# Patient Record
Sex: MALE | Race: BLACK OR AFRICAN AMERICAN | NOT HISPANIC OR LATINO | Employment: OTHER | URBAN - METROPOLITAN AREA
[De-identification: names, ages, dates, MRNs, and addresses within clinical notes are randomized per-mention and may not be internally consistent; named-entity substitution may affect disease eponyms.]

---

## 2017-09-02 ENCOUNTER — HOSPITAL ENCOUNTER (EMERGENCY)
Facility: HOSPITAL | Age: 67
Discharge: HOME | End: 2017-09-02
Attending: EMERGENCY MEDICINE

## 2017-09-02 VITALS
TEMPERATURE: 99 F | HEART RATE: 50 BPM | OXYGEN SATURATION: 99 % | DIASTOLIC BLOOD PRESSURE: 59 MMHG | SYSTOLIC BLOOD PRESSURE: 111 MMHG | RESPIRATION RATE: 18 BRPM

## 2017-09-02 DIAGNOSIS — R07.9 CHEST PAIN: ICD-10-CM

## 2017-09-02 LAB
ALBUMIN SERPL BCP-MCNC: 4.1 G/DL
ALP SERPL-CCNC: 78 U/L
ALT SERPL W/O P-5'-P-CCNC: 28 U/L
ANION GAP SERPL CALC-SCNC: 9 MMOL/L
APTT BLDCRRT: 26.3 SEC
AST SERPL-CCNC: 28 U/L
BASOPHILS # BLD AUTO: 0.03 K/UL
BASOPHILS NFR BLD: 0.7 %
BILIRUB SERPL-MCNC: 0.6 MG/DL
BUN SERPL-MCNC: 19 MG/DL
CALCIUM SERPL-MCNC: 9.9 MG/DL
CHLORIDE SERPL-SCNC: 101 MMOL/L
CO2 SERPL-SCNC: 28 MMOL/L
CREAT SERPL-MCNC: 1.2 MG/DL
D DIMER PPP FEU-MCNC: <200 NG/ML
DIFFERENTIAL METHOD: ABNORMAL
EOSINOPHIL # BLD AUTO: 0.1 K/UL
EOSINOPHIL NFR BLD: 1.6 %
ERYTHROCYTE [DISTWIDTH] IN BLOOD BY AUTOMATED COUNT: 14.9 %
EST. GFR  (AFRICAN AMERICAN): >60 ML/MIN/1.73 M^2
EST. GFR  (NON AFRICAN AMERICAN): >60 ML/MIN/1.73 M^2
GLUCOSE SERPL-MCNC: 118 MG/DL
HCT VFR BLD AUTO: 45.9 %
HGB BLD-MCNC: 14.8 G/DL
INR PPP: 1.3
LYMPHOCYTES # BLD AUTO: 1.5 K/UL
LYMPHOCYTES NFR BLD: 34.7 %
MAGNESIUM SERPL-MCNC: 2.2 MG/DL
MCH RBC QN AUTO: 28 PG
MCHC RBC AUTO-ENTMCNC: 32.2 G/DL
MCV RBC AUTO: 87 FL
MONOCYTES # BLD AUTO: 0.6 K/UL
MONOCYTES NFR BLD: 14.2 %
NEUTROPHILS # BLD AUTO: 2.1 K/UL
NEUTROPHILS NFR BLD: 48.1 %
NT-PROBNP: 40 PG/ML
PLATELET # BLD AUTO: 207 K/UL
PMV BLD AUTO: 10.4 FL
POTASSIUM SERPL-SCNC: 4.5 MMOL/L
PROT SERPL-MCNC: 7.5 G/DL
PROTHROMBIN TIME: 13.9 SEC
RBC # BLD AUTO: 5.28 M/UL
SODIUM SERPL-SCNC: 138 MMOL/L
TROPONIN I SERPL DL<=0.01 NG/ML-MCNC: <0.012 NG/ML
WBC # BLD AUTO: 4.3 K/UL

## 2017-09-02 PROCEDURE — 99284 EMERGENCY DEPT VISIT MOD MDM: CPT | Mod: 25

## 2017-09-02 PROCEDURE — 93010 ELECTROCARDIOGRAM REPORT: CPT | Mod: 76,,, | Performed by: INTERNAL MEDICINE

## 2017-09-02 PROCEDURE — 83880 ASSAY OF NATRIURETIC PEPTIDE: CPT

## 2017-09-02 PROCEDURE — 93005 ELECTROCARDIOGRAM TRACING: CPT

## 2017-09-02 PROCEDURE — 80053 COMPREHEN METABOLIC PANEL: CPT

## 2017-09-02 PROCEDURE — 85025 COMPLETE CBC W/AUTO DIFF WBC: CPT

## 2017-09-02 PROCEDURE — 83735 ASSAY OF MAGNESIUM: CPT

## 2017-09-02 PROCEDURE — 84484 ASSAY OF TROPONIN QUANT: CPT

## 2017-09-02 PROCEDURE — 25000003 PHARM REV CODE 250: Performed by: EMERGENCY MEDICINE

## 2017-09-02 PROCEDURE — 85730 THROMBOPLASTIN TIME PARTIAL: CPT

## 2017-09-02 PROCEDURE — 27000221 HC OXYGEN, UP TO 24 HOURS

## 2017-09-02 PROCEDURE — 85610 PROTHROMBIN TIME: CPT

## 2017-09-02 PROCEDURE — 85379 FIBRIN DEGRADATION QUANT: CPT

## 2017-09-02 RX ORDER — LISINOPRIL 20 MG/1
20 TABLET ORAL DAILY
COMMUNITY
End: 2019-06-23

## 2017-09-02 RX ORDER — NAPROXEN SODIUM 220 MG/1
81 TABLET, FILM COATED ORAL DAILY
COMMUNITY
End: 2019-06-23

## 2017-09-02 RX ORDER — TAMSULOSIN HYDROCHLORIDE 0.4 MG/1
0.4 CAPSULE ORAL DAILY
COMMUNITY
End: 2022-12-21 | Stop reason: SDUPTHER

## 2017-09-02 RX ORDER — BISOPROLOL FUMARATE AND HYDROCHLOROTHIAZIDE 2.5; 6.25 MG/1; MG/1
1 TABLET ORAL DAILY
COMMUNITY
End: 2019-06-23

## 2017-09-02 RX ORDER — PHENAZOPYRIDINE HYDROCHLORIDE 200 MG/1
200 TABLET, FILM COATED ORAL 3 TIMES DAILY PRN
COMMUNITY
End: 2019-06-23

## 2017-09-02 RX ORDER — ASPIRIN 325 MG
325 TABLET ORAL ONCE
Status: COMPLETED | OUTPATIENT
Start: 2017-09-02 | End: 2017-09-02

## 2017-09-02 RX ORDER — NIFEDIPINE 30 MG/1
30 TABLET, FILM COATED, EXTENDED RELEASE ORAL DAILY
COMMUNITY
End: 2019-06-23

## 2017-09-02 RX ORDER — CEFUROXIME AXETIL 500 MG/1
500 TABLET ORAL 2 TIMES DAILY
COMMUNITY
End: 2019-05-18

## 2017-09-02 RX ADMIN — ASPIRIN 325 MG ORAL TABLET 325 MG: 325 PILL ORAL at 06:09

## 2017-09-02 RX ADMIN — NITROGLYCERIN 1 INCH: 20 OINTMENT TOPICAL at 06:09

## 2017-09-02 NOTE — ED PROVIDER NOTES
"Encounter Date: 9/2/2017       History     Chief Complaint   Patient presents with    Chest Pain     substernal pain for 3 days     This patient is a 67-year-old male, presents to the emergency department complaining of chest discomfort.  Started 2 days ago while he was sitting down at home.  Retrosternal pressure sensation, associated with one episode of diaphoresis, intermittent shortness of breath.  Has been nearly constant, but does wax and wane.  Gets better if he sits down and relaxes, gets worse if he gets up and exerts himself.  No associated palpitations nausea or vomiting.    In the early 1990s the patient was diagnosed with some form of valvular disease.  He does not know which valve is urged to and, but says that he was told it is "leaky", has not required surgical repair, and replacement, and does not have heart failure.  He also reports that he has had 3 angiograms over the years, the most recently was about 2-3 months ago.  After one of the angiograms she was told that one of the vessels had a 20% obstruction, but after the most recent angiogram he was told everything was normal.    He has never had chest pain like he has been experiencing in the last few days until this event.    He has a history of hypertension, he does not have diabetes, or hypercholesterolemia.  He has a remote history of smoking, but it is been several decades.          Review of patient's allergies indicates:  No Known Allergies  Past Medical History:   Diagnosis Date    BPH (benign prostatic hyperplasia)     Hypertension      No past surgical history on file.  No family history on file.  Social History   Substance Use Topics    Smoking status: Never Smoker    Smokeless tobacco: Not on file    Alcohol use No     Review of Systems   Constitutional: Positive for diaphoresis. Negative for chills and fever.   HENT: Negative for congestion and rhinorrhea.    Eyes: Negative for visual disturbance.   Respiratory: Positive for " shortness of breath. Negative for cough and wheezing.    Cardiovascular: Positive for chest pain. Negative for palpitations and leg swelling.   Gastrointestinal: Negative for abdominal pain, diarrhea, nausea and vomiting.   Endocrine: Negative for polyuria.        Has a history of diabetes mellitus   Genitourinary: Negative for dysuria and hematuria.   Musculoskeletal: Negative for arthralgias and myalgias.   Skin: Negative for rash.   Allergic/Immunologic: Negative for immunocompromised state.   Neurological: Negative for syncope and light-headedness.   Hematological:        No history of malignancy DVT or PE   Psychiatric/Behavioral: Negative for confusion.       Physical Exam     Initial Vitals [09/02/17 1736]   BP Pulse Resp Temp SpO2   (!) 145/78 (!) 53 20 98.8 °F (37.1 °C) 100 %      MAP       100.33         Physical Exam    Nursing note and vitals reviewed.  Constitutional: He appears well-developed and well-nourished. He is not diaphoretic. No distress.   HENT:   Head: Normocephalic.   Right Ear: External ear normal.   Left Ear: External ear normal.   Nose: Nose normal.   Mouth/Throat: Oropharynx is clear and moist.   Eyes: Conjunctivae and EOM are normal. Pupils are equal, round, and reactive to light.   Neck: No JVD present.   Cardiovascular: Normal rate, regular rhythm, normal heart sounds and intact distal pulses.   No murmur heard.  Pulmonary/Chest: Breath sounds normal. No stridor. No respiratory distress. He has no wheezes.   Abdominal: Soft. Bowel sounds are normal. He exhibits no distension. There is no tenderness.   Musculoskeletal: He exhibits no edema or tenderness.   Neurological: He is alert and oriented to person, place, and time. He has normal strength. No sensory deficit.   Skin: Skin is warm and dry. No rash noted.   Psychiatric: He has a normal mood and affect.         ED Course   Procedures  Labs Reviewed   CBC W/ AUTO DIFFERENTIAL - Abnormal; Notable for the following:        Result  Value    RDW 14.9 (*)     All other components within normal limits   COMPREHENSIVE METABOLIC PANEL - Abnormal; Notable for the following:     Glucose 118 (*)     All other components within normal limits   PROTIME-INR - Abnormal; Notable for the following:     Prothrombin Time 13.9 (*)     INR 1.3 (*)     All other components within normal limits   APTT   MAGNESIUM   TROPONIN I   D DIMER   NT-PRO NATRIURETIC PEPTIDE       Results for orders placed or performed during the hospital encounter of 09/02/17   APTT   Result Value Ref Range    aPTT 26.3 21.0 - 32.0 sec   CBC auto differential   Result Value Ref Range    WBC 4.30 3.90 - 12.70 K/uL    RBC 5.28 4.60 - 6.20 M/uL    Hemoglobin 14.8 14.0 - 18.0 g/dL    Hematocrit 45.9 40.0 - 54.0 %    MCV 87 82 - 98 fL    MCH 28.0 27.0 - 31.0 pg    MCHC 32.2 32.0 - 36.0 g/dL    RDW 14.9 (H) 11.5 - 14.5 %    Platelets 207 150 - 350 K/uL    MPV 10.4 9.2 - 12.9 fL    Gran # 2.1 1.8 - 7.7 K/uL    Lymph # 1.5 1.0 - 4.8 K/uL    Mono # 0.6 0.3 - 1.0 K/uL    Eos # 0.1 0.0 - 0.5 K/uL    Baso # 0.03 0.00 - 0.20 K/uL    Gran% 48.1 38.0 - 73.0 %    Lymph% 34.7 18.0 - 48.0 %    Mono% 14.2 4.0 - 15.0 %    Eosinophil% 1.6 0.0 - 8.0 %    Basophil% 0.7 0.0 - 1.9 %    Differential Method Automated    Comprehensive metabolic panel   Result Value Ref Range    Sodium 138 136 - 145 mmol/L    Potassium 4.5 3.5 - 5.1 mmol/L    Chloride 101 95 - 110 mmol/L    CO2 28 23 - 29 mmol/L    Glucose 118 (H) 70 - 110 mg/dL    BUN, Bld 19 2 - 20 mg/dL    Creatinine 1.20 0.50 - 1.40 mg/dL    Calcium 9.9 8.7 - 10.5 mg/dL    Total Protein 7.5 6.0 - 8.4 g/dL    Albumin 4.1 3.5 - 5.2 g/dL    Total Bilirubin 0.6 0.1 - 1.0 mg/dL    Alkaline Phosphatase 78 38 - 126 U/L    AST 28 15 - 46 U/L    ALT 28 10 - 44 U/L    Anion Gap 9 8 - 16 mmol/L    eGFR if African American >60.0 >60 mL/min/1.73 m^2    eGFR if non African American >60.0 >60 mL/min/1.73 m^2   Magnesium   Result Value Ref Range    Magnesium 2.2 1.6 - 2.6 mg/dL    Protime-INR   Result Value Ref Range    Prothrombin Time 13.9 (H) 9.0 - 12.5 sec    INR 1.3 (H) 0.8 - 1.2   Troponin I   Result Value Ref Range    Troponin I <0.012 0.012 - 0.034 ng/mL   D Dimer   Result Value Ref Range    D-Dimer <200 <230 ng/mL   NT-Pro Natriuretic Peptide   Result Value Ref Range    NT-proBNP 40 5 - 900 pg/mL       EKG Readings: (Independently Interpreted)   I independently reviewed the EKG tracing.  It shows sinus bradycardia, heart rate is 55.  There is an incomplete right bundle branch block pattern.  No acute ST elevation or depression.       X-Rays:   Independently Interpreted Readings:   Other Readings:  I independently reviewed the chest x-ray.  Heart size is normal.  It is underpenetrated film, but I do not see any distinct infiltrates or effusions, appears normal    Medical Decision Making:   Initial Assessment:   Chest pain.  This patient has a history of cardiac valvular disease, we do not know the specifics.  He had a left heart catheterization in June.  We obtained the report from [facility], and he had angiographically normal coronary arteries with normal LV systolic function and mild diastolic dysfunction.    His d-dimer is not elevated, so the likelihood of pulmonary emboli is low.  I asked x-ray shows no evidence of pneumonia, and his cardiac/mediastinal silhouette looked normal, allowing for the suboptimal film, and he has normal distal pulses, so the likelihood of an aortic catastrophe is also very low.    Given the normal left heart catheterization 2 months ago, I feel it is safe to discharge him home, it is unlikely that the discomfort he is experiencing is cardiogenic.  He should follow-up with his primary care physician, Dr. Barker.  The patient's wife says that they saw Dr. Barker yesterday, and that he was placed on Protonix for this problem.  He should continue that medication.                   ED Course      Clinical Impression:   The primary encounter diagnosis was Chest  pain with noncardiac features. A diagnosis of Chest pain was also pertinent to this visit.    Disposition:   Disposition: Discharged  Condition: Stable                        Raheel Kumar MD  09/02/17 2994

## 2017-09-03 NOTE — ED NOTES
Report received from Mesha at bedside. Pt awake, alert, oriented with family at bedside. Pt denies any chest pain or needs at this time. Pt updated on current plan of care and pt and family states they understand.

## 2017-09-03 NOTE — ED NOTES
Pt updated on current plan of care, awaiting heart cath results from [facility]. Pt and family state they understand.

## 2019-03-25 ENCOUNTER — HOSPITAL ENCOUNTER (EMERGENCY)
Facility: HOSPITAL | Age: 69
Discharge: HOME | End: 2019-03-25
Attending: FAMILY MEDICINE
Payer: MEDICARE

## 2019-03-25 VITALS
WEIGHT: 225 LBS | OXYGEN SATURATION: 97 % | TEMPERATURE: 98 F | DIASTOLIC BLOOD PRESSURE: 58 MMHG | HEART RATE: 53 BPM | RESPIRATION RATE: 19 BRPM | SYSTOLIC BLOOD PRESSURE: 99 MMHG | BODY MASS INDEX: 32.21 KG/M2 | HEIGHT: 70 IN

## 2019-03-25 DIAGNOSIS — R07.9 CHEST PAIN: Primary | ICD-10-CM

## 2019-03-25 LAB
ALBUMIN SERPL BCP-MCNC: 4 G/DL (ref 3.5–5.2)
ALP SERPL-CCNC: 96 U/L (ref 38–126)
ALT SERPL W/O P-5'-P-CCNC: 25 U/L (ref 10–44)
ANION GAP SERPL CALC-SCNC: 5 MMOL/L (ref 8–16)
AST SERPL-CCNC: 31 U/L (ref 15–46)
BASOPHILS # BLD AUTO: 0.03 K/UL (ref 0–0.2)
BASOPHILS NFR BLD: 0.6 % (ref 0–1.9)
BILIRUB SERPL-MCNC: 0.5 MG/DL (ref 0.1–1)
BUN SERPL-MCNC: 18 MG/DL (ref 2–20)
CALCIUM SERPL-MCNC: 9.3 MG/DL (ref 8.7–10.5)
CHLORIDE SERPL-SCNC: 100 MMOL/L (ref 95–110)
CO2 SERPL-SCNC: 31 MMOL/L (ref 23–29)
CREAT SERPL-MCNC: 1.19 MG/DL (ref 0.5–1.4)
DIFFERENTIAL METHOD: NORMAL
EOSINOPHIL # BLD AUTO: 0.2 K/UL (ref 0–0.5)
EOSINOPHIL NFR BLD: 4.7 % (ref 0–8)
ERYTHROCYTE [DISTWIDTH] IN BLOOD BY AUTOMATED COUNT: 13.7 % (ref 11.5–14.5)
EST. GFR  (AFRICAN AMERICAN): >60 ML/MIN/1.73 M^2
EST. GFR  (NON AFRICAN AMERICAN): >60 ML/MIN/1.73 M^2
GLUCOSE SERPL-MCNC: 121 MG/DL (ref 70–110)
HCT VFR BLD AUTO: 45.2 % (ref 40–54)
HGB BLD-MCNC: 14.5 G/DL (ref 14–18)
INR PPP: 1.3 (ref 0.8–1.2)
LYMPHOCYTES # BLD AUTO: 1.8 K/UL (ref 1–4.8)
LYMPHOCYTES NFR BLD: 35.4 % (ref 18–48)
MCH RBC QN AUTO: 28.3 PG (ref 27–31)
MCHC RBC AUTO-ENTMCNC: 32.1 G/DL (ref 32–36)
MCV RBC AUTO: 88 FL (ref 82–98)
MONOCYTES # BLD AUTO: 0.7 K/UL (ref 0.3–1)
MONOCYTES NFR BLD: 14.6 % (ref 4–15)
NEUTROPHILS # BLD AUTO: 2.2 K/UL (ref 1.8–7.7)
NEUTROPHILS NFR BLD: 44.3 % (ref 38–73)
NT-PROBNP: 15 PG/ML (ref 5–900)
PLATELET # BLD AUTO: 213 K/UL (ref 150–350)
PMV BLD AUTO: 10 FL (ref 9.2–12.9)
POTASSIUM SERPL-SCNC: 4 MMOL/L (ref 3.5–5.1)
PROT SERPL-MCNC: 7.2 G/DL (ref 6–8.4)
PROTHROMBIN TIME: 13.6 SEC (ref 9–12.5)
RBC # BLD AUTO: 5.12 M/UL (ref 4.6–6.2)
SODIUM SERPL-SCNC: 136 MMOL/L (ref 136–145)
TROPONIN I SERPL DL<=0.01 NG/ML-MCNC: <0.012 NG/ML (ref 0.01–0.03)
TROPONIN I SERPL DL<=0.01 NG/ML-MCNC: <0.012 NG/ML (ref 0.01–0.03)
WBC # BLD AUTO: 5.06 K/UL (ref 3.9–12.7)

## 2019-03-25 PROCEDURE — 80053 COMPREHEN METABOLIC PANEL: CPT | Mod: ER

## 2019-03-25 PROCEDURE — 84484 ASSAY OF TROPONIN QUANT: CPT | Mod: ER

## 2019-03-25 PROCEDURE — 99285 EMERGENCY DEPT VISIT HI MDM: CPT | Mod: ER

## 2019-03-25 PROCEDURE — 85025 COMPLETE CBC W/AUTO DIFF WBC: CPT | Mod: ER

## 2019-03-25 PROCEDURE — 83880 ASSAY OF NATRIURETIC PEPTIDE: CPT | Mod: ER

## 2019-03-25 PROCEDURE — 93005 ELECTROCARDIOGRAM TRACING: CPT | Mod: ER

## 2019-03-25 PROCEDURE — 25000003 PHARM REV CODE 250: Mod: ER | Performed by: FAMILY MEDICINE

## 2019-03-25 PROCEDURE — 93010 EKG 12-LEAD: ICD-10-PCS | Mod: ,,, | Performed by: STUDENT IN AN ORGANIZED HEALTH CARE EDUCATION/TRAINING PROGRAM

## 2019-03-25 PROCEDURE — 85610 PROTHROMBIN TIME: CPT | Mod: ER

## 2019-03-25 PROCEDURE — 93010 ELECTROCARDIOGRAM REPORT: CPT | Mod: ,,, | Performed by: STUDENT IN AN ORGANIZED HEALTH CARE EDUCATION/TRAINING PROGRAM

## 2019-03-25 RX ORDER — NAPROXEN SODIUM 220 MG/1
324 TABLET, FILM COATED ORAL ONCE
Status: DISCONTINUED | OUTPATIENT
Start: 2019-03-25 | End: 2019-03-25

## 2019-03-25 RX ADMIN — NITROGLYCERIN 1 INCH: 20 OINTMENT TOPICAL at 12:03

## 2019-03-25 NOTE — ED NOTES
Patient reports chest pain has decreased to a 4 out of 10 since nitro paste applied. Dr. Pradhan notified.

## 2019-03-25 NOTE — ED PROVIDER NOTES
Encounter Date: 3/25/2019       History     Chief Complaint   Patient presents with    Chest Pain     Midsternal non radiating crushing chest pain x1 hour prior to arrival; pt states pain is 9-10/10, not relieved or exacerbated by anything; denies any SOB or nausea but admits to some dizziness, took 650 mg ASA prior to arrival     68-year-old male patient had midsternal nonradiating crushing chest pain early in the day subsided on its own otherwise no shortness of breath no dyspnea exertion orthopnea no history of or stent placement.  Patient has no nausea no vomiting is pain free at this time.        Review of patient's allergies indicates:   Allergen Reactions    Pcn [penicillins]      Past Medical History:   Diagnosis Date    BPH (benign prostatic hyperplasia)     Hypertension     Mitral valve prolapse syndrome     Prostate enlargement      No past surgical history on file.  History reviewed. No pertinent family history.  Social History     Tobacco Use    Smoking status: Never Smoker   Substance Use Topics    Alcohol use: No     Frequency: Never    Drug use: No     Review of Systems   Constitutional: Negative for fever.   HENT: Negative for sore throat.    Respiratory: Negative for shortness of breath.    Cardiovascular: Positive for chest pain.   Gastrointestinal: Negative for nausea.   Genitourinary: Negative for dysuria.   Musculoskeletal: Negative for back pain.   Skin: Negative for rash.   Neurological: Negative for weakness.   Hematological: Does not bruise/bleed easily.   All other systems reviewed and are negative.      Physical Exam     Initial Vitals   BP Pulse Resp Temp SpO2   03/25/19 0034 03/25/19 0034 03/25/19 0034 03/25/19 0031 03/25/19 0034   (!) 141/81 61 18 97.8 °F (36.6 °C) 99 %      MAP       --                Physical Exam    Nursing note and vitals reviewed.  Constitutional: He appears well-developed and well-nourished.   HENT:   Head: Normocephalic and atraumatic.   Eyes:  Conjunctivae and EOM are normal. Pupils are equal, round, and reactive to light.   Neck: Normal range of motion. Neck supple.   Cardiovascular: Normal rate, regular rhythm and normal heart sounds.   Pulmonary/Chest: Breath sounds normal. He exhibits tenderness.   Abdominal: Soft. Bowel sounds are normal.   Musculoskeletal: Normal range of motion.   Neurological: He is alert. He has normal reflexes.         ED Course   Procedures  Labs Reviewed   COMPREHENSIVE METABOLIC PANEL - Abnormal; Notable for the following components:       Result Value    CO2 31 (*)     Glucose 121 (*)     Anion Gap 5 (*)     All other components within normal limits   PROTIME-INR - Abnormal; Notable for the following components:    Prothrombin Time 13.6 (*)     INR 1.3 (*)     All other components within normal limits   CBC W/ AUTO DIFFERENTIAL   TROPONIN I   NT-PRO NATRIURETIC PEPTIDE   TROPONIN I     EKG Readings: (Independently Interpreted)   Rhythm: Normal Sinus Rhythm. Heart Rate: 53. Ectopy: No Ectopy. Conduction: Normal. ST Segments: Normal ST Segments. T Waves: Normal. Clinical Impression: Normal Sinus Rhythm       Imaging Results          X-Ray Chest PA And Lateral (In process)               X-Rays:   Independently Interpreted Readings:   Other Readings:  Please see radiology report    Medical Decision Making:   Initial Assessment:   Patient sitting in no distress and pleasant. Patient has no other complaints other than documented.   q  Differential Diagnosis:   Angina, unstable angina, hypertension, hypertension urgency, hypertension emergency, myocardial infarction                        Clinical Impression:       ICD-10-CM ICD-9-CM   1. Chest pain R07.9 786.50                                Raymond Pradhan MD  03/25/19 0416

## 2019-04-12 ENCOUNTER — HOSPITAL ENCOUNTER (EMERGENCY)
Facility: HOSPITAL | Age: 69
Discharge: HOME | End: 2019-04-12
Attending: FAMILY MEDICINE
Payer: MEDICARE

## 2019-04-12 VITALS
TEMPERATURE: 98 F | RESPIRATION RATE: 22 BRPM | BODY MASS INDEX: 32.35 KG/M2 | WEIGHT: 226 LBS | SYSTOLIC BLOOD PRESSURE: 136 MMHG | HEART RATE: 50 BPM | DIASTOLIC BLOOD PRESSURE: 63 MMHG | HEIGHT: 70 IN | OXYGEN SATURATION: 94 %

## 2019-04-12 DIAGNOSIS — R07.9 CHEST PAIN: ICD-10-CM

## 2019-04-12 LAB
ALBUMIN SERPL BCP-MCNC: 4.1 G/DL (ref 3.5–5.2)
ALP SERPL-CCNC: 99 U/L (ref 38–126)
ALT SERPL W/O P-5'-P-CCNC: 26 U/L (ref 10–44)
ANION GAP SERPL CALC-SCNC: 5 MMOL/L (ref 8–16)
AST SERPL-CCNC: 29 U/L (ref 15–46)
BASOPHILS # BLD AUTO: 0.03 K/UL (ref 0–0.2)
BASOPHILS NFR BLD: 0.6 % (ref 0–1.9)
BILIRUB SERPL-MCNC: 0.2 MG/DL (ref 0.1–1)
BUN SERPL-MCNC: 27 MG/DL (ref 2–20)
CALCIUM SERPL-MCNC: 9.7 MG/DL (ref 8.7–10.5)
CHLORIDE SERPL-SCNC: 104 MMOL/L (ref 95–110)
CO2 SERPL-SCNC: 28 MMOL/L (ref 23–29)
CREAT SERPL-MCNC: 1.17 MG/DL (ref 0.5–1.4)
DIFFERENTIAL METHOD: ABNORMAL
EOSINOPHIL # BLD AUTO: 0.2 K/UL (ref 0–0.5)
EOSINOPHIL NFR BLD: 4.3 % (ref 0–8)
ERYTHROCYTE [DISTWIDTH] IN BLOOD BY AUTOMATED COUNT: 13.9 % (ref 11.5–14.5)
EST. GFR  (AFRICAN AMERICAN): >60 ML/MIN/1.73 M^2
EST. GFR  (NON AFRICAN AMERICAN): >60 ML/MIN/1.73 M^2
GLUCOSE SERPL-MCNC: 111 MG/DL (ref 70–110)
HCT VFR BLD AUTO: 45 % (ref 40–54)
HGB BLD-MCNC: 14.3 G/DL (ref 14–18)
LYMPHOCYTES # BLD AUTO: 1.8 K/UL (ref 1–4.8)
LYMPHOCYTES NFR BLD: 32.8 % (ref 18–48)
MCH RBC QN AUTO: 28.1 PG (ref 27–31)
MCHC RBC AUTO-ENTMCNC: 31.8 G/DL (ref 32–36)
MCV RBC AUTO: 89 FL (ref 82–98)
MONOCYTES # BLD AUTO: 0.9 K/UL (ref 0.3–1)
MONOCYTES NFR BLD: 16.9 % (ref 4–15)
NEUTROPHILS # BLD AUTO: 2.5 K/UL (ref 1.8–7.7)
NEUTROPHILS NFR BLD: 45.2 % (ref 38–73)
NT-PROBNP: 32 PG/ML (ref 5–900)
PLATELET # BLD AUTO: 205 K/UL (ref 150–350)
PMV BLD AUTO: 10.2 FL (ref 9.2–12.9)
POTASSIUM SERPL-SCNC: 4.1 MMOL/L (ref 3.5–5.1)
PROT SERPL-MCNC: 7.4 G/DL (ref 6–8.4)
RBC # BLD AUTO: 5.08 M/UL (ref 4.6–6.2)
SODIUM SERPL-SCNC: 137 MMOL/L (ref 136–145)
TROPONIN I SERPL DL<=0.01 NG/ML-MCNC: <0.012 NG/ML (ref 0.01–0.03)
WBC # BLD AUTO: 5.4 K/UL (ref 3.9–12.7)

## 2019-04-12 PROCEDURE — 93010 EKG 12-LEAD: ICD-10-PCS | Mod: ,,, | Performed by: INTERNAL MEDICINE

## 2019-04-12 PROCEDURE — 93005 ELECTROCARDIOGRAM TRACING: CPT | Mod: ER

## 2019-04-12 PROCEDURE — 94760 N-INVAS EAR/PLS OXIMETRY 1: CPT | Mod: ER

## 2019-04-12 PROCEDURE — 83880 ASSAY OF NATRIURETIC PEPTIDE: CPT | Mod: ER

## 2019-04-12 PROCEDURE — 84484 ASSAY OF TROPONIN QUANT: CPT | Mod: ER

## 2019-04-12 PROCEDURE — 80053 COMPREHEN METABOLIC PANEL: CPT | Mod: ER

## 2019-04-12 PROCEDURE — 63600175 PHARM REV CODE 636 W HCPCS: Mod: ER | Performed by: FAMILY MEDICINE

## 2019-04-12 PROCEDURE — 99285 EMERGENCY DEPT VISIT HI MDM: CPT | Mod: 25,ER

## 2019-04-12 PROCEDURE — 93010 ELECTROCARDIOGRAM REPORT: CPT | Mod: ,,, | Performed by: INTERNAL MEDICINE

## 2019-04-12 PROCEDURE — 96374 THER/PROPH/DIAG INJ IV PUSH: CPT | Mod: ER

## 2019-04-12 PROCEDURE — 25000003 PHARM REV CODE 250: Mod: ER | Performed by: FAMILY MEDICINE

## 2019-04-12 PROCEDURE — S0028 INJECTION, FAMOTIDINE, 20 MG: HCPCS | Mod: ER | Performed by: FAMILY MEDICINE

## 2019-04-12 PROCEDURE — 85025 COMPLETE CBC W/AUTO DIFF WBC: CPT | Mod: ER

## 2019-04-12 RX ORDER — NITROGLYCERIN 0.4 MG/1
0.4 TABLET SUBLINGUAL
Status: COMPLETED | OUTPATIENT
Start: 2019-04-12 | End: 2019-04-12

## 2019-04-12 RX ORDER — FAMOTIDINE 10 MG/ML
20 INJECTION INTRAVENOUS
Status: COMPLETED | OUTPATIENT
Start: 2019-04-12 | End: 2019-04-12

## 2019-04-12 RX ORDER — ASPIRIN 325 MG
325 TABLET, DELAYED RELEASE (ENTERIC COATED) ORAL
Status: COMPLETED | OUTPATIENT
Start: 2019-04-12 | End: 2019-04-12

## 2019-04-12 RX ADMIN — LIDOCAINE HYDROCHLORIDE: 20 SOLUTION ORAL; TOPICAL at 09:04

## 2019-04-12 RX ADMIN — FAMOTIDINE 20 MG: 10 INJECTION, SOLUTION INTRAVENOUS at 09:04

## 2019-04-12 RX ADMIN — NITROGLYCERIN 0.4 MG: 0.4 TABLET, ORALLY DISINTEGRATING SUBLINGUAL at 08:04

## 2019-04-12 RX ADMIN — ASPIRIN 325 MG: 325 TABLET, COATED ORAL at 08:04

## 2019-04-13 NOTE — ED NOTES
Dr. Rehman informed that patient blood pressure 132/72 with a heart rate of 51. Instructed to still administer Nitro SL at this time.

## 2019-04-13 NOTE — ED NOTES
Patient currently has no complaints of pain at this time. Vital signs remain WDL at this time. MD aware of blood pressure and heart rate. Will continue to monitor patient.

## 2019-04-13 NOTE — ED PROVIDER NOTES
Encounter Date: 4/12/2019       History     Chief Complaint   Patient presents with    Chest Pain     left sided chest pain x 1 hour; pain radiates to left jaw and is described as sharp.     68-year-old male complains of left chest pain while he was sitting at [employer] about 2 hr ago.  Patient was very anxious and had mild sweating also..  This pain was 6/10 it started and lasted for a minute. Pain resolved and only tightness now.  Pain radiated to his jaw.  He had chronic chest pains for which he followed cardiology.  Patient had angiogram about 2 years ago at [facility] and did not receive any intervention.  Patient saw his Cardiology yesterday and had a stress test by treadmill.  Patient did not have chest pain during the test.    Patient denies fever, cough, shortness of breath. No nausea vomiting or dizziness.  He did not take any medication for the pain.    The history is provided by the patient.     Review of patient's allergies indicates:   Allergen Reactions    Pcn [penicillins]      Past Medical History:   Diagnosis Date    BPH (benign prostatic hyperplasia)     Hypertension     Mitral valve prolapse syndrome     Prostate enlargement      History reviewed. No pertinent surgical history.  History reviewed. No pertinent family history.  Social History     Tobacco Use    Smoking status: Never Smoker   Substance Use Topics    Alcohol use: No     Frequency: Never    Drug use: No     Review of Systems   Constitutional: Negative for activity change, appetite change, chills and fever.   HENT: Negative for congestion, ear discharge, rhinorrhea, sinus pressure, sinus pain, sore throat and trouble swallowing.    Eyes: Negative for photophobia, pain, discharge, redness, itching and visual disturbance.   Respiratory: Negative for cough, chest tightness, shortness of breath and wheezing.    Cardiovascular: Positive for chest pain. Negative for palpitations and leg swelling.   Gastrointestinal: Negative for  abdominal distention, abdominal pain, constipation, diarrhea, nausea and vomiting.   Genitourinary: Negative for dysuria, flank pain, frequency and hematuria.   Musculoskeletal: Negative for back pain, gait problem, neck pain and neck stiffness.   Skin: Negative for rash and wound.   Neurological: Negative for dizziness, tremors, seizures, syncope, speech difficulty, weakness, light-headedness, numbness and headaches.   Psychiatric/Behavioral: Negative for behavioral problems, confusion, hallucinations and sleep disturbance. The patient is not nervous/anxious.    All other systems reviewed and are negative.      Physical Exam     Initial Vitals [04/12/19 2009]   BP Pulse Resp Temp SpO2   (!) 149/80 60 17 98.9 °F (37.2 °C) 97 %      MAP       --         Physical Exam    Nursing note and vitals reviewed.  Constitutional: Vital signs are normal. He appears well-developed and well-nourished. He is active.   HENT:   Head: Normocephalic and atraumatic.   Nose: Nose normal.   Mouth/Throat: Oropharynx is clear and moist.   Eyes: Conjunctivae, EOM and lids are normal. Pupils are equal, round, and reactive to light.   Neck: Trachea normal, normal range of motion and full passive range of motion without pain. Neck supple. Normal range of motion present. No neck rigidity.   Cardiovascular: Normal rate, regular rhythm, S1 normal, S2 normal, normal heart sounds, intact distal pulses and normal pulses.   No murmur heard.  Pulmonary/Chest: Breath sounds normal. No respiratory distress. He has no wheezes. He has no rhonchi. He has no rales. He exhibits no tenderness.   Abdominal: Soft. Normal appearance and bowel sounds are normal. He exhibits no distension. There is no tenderness.   Musculoskeletal: Normal range of motion.   Lymphadenopathy:     He has no cervical adenopathy.   Neurological: He is alert and oriented to person, place, and time. He has normal reflexes. No cranial nerve deficit or sensory deficit. GCS score is 15. GCS  eye subscore is 4. GCS verbal subscore is 5. GCS motor subscore is 6.   Skin: Skin is warm and intact. Capillary refill takes less than 2 seconds. No abrasion, no bruising and no rash noted.   Psychiatric: He has a normal mood and affect. His speech is normal and behavior is normal. Judgment and thought content normal. He is not actively hallucinating. Cognition and memory are normal. He is attentive.         ED Course   Procedures  Labs Reviewed   CBC W/ AUTO DIFFERENTIAL   COMPREHENSIVE METABOLIC PANEL   TROPONIN I   B-TYPE NATRIURETIC PEPTIDE     EKG Readings: (Independently Interpreted)   Initial Reading: No STEMI. Rhythm: Normal Sinus Rhythm. Heart Rate: 59. Ectopy: No Ectopy. Conduction: LAFP. ST Segments: Normal ST Segments. T Waves: Normal. Clinical Impression: Normal Sinus Rhythm       Imaging Results    None          Medical Decision Making:   Initial Assessment:   Sharp chest pain in his left chest radiating to his left jaw for about less than a minute. No chest pain on arrival to ED.  Differential Diagnosis:   Angina, unstable angina, coronary syndrome, myocardial infraction, musculoskeletal pain, rib pain, costochondritis.  Clinical Tests:   Lab Tests: Ordered and Reviewed  Radiological Study: Ordered and Reviewed  Medical Tests: Ordered and Reviewed  ED Management:  Cardiac workup is negative with normal EKG and normal cardiac enzymes.  Patient burning pain did not get better with nitroglycerin.  He was treated with GI cocktail and Pepcid which made his burning sensation improved.  Symptoms can be secondary to GERD versus musculoskeletal pain. Advised to continue Pepcid as needed for pain.  Follow up primary care physician in next 2-3 days or to the ER if symptoms recur or worsen.                      Clinical Impression:       ICD-10-CM ICD-9-CM   1. Chest pain R07.9 786.50         Disposition:   Disposition: Discharged  Condition: Stable                        Carlo Rehman MD  04/12/19 6002

## 2019-05-18 ENCOUNTER — HOSPITAL ENCOUNTER (EMERGENCY)
Facility: HOSPITAL | Age: 69
Discharge: HOME | End: 2019-05-18
Attending: EMERGENCY MEDICINE
Payer: MEDICARE

## 2019-05-18 VITALS
DIASTOLIC BLOOD PRESSURE: 77 MMHG | RESPIRATION RATE: 20 BRPM | HEART RATE: 57 BPM | OXYGEN SATURATION: 99 % | TEMPERATURE: 98 F | SYSTOLIC BLOOD PRESSURE: 127 MMHG

## 2019-05-18 DIAGNOSIS — R00.2 PALPITATIONS: Primary | ICD-10-CM

## 2019-05-18 LAB
ALBUMIN SERPL BCP-MCNC: 3.6 G/DL (ref 3.5–5.2)
ALP SERPL-CCNC: 107 U/L (ref 55–135)
ALT SERPL W/O P-5'-P-CCNC: 20 U/L (ref 10–44)
ANION GAP SERPL CALC-SCNC: 8 MMOL/L (ref 8–16)
AST SERPL-CCNC: 25 U/L (ref 10–40)
BASOPHILS # BLD AUTO: 0.05 K/UL (ref 0–0.2)
BASOPHILS NFR BLD: 0.9 % (ref 0–1.9)
BILIRUB SERPL-MCNC: 0.2 MG/DL (ref 0.1–1)
BNP SERPL-MCNC: <10 PG/ML (ref 0–99)
BUN SERPL-MCNC: 30 MG/DL (ref 8–23)
CALCIUM SERPL-MCNC: 9.7 MG/DL (ref 8.7–10.5)
CHLORIDE SERPL-SCNC: 105 MMOL/L (ref 95–110)
CO2 SERPL-SCNC: 24 MMOL/L (ref 23–29)
CREAT SERPL-MCNC: 1.2 MG/DL (ref 0.5–1.4)
DIFFERENTIAL METHOD: ABNORMAL
EOSINOPHIL # BLD AUTO: 0.3 K/UL (ref 0–0.5)
EOSINOPHIL NFR BLD: 5.2 % (ref 0–8)
ERYTHROCYTE [DISTWIDTH] IN BLOOD BY AUTOMATED COUNT: 13.2 % (ref 11.5–14.5)
EST. GFR  (AFRICAN AMERICAN): >60 ML/MIN/1.73 M^2
EST. GFR  (NON AFRICAN AMERICAN): >60 ML/MIN/1.73 M^2
GLUCOSE SERPL-MCNC: 104 MG/DL (ref 70–110)
HCT VFR BLD AUTO: 44.2 % (ref 40–54)
HGB BLD-MCNC: 14.4 G/DL (ref 14–18)
IMM GRANULOCYTES # BLD AUTO: 0.03 K/UL (ref 0–0.04)
IMM GRANULOCYTES NFR BLD AUTO: 0.6 % (ref 0–0.5)
INR PPP: 1.1 (ref 0.8–1.2)
LYMPHOCYTES # BLD AUTO: 1.5 K/UL (ref 1–4.8)
LYMPHOCYTES NFR BLD: 28.4 % (ref 18–48)
MCH RBC QN AUTO: 28.6 PG (ref 27–31)
MCHC RBC AUTO-ENTMCNC: 32.6 G/DL (ref 32–36)
MCV RBC AUTO: 88 FL (ref 82–98)
MONOCYTES # BLD AUTO: 0.8 K/UL (ref 0.3–1)
MONOCYTES NFR BLD: 15.3 % (ref 4–15)
NEUTROPHILS # BLD AUTO: 2.7 K/UL (ref 1.8–7.7)
NEUTROPHILS NFR BLD: 49.6 % (ref 38–73)
NRBC BLD-RTO: 0 /100 WBC
PLATELET # BLD AUTO: 212 K/UL (ref 150–350)
PMV BLD AUTO: 10.5 FL (ref 9.2–12.9)
POTASSIUM SERPL-SCNC: 4.3 MMOL/L (ref 3.5–5.1)
PROT SERPL-MCNC: 7.3 G/DL (ref 6–8.4)
PROTHROMBIN TIME: 11.3 SEC (ref 9–12.5)
RBC # BLD AUTO: 5.03 M/UL (ref 4.6–6.2)
SODIUM SERPL-SCNC: 137 MMOL/L (ref 136–145)
TROPONIN I SERPL DL<=0.01 NG/ML-MCNC: <0.006 NG/ML (ref 0–0.03)
WBC # BLD AUTO: 5.43 K/UL (ref 3.9–12.7)

## 2019-05-18 PROCEDURE — 93010 EKG 12-LEAD: ICD-10-PCS | Mod: ,,, | Performed by: INTERNAL MEDICINE

## 2019-05-18 PROCEDURE — 93010 ELECTROCARDIOGRAM REPORT: CPT | Mod: ,,, | Performed by: INTERNAL MEDICINE

## 2019-05-18 PROCEDURE — 85025 COMPLETE CBC W/AUTO DIFF WBC: CPT

## 2019-05-18 PROCEDURE — 93005 ELECTROCARDIOGRAM TRACING: CPT

## 2019-05-18 PROCEDURE — 99285 EMERGENCY DEPT VISIT HI MDM: CPT | Mod: 25

## 2019-05-18 PROCEDURE — 84484 ASSAY OF TROPONIN QUANT: CPT

## 2019-05-18 PROCEDURE — 99284 EMERGENCY DEPT VISIT MOD MDM: CPT | Mod: ,,, | Performed by: EMERGENCY MEDICINE

## 2019-05-18 PROCEDURE — 80053 COMPREHEN METABOLIC PANEL: CPT

## 2019-05-18 PROCEDURE — 85610 PROTHROMBIN TIME: CPT

## 2019-05-18 PROCEDURE — 83880 ASSAY OF NATRIURETIC PEPTIDE: CPT

## 2019-05-18 PROCEDURE — 99284 PR EMERGENCY DEPT VISIT,LEVEL IV: ICD-10-PCS | Mod: ,,, | Performed by: EMERGENCY MEDICINE

## 2019-05-18 RX ORDER — ASPIRIN 325 MG
325 TABLET ORAL
Status: DISCONTINUED | OUTPATIENT
Start: 2019-05-18 | End: 2019-05-18

## 2019-05-18 NOTE — ED NOTES
Telemetry Verification   Patient placed on Telemetry Box    Box # [id number]   Monitor Tech    Rate 54   Rhythm Sinus javier

## 2019-05-18 NOTE — EKG INTERPRETATIONS - EMERGENCY DEPT.
ECG 1:  Independently interpreted by MD:  Rate 59, NSR, no stemi, no ectopy, no hypertrophy, normal intervals    ECG 2:  Independently interpreted by MD:  Rate 56, NSR, no stemi, no ectopy, no hypertrophy, normal intervals

## 2019-05-18 NOTE — ED NOTES
Patient identifiers verified and correct for Jerardo Callejas.   C/C: Palpitations  APPEARANCE: awake and alert in NAD.  SKIN: warm, dry and intact. No breakdown or bruising.  MUSCULOSKELETAL: Patient moving all extremities spontaneously, no obvious swelling or deformities noted. Ambulates independently.  RESPIRATORY: Denies shortness of breath. Respirations unlabored.   CARDIAC: Denies CP, 2+ distal pulses; no peripheral edema.  ABDOMEN: S/ND/NT, Denies nausea  : voids spontaneously, denies difficulty  Neurologic: AAO x 4; follows commands equal strength in all extremities; denies numbness/tingling. Denies dizziness or weakness.

## 2019-05-18 NOTE — ED PROVIDER NOTES
"Encounter Date: 5/18/2019       History     Chief Complaint   Patient presents with    Palpitations     C/o feeling like his "heart ain't beating right". States he feels like i'ts "fluttering" in his chest x 1.5 days      Patient is a 69 year old male with PMHX of MVP, HTN, and BPH. He presents to the ED for palpitations. He reports having palpitations for approximately two days. Reports associated sternal chest pain. Describes pain as constant and pressure. Rates pain 4/10. Denies pain radiation. He denies fever,chills, nausea, vomiting, sob, abd pain, dysuria, diarrhea, or constipation. He is a non smoker and denies alcohol use.        Review of patient's allergies indicates:   Allergen Reactions    Pcn [penicillins]      Past Medical History:   Diagnosis Date    BPH (benign prostatic hyperplasia)     Hypertension     Mitral valve prolapse syndrome     Prostate enlargement      No past surgical history on file.  No family history on file.  Social History     Tobacco Use    Smoking status: Never Smoker   Substance Use Topics    Alcohol use: No     Frequency: Never    Drug use: No     Review of Systems   Constitutional: Negative for fever.   HENT: Negative for sore throat.    Respiratory: Negative for shortness of breath.    Cardiovascular: Positive for chest pain and palpitations.   Gastrointestinal: Negative for abdominal pain, nausea and vomiting.   Genitourinary: Negative for dysuria.   Musculoskeletal: Negative for back pain.   Skin: Negative for rash.   Neurological: Negative for weakness.   Hematological: Does not bruise/bleed easily.       Physical Exam     Initial Vitals [05/18/19 0313]   BP Pulse Resp Temp SpO2   (!) 154/75 60 16 98.5 °F (36.9 °C) 97 %      MAP       --         Physical Exam    Vitals reviewed.  Constitutional: He appears well-developed and well-nourished. No distress.   HENT:   Head: Normocephalic.   Eyes: Conjunctivae are normal.   Neck: Normal range of motion.   Cardiovascular: " Normal rate and regular rhythm.   No murmur heard.  Pulmonary/Chest: Breath sounds normal. No respiratory distress. He has no wheezes. He has no rales.   Abdominal: Soft. Bowel sounds are normal. He exhibits no distension. There is no tenderness.   Musculoskeletal: Normal range of motion.   Neurological: He is alert and oriented to person, place, and time.   Skin: Skin is warm and dry. No erythema.         ED Course   Procedures  Labs Reviewed   CBC W/ AUTO DIFFERENTIAL - Abnormal; Notable for the following components:       Result Value    Immature Granulocytes 0.6 (*)     Mono% 15.3 (*)     All other components within normal limits   COMPREHENSIVE METABOLIC PANEL - Abnormal; Notable for the following components:    BUN, Bld 30 (*)     All other components within normal limits   TROPONIN I   B-TYPE NATRIURETIC PEPTIDE   PROTIME-INR          Imaging Results          X-Ray Chest PA And Lateral (Final result)  Result time 05/18/19 04:48:48    Final result by Chantal Masterson MD (05/18/19 04:48:48)                 Impression:      No radiographic evidence of acute intra-thoracic process.      Electronically signed by: Chantal Masterson MD  Date:    05/18/2019  Time:    04:48             Narrative:    EXAMINATION:  XR CHEST PA AND LATERAL    CLINICAL HISTORY:  Chest Pain;    TECHNIQUE:  PA and lateral views of the chest were performed.    COMPARISON:  Cardiac monitoring leads overlie the chest.    FINDINGS:  The cardiomediastinal silhouette is within normal limits. The visualized airway is unremarkable.  The lungs appear symmetrically aerated without definite focal alveolar consolidation. No large pleural effusion or pneumothorax is appreciated.The visualized osseous structures are intact.                                       APC / Resident Notes:   Patient is a 69 year old male presents to the ED for emergent evaluation of palpitations.     Will order labs and imaging. Will continue to monitor.     Differential diagnoses  include, but are not limited to: cardiac arrhythmia, dehydration, generalized anxiety disorder, or electrolyte imbalance.     No leukocytosis. Hemodynamically stable. Troponin WNL. CXR found to have no acute cardiopulmonary process.     Patient reassessed, reports symptoms improved with ED management. I have discussed emergency department findings, and plan with the patient. Will discharge home with F/U with cardiology and PCP. Patient verbalizes understanding of plan and agrees. Return precautions given.     I have discussed and reviewed with my supervising physician.        Clinical Impression:       ICD-10-CM ICD-9-CM   1. Palpitations R00.2 785.1         Disposition:   Disposition: Discharged  Condition: Stable                        Shantal Lala PA-C  05/18/19 0754

## 2019-05-18 NOTE — ED TRIAGE NOTES
Patient presents to the ED from home with c/o intermittent palpitations since this past Thursday. Patient reports that his vision became blurry this morning at approx. 0100 when he woke up from a nap on the sofa, episode lasting about 1-2 minutes. Vision back to normal at this time. Patient denies any difficulty with speech, ambulation or numbness. Patient denies any SOB, nausea, vomiting or any others symptom.

## 2019-06-23 ENCOUNTER — HOSPITAL ENCOUNTER (EMERGENCY)
Facility: HOSPITAL | Age: 69
Discharge: HOME | End: 2019-06-23
Attending: EMERGENCY MEDICINE
Payer: MEDICARE

## 2019-06-23 VITALS
BODY MASS INDEX: 32.38 KG/M2 | SYSTOLIC BLOOD PRESSURE: 135 MMHG | HEIGHT: 70 IN | RESPIRATION RATE: 16 BRPM | TEMPERATURE: 99 F | DIASTOLIC BLOOD PRESSURE: 76 MMHG | WEIGHT: 226.19 LBS | HEART RATE: 62 BPM | OXYGEN SATURATION: 100 %

## 2019-06-23 DIAGNOSIS — J34.89 NASAL PAIN: ICD-10-CM

## 2019-06-23 DIAGNOSIS — R09.81 NASAL CONGESTION: Primary | ICD-10-CM

## 2019-06-23 PROCEDURE — 99284 PR EMERGENCY DEPT VISIT,LEVEL IV: ICD-10-PCS | Mod: ,,, | Performed by: EMERGENCY MEDICINE

## 2019-06-23 PROCEDURE — 99283 EMERGENCY DEPT VISIT LOW MDM: CPT

## 2019-06-23 PROCEDURE — 25000003 PHARM REV CODE 250: Performed by: PHYSICIAN ASSISTANT

## 2019-06-23 PROCEDURE — 99284 EMERGENCY DEPT VISIT MOD MDM: CPT | Mod: ,,, | Performed by: EMERGENCY MEDICINE

## 2019-06-23 RX ORDER — OXYMETAZOLINE HCL 0.05 %
1 SPRAY, NON-AEROSOL (ML) NASAL
Status: COMPLETED | OUTPATIENT
Start: 2019-06-23 | End: 2019-06-23

## 2019-06-23 RX ORDER — GUAIFENESIN 600 MG/1
1200 TABLET, EXTENDED RELEASE ORAL 2 TIMES DAILY
Qty: 14 TABLET | Refills: 0 | Status: SHIPPED | OUTPATIENT
Start: 2019-06-23 | End: 2021-02-06

## 2019-06-23 RX ORDER — GUAIFENESIN 600 MG/1
600 TABLET, EXTENDED RELEASE ORAL 2 TIMES DAILY
Status: DISCONTINUED | OUTPATIENT
Start: 2019-06-23 | End: 2019-06-23

## 2019-06-23 RX ORDER — GUAIFENESIN 600 MG/1
600 TABLET, EXTENDED RELEASE ORAL
Status: COMPLETED | OUTPATIENT
Start: 2019-06-23 | End: 2019-06-23

## 2019-06-23 RX ADMIN — Medication 1 SPRAY: at 06:06

## 2019-06-23 RX ADMIN — GUAIFENESIN 600 MG: 600 TABLET, EXTENDED RELEASE ORAL at 06:06

## 2019-06-23 NOTE — DISCHARGE INSTRUCTIONS
Take Mucinex 600 mg twice daily for congestion relief.  Follow-up with your PCP.  Return to the ED for any concerning symptoms.    Our goal in the emergency department is to always give you outstanding care and exceptional service. You may receive a survey by mail or e-mail in the next week regarding your experience in our ED. We would greatly appreciate your completing and returning the survey. Your feedback provides us with a way to recognize our staff who give very good care and it helps us learn how to improve when your experience was below our aspiration of excellence.

## 2019-06-24 NOTE — ED PROVIDER NOTES
Encounter Date: 6/23/2019       History     Chief Complaint   Patient presents with    Facial Swelling     Patient is a 69-year-old  male with a PMH of hypertension who presents to the ED for urgent evaluation of nasal pain. Patient reports a 3-4 day history of nasal congestion, rhinorrhea, and pain over the bridge of his nose. He denies nasal trauma. He does report new eyeglasses that he bought on Wednesday (5 days ago). He has tried over-the-counter intranasal spray without relief. He denies fever/chills, HA, ear pain, sore throat, cough, chest pain, SOB, nausea/vomiting, or abdominal pain.    The history is provided by the patient.     Review of patient's allergies indicates:   Allergen Reactions    Pcn [penicillins]      Past Medical History:   Diagnosis Date    BPH (benign prostatic hyperplasia)     Hypertension     Mitral valve prolapse syndrome     Prostate enlargement      History reviewed. No pertinent surgical history.  No family history on file.  Social History     Tobacco Use    Smoking status: Never Smoker   Substance Use Topics    Alcohol use: No     Frequency: Never    Drug use: No     Review of Systems   Constitutional: Negative for chills and fever.   HENT: Positive for congestion, rhinorrhea and sinus pressure. Negative for ear pain and sore throat.    Eyes: Negative for visual disturbance.   Respiratory: Negative for shortness of breath.    Cardiovascular: Negative for chest pain.   Gastrointestinal: Negative for abdominal pain, nausea and vomiting.   Genitourinary: Negative for dysuria.   Musculoskeletal: Negative for arthralgias.   Skin: Negative for rash.   Neurological: Negative for headaches.   Psychiatric/Behavioral: Negative for dysphoric mood.       Physical Exam     Initial Vitals [06/23/19 1703]   BP Pulse Resp Temp SpO2   135/76 62 16 99.1 °F (37.3 °C) 100 %      MAP       --         Physical Exam    Nursing note and vitals reviewed.  Constitutional: He appears  well-developed and well-nourished. He is not diaphoretic. No distress.   HENT:   Head: Normocephalic and atraumatic.   Nose: Right sinus exhibits no maxillary sinus tenderness and no frontal sinus tenderness. Left sinus exhibits no maxillary sinus tenderness and no frontal sinus tenderness.   Mouth/Throat: Oropharynx is clear and moist. No oropharyngeal exudate.   +Nasal mucosal edema. No external signs of trauma, nasal deformity, or septal deviation.    Eyes: Conjunctivae and EOM are normal. Pupils are equal, round, and reactive to light.   Neck: Normal range of motion. Neck supple.   Cardiovascular: Normal rate, regular rhythm, normal heart sounds and intact distal pulses.   Pulmonary/Chest: Breath sounds normal. No respiratory distress. He has no wheezes. He has no rales.   Abdominal: Soft. Bowel sounds are normal. There is no tenderness. There is no rebound and no guarding.   Musculoskeletal: Normal range of motion. He exhibits no edema or tenderness.   Neurological: He is alert and oriented to person, place, and time. He has normal strength. GCS score is 15. GCS eye subscore is 4. GCS verbal subscore is 5. GCS motor subscore is 6.   Skin: Skin is warm and dry.   Psychiatric: He has a normal mood and affect. Thought content normal.         ED Course   Procedures  Labs Reviewed - No data to display       Imaging Results    None          Medical Decision Making:   History:   Old Medical Records: I decided to obtain old medical records.  Initial Assessment:   Patient is a 69-year-old  male with a PMH of hypertension who presents to the ED for urgent evaluation of nasal pain. PE reveals a nontoxic, afebrile, well-appearing male in no acute distress. Vital signs are stable.  Differential Diagnosis:   Differential diagnosis includes but is not limited to:  Allergic rhinitis, sinusitis, nasal pain from eyewear.  ED Management:  Patient overall very well-appearing, afebrile, hemodynamically stable.  Suspect symptoms likely secondary to rhinitis and new eyewear. Patient given Afrin and Mucinex in ED. No evidence of nasal deformity or septal hematoma. Patient is stable for discharge home with Rx Mucinex. ED return precautions given. Patient verbalized understanding and is agreeable. I have discussed patient case with my supervisory physician, who is in agreement with my assessment and plan.                        Clinical Impression:       ICD-10-CM ICD-9-CM   1. Nasal congestion R09.81 478.19   2. Nasal pain J34.89 478.19         Disposition:   Disposition: Discharged  Condition: Stable                        Didi Thomas PA-C  06/23/19 2040

## 2019-10-26 ENCOUNTER — HOSPITAL ENCOUNTER (EMERGENCY)
Facility: HOSPITAL | Age: 69
Discharge: HOME | End: 2019-10-26
Attending: EMERGENCY MEDICINE
Payer: MEDICARE

## 2019-10-26 VITALS
TEMPERATURE: 98 F | SYSTOLIC BLOOD PRESSURE: 161 MMHG | OXYGEN SATURATION: 97 % | BODY MASS INDEX: 32.21 KG/M2 | HEART RATE: 76 BPM | DIASTOLIC BLOOD PRESSURE: 85 MMHG | HEIGHT: 70 IN | WEIGHT: 225 LBS | RESPIRATION RATE: 18 BRPM

## 2019-10-26 DIAGNOSIS — N39.0 URINARY TRACT INFECTION WITHOUT HEMATURIA, SITE UNSPECIFIED: Primary | ICD-10-CM

## 2019-10-26 LAB
BILIRUB UR QL STRIP: NEGATIVE
CLARITY UR REFRACT.AUTO: CLEAR
COLOR UR AUTO: YELLOW
GLUCOSE UR QL STRIP: NEGATIVE
HGB UR QL STRIP: NEGATIVE
KETONES UR QL STRIP: NEGATIVE
LEUKOCYTE ESTERASE UR QL STRIP: ABNORMAL
MICROSCOPIC COMMENT: NORMAL
NITRITE UR QL STRIP: NEGATIVE
PH UR STRIP: 6 [PH] (ref 5–8)
PROT UR QL STRIP: NEGATIVE
RBC #/AREA URNS AUTO: 1 /HPF (ref 0–4)
SP GR UR STRIP: 1.01 (ref 1–1.03)
URN SPEC COLLECT METH UR: ABNORMAL
UROBILINOGEN UR STRIP-ACNC: NEGATIVE EU/DL
WBC #/AREA URNS AUTO: 3 /HPF (ref 0–5)

## 2019-10-26 PROCEDURE — 99284 EMERGENCY DEPT VISIT MOD MDM: CPT | Mod: 25,ER

## 2019-10-26 PROCEDURE — 81000 URINALYSIS NONAUTO W/SCOPE: CPT | Mod: ER

## 2019-10-26 PROCEDURE — 25000003 PHARM REV CODE 250: Mod: ER | Performed by: EMERGENCY MEDICINE

## 2019-10-26 PROCEDURE — 63600175 PHARM REV CODE 636 W HCPCS: Mod: ER | Performed by: EMERGENCY MEDICINE

## 2019-10-26 PROCEDURE — 96372 THER/PROPH/DIAG INJ SC/IM: CPT | Mod: ER

## 2019-10-26 RX ORDER — NITROFURANTOIN 25; 75 MG/1; MG/1
CAPSULE ORAL
Status: DISCONTINUED
Start: 2019-10-26 | End: 2019-10-26 | Stop reason: HOSPADM

## 2019-10-26 RX ORDER — PHENAZOPYRIDINE HYDROCHLORIDE 100 MG/1
200 TABLET, FILM COATED ORAL
Status: COMPLETED | OUTPATIENT
Start: 2019-10-26 | End: 2019-10-26

## 2019-10-26 RX ORDER — KETOROLAC TROMETHAMINE 30 MG/ML
60 INJECTION, SOLUTION INTRAMUSCULAR; INTRAVENOUS
Status: COMPLETED | OUTPATIENT
Start: 2019-10-26 | End: 2019-10-26

## 2019-10-26 RX ORDER — NITROFURANTOIN 25; 75 MG/1; MG/1
100 CAPSULE ORAL
Status: COMPLETED | OUTPATIENT
Start: 2019-10-26 | End: 2019-10-26

## 2019-10-26 RX ORDER — PHENAZOPYRIDINE HYDROCHLORIDE 200 MG/1
200 TABLET, FILM COATED ORAL 3 TIMES DAILY
Qty: 6 TABLET | Refills: 0 | Status: SHIPPED | OUTPATIENT
Start: 2019-10-26 | End: 2019-11-05

## 2019-10-26 RX ORDER — NITROFURANTOIN 25; 75 MG/1; MG/1
100 CAPSULE ORAL 2 TIMES DAILY
Qty: 14 CAPSULE | Refills: 0 | Status: SHIPPED | OUTPATIENT
Start: 2019-10-26 | End: 2019-11-02

## 2019-10-26 RX ORDER — TRAMADOL HYDROCHLORIDE 50 MG/1
50 TABLET ORAL EVERY 6 HOURS PRN
Qty: 15 TABLET | Refills: 0 | Status: SHIPPED | OUTPATIENT
Start: 2019-10-26 | End: 2019-12-15 | Stop reason: CLARIF

## 2019-10-26 RX ADMIN — PHENAZOPYRIDINE HYDROCHLORIDE 200 MG: 100 TABLET ORAL at 09:10

## 2019-10-26 RX ADMIN — KETOROLAC TROMETHAMINE 60 MG: 30 INJECTION, SOLUTION INTRAMUSCULAR at 09:10

## 2019-10-26 RX ADMIN — NITROFURANTOIN MONOHYDRATE/MACROCRYSTALLINE 100 MG: 25; 75 CAPSULE ORAL at 09:10

## 2019-10-27 NOTE — ED NOTES
Reviewed all discharge instructions and Rx.  Explained to pt about medicating tonight with first dose, 2 Rx on paper provided and 1 Rx sent to pharmacy.  Explained about follow-up with PCP in 3 days for recheck.  Explained about monitoring for worsening s/s of problems or infections.  Pt verbalized understanding.

## 2019-10-31 NOTE — ED PROVIDER NOTES
"Encounter Date: 10/26/2019       History     Chief Complaint   Patient presents with    Back Pain     Patient reports lower back pain with frequent urination x 2 weeks, "I think I have an infection in my prostate"     69-year-old male presents complaining of lower back pain and frequent urination for 14 days.  Patient thinks he might have an infection in his prostate.  Pain is rated dull and 6/10.  Patient denies fever/chills/nausea/vomiting/diarrhea        Review of patient's allergies indicates:   Allergen Reactions    Pcn [penicillins]      Past Medical History:   Diagnosis Date    BPH (benign prostatic hyperplasia)     Hypertension     Mitral valve prolapse syndrome     Prostate enlargement      History reviewed. No pertinent surgical history.  History reviewed. No pertinent family history.  Social History     Tobacco Use    Smoking status: Never Smoker    Smokeless tobacco: Never Used   Substance Use Topics    Alcohol use: No     Frequency: Never    Drug use: No     Review of Systems   Constitutional: Negative for fever.   HENT: Negative for sore throat.    Respiratory: Negative for shortness of breath.    Cardiovascular: Negative for chest pain.   Gastrointestinal: Negative for nausea.   Genitourinary: Positive for frequency.   Musculoskeletal: Positive for back pain.   Skin: Negative for rash.   Neurological: Negative for weakness.   Hematological: Does not bruise/bleed easily.       Physical Exam     Initial Vitals [10/26/19 2036]   BP Pulse Resp Temp SpO2   (!) 161/85 76 18 98.2 °F (36.8 °C) 97 %      MAP       --         Physical Exam    Nursing note and vitals reviewed.  Constitutional: He appears well-developed and well-nourished.   HENT:   Head: Normocephalic and atraumatic.   Mouth/Throat: Oropharynx is clear and moist.   Eyes: Conjunctivae and EOM are normal. Pupils are equal, round, and reactive to light.   Neck: Normal range of motion. Neck supple.   Cardiovascular: Normal rate, regular " rhythm, normal heart sounds and intact distal pulses.   Pulmonary/Chest: Breath sounds normal.   Abdominal: Soft. Bowel sounds are normal.   Musculoskeletal: Normal range of motion. He exhibits tenderness.   Positive lower back tenderness   Neurological: He is alert and oriented to person, place, and time. He has normal strength. GCS score is 15. GCS eye subscore is 4. GCS verbal subscore is 5. GCS motor subscore is 6.   Skin: Skin is warm. Capillary refill takes less than 2 seconds.         ED Course   Procedures  Labs Reviewed   URINALYSIS, REFLEX TO URINE CULTURE - Abnormal; Notable for the following components:       Result Value    Leukocytes, UA 1+ (*)     All other components within normal limits    Narrative:     Preferred Collection Type->Urine, Clean Catch   URINALYSIS MICROSCOPIC    Narrative:     Preferred Collection Type->Urine, Clean Catch         Results for orders placed or performed during the hospital encounter of 10/26/19   Urinalysis, Reflex to Urine Culture Urine, Clean Catch   Result Value Ref Range    Specimen UA Urine, Catheterized     Color, UA Yellow Yellow, Straw, Rakel    Appearance, UA Clear Clear    pH, UA 6.0 5.0 - 8.0    Specific Gravity, UA 1.015 1.005 - 1.030    Protein, UA Negative Negative    Glucose, UA Negative Negative    Ketones, UA Negative Negative    Bilirubin (UA) Negative Negative    Occult Blood UA Negative Negative    Nitrite, UA Negative Negative    Urobilinogen, UA Negative <2.0 EU/dL    Leukocytes, UA 1+ (A) Negative   Urinalysis Microscopic   Result Value Ref Range    RBC, UA 1 0 - 4 /hpf    WBC, UA 3 0 - 5 /hpf    Microscopic Comment SEE COMMENT          Imaging Results    None                               Clinical Impression:       ICD-10-CM ICD-9-CM   1. Urinary tract infection without hematuria, site unspecified N39.0 599.0                                Roland Patel MD  10/31/19 0606

## 2019-11-05 ENCOUNTER — HOSPITAL ENCOUNTER (EMERGENCY)
Facility: HOSPITAL | Age: 69
Discharge: HOME | End: 2019-11-06
Attending: EMERGENCY MEDICINE
Payer: MEDICARE

## 2019-11-05 DIAGNOSIS — M54.50 ACUTE LOW BACK PAIN WITHOUT SCIATICA, UNSPECIFIED BACK PAIN LATERALITY: Primary | ICD-10-CM

## 2019-11-05 PROCEDURE — 63600175 PHARM REV CODE 636 W HCPCS: Performed by: EMERGENCY MEDICINE

## 2019-11-05 PROCEDURE — 99284 EMERGENCY DEPT VISIT MOD MDM: CPT | Mod: ,,, | Performed by: EMERGENCY MEDICINE

## 2019-11-05 PROCEDURE — 99284 PR EMERGENCY DEPT VISIT,LEVEL IV: ICD-10-PCS | Mod: ,,, | Performed by: EMERGENCY MEDICINE

## 2019-11-05 PROCEDURE — 96372 THER/PROPH/DIAG INJ SC/IM: CPT

## 2019-11-05 PROCEDURE — 51798 US URINE CAPACITY MEASURE: CPT

## 2019-11-05 PROCEDURE — 99284 EMERGENCY DEPT VISIT MOD MDM: CPT | Mod: 25

## 2019-11-05 RX ORDER — KETOROLAC TROMETHAMINE 30 MG/ML
15 INJECTION, SOLUTION INTRAMUSCULAR; INTRAVENOUS
Status: COMPLETED | OUTPATIENT
Start: 2019-11-05 | End: 2019-11-05

## 2019-11-05 RX ADMIN — KETOROLAC TROMETHAMINE 15 MG: 30 INJECTION, SOLUTION INTRAMUSCULAR; INTRAVENOUS at 09:11

## 2019-11-06 VITALS
DIASTOLIC BLOOD PRESSURE: 66 MMHG | TEMPERATURE: 98 F | SYSTOLIC BLOOD PRESSURE: 131 MMHG | WEIGHT: 225 LBS | HEART RATE: 50 BPM | OXYGEN SATURATION: 97 % | BODY MASS INDEX: 32.21 KG/M2 | RESPIRATION RATE: 16 BRPM | HEIGHT: 70 IN

## 2019-11-06 RX ORDER — METHOCARBAMOL 500 MG/1
500 TABLET, FILM COATED ORAL 3 TIMES DAILY PRN
Qty: 30 TABLET | Refills: 0 | Status: SHIPPED | OUTPATIENT
Start: 2019-11-06 | End: 2019-12-15 | Stop reason: CLARIF

## 2019-11-06 NOTE — ED TRIAGE NOTES
Patient states right lower back pain x 1 month, intermittent in nature, went to PCP, was placed on antibiotics for prostate issues. Denies carlos. States antibiotic did not do anything for pain . States went to ED 2 weeks later with pain meds, Tramadol, last dose yesterday. Also states went to  Urology today, negative work up.

## 2019-11-06 NOTE — ED PROVIDER NOTES
Encounter Date: 11/5/2019    SCRIBE #1 NOTE: I, Mac Acevedo, am scribing for, and in the presence of,  Dr. Bernardo. I have scribed the entire note.       History     Chief Complaint   Patient presents with    Back Pain     Pt c/o lower back pain x 1 month. Denies injury or urinary changes. He was seen at [facility] on saturday with same complaint and prescribed with tramadol. He denies relief. Last dose was yesterday.      Time patient was seen by the provider: 8:59 PM      The patient is a 69 y.o. male with co-morbidities including: HTN and mitral valve prolapse syndrome, who presents to the ED with a complaint of worsening lower back pain x1-2 weeks, that is worse with standing or walking, and is positional. He does not use a walker at baseline, but is using one today due to his back pain. He is able to walk with a walker. He was seen at [facility] and [facility], as well as by his PCP. At one of these recent visits, degenerative disk disease was seen on CT. He was also prescribed a course of antibiotics for a possible UTI, which he finished. He also complains of right leg pain and frequent urination. No dysuria. No fevers, No IV drug abuse. He is not currently taking muscle relaxants.     The history is provided by the patient.     Review of patient's allergies indicates:   Allergen Reactions    Pcn [penicillins]      Past Medical History:   Diagnosis Date    BPH (benign prostatic hyperplasia)     Hypertension     Mitral valve prolapse syndrome     Prostate enlargement      History reviewed. No pertinent surgical history.  History reviewed. No pertinent family history.  Social History     Tobacco Use    Smoking status: Never Smoker    Smokeless tobacco: Never Used   Substance Use Topics    Alcohol use: No     Frequency: Never    Drug use: No     Review of Systems  Constitutional:  No Fever, No Chills,   Eyes: No Vision Changes  ENT/Mouth: No sore throat, No rhinorrhea  Cardiovascular:   No Chest Pain, No Palpitations  Respiratory:  No Cough, No SOB  Gastrointestinal:  No Nausea, No Vomiting, No Diarrhea, No abdo pain.  Genitourinary:  Frequent urination. No  pain, No dysuria   Musculoskeletal:  Back pain. Right leg pain. No Arthralgias, No Neck Pain, No recent trauma.  Skin:  No skin Lesions  Neuro:  No Weakness, No Numbness, No Paresthesias, No Dizziness, No Headache      Physical Exam     Initial Vitals [11/05/19 1943]   BP Pulse Resp Temp SpO2   (!) 164/77 (!) 58 16 98.1 °F (36.7 °C) 100 %      MAP       --         Physical Exam    Nursing note and vitals reviewed.    Physical Exam:  CONSTITUTIONAL: Well developed, well nourished, in no acute distress, calm  HENT: Normocephalic, atraumatic    EYES: Sclerae anicteric   NECK: Supple  RESPIRATORY: Breathing comfortably. Speaking in full sentences   NEUROLOGIC: Alert, interacting normally. No facial droop. 5/5 strength in bilateral lower extremities. Normal sensation to light touch. Able to ambulate with a steady gait with a walker. Negative straight leg raise. Right hand tremor. No sig LE swelling or TTP.    MSK: Moving all four extremities. No significant T or L spine tenderness.   Skin: Warm and dry. No visible rash on exposed areas of skin.    Psych: Mood and affect normal.       ED Course   Procedures  Labs Reviewed - No data to display       Imaging Results    None          Medical Decision Making:   History:   Old Medical Records: I decided to obtain old medical records.  Initial Assessment:   Subacute back pain, seen at outside hospital and reports having a CT scan that showed arthritis but no other dangerous pathology, per patient report. Coming in with continued back pain, he's been taking tramadol, but states that it has not significantly improved his pain. He does not like taking ibuprofen or tylenol because he states that both make him feel jittery. Denies pain with urination, although does urinate frequently. No abdominal pain. No  fevers. Not consistent with urinary infection, but will check post void residual to ensure he is not having retention. Will treat symptoms with IM Toradol. Will give muscle relaxers but at home as patient is driving home.   Mild R hand tremor, pt state worst when in hospital, not consistent with dangerous pathology at this time.   Clinical Tests:   Medical Tests: Ordered  ED Management:  Patient feels improved with Toradol. Post void residual is 185, not consistent with urinary retention from spinal compression. Will discharge home with MRI as scheduled in 2 days and primary care follow up. ED return instructions.   BP improved with pain treatment. Pt to continue to take his BP meds and follow up with PMD for continued HTN management.     Pt agrees with plan and verbalizes understanding of return precautions.     MDM Complexity Points:   Problem Points:  1.New problem, with no additional ED work-up planned (maximum of 1) - back pain  2.Self-limited or minor (maximum of 2) - elevated BP    Data Points:  Review or order medicine test (PFTs, EKG, cardiac echo or catheterization) and Decision to obtain old records (in the EHR)                Scribe Attestation:   Scribe #1: I performed the above scribed service and the documentation accurately describes the services I performed. I attest to the accuracy of the note.                          Clinical Impression:       ICD-10-CM ICD-9-CM   1. Acute low back pain without sciatica, unspecified back pain laterality M54.5 724.2         Disposition:   Disposition: Discharged  Condition: Stable                     Pablo Bernardo MD  11/07/19 1201       Pablo Bernardo MD  11/07/19 1202

## 2019-11-06 NOTE — ED NOTES
Patient identifiers verified and correct for Mr Callejas  C/C:Right lower back pain  SEE NN  APPEARANCE: awake and alert in NAD.  SKIN: warm, dry and intact. No breakdown or bruising.  MUSCULOSKELETAL: Patient moving all extremities spontaneously, no obvious swelling or deformities noted. Ambulates with walker, began using walker today,   RESPIRATORY: Denies shortness of breath.Respirations unlabored.   CARDIAC: Denies CP, 2+ distal pulses; no peripheral edema  ABDOMEN: S/ND/NT, Denies nausea  : voids spontaneously, denies difficulty  Neurologic: AAO x 4; follows commands equal strength in all extremities; denies numbness/tingling. Denies dizziness Positive weakness due to pain, states pain radiates down leg.

## 2019-11-06 NOTE — DISCHARGE INSTRUCTIONS
Your physical exam did not show any signs of dangerous back pain.  You should follow up on Thursday as scheduled for MRI, and follow-up within 1 week with your primary care doctor for continued evaluation.    If you have difficulty urinating, if you have numbness in your legs or groin, if you have inability to walk, or uncontrolled pain please return to the emergency department for re-evaluation.    You can take tramadol and Robaxin as needed for symptom control.    Your blood pressure was elevated today please continue take your blood pressure medications as prescribed and follow up with your primary care doctor for continued management of your blood pressure.

## 2019-12-13 ENCOUNTER — HOSPITAL ENCOUNTER (EMERGENCY)
Facility: HOSPITAL | Age: 69
Discharge: HOME | End: 2019-12-13
Attending: EMERGENCY MEDICINE
Payer: MEDICARE

## 2019-12-13 VITALS
WEIGHT: 225 LBS | DIASTOLIC BLOOD PRESSURE: 76 MMHG | HEART RATE: 59 BPM | HEIGHT: 69 IN | TEMPERATURE: 98 F | RESPIRATION RATE: 18 BRPM | BODY MASS INDEX: 33.33 KG/M2 | OXYGEN SATURATION: 99 % | SYSTOLIC BLOOD PRESSURE: 162 MMHG

## 2019-12-13 DIAGNOSIS — G89.29 CHRONIC RIGHT-SIDED LOW BACK PAIN WITH RIGHT-SIDED SCIATICA: Primary | ICD-10-CM

## 2019-12-13 DIAGNOSIS — M54.41 CHRONIC RIGHT-SIDED LOW BACK PAIN WITH RIGHT-SIDED SCIATICA: Primary | ICD-10-CM

## 2019-12-13 PROCEDURE — 25000003 PHARM REV CODE 250: Mod: ER | Performed by: NURSE PRACTITIONER

## 2019-12-13 PROCEDURE — 63600175 PHARM REV CODE 636 W HCPCS: Mod: ER | Performed by: NURSE PRACTITIONER

## 2019-12-13 PROCEDURE — 99284 EMERGENCY DEPT VISIT MOD MDM: CPT | Mod: 25,ER

## 2019-12-13 PROCEDURE — 96372 THER/PROPH/DIAG INJ SC/IM: CPT | Mod: ER

## 2019-12-13 RX ORDER — METHOCARBAMOL 750 MG/1
750 TABLET, FILM COATED ORAL
Status: DISCONTINUED | OUTPATIENT
Start: 2019-12-13 | End: 2019-12-13

## 2019-12-13 RX ORDER — KETOROLAC TROMETHAMINE 30 MG/ML
30 INJECTION, SOLUTION INTRAMUSCULAR; INTRAVENOUS
Status: COMPLETED | OUTPATIENT
Start: 2019-12-13 | End: 2019-12-13

## 2019-12-13 RX ORDER — METHOCARBAMOL 500 MG/1
1000 TABLET, FILM COATED ORAL
Status: COMPLETED | OUTPATIENT
Start: 2019-12-13 | End: 2019-12-13

## 2019-12-13 RX ADMIN — METHOCARBAMOL TABLETS 1000 MG: 500 TABLET, COATED ORAL at 07:12

## 2019-12-13 RX ADMIN — KETOROLAC TROMETHAMINE 30 MG: 30 INJECTION, SOLUTION INTRAMUSCULAR at 06:12

## 2019-12-14 NOTE — ED PROVIDER NOTES
"Encounter Date: 12/13/2019       History     Chief Complaint   Patient presents with    Back Pain     Pt with c/o R lower back pain that radiates down the R leg. Pt has been going to pain management for same symptoms. Pt states he got a shot in his back on Wednesday "but it didn't tell." Pt was seen by PCP yesterday and was given Hydrocondone 7.5mg and Diclofenac 50mg "which is not helping at all." Pt states he took the Hydrocodone at 1700 today.      69-year-old male presents with right low back pain radiating down right leg.  He reports this pain is chronic.  He reports he had an injection for pain management few days ago.  He reports his PCP put him on Norco 7.5 and Voltaren for the pain yesterday.  Patient also reports he drove today.        Review of patient's allergies indicates:   Allergen Reactions    Pcn [penicillins]      Past Medical History:   Diagnosis Date    Back pain, chronic     BPH (benign prostatic hyperplasia)     Hypertension     Mitral valve prolapse syndrome     Prostate enlargement      History reviewed. No pertinent surgical history.  History reviewed. No pertinent family history.  Social History     Tobacco Use    Smoking status: Never Smoker    Smokeless tobacco: Never Used   Substance Use Topics    Alcohol use: No     Frequency: Never    Drug use: No     Review of Systems   Constitutional: Negative for fever.   HENT: Negative for sore throat.    Respiratory: Negative for shortness of breath.    Cardiovascular: Negative for chest pain.   Gastrointestinal: Negative for nausea.   Genitourinary: Negative for dysuria.   Musculoskeletal: Positive for myalgias. Negative for back pain.   Skin: Negative for rash.   Neurological: Negative for weakness.   Hematological: Does not bruise/bleed easily.       Physical Exam     Initial Vitals [12/13/19 1816]   BP Pulse Resp Temp SpO2   (!) 162/76 (!) 59 18 98.1 °F (36.7 °C) 99 %      MAP       --         Physical Exam    Constitutional: Vital " signs are normal. He appears well-developed and well-nourished. He is not diaphoretic. He is active.  Non-toxic appearance. He does not have a sickly appearance. He does not appear ill. No distress.   HENT:   Head: Normocephalic and atraumatic.   Cardiovascular: Regular rhythm, S1 normal and S2 normal.   Abdominal: There is no tenderness.   Musculoskeletal:        Lumbar back: He exhibits tenderness.        Back:    Neurological: He is alert. GCS eye subscore is 4. GCS verbal subscore is 5. GCS motor subscore is 6.   Skin: Skin is warm and dry. No pallor.   Psychiatric: He has a normal mood and affect. His speech is normal and behavior is normal. Thought content normal.         ED Course   Procedures  Labs Reviewed - No data to display       Imaging Results    None          Medical Decision Making:   Patient given pain medicine by his pain management doctor a few days ago through injections.  Patient given pain management from his PCP yesterday.  Will treat patient with Robaxin and Toradol in the ED before discharge.                                 Clinical Impression:       ICD-10-CM ICD-9-CM   1. Chronic right-sided low back pain with right-sided sciatica M54.41 724.2    G89.29 724.3     338.29         Disposition:   Disposition: Discharged                     Tony Perez NP  12/13/19 1908

## 2019-12-15 ENCOUNTER — HOSPITAL ENCOUNTER (EMERGENCY)
Facility: HOSPITAL | Age: 69
Discharge: HOME | End: 2019-12-15
Attending: EMERGENCY MEDICINE
Payer: MEDICARE

## 2019-12-15 VITALS
SYSTOLIC BLOOD PRESSURE: 169 MMHG | TEMPERATURE: 99 F | DIASTOLIC BLOOD PRESSURE: 84 MMHG | RESPIRATION RATE: 16 BRPM | OXYGEN SATURATION: 98 % | HEART RATE: 65 BPM

## 2019-12-15 DIAGNOSIS — G89.29 ACUTE EXACERBATION OF CHRONIC LOW BACK PAIN: Primary | ICD-10-CM

## 2019-12-15 DIAGNOSIS — M51.36 DDD (DEGENERATIVE DISC DISEASE), LUMBAR: ICD-10-CM

## 2019-12-15 DIAGNOSIS — M54.16 LUMBAR RADICULOPATHY: ICD-10-CM

## 2019-12-15 DIAGNOSIS — M54.50 ACUTE EXACERBATION OF CHRONIC LOW BACK PAIN: Primary | ICD-10-CM

## 2019-12-15 PROCEDURE — 25000003 PHARM REV CODE 250: Performed by: PHYSICIAN ASSISTANT

## 2019-12-15 PROCEDURE — 63600175 PHARM REV CODE 636 W HCPCS: Performed by: PHYSICIAN ASSISTANT

## 2019-12-15 PROCEDURE — 99284 PR EMERGENCY DEPT VISIT,LEVEL IV: ICD-10-PCS | Mod: ,,, | Performed by: PHYSICIAN ASSISTANT

## 2019-12-15 PROCEDURE — 96372 THER/PROPH/DIAG INJ SC/IM: CPT

## 2019-12-15 PROCEDURE — 99284 EMERGENCY DEPT VISIT MOD MDM: CPT | Mod: 25

## 2019-12-15 PROCEDURE — 99284 EMERGENCY DEPT VISIT MOD MDM: CPT | Mod: ,,, | Performed by: PHYSICIAN ASSISTANT

## 2019-12-15 RX ORDER — LIDOCAINE 50 MG/G
1 PATCH TOPICAL
Status: DISCONTINUED | OUTPATIENT
Start: 2019-12-15 | End: 2019-12-15 | Stop reason: HOSPADM

## 2019-12-15 RX ORDER — KETOROLAC TROMETHAMINE 30 MG/ML
15 INJECTION, SOLUTION INTRAMUSCULAR; INTRAVENOUS
Status: COMPLETED | OUTPATIENT
Start: 2019-12-15 | End: 2019-12-15

## 2019-12-15 RX ORDER — DICLOFENAC SODIUM 50 MG/1
50 TABLET, DELAYED RELEASE ORAL 2 TIMES DAILY
COMMUNITY
End: 2021-02-06

## 2019-12-15 RX ORDER — LIDOCAINE 50 MG/G
1 PATCH TOPICAL
Qty: 15 PATCH | Refills: 0 | Status: SHIPPED | OUTPATIENT
Start: 2019-12-15 | End: 2021-02-06

## 2019-12-15 RX ADMIN — LIDOCAINE 1 PATCH: 50 PATCH TOPICAL at 04:12

## 2019-12-15 RX ADMIN — KETOROLAC TROMETHAMINE 15 MG: 30 INJECTION, SOLUTION INTRAMUSCULAR; INTRAVENOUS at 04:12

## 2019-12-15 NOTE — ED TRIAGE NOTES
"Jerardo Callejas, an 69 y.o. male presents to the ED with lower back pain and numbness to the top of his right thigh.  Patient is in pain management and says his pain increased after his last visit and he doesn't have another one scheduled until next Friday.       Review of patient's allergies indicates:   Allergen Reactions    Pcn [penicillins]      Chief Complaint   Patient presents with    Back Pain     "been going on for a while" - reports numbness to R leg      Past Medical History:   Diagnosis Date    Back pain, chronic     BPH (benign prostatic hyperplasia)     Hypertension     Mitral valve prolapse syndrome     Prostate enlargement        "

## 2019-12-16 NOTE — ED PROVIDER NOTES
"Encounter Date: 12/15/2019       History     Chief Complaint   Patient presents with    Back Pain     "been going on for a while" - reports numbness to R leg      The patient presents to the ER c/o uncontrolled low back pain. He is in pain management for chronic back pain. He recently had an MRI of his spine and was diagnosed with DDD, and advised that he did not need back surgery. He has been getting steroid injections. He is prescribed NSAIDs. He states that his pain is uncontrolled. He reports having burning pains intermittently down his leg. He states that this is all chronic and denies any new injury, worsening, or acute symptoms. He states that he wants to try some other method of pain control. He denies any weakness, numbness, or loss of function. He denies any saddle paresthesia. He denies any bowel or bladder dysfunction.         Review of patient's allergies indicates:   Allergen Reactions    Pcn [penicillins]      Past Medical History:   Diagnosis Date    Back pain, chronic     BPH (benign prostatic hyperplasia)     Hypertension     Mitral valve prolapse syndrome     Prostate enlargement      History reviewed. No pertinent surgical history.  No family history on file.  Social History     Tobacco Use    Smoking status: Never Smoker    Smokeless tobacco: Never Used   Substance Use Topics    Alcohol use: No     Frequency: Never    Drug use: No     Review of Systems   Constitutional: Negative for chills and fever.   Respiratory: Negative for chest tightness and shortness of breath.    Cardiovascular: Negative for chest pain.   Gastrointestinal: Negative for abdominal pain, diarrhea, nausea and vomiting.   Genitourinary: Negative for decreased urine volume, difficulty urinating, dysuria, flank pain, frequency, hematuria and urgency.   Musculoskeletal: Positive for back pain. Negative for joint swelling and neck pain.   Skin: Negative for color change and rash.   Allergic/Immunologic: Negative for " immunocompromised state.   Neurological: Negative for dizziness, syncope, weakness, light-headedness, numbness and headaches.   Psychiatric/Behavioral: Negative for confusion.       Physical Exam     Initial Vitals [12/15/19 1538]   BP Pulse Resp Temp SpO2   (!) 169/84 65 16 98.5 °F (36.9 °C) 98 %      MAP       --         Physical Exam    Nursing note and vitals reviewed.  Constitutional: He appears well-developed and well-nourished. He is not diaphoretic.   HENT:   Head: Normocephalic.   Eyes: Conjunctivae are normal.   Neck: Neck supple.   Cardiovascular: Normal rate, regular rhythm and intact distal pulses.   Pulmonary/Chest: Breath sounds normal. No respiratory distress.   Abdominal: Soft. He exhibits no distension. There is no tenderness. There is no rebound and no guarding.   Musculoskeletal:   Low back pain.    Neurological: He is alert and oriented to person, place, and time. He has normal strength. No sensory deficit.   5/5 strength extremities x 4. Ambulatory - using a walker - reports baseline.    Skin: Skin is warm and dry. No rash noted.         ED Course   Procedures  Labs Reviewed - No data to display       Imaging Results    None          Medical Decision Making:   History:   Old Medical Records: I decided to obtain old medical records.  Initial Assessment:   Pt here c/o uncontrolled chronic back pain with chronic radicular symptoms. Denies any new injury, or new symptoms. Denies any worsening and reports symptoms are chronic.    Differential Diagnosis:   Chronic pain, DDD, Lumbar radiculopathy, spinal stenosis, disc injury, cauda equina, epidural abscess, sciatica, Diskitis, compression, etc    Clinical Tests:   Lab Tests: Reviewed  Radiological Study: Reviewed  ED Management:  Analgesic provided   Pt reports improvement   Pt advised to see his regular physician and pain management physician in the next 2-3 days for re-evaluation and further management   He states that he has a pain management  appointment already scheduled for next week   Pt advised to return to the ER if unimproved or if worse in any way                                  Clinical Impression:       ICD-10-CM ICD-9-CM   1. Acute exacerbation of chronic low back pain M54.5 724.2    G89.29 338.19     338.29   2. Lumbar radiculopathy M54.16 724.4   3. DDD (degenerative disc disease), lumbar M51.36 722.52         Disposition:   Disposition: Discharged  Condition: Stable                     Yong Villaseñor PA-C  12/16/19 6898

## 2021-02-06 ENCOUNTER — HOSPITAL ENCOUNTER (EMERGENCY)
Facility: HOSPITAL | Age: 71
Discharge: HOME | End: 2021-02-06
Attending: EMERGENCY MEDICINE
Payer: MEDICARE

## 2021-02-06 VITALS
RESPIRATION RATE: 18 BRPM | HEART RATE: 57 BPM | BODY MASS INDEX: 30.78 KG/M2 | OXYGEN SATURATION: 96 % | DIASTOLIC BLOOD PRESSURE: 75 MMHG | TEMPERATURE: 98 F | WEIGHT: 215 LBS | SYSTOLIC BLOOD PRESSURE: 130 MMHG | HEIGHT: 70 IN

## 2021-02-06 DIAGNOSIS — T88.1XXA: Primary | ICD-10-CM

## 2021-02-06 LAB
INFLUENZA A, MOLECULAR: NEGATIVE
INFLUENZA B, MOLECULAR: NEGATIVE
SARS-COV-2 RDRP RESP QL NAA+PROBE: NEGATIVE
SPECIMEN SOURCE: NORMAL

## 2021-02-06 PROCEDURE — U0002 COVID-19 LAB TEST NON-CDC: HCPCS | Mod: ER

## 2021-02-06 PROCEDURE — 99282 EMERGENCY DEPT VISIT SF MDM: CPT | Mod: ER

## 2021-02-06 PROCEDURE — 87502 INFLUENZA DNA AMP PROBE: CPT | Mod: ER

## 2021-02-12 ENCOUNTER — HOSPITAL ENCOUNTER (OUTPATIENT)
Facility: HOSPITAL | Age: 71
Discharge: HOME | End: 2021-02-12
Attending: EMERGENCY MEDICINE | Admitting: HOSPITALIST
Payer: MEDICARE

## 2021-02-12 VITALS
OXYGEN SATURATION: 97 % | HEART RATE: 52 BPM | WEIGHT: 221.56 LBS | DIASTOLIC BLOOD PRESSURE: 76 MMHG | TEMPERATURE: 97 F | SYSTOLIC BLOOD PRESSURE: 138 MMHG | RESPIRATION RATE: 18 BRPM | BODY MASS INDEX: 31.72 KG/M2 | HEIGHT: 70 IN

## 2021-02-12 DIAGNOSIS — R07.9 CHEST PAIN: Primary | ICD-10-CM

## 2021-02-12 DIAGNOSIS — R00.1 BRADYCARDIA: ICD-10-CM

## 2021-02-12 LAB
ALBUMIN SERPL BCP-MCNC: 3.5 G/DL (ref 3.5–5.2)
ALP SERPL-CCNC: 104 U/L (ref 55–135)
ALT SERPL W/O P-5'-P-CCNC: 19 U/L (ref 10–44)
ANION GAP SERPL CALC-SCNC: 11 MMOL/L (ref 8–16)
ASCENDING AORTA: 3.68 CM
AST SERPL-CCNC: 22 U/L (ref 10–40)
AV INDEX (PROSTH): 0.82
AV MEAN GRADIENT: 3 MMHG
AV PEAK GRADIENT: 6 MMHG
AV VALVE AREA: 2.92 CM2
AV VELOCITY RATIO: 0.98
BASOPHILS # BLD AUTO: 0.06 K/UL (ref 0–0.2)
BASOPHILS NFR BLD: 1.1 % (ref 0–1.9)
BILIRUB SERPL-MCNC: 0.2 MG/DL (ref 0.1–1)
BNP SERPL-MCNC: <10 PG/ML (ref 0–99)
BSA FOR ECHO PROCEDURE: 2.23 M2
BUN SERPL-MCNC: 20 MG/DL (ref 8–23)
CALCIUM SERPL-MCNC: 9.6 MG/DL (ref 8.7–10.5)
CHLORIDE SERPL-SCNC: 106 MMOL/L (ref 95–110)
CO2 SERPL-SCNC: 20 MMOL/L (ref 23–29)
CREAT SERPL-MCNC: 1.1 MG/DL (ref 0.5–1.4)
CTP QC/QA: YES
CV ECHO LV RWT: 0.39 CM
DIFFERENTIAL METHOD: ABNORMAL
DOP CALC AO PEAK VEL: 1.2 M/S
DOP CALC AO VTI: 31.63 CM
DOP CALC LVOT AREA: 3.6 CM2
DOP CALC LVOT DIAMETER: 2.13 CM
DOP CALC LVOT PEAK VEL: 1.17 M/S
DOP CALC LVOT STROKE VOLUME: 92.38 CM3
DOP CALCLVOT PEAK VEL VTI: 25.94 CM
E WAVE DECELERATION TIME: 183.55 MSEC
E/A RATIO: 1.44
E/E' RATIO: 12.67 M/S
ECHO LV POSTERIOR WALL: 0.92 CM (ref 0.6–1.1)
EOSINOPHIL # BLD AUTO: 0.2 K/UL (ref 0–0.5)
EOSINOPHIL NFR BLD: 4.3 % (ref 0–8)
ERYTHROCYTE [DISTWIDTH] IN BLOOD BY AUTOMATED COUNT: 13.2 % (ref 11.5–14.5)
EST. GFR  (AFRICAN AMERICAN): >60 ML/MIN/1.73 M^2
EST. GFR  (NON AFRICAN AMERICAN): >60 ML/MIN/1.73 M^2
FRACTIONAL SHORTENING: 36 % (ref 28–44)
GLUCOSE SERPL-MCNC: 114 MG/DL (ref 70–110)
HCT VFR BLD AUTO: 45.9 % (ref 40–54)
HGB BLD-MCNC: 14.6 G/DL (ref 14–18)
IMM GRANULOCYTES # BLD AUTO: 0.05 K/UL (ref 0–0.04)
IMM GRANULOCYTES NFR BLD AUTO: 0.9 % (ref 0–0.5)
INTERVENTRICULAR SEPTUM: 1.04 CM (ref 0.6–1.1)
LA MAJOR: 4.79 CM
LA MINOR: 4.87 CM
LA WIDTH: 2.99 CM
LEFT ATRIUM SIZE: 3.45 CM
LEFT ATRIUM VOLUME INDEX MOD: 14.9 ML/M2
LEFT ATRIUM VOLUME INDEX: 19.4 ML/M2
LEFT ATRIUM VOLUME MOD: 32.54 CM3
LEFT ATRIUM VOLUME: 42.35 CM3
LEFT INTERNAL DIMENSION IN SYSTOLE: 3.06 CM (ref 2.1–4)
LEFT VENTRICLE DIASTOLIC VOLUME INDEX: 48.14 ML/M2
LEFT VENTRICLE DIASTOLIC VOLUME: 104.94 ML
LEFT VENTRICLE MASS INDEX: 75 G/M2
LEFT VENTRICLE SYSTOLIC VOLUME INDEX: 16.8 ML/M2
LEFT VENTRICLE SYSTOLIC VOLUME: 36.6 ML
LEFT VENTRICULAR INTERNAL DIMENSION IN DIASTOLE: 4.75 CM (ref 3.5–6)
LEFT VENTRICULAR MASS: 162.79 G
LV LATERAL E/E' RATIO: 10.56 M/S
LV SEPTAL E/E' RATIO: 15.83 M/S
LYMPHOCYTES # BLD AUTO: 1.8 K/UL (ref 1–4.8)
LYMPHOCYTES NFR BLD: 34 % (ref 18–48)
MCH RBC QN AUTO: 28.6 PG (ref 27–31)
MCHC RBC AUTO-ENTMCNC: 31.8 G/DL (ref 32–36)
MCV RBC AUTO: 90 FL (ref 82–98)
MONOCYTES # BLD AUTO: 0.8 K/UL (ref 0.3–1)
MONOCYTES NFR BLD: 14.5 % (ref 4–15)
MV A" WAVE DURATION": 13.7 MSEC
MV PEAK A VEL: 0.66 M/S
MV PEAK E VEL: 0.95 M/S
NEUTROPHILS # BLD AUTO: 2.4 K/UL (ref 1.8–7.7)
NEUTROPHILS NFR BLD: 45.2 % (ref 38–73)
NRBC BLD-RTO: 0 /100 WBC
PISA TR MAX VEL: 2.58 M/S
PLATELET # BLD AUTO: 217 K/UL (ref 150–350)
PMV BLD AUTO: 10 FL (ref 9.2–12.9)
POTASSIUM SERPL-SCNC: 4.2 MMOL/L (ref 3.5–5.1)
PROT SERPL-MCNC: 7.3 G/DL (ref 6–8.4)
PULM VEIN S/D RATIO: 1.16
PV PEAK D VEL: 0.43 M/S
PV PEAK S VEL: 0.5 M/S
RA MAJOR: 5.83 CM
RA PRESSURE: 3 MMHG
RA WIDTH: 3.83 CM
RBC # BLD AUTO: 5.11 M/UL (ref 4.6–6.2)
RIGHT VENTRICULAR END-DIASTOLIC DIMENSION: 3.8 CM
SARS-COV-2 RDRP RESP QL NAA+PROBE: NEGATIVE
SINUS: 3.7 CM
SODIUM SERPL-SCNC: 137 MMOL/L (ref 136–145)
STJ: 3.05 CM
TDI LATERAL: 0.09 M/S
TDI SEPTAL: 0.06 M/S
TDI: 0.08 M/S
TR MAX PG: 27 MMHG
TRICUSPID ANNULAR PLANE SYSTOLIC EXCURSION: 2.78 CM
TROPONIN I SERPL DL<=0.01 NG/ML-MCNC: 0.01 NG/ML (ref 0–0.03)
TROPONIN I SERPL DL<=0.01 NG/ML-MCNC: <0.006 NG/ML (ref 0–0.03)
TV REST PULMONARY ARTERY PRESSURE: 30 MMHG
WBC # BLD AUTO: 5.38 K/UL (ref 3.9–12.7)

## 2021-02-12 PROCEDURE — 36000 PLACE NEEDLE IN VEIN: CPT

## 2021-02-12 PROCEDURE — 25000003 PHARM REV CODE 250: Performed by: STUDENT IN AN ORGANIZED HEALTH CARE EDUCATION/TRAINING PROGRAM

## 2021-02-12 PROCEDURE — 93010 EKG 12-LEAD: ICD-10-PCS | Mod: ,,, | Performed by: INTERNAL MEDICINE

## 2021-02-12 PROCEDURE — 93010 ELECTROCARDIOGRAM REPORT: CPT | Mod: 77,,, | Performed by: INTERNAL MEDICINE

## 2021-02-12 PROCEDURE — 99220 PR INITIAL OBSERVATION CARE,LEVL III: CPT | Mod: ,,, | Performed by: INTERNAL MEDICINE

## 2021-02-12 PROCEDURE — G0378 HOSPITAL OBSERVATION PER HR: HCPCS

## 2021-02-12 PROCEDURE — 84484 ASSAY OF TROPONIN QUANT: CPT

## 2021-02-12 PROCEDURE — 93005 ELECTROCARDIOGRAM TRACING: CPT

## 2021-02-12 PROCEDURE — 83880 ASSAY OF NATRIURETIC PEPTIDE: CPT

## 2021-02-12 PROCEDURE — 93010 EKG 12-LEAD: ICD-10-PCS | Mod: 77,,, | Performed by: INTERNAL MEDICINE

## 2021-02-12 PROCEDURE — 25000242 PHARM REV CODE 250 ALT 637 W/ HCPCS: Performed by: STUDENT IN AN ORGANIZED HEALTH CARE EDUCATION/TRAINING PROGRAM

## 2021-02-12 PROCEDURE — 93010 ELECTROCARDIOGRAM REPORT: CPT | Mod: ,,, | Performed by: INTERNAL MEDICINE

## 2021-02-12 PROCEDURE — 85025 COMPLETE CBC W/AUTO DIFF WBC: CPT

## 2021-02-12 PROCEDURE — 25000003 PHARM REV CODE 250: Performed by: INTERNAL MEDICINE

## 2021-02-12 PROCEDURE — 99285 EMERGENCY DEPT VISIT HI MDM: CPT | Mod: 25

## 2021-02-12 PROCEDURE — U0002 COVID-19 LAB TEST NON-CDC: HCPCS | Performed by: STUDENT IN AN ORGANIZED HEALTH CARE EDUCATION/TRAINING PROGRAM

## 2021-02-12 PROCEDURE — 36415 COLL VENOUS BLD VENIPUNCTURE: CPT

## 2021-02-12 PROCEDURE — 99284 PR EMERGENCY DEPT VISIT,LEVEL IV: ICD-10-PCS | Mod: GC,CS,, | Performed by: EMERGENCY MEDICINE

## 2021-02-12 PROCEDURE — 80053 COMPREHEN METABOLIC PANEL: CPT

## 2021-02-12 PROCEDURE — 84484 ASSAY OF TROPONIN QUANT: CPT | Mod: 91

## 2021-02-12 PROCEDURE — 99220 PR INITIAL OBSERVATION CARE,LEVL III: ICD-10-PCS | Mod: ,,, | Performed by: INTERNAL MEDICINE

## 2021-02-12 PROCEDURE — 99284 EMERGENCY DEPT VISIT MOD MDM: CPT | Mod: GC,CS,, | Performed by: EMERGENCY MEDICINE

## 2021-02-12 RX ORDER — NITROGLYCERIN 0.4 MG/1
0.4 TABLET SUBLINGUAL
Status: COMPLETED | OUTPATIENT
Start: 2021-02-12 | End: 2021-02-12

## 2021-02-12 RX ORDER — ASPIRIN 325 MG
325 TABLET ORAL
Status: COMPLETED | OUTPATIENT
Start: 2021-02-12 | End: 2021-02-12

## 2021-02-12 RX ORDER — AMLODIPINE BESYLATE 5 MG/1
5 TABLET ORAL DAILY
Qty: 30 TABLET | Refills: 3 | Status: SHIPPED | OUTPATIENT
Start: 2021-02-12 | End: 2022-02-10

## 2021-02-12 RX ORDER — AMLODIPINE BESYLATE 2.5 MG/1
5 TABLET ORAL DAILY
Qty: 30 TABLET | Refills: 3 | Status: SHIPPED | OUTPATIENT
Start: 2021-02-12 | End: 2021-02-12 | Stop reason: SDUPTHER

## 2021-02-12 RX ORDER — ASPIRIN 81 MG/1
81 TABLET ORAL DAILY
Status: DISCONTINUED | OUTPATIENT
Start: 2021-02-12 | End: 2021-02-12 | Stop reason: HOSPADM

## 2021-02-12 RX ORDER — LISINOPRIL 20 MG/1
20 TABLET ORAL DAILY
Status: DISCONTINUED | OUTPATIENT
Start: 2021-02-12 | End: 2021-02-12 | Stop reason: HOSPADM

## 2021-02-12 RX ORDER — ONDANSETRON 8 MG/1
8 TABLET, ORALLY DISINTEGRATING ORAL EVERY 8 HOURS PRN
Status: DISCONTINUED | OUTPATIENT
Start: 2021-02-12 | End: 2021-02-12 | Stop reason: HOSPADM

## 2021-02-12 RX ORDER — TAMSULOSIN HYDROCHLORIDE 0.4 MG/1
0.4 CAPSULE ORAL DAILY
Status: DISCONTINUED | OUTPATIENT
Start: 2021-02-12 | End: 2021-02-12 | Stop reason: HOSPADM

## 2021-02-12 RX ORDER — AMLODIPINE BESYLATE 2.5 MG/1
2.5 TABLET ORAL DAILY
Status: DISCONTINUED | OUTPATIENT
Start: 2021-02-12 | End: 2021-02-12 | Stop reason: HOSPADM

## 2021-02-12 RX ORDER — IBUPROFEN 200 MG
24 TABLET ORAL
Status: DISCONTINUED | OUTPATIENT
Start: 2021-02-12 | End: 2021-02-12 | Stop reason: HOSPADM

## 2021-02-12 RX ORDER — TALC
6 POWDER (GRAM) TOPICAL NIGHTLY PRN
Status: DISCONTINUED | OUTPATIENT
Start: 2021-02-12 | End: 2021-02-12 | Stop reason: HOSPADM

## 2021-02-12 RX ORDER — POLYETHYLENE GLYCOL 3350 17 G/17G
17 POWDER, FOR SOLUTION ORAL 2 TIMES DAILY PRN
Status: DISCONTINUED | OUTPATIENT
Start: 2021-02-12 | End: 2021-02-12 | Stop reason: HOSPADM

## 2021-02-12 RX ORDER — IPRATROPIUM BROMIDE AND ALBUTEROL SULFATE 2.5; .5 MG/3ML; MG/3ML
3 SOLUTION RESPIRATORY (INHALATION) EVERY 6 HOURS PRN
Status: DISCONTINUED | OUTPATIENT
Start: 2021-02-12 | End: 2021-02-12 | Stop reason: HOSPADM

## 2021-02-12 RX ORDER — ENOXAPARIN SODIUM 100 MG/ML
40 INJECTION SUBCUTANEOUS EVERY 24 HOURS
Status: DISCONTINUED | OUTPATIENT
Start: 2021-02-12 | End: 2021-02-12 | Stop reason: HOSPADM

## 2021-02-12 RX ORDER — OMEPRAZOLE 20 MG/1
20 CAPSULE, DELAYED RELEASE ORAL DAILY
Qty: 30 CAPSULE | Refills: 1 | Status: SHIPPED | OUTPATIENT
Start: 2021-02-12 | End: 2021-03-24

## 2021-02-12 RX ORDER — HYDROCHLOROTHIAZIDE 12.5 MG/1
12.5 TABLET ORAL DAILY
Status: DISCONTINUED | OUTPATIENT
Start: 2021-02-12 | End: 2021-02-12 | Stop reason: HOSPADM

## 2021-02-12 RX ORDER — ACETAMINOPHEN 325 MG/1
650 TABLET ORAL EVERY 8 HOURS PRN
Status: DISCONTINUED | OUTPATIENT
Start: 2021-02-12 | End: 2021-02-12 | Stop reason: HOSPADM

## 2021-02-12 RX ORDER — PROMETHAZINE HYDROCHLORIDE 25 MG/1
25 TABLET ORAL EVERY 6 HOURS PRN
Status: DISCONTINUED | OUTPATIENT
Start: 2021-02-12 | End: 2021-02-12 | Stop reason: HOSPADM

## 2021-02-12 RX ORDER — CARVEDILOL 3.12 MG/1
3.12 TABLET ORAL 2 TIMES DAILY WITH MEALS
Status: DISCONTINUED | OUTPATIENT
Start: 2021-02-12 | End: 2021-02-12 | Stop reason: HOSPADM

## 2021-02-12 RX ORDER — IBUPROFEN 200 MG
16 TABLET ORAL
Status: DISCONTINUED | OUTPATIENT
Start: 2021-02-12 | End: 2021-02-12 | Stop reason: HOSPADM

## 2021-02-12 RX ORDER — ALUMINUM HYDROXIDE, MAGNESIUM HYDROXIDE, AND SIMETHICONE 2400; 240; 2400 MG/30ML; MG/30ML; MG/30ML
5 SUSPENSION ORAL EVERY 4 HOURS PRN
Status: DISCONTINUED | OUTPATIENT
Start: 2021-02-12 | End: 2021-02-12 | Stop reason: HOSPADM

## 2021-02-12 RX ORDER — GLUCAGON 1 MG
1 KIT INJECTION
Status: DISCONTINUED | OUTPATIENT
Start: 2021-02-12 | End: 2021-02-12 | Stop reason: HOSPADM

## 2021-02-12 RX ORDER — SODIUM CHLORIDE 0.9 % (FLUSH) 0.9 %
10 SYRINGE (ML) INJECTION
Status: DISCONTINUED | OUTPATIENT
Start: 2021-02-12 | End: 2021-02-12 | Stop reason: HOSPADM

## 2021-02-12 RX ADMIN — CARVEDILOL 3.12 MG: 3.12 TABLET, FILM COATED ORAL at 10:02

## 2021-02-12 RX ADMIN — TAMSULOSIN HYDROCHLORIDE 0.4 MG: 0.4 CAPSULE ORAL at 10:02

## 2021-02-12 RX ADMIN — LISINOPRIL 20 MG: 20 TABLET ORAL at 10:02

## 2021-02-12 RX ADMIN — ASPIRIN 325 MG ORAL TABLET 325 MG: 325 PILL ORAL at 02:02

## 2021-02-12 RX ADMIN — HYDROCHLOROTHIAZIDE 12.5 MG: 12.5 TABLET ORAL at 10:02

## 2021-02-12 RX ADMIN — NITROGLYCERIN 0.4 MG: 0.4 TABLET, ORALLY DISINTEGRATING SUBLINGUAL at 02:02

## 2021-02-12 RX ADMIN — ASPIRIN 81 MG: 81 TABLET, COATED ORAL at 10:02

## 2021-02-12 RX ADMIN — AMLODIPINE BESYLATE 2.5 MG: 2.5 TABLET ORAL at 10:02

## 2021-03-17 ENCOUNTER — HOSPITAL ENCOUNTER (EMERGENCY)
Facility: HOSPITAL | Age: 71
Discharge: HOME | End: 2021-03-17
Attending: EMERGENCY MEDICINE
Payer: MEDICARE

## 2021-03-17 VITALS
HEART RATE: 51 BPM | HEIGHT: 70 IN | TEMPERATURE: 99 F | DIASTOLIC BLOOD PRESSURE: 65 MMHG | OXYGEN SATURATION: 97 % | BODY MASS INDEX: 31.5 KG/M2 | SYSTOLIC BLOOD PRESSURE: 113 MMHG | WEIGHT: 220 LBS | RESPIRATION RATE: 18 BRPM

## 2021-03-17 DIAGNOSIS — M25.531 RIGHT WRIST PAIN: ICD-10-CM

## 2021-03-17 DIAGNOSIS — R07.89 CHEST TIGHTNESS: Primary | ICD-10-CM

## 2021-03-17 LAB
ALBUMIN SERPL BCP-MCNC: 2 G/DL (ref 3.5–5.2)
ALBUMIN SERPL BCP-MCNC: 3.5 G/DL (ref 3.5–5.2)
ALP SERPL-CCNC: 110 U/L (ref 55–135)
ALP SERPL-CCNC: 64 U/L (ref 55–135)
ALT SERPL W/O P-5'-P-CCNC: 10 U/L (ref 10–44)
ALT SERPL W/O P-5'-P-CCNC: 17 U/L (ref 10–44)
ANION GAP SERPL CALC-SCNC: 5 MMOL/L (ref 8–16)
ANION GAP SERPL CALC-SCNC: 9 MMOL/L (ref 8–16)
AST SERPL-CCNC: 12 U/L (ref 10–40)
AST SERPL-CCNC: 20 U/L (ref 10–40)
BASOPHILS # BLD AUTO: 0.05 K/UL (ref 0–0.2)
BASOPHILS NFR BLD: 1 % (ref 0–1.9)
BILIRUB SERPL-MCNC: 0.2 MG/DL (ref 0.1–1)
BILIRUB SERPL-MCNC: 0.3 MG/DL (ref 0.1–1)
BNP SERPL-MCNC: <10 PG/ML (ref 0–99)
BUN SERPL-MCNC: 15 MG/DL (ref 8–23)
BUN SERPL-MCNC: 22 MG/DL (ref 8–23)
BUN SERPL-MCNC: 23 MG/DL (ref 6–30)
CALCIUM SERPL-MCNC: 5.1 MG/DL (ref 8.7–10.5)
CALCIUM SERPL-MCNC: 9.1 MG/DL (ref 8.7–10.5)
CHLORIDE SERPL-SCNC: 104 MMOL/L (ref 95–110)
CHLORIDE SERPL-SCNC: 106 MMOL/L (ref 95–110)
CHLORIDE SERPL-SCNC: 125 MMOL/L (ref 95–110)
CO2 SERPL-SCNC: 14 MMOL/L (ref 23–29)
CO2 SERPL-SCNC: 24 MMOL/L (ref 23–29)
CREAT SERPL-MCNC: 0.6 MG/DL (ref 0.5–1.4)
CREAT SERPL-MCNC: 1 MG/DL (ref 0.5–1.4)
CREAT SERPL-MCNC: 1 MG/DL (ref 0.5–1.4)
DIFFERENTIAL METHOD: ABNORMAL
EOSINOPHIL # BLD AUTO: 0.2 K/UL (ref 0–0.5)
EOSINOPHIL NFR BLD: 3.3 % (ref 0–8)
ERYTHROCYTE [DISTWIDTH] IN BLOOD BY AUTOMATED COUNT: 13.9 % (ref 11.5–14.5)
EST. GFR  (AFRICAN AMERICAN): >60 ML/MIN/1.73 M^2
EST. GFR  (AFRICAN AMERICAN): >60 ML/MIN/1.73 M^2
EST. GFR  (NON AFRICAN AMERICAN): >60 ML/MIN/1.73 M^2
EST. GFR  (NON AFRICAN AMERICAN): >60 ML/MIN/1.73 M^2
GLUCOSE SERPL-MCNC: 57 MG/DL (ref 70–110)
GLUCOSE SERPL-MCNC: 88 MG/DL (ref 70–110)
GLUCOSE SERPL-MCNC: 90 MG/DL (ref 70–110)
HCT VFR BLD AUTO: 45 % (ref 40–54)
HCT VFR BLD CALC: 44 %PCV (ref 36–54)
HGB BLD-MCNC: 14.5 G/DL (ref 14–18)
IMM GRANULOCYTES # BLD AUTO: 0.03 K/UL (ref 0–0.04)
IMM GRANULOCYTES NFR BLD AUTO: 0.6 % (ref 0–0.5)
LYMPHOCYTES # BLD AUTO: 1.6 K/UL (ref 1–4.8)
LYMPHOCYTES NFR BLD: 30.4 % (ref 18–48)
MAGNESIUM SERPL-MCNC: 1.1 MG/DL (ref 1.6–2.6)
MAGNESIUM SERPL-MCNC: 1.9 MG/DL (ref 1.6–2.6)
MCH RBC QN AUTO: 28.3 PG (ref 27–31)
MCHC RBC AUTO-ENTMCNC: 32.2 G/DL (ref 32–36)
MCV RBC AUTO: 88 FL (ref 82–98)
MONOCYTES # BLD AUTO: 0.7 K/UL (ref 0.3–1)
MONOCYTES NFR BLD: 13.9 % (ref 4–15)
NEUTROPHILS # BLD AUTO: 2.6 K/UL (ref 1.8–7.7)
NEUTROPHILS NFR BLD: 50.8 % (ref 38–73)
NRBC BLD-RTO: 0 /100 WBC
PLATELET # BLD AUTO: 200 K/UL (ref 150–350)
PMV BLD AUTO: 10.3 FL (ref 9.2–12.9)
POC IONIZED CALCIUM: 1.18 MMOL/L (ref 1.06–1.42)
POC TCO2 (MEASURED): 25 MMOL/L (ref 23–29)
POTASSIUM BLD-SCNC: 3.7 MMOL/L (ref 3.5–5.1)
POTASSIUM SERPL-SCNC: 2.4 MMOL/L (ref 3.5–5.1)
POTASSIUM SERPL-SCNC: 3.8 MMOL/L (ref 3.5–5.1)
PROT SERPL-MCNC: 3.9 G/DL (ref 6–8.4)
PROT SERPL-MCNC: 6.9 G/DL (ref 6–8.4)
RBC # BLD AUTO: 5.12 M/UL (ref 4.6–6.2)
SAMPLE: NORMAL
SODIUM BLD-SCNC: 139 MMOL/L (ref 136–145)
SODIUM SERPL-SCNC: 139 MMOL/L (ref 136–145)
SODIUM SERPL-SCNC: 144 MMOL/L (ref 136–145)
TROPONIN I SERPL DL<=0.01 NG/ML-MCNC: <0.006 NG/ML (ref 0–0.03)
TROPONIN I SERPL DL<=0.01 NG/ML-MCNC: <0.006 NG/ML (ref 0–0.03)
WBC # BLD AUTO: 5.1 K/UL (ref 3.9–12.7)

## 2021-03-17 PROCEDURE — 83880 ASSAY OF NATRIURETIC PEPTIDE: CPT | Performed by: PHYSICIAN ASSISTANT

## 2021-03-17 PROCEDURE — 80047 BASIC METABLC PNL IONIZED CA: CPT

## 2021-03-17 PROCEDURE — 99285 EMERGENCY DEPT VISIT HI MDM: CPT | Mod: 25

## 2021-03-17 PROCEDURE — 93010 ELECTROCARDIOGRAM REPORT: CPT | Mod: ,,, | Performed by: INTERNAL MEDICINE

## 2021-03-17 PROCEDURE — 83735 ASSAY OF MAGNESIUM: CPT | Performed by: PHYSICIAN ASSISTANT

## 2021-03-17 PROCEDURE — 80053 COMPREHEN METABOLIC PANEL: CPT | Performed by: PHYSICIAN ASSISTANT

## 2021-03-17 PROCEDURE — 99285 EMERGENCY DEPT VISIT HI MDM: CPT | Mod: ,,, | Performed by: PHYSICIAN ASSISTANT

## 2021-03-17 PROCEDURE — 99285 PR EMERGENCY DEPT VISIT,LEVEL V: ICD-10-PCS | Mod: ,,, | Performed by: PHYSICIAN ASSISTANT

## 2021-03-17 PROCEDURE — 82330 ASSAY OF CALCIUM: CPT

## 2021-03-17 PROCEDURE — 93005 ELECTROCARDIOGRAM TRACING: CPT

## 2021-03-17 PROCEDURE — 86803 HEPATITIS C AB TEST: CPT | Performed by: EMERGENCY MEDICINE

## 2021-03-17 PROCEDURE — 93010 EKG 12-LEAD: ICD-10-PCS | Mod: ,,, | Performed by: INTERNAL MEDICINE

## 2021-03-17 PROCEDURE — 85025 COMPLETE CBC W/AUTO DIFF WBC: CPT | Performed by: PHYSICIAN ASSISTANT

## 2021-03-17 PROCEDURE — 84484 ASSAY OF TROPONIN QUANT: CPT | Performed by: PHYSICIAN ASSISTANT

## 2021-03-18 LAB — HCV AB SERPL QL IA: NEGATIVE

## 2021-03-24 ENCOUNTER — OFFICE VISIT (OUTPATIENT)
Dept: CARDIOLOGY | Facility: CLINIC | Age: 71
End: 2021-03-24
Payer: MEDICARE

## 2021-03-24 VITALS
HEART RATE: 80 BPM | BODY MASS INDEX: 32.1 KG/M2 | WEIGHT: 224.19 LBS | DIASTOLIC BLOOD PRESSURE: 68 MMHG | HEIGHT: 70 IN | SYSTOLIC BLOOD PRESSURE: 125 MMHG

## 2021-03-24 DIAGNOSIS — I10 ESSENTIAL HYPERTENSION: ICD-10-CM

## 2021-03-24 DIAGNOSIS — R07.2 PRECORDIAL PAIN: Primary | ICD-10-CM

## 2021-03-24 PROCEDURE — 99204 PR OFFICE/OUTPT VISIT, NEW, LEVL IV, 45-59 MIN: ICD-10-PCS | Mod: S$GLB,,, | Performed by: INTERNAL MEDICINE

## 2021-03-24 PROCEDURE — 1126F AMNT PAIN NOTED NONE PRSNT: CPT | Mod: S$GLB,,, | Performed by: INTERNAL MEDICINE

## 2021-03-24 PROCEDURE — 99999 PR PBB SHADOW E&M-EST. PATIENT-LVL III: CPT | Mod: PBBFAC,,, | Performed by: INTERNAL MEDICINE

## 2021-03-24 PROCEDURE — 3074F PR MOST RECENT SYSTOLIC BLOOD PRESSURE < 130 MM HG: ICD-10-PCS | Mod: CPTII,S$GLB,, | Performed by: INTERNAL MEDICINE

## 2021-03-24 PROCEDURE — 3078F DIAST BP <80 MM HG: CPT | Mod: CPTII,S$GLB,, | Performed by: INTERNAL MEDICINE

## 2021-03-24 PROCEDURE — 1159F PR MEDICATION LIST DOCUMENTED IN MEDICAL RECORD: ICD-10-PCS | Mod: S$GLB,,, | Performed by: INTERNAL MEDICINE

## 2021-03-24 PROCEDURE — 3008F BODY MASS INDEX DOCD: CPT | Mod: CPTII,S$GLB,, | Performed by: INTERNAL MEDICINE

## 2021-03-24 PROCEDURE — 1126F PR PAIN SEVERITY QUANTIFIED, NO PAIN PRESENT: ICD-10-PCS | Mod: S$GLB,,, | Performed by: INTERNAL MEDICINE

## 2021-03-24 PROCEDURE — 3074F SYST BP LT 130 MM HG: CPT | Mod: CPTII,S$GLB,, | Performed by: INTERNAL MEDICINE

## 2021-03-24 PROCEDURE — 99204 OFFICE O/P NEW MOD 45 MIN: CPT | Mod: S$GLB,,, | Performed by: INTERNAL MEDICINE

## 2021-03-24 PROCEDURE — 99999 PR PBB SHADOW E&M-EST. PATIENT-LVL III: ICD-10-PCS | Mod: PBBFAC,,, | Performed by: INTERNAL MEDICINE

## 2021-03-24 PROCEDURE — 3008F PR BODY MASS INDEX (BMI) DOCUMENTED: ICD-10-PCS | Mod: CPTII,S$GLB,, | Performed by: INTERNAL MEDICINE

## 2021-03-24 PROCEDURE — 1159F MED LIST DOCD IN RCRD: CPT | Mod: S$GLB,,, | Performed by: INTERNAL MEDICINE

## 2021-03-24 PROCEDURE — 3078F PR MOST RECENT DIASTOLIC BLOOD PRESSURE < 80 MM HG: ICD-10-PCS | Mod: CPTII,S$GLB,, | Performed by: INTERNAL MEDICINE

## 2021-03-26 ENCOUNTER — LAB VISIT (OUTPATIENT)
Dept: LAB | Facility: HOSPITAL | Age: 71
End: 2021-03-26
Attending: INTERNAL MEDICINE
Payer: MEDICARE

## 2021-03-26 DIAGNOSIS — I10 ESSENTIAL HYPERTENSION: ICD-10-CM

## 2021-03-26 DIAGNOSIS — R07.2 PRECORDIAL PAIN: ICD-10-CM

## 2021-03-26 LAB
CHOLEST SERPL-MCNC: 133 MG/DL (ref 120–199)
CHOLEST/HDLC SERPL: 3.8 {RATIO} (ref 2–5)
HDLC SERPL-MCNC: 35 MG/DL (ref 40–75)
HDLC SERPL: 26.3 % (ref 20–50)
LDLC SERPL CALC-MCNC: 80.4 MG/DL (ref 63–159)
NONHDLC SERPL-MCNC: 98 MG/DL
TRIGL SERPL-MCNC: 88 MG/DL (ref 30–150)

## 2021-03-26 PROCEDURE — 80061 LIPID PANEL: CPT | Performed by: INTERNAL MEDICINE

## 2021-03-26 PROCEDURE — 36415 COLL VENOUS BLD VENIPUNCTURE: CPT | Mod: PO | Performed by: INTERNAL MEDICINE

## 2021-03-30 ENCOUNTER — OFFICE VISIT (OUTPATIENT)
Dept: ORTHOPEDICS | Facility: CLINIC | Age: 71
End: 2021-03-30
Payer: MEDICARE

## 2021-03-30 ENCOUNTER — HOSPITAL ENCOUNTER (OUTPATIENT)
Dept: RADIOLOGY | Facility: HOSPITAL | Age: 71
Discharge: HOME | End: 2021-03-30
Attending: ORTHOPAEDIC SURGERY
Payer: MEDICARE

## 2021-03-30 VITALS — WEIGHT: 224 LBS | HEIGHT: 70 IN | BODY MASS INDEX: 32.07 KG/M2

## 2021-03-30 DIAGNOSIS — M79.641 HAND PAIN, RIGHT: ICD-10-CM

## 2021-03-30 DIAGNOSIS — M25.531 WRIST PAIN, RIGHT: Primary | ICD-10-CM

## 2021-03-30 DIAGNOSIS — M25.531 WRIST PAIN, RIGHT: ICD-10-CM

## 2021-03-30 PROCEDURE — 3008F PR BODY MASS INDEX (BMI) DOCUMENTED: ICD-10-PCS | Mod: CPTII,S$GLB,, | Performed by: ORTHOPAEDIC SURGERY

## 2021-03-30 PROCEDURE — 1159F MED LIST DOCD IN RCRD: CPT | Mod: S$GLB,,, | Performed by: ORTHOPAEDIC SURGERY

## 2021-03-30 PROCEDURE — 1101F PT FALLS ASSESS-DOCD LE1/YR: CPT | Mod: CPTII,S$GLB,, | Performed by: ORTHOPAEDIC SURGERY

## 2021-03-30 PROCEDURE — 99999 PR PBB SHADOW E&M-EST. PATIENT-LVL III: ICD-10-PCS | Mod: PBBFAC,,, | Performed by: ORTHOPAEDIC SURGERY

## 2021-03-30 PROCEDURE — 3008F BODY MASS INDEX DOCD: CPT | Mod: CPTII,S$GLB,, | Performed by: ORTHOPAEDIC SURGERY

## 2021-03-30 PROCEDURE — 1125F PR PAIN SEVERITY QUANTIFIED, PAIN PRESENT: ICD-10-PCS | Mod: S$GLB,,, | Performed by: ORTHOPAEDIC SURGERY

## 2021-03-30 PROCEDURE — 1101F PR PT FALLS ASSESS DOC 0-1 FALLS W/OUT INJ PAST YR: ICD-10-PCS | Mod: CPTII,S$GLB,, | Performed by: ORTHOPAEDIC SURGERY

## 2021-03-30 PROCEDURE — 99204 OFFICE O/P NEW MOD 45 MIN: CPT | Mod: S$GLB,,, | Performed by: ORTHOPAEDIC SURGERY

## 2021-03-30 PROCEDURE — 73110 X-RAY EXAM OF WRIST: CPT | Mod: 26,RT,, | Performed by: RADIOLOGY

## 2021-03-30 PROCEDURE — 99999 PR PBB SHADOW E&M-EST. PATIENT-LVL III: CPT | Mod: PBBFAC,,, | Performed by: ORTHOPAEDIC SURGERY

## 2021-03-30 PROCEDURE — 3288F FALL RISK ASSESSMENT DOCD: CPT | Mod: CPTII,S$GLB,, | Performed by: ORTHOPAEDIC SURGERY

## 2021-03-30 PROCEDURE — 1125F AMNT PAIN NOTED PAIN PRSNT: CPT | Mod: S$GLB,,, | Performed by: ORTHOPAEDIC SURGERY

## 2021-03-30 PROCEDURE — 3288F PR FALLS RISK ASSESSMENT DOCUMENTED: ICD-10-PCS | Mod: CPTII,S$GLB,, | Performed by: ORTHOPAEDIC SURGERY

## 2021-03-30 PROCEDURE — 73110 XR WRIST COMPLETE 3 VIEWS RIGHT: ICD-10-PCS | Mod: 26,RT,, | Performed by: RADIOLOGY

## 2021-03-30 PROCEDURE — 73110 X-RAY EXAM OF WRIST: CPT | Mod: TC,RT

## 2021-03-30 PROCEDURE — 99204 PR OFFICE/OUTPT VISIT, NEW, LEVL IV, 45-59 MIN: ICD-10-PCS | Mod: S$GLB,,, | Performed by: ORTHOPAEDIC SURGERY

## 2021-03-30 PROCEDURE — 1159F PR MEDICATION LIST DOCUMENTED IN MEDICAL RECORD: ICD-10-PCS | Mod: S$GLB,,, | Performed by: ORTHOPAEDIC SURGERY

## 2021-03-30 RX ORDER — MELOXICAM 7.5 MG/1
7.5 TABLET ORAL DAILY
Qty: 30 TABLET | Refills: 0 | Status: SHIPPED | OUTPATIENT
Start: 2021-03-30 | End: 2021-04-29

## 2021-03-30 RX ORDER — METHYLPREDNISOLONE 4 MG/1
TABLET ORAL
Qty: 1 PACKAGE | Refills: 0 | Status: SHIPPED | OUTPATIENT
Start: 2021-03-30 | End: 2021-05-12 | Stop reason: CLARIF

## 2021-03-31 ENCOUNTER — HOSPITAL ENCOUNTER (OUTPATIENT)
Dept: CARDIOLOGY | Facility: HOSPITAL | Age: 71
Discharge: HOME | End: 2021-03-31
Attending: INTERNAL MEDICINE
Payer: MEDICARE

## 2021-03-31 VITALS — HEIGHT: 70 IN | BODY MASS INDEX: 31.5 KG/M2 | WEIGHT: 220 LBS

## 2021-03-31 DIAGNOSIS — R07.2 PRECORDIAL PAIN: ICD-10-CM

## 2021-03-31 LAB
ASCENDING AORTA: 3.21 CM
BSA FOR ECHO PROCEDURE: 2.22 M2
CV ECHO LV RWT: 0.34 CM
CV STRESS BASE HR: 59 BPM
DIASTOLIC BLOOD PRESSURE: 69 MMHG
DOP CALC LVOT AREA: 3.9 CM2
DOP CALC LVOT DIAMETER: 2.23 CM
DOP CALC LVOT PEAK VEL: 1.25 M/S
DOP CALC LVOT STROKE VOLUME: 97.71 CM3
DOP CALCLVOT PEAK VEL VTI: 25.03 CM
E WAVE DECELERATION TIME: 255.1 MSEC
E/A RATIO: 1.05
E/E' RATIO: 6.63 M/S
ECHO LV POSTERIOR WALL: 0.79 CM (ref 0.6–1.1)
FRACTIONAL SHORTENING: 32 % (ref 28–44)
INTERVENTRICULAR SEPTUM: 0.75 CM (ref 0.6–1.1)
IVRT: 82.78 MSEC
LA MAJOR: 5.94 CM
LA MINOR: 5.85 CM
LA WIDTH: 4.09 CM
LEFT ATRIUM SIZE: 2.9 CM
LEFT ATRIUM VOLUME INDEX MOD: 24.2 ML/M2
LEFT ATRIUM VOLUME INDEX: 27.4 ML/M2
LEFT ATRIUM VOLUME MOD: 52.62 CM3
LEFT ATRIUM VOLUME: 59.43 CM3
LEFT INTERNAL DIMENSION IN SYSTOLE: 3.19 CM (ref 2.1–4)
LEFT VENTRICLE DIASTOLIC VOLUME INDEX: 47.2 ML/M2
LEFT VENTRICLE DIASTOLIC VOLUME: 102.42 ML
LEFT VENTRICLE MASS INDEX: 54 G/M2
LEFT VENTRICLE SYSTOLIC VOLUME INDEX: 18.7 ML/M2
LEFT VENTRICLE SYSTOLIC VOLUME: 40.6 ML
LEFT VENTRICULAR INTERNAL DIMENSION IN DIASTOLE: 4.7 CM (ref 3.5–6)
LEFT VENTRICULAR MASS: 116.37 G
LV LATERAL E/E' RATIO: 5.73 M/S
LV SEPTAL E/E' RATIO: 7.88 M/S
MV A" WAVE DURATION": 11.8 MSEC
MV PEAK A VEL: 0.6 M/S
MV PEAK E VEL: 0.63 M/S
MV STENOSIS PRESSURE HALF TIME: 73.98 MS
MV VALVE AREA P 1/2 METHOD: 2.97 CM2
OHS CV CPX 1 MINUTE RECOVERY HEART RATE: 113 BPM
OHS CV CPX 85 PERCENT MAX PREDICTED HEART RATE MALE: 126
OHS CV CPX ESTIMATED METS: 13
OHS CV CPX MAX PREDICTED HEART RATE: 148
OHS CV CPX PATIENT IS FEMALE: 0
OHS CV CPX PATIENT IS MALE: 1
OHS CV CPX PEAK DIASTOLIC BLOOD PRESSURE: 70 MMHG
OHS CV CPX PEAK HEAR RATE: 144 BPM
OHS CV CPX PEAK RATE PRESSURE PRODUCT: NORMAL
OHS CV CPX PEAK SYSTOLIC BLOOD PRESSURE: 168 MMHG
OHS CV CPX PERCENT MAX PREDICTED HEART RATE ACHIEVED: 97
OHS CV CPX RATE PRESSURE PRODUCT PRESENTING: 7257
PISA TR MAX VEL: 2.5 M/S
PULM VEIN S/D RATIO: 1.56
PV PEAK D VEL: 0.45 M/S
PV PEAK S VEL: 0.7 M/S
RA MAJOR: 5.64 CM
RA PRESSURE: 3 MMHG
RA WIDTH: 3.71 CM
RIGHT VENTRICULAR END-DIASTOLIC DIMENSION: 4.03 CM
RV TISSUE DOPPLER FREE WALL SYSTOLIC VELOCITY 1 (APICAL 4 CHAMBER VIEW): 14.62 CM/S
SINUS: 3.61 CM
STJ: 3.14 CM
STRESS ECHO POST EXERCISE DUR MIN: 7 MINUTES
STRESS ECHO POST EXERCISE DUR SEC: 51 SECONDS
SYSTOLIC BLOOD PRESSURE: 123 MMHG
TDI LATERAL: 0.11 M/S
TDI SEPTAL: 0.08 M/S
TDI: 0.1 M/S
TR MAX PG: 25 MMHG
TRICUSPID ANNULAR PLANE SYSTOLIC EXCURSION: 2.22 CM
TV REST PULMONARY ARTERY PRESSURE: 28 MMHG

## 2021-03-31 PROCEDURE — 93351 STRESS TTE COMPLETE: CPT

## 2021-03-31 PROCEDURE — 93351 STRESS ECHO (CUPID ONLY): ICD-10-PCS | Mod: 26,,, | Performed by: INTERNAL MEDICINE

## 2021-03-31 PROCEDURE — 93351 STRESS TTE COMPLETE: CPT | Mod: 26,,, | Performed by: INTERNAL MEDICINE

## 2021-04-01 ENCOUNTER — HOSPITAL ENCOUNTER (EMERGENCY)
Facility: HOSPITAL | Age: 71
Discharge: HOME | End: 2021-04-01
Attending: EMERGENCY MEDICINE
Payer: MEDICARE

## 2021-04-01 VITALS
OXYGEN SATURATION: 97 % | HEART RATE: 58 BPM | DIASTOLIC BLOOD PRESSURE: 71 MMHG | TEMPERATURE: 98 F | WEIGHT: 225.88 LBS | RESPIRATION RATE: 21 BRPM | SYSTOLIC BLOOD PRESSURE: 130 MMHG | BODY MASS INDEX: 32.41 KG/M2

## 2021-04-01 DIAGNOSIS — K21.9 GASTROESOPHAGEAL REFLUX DISEASE, UNSPECIFIED WHETHER ESOPHAGITIS PRESENT: Primary | ICD-10-CM

## 2021-04-01 DIAGNOSIS — R07.9 CHEST PAIN: ICD-10-CM

## 2021-04-01 LAB
ALBUMIN SERPL BCP-MCNC: 4.1 G/DL (ref 3.5–5.2)
ALP SERPL-CCNC: 125 U/L (ref 38–126)
ALT SERPL W/O P-5'-P-CCNC: 18 U/L (ref 10–44)
ANION GAP SERPL CALC-SCNC: 7 MMOL/L (ref 8–16)
AST SERPL-CCNC: 25 U/L (ref 15–46)
BASOPHILS # BLD AUTO: 0.04 K/UL (ref 0–0.2)
BASOPHILS NFR BLD: 0.9 % (ref 0–1.9)
BILIRUB SERPL-MCNC: 0.2 MG/DL (ref 0.1–1)
CALCIUM SERPL-MCNC: 10 MG/DL (ref 8.7–10.5)
CHLORIDE SERPL-SCNC: 102 MMOL/L (ref 95–110)
CO2 SERPL-SCNC: 32 MMOL/L (ref 23–29)
CREAT SERPL-MCNC: 1.24 MG/DL (ref 0.5–1.4)
DIFFERENTIAL METHOD: ABNORMAL
EOSINOPHIL # BLD AUTO: 0.3 K/UL (ref 0–0.5)
EOSINOPHIL NFR BLD: 6.3 % (ref 0–8)
ERYTHROCYTE [DISTWIDTH] IN BLOOD BY AUTOMATED COUNT: 13.4 % (ref 11.5–14.5)
EST. GFR  (AFRICAN AMERICAN): >60 ML/MIN/1.73 M^2
EST. GFR  (NON AFRICAN AMERICAN): 57.7 ML/MIN/1.73 M^2
EXTRA BLUE TOP RAINBOW: NORMAL
EXTRA GREEN TOP RAINBOW: NORMAL
EXTRA LAVENDER TOP RAINBOW: NORMAL
GLUCOSE SERPL-MCNC: 135 MG/DL (ref 70–110)
HCT VFR BLD AUTO: 45 % (ref 40–54)
HGB BLD-MCNC: 14.1 G/DL (ref 14–18)
IMM GRANULOCYTES # BLD AUTO: 0.03 K/UL (ref 0–0.04)
IMM GRANULOCYTES NFR BLD AUTO: 0.7 % (ref 0–0.5)
LYMPHOCYTES # BLD AUTO: 1.7 K/UL (ref 1–4.8)
LYMPHOCYTES NFR BLD: 37.1 % (ref 18–48)
MCH RBC QN AUTO: 28.3 PG (ref 27–31)
MCHC RBC AUTO-ENTMCNC: 31.3 G/DL (ref 32–36)
MCV RBC AUTO: 90 FL (ref 82–98)
MONOCYTES # BLD AUTO: 0.6 K/UL (ref 0.3–1)
MONOCYTES NFR BLD: 12.8 % (ref 4–15)
NEUTROPHILS # BLD AUTO: 2 K/UL (ref 1.8–7.7)
NEUTROPHILS NFR BLD: 42.2 % (ref 38–73)
NRBC BLD-RTO: 0 /100 WBC
PLATELET # BLD AUTO: 198 K/UL (ref 150–450)
PMV BLD AUTO: 10 FL (ref 9.2–12.9)
POTASSIUM SERPL-SCNC: 3.8 MMOL/L (ref 3.5–5.1)
PROT SERPL-MCNC: 7.1 G/DL (ref 6–8.4)
RBC # BLD AUTO: 4.99 M/UL (ref 4.6–6.2)
SODIUM SERPL-SCNC: 141 MMOL/L (ref 136–145)
TROPONIN I SERPL-MCNC: <0.012 NG/ML (ref 0.01–0.03)
TROPONIN I SERPL-MCNC: <0.012 NG/ML (ref 0.01–0.03)
UUN UR-MCNC: 21 MG/DL (ref 2–20)
WBC # BLD AUTO: 4.61 K/UL (ref 3.9–12.7)

## 2021-04-01 PROCEDURE — 93010 EKG 12-LEAD: ICD-10-PCS | Mod: ,,, | Performed by: INTERNAL MEDICINE

## 2021-04-01 PROCEDURE — 25000003 PHARM REV CODE 250: Mod: ER | Performed by: EMERGENCY MEDICINE

## 2021-04-01 PROCEDURE — 85025 COMPLETE CBC W/AUTO DIFF WBC: CPT | Mod: ER | Performed by: EMERGENCY MEDICINE

## 2021-04-01 PROCEDURE — 84484 ASSAY OF TROPONIN QUANT: CPT | Mod: ER | Performed by: EMERGENCY MEDICINE

## 2021-04-01 PROCEDURE — 93010 ELECTROCARDIOGRAM REPORT: CPT | Mod: ,,, | Performed by: INTERNAL MEDICINE

## 2021-04-01 PROCEDURE — 99285 EMERGENCY DEPT VISIT HI MDM: CPT | Mod: 25,ER

## 2021-04-01 PROCEDURE — 93005 ELECTROCARDIOGRAM TRACING: CPT | Mod: ER

## 2021-04-01 PROCEDURE — 80053 COMPREHEN METABOLIC PANEL: CPT | Mod: ER | Performed by: EMERGENCY MEDICINE

## 2021-04-01 RX ORDER — FAMOTIDINE 20 MG/1
20 TABLET, FILM COATED ORAL 2 TIMES DAILY
Qty: 20 TABLET | Refills: 0 | Status: SHIPPED | OUTPATIENT
Start: 2021-04-01 | End: 2021-06-11

## 2021-04-01 RX ORDER — NAPROXEN SODIUM 220 MG/1
324 TABLET, FILM COATED ORAL
Status: COMPLETED | OUTPATIENT
Start: 2021-04-01 | End: 2021-04-01

## 2021-04-01 RX ADMIN — LIDOCAINE HYDROCHLORIDE: 20 SOLUTION ORAL; TOPICAL at 05:04

## 2021-04-01 RX ADMIN — ASPIRIN 324 MG: 81 TABLET, CHEWABLE ORAL at 02:04

## 2021-04-05 ENCOUNTER — CLINICAL SUPPORT (OUTPATIENT)
Dept: REHABILITATION | Facility: HOSPITAL | Age: 71
End: 2021-04-05
Payer: MEDICARE

## 2021-04-05 DIAGNOSIS — Z78.9 ALTERATION IN INSTRUMENTAL ACTIVITIES OF DAILY LIVING (IADL): ICD-10-CM

## 2021-04-05 DIAGNOSIS — M25.531 WRIST PAIN, RIGHT: ICD-10-CM

## 2021-04-05 DIAGNOSIS — M79.641 HAND PAIN, RIGHT: ICD-10-CM

## 2021-04-05 PROCEDURE — 97110 THERAPEUTIC EXERCISES: CPT | Mod: PO

## 2021-04-05 PROCEDURE — 97166 OT EVAL MOD COMPLEX 45 MIN: CPT | Mod: PO

## 2021-04-12 ENCOUNTER — CLINICAL SUPPORT (OUTPATIENT)
Dept: REHABILITATION | Facility: HOSPITAL | Age: 71
End: 2021-04-12
Payer: MEDICARE

## 2021-04-12 DIAGNOSIS — M79.641 HAND PAIN, RIGHT: ICD-10-CM

## 2021-04-12 DIAGNOSIS — Z78.9 ALTERATION IN INSTRUMENTAL ACTIVITIES OF DAILY LIVING (IADL): ICD-10-CM

## 2021-04-12 PROCEDURE — 97140 MANUAL THERAPY 1/> REGIONS: CPT | Mod: PO

## 2021-04-12 PROCEDURE — 97110 THERAPEUTIC EXERCISES: CPT | Mod: PO

## 2021-04-21 ENCOUNTER — CLINICAL SUPPORT (OUTPATIENT)
Dept: REHABILITATION | Facility: HOSPITAL | Age: 71
End: 2021-04-21
Payer: MEDICARE

## 2021-04-21 DIAGNOSIS — Z78.9 ALTERATION IN INSTRUMENTAL ACTIVITIES OF DAILY LIVING (IADL): ICD-10-CM

## 2021-04-21 DIAGNOSIS — M79.641 HAND PAIN, RIGHT: ICD-10-CM

## 2021-04-21 PROCEDURE — 97110 THERAPEUTIC EXERCISES: CPT | Mod: PO

## 2021-04-21 PROCEDURE — 97018 PARAFFIN BATH THERAPY: CPT | Mod: PO

## 2021-04-27 ENCOUNTER — OFFICE VISIT (OUTPATIENT)
Dept: ORTHOPEDICS | Facility: CLINIC | Age: 71
End: 2021-04-27
Payer: MEDICARE

## 2021-04-27 VITALS — BODY MASS INDEX: 32.21 KG/M2 | WEIGHT: 225 LBS | HEIGHT: 70 IN

## 2021-04-27 DIAGNOSIS — M65.831 EXTENSOR TENOSYNOVITIS OF RIGHT WRIST: Primary | ICD-10-CM

## 2021-04-27 PROCEDURE — 3288F PR FALLS RISK ASSESSMENT DOCUMENTED: ICD-10-PCS | Mod: CPTII,S$GLB,, | Performed by: ORTHOPAEDIC SURGERY

## 2021-04-27 PROCEDURE — 99213 OFFICE O/P EST LOW 20 MIN: CPT | Mod: 25,S$GLB,, | Performed by: ORTHOPAEDIC SURGERY

## 2021-04-27 PROCEDURE — 99999 PR PBB SHADOW E&M-EST. PATIENT-LVL II: ICD-10-PCS | Mod: PBBFAC,,, | Performed by: ORTHOPAEDIC SURGERY

## 2021-04-27 PROCEDURE — 99213 PR OFFICE/OUTPT VISIT, EST, LEVL III, 20-29 MIN: ICD-10-PCS | Mod: 25,S$GLB,, | Performed by: ORTHOPAEDIC SURGERY

## 2021-04-27 PROCEDURE — 3288F FALL RISK ASSESSMENT DOCD: CPT | Mod: CPTII,S$GLB,, | Performed by: ORTHOPAEDIC SURGERY

## 2021-04-27 PROCEDURE — 1159F PR MEDICATION LIST DOCUMENTED IN MEDICAL RECORD: ICD-10-PCS | Mod: S$GLB,,, | Performed by: ORTHOPAEDIC SURGERY

## 2021-04-27 PROCEDURE — 1159F MED LIST DOCD IN RCRD: CPT | Mod: S$GLB,,, | Performed by: ORTHOPAEDIC SURGERY

## 2021-04-27 PROCEDURE — 1125F PR PAIN SEVERITY QUANTIFIED, PAIN PRESENT: ICD-10-PCS | Mod: S$GLB,,, | Performed by: ORTHOPAEDIC SURGERY

## 2021-04-27 PROCEDURE — 3008F BODY MASS INDEX DOCD: CPT | Mod: CPTII,S$GLB,, | Performed by: ORTHOPAEDIC SURGERY

## 2021-04-27 PROCEDURE — 99999 PR PBB SHADOW E&M-EST. PATIENT-LVL II: CPT | Mod: PBBFAC,,, | Performed by: ORTHOPAEDIC SURGERY

## 2021-04-27 PROCEDURE — 20550 PR INJECT TENDON SHEATH/LIGAMENT: ICD-10-PCS | Mod: RT,S$GLB,, | Performed by: ORTHOPAEDIC SURGERY

## 2021-04-27 PROCEDURE — 1125F AMNT PAIN NOTED PAIN PRSNT: CPT | Mod: S$GLB,,, | Performed by: ORTHOPAEDIC SURGERY

## 2021-04-27 PROCEDURE — 1101F PR PT FALLS ASSESS DOC 0-1 FALLS W/OUT INJ PAST YR: ICD-10-PCS | Mod: CPTII,S$GLB,, | Performed by: ORTHOPAEDIC SURGERY

## 2021-04-27 PROCEDURE — 3008F PR BODY MASS INDEX (BMI) DOCUMENTED: ICD-10-PCS | Mod: CPTII,S$GLB,, | Performed by: ORTHOPAEDIC SURGERY

## 2021-04-27 PROCEDURE — 1101F PT FALLS ASSESS-DOCD LE1/YR: CPT | Mod: CPTII,S$GLB,, | Performed by: ORTHOPAEDIC SURGERY

## 2021-04-27 PROCEDURE — 20550 NJX 1 TENDON SHEATH/LIGAMENT: CPT | Mod: RT,S$GLB,, | Performed by: ORTHOPAEDIC SURGERY

## 2021-04-27 RX ORDER — TRIAMCINOLONE ACETONIDE 40 MG/ML
40 INJECTION, SUSPENSION INTRA-ARTICULAR; INTRAMUSCULAR
Status: DISCONTINUED | OUTPATIENT
Start: 2021-04-27 | End: 2021-04-27 | Stop reason: HOSPADM

## 2021-04-27 RX ADMIN — TRIAMCINOLONE ACETONIDE 40 MG: 40 INJECTION, SUSPENSION INTRA-ARTICULAR; INTRAMUSCULAR at 11:04

## 2021-05-12 ENCOUNTER — HOSPITAL ENCOUNTER (EMERGENCY)
Facility: HOSPITAL | Age: 71
Discharge: HOME | End: 2021-05-12
Attending: EMERGENCY MEDICINE
Payer: MEDICARE

## 2021-05-12 VITALS
WEIGHT: 220 LBS | RESPIRATION RATE: 15 BRPM | BODY MASS INDEX: 31.57 KG/M2 | SYSTOLIC BLOOD PRESSURE: 132 MMHG | DIASTOLIC BLOOD PRESSURE: 73 MMHG | HEART RATE: 68 BPM | OXYGEN SATURATION: 100 % | TEMPERATURE: 99 F

## 2021-05-12 DIAGNOSIS — S81.001A OPEN WOUND OF RIGHT KNEE, INITIAL ENCOUNTER: ICD-10-CM

## 2021-05-12 DIAGNOSIS — L03.115 CELLULITIS OF RIGHT KNEE: Primary | ICD-10-CM

## 2021-05-12 LAB — POCT GLUCOSE: 104 MG/DL (ref 70–110)

## 2021-05-12 PROCEDURE — 25000003 PHARM REV CODE 250: Mod: ER | Performed by: PHYSICIAN ASSISTANT

## 2021-05-12 PROCEDURE — 82962 GLUCOSE BLOOD TEST: CPT | Mod: ER

## 2021-05-12 PROCEDURE — 99283 EMERGENCY DEPT VISIT LOW MDM: CPT | Mod: 25,ER

## 2021-05-12 RX ORDER — CLINDAMYCIN HYDROCHLORIDE 150 MG/1
450 CAPSULE ORAL
Status: COMPLETED | OUTPATIENT
Start: 2021-05-12 | End: 2021-05-12

## 2021-05-12 RX ORDER — CIPROFLOXACIN 500 MG/1
500 TABLET ORAL 2 TIMES DAILY
COMMUNITY
End: 2021-06-11

## 2021-05-12 RX ORDER — CLINDAMYCIN HYDROCHLORIDE 150 MG/1
450 CAPSULE ORAL 3 TIMES DAILY
Qty: 63 CAPSULE | Refills: 0 | Status: SHIPPED | OUTPATIENT
Start: 2021-05-12 | End: 2021-05-19

## 2021-05-12 RX ADMIN — CLINDAMYCIN HYDROCHLORIDE 450 MG: 150 CAPSULE ORAL at 12:05

## 2021-05-13 ENCOUNTER — HOSPITAL ENCOUNTER (EMERGENCY)
Facility: HOSPITAL | Age: 71
Discharge: HOME | End: 2021-05-13
Attending: EMERGENCY MEDICINE
Payer: MEDICARE

## 2021-05-13 VITALS
DIASTOLIC BLOOD PRESSURE: 77 MMHG | WEIGHT: 220 LBS | OXYGEN SATURATION: 99 % | HEIGHT: 70 IN | SYSTOLIC BLOOD PRESSURE: 139 MMHG | HEART RATE: 65 BPM | TEMPERATURE: 99 F | RESPIRATION RATE: 16 BRPM | BODY MASS INDEX: 31.5 KG/M2

## 2021-05-13 DIAGNOSIS — T14.8XXA SKIN ABRASION: Primary | ICD-10-CM

## 2021-05-13 DIAGNOSIS — R60.9 DEPENDENT EDEMA: ICD-10-CM

## 2021-05-13 LAB
BASOPHILS # BLD AUTO: 0.04 K/UL (ref 0–0.2)
BASOPHILS NFR BLD: 0.7 % (ref 0–1.9)
BUN SERPL-MCNC: 24 MG/DL (ref 6–30)
CHLORIDE SERPL-SCNC: 99 MMOL/L (ref 95–110)
CREAT SERPL-MCNC: 1.1 MG/DL (ref 0.5–1.4)
DIFFERENTIAL METHOD: ABNORMAL
EOSINOPHIL # BLD AUTO: 0.1 K/UL (ref 0–0.5)
EOSINOPHIL NFR BLD: 2.5 % (ref 0–8)
ERYTHROCYTE [DISTWIDTH] IN BLOOD BY AUTOMATED COUNT: 14 % (ref 11.5–14.5)
GLUCOSE SERPL-MCNC: 95 MG/DL (ref 70–110)
HCT VFR BLD AUTO: 45.1 % (ref 40–54)
HCT VFR BLD CALC: 47 %PCV (ref 36–54)
HGB BLD-MCNC: 14.4 G/DL (ref 14–18)
IMM GRANULOCYTES # BLD AUTO: 0.05 K/UL (ref 0–0.04)
IMM GRANULOCYTES NFR BLD AUTO: 0.9 % (ref 0–0.5)
LYMPHOCYTES # BLD AUTO: 1.4 K/UL (ref 1–4.8)
LYMPHOCYTES NFR BLD: 25 % (ref 18–48)
MCH RBC QN AUTO: 28.2 PG (ref 27–31)
MCHC RBC AUTO-ENTMCNC: 31.9 G/DL (ref 32–36)
MCV RBC AUTO: 88 FL (ref 82–98)
MONOCYTES # BLD AUTO: 0.8 K/UL (ref 0.3–1)
MONOCYTES NFR BLD: 13.4 % (ref 4–15)
NEUTROPHILS # BLD AUTO: 3.2 K/UL (ref 1.8–7.7)
NEUTROPHILS NFR BLD: 57.5 % (ref 38–73)
NRBC BLD-RTO: 0 /100 WBC
PLATELET # BLD AUTO: 220 K/UL (ref 150–450)
PMV BLD AUTO: 10.3 FL (ref 9.2–12.9)
POC IONIZED CALCIUM: 1.27 MMOL/L (ref 1.06–1.42)
POC TCO2 (MEASURED): 27 MMOL/L (ref 23–29)
POTASSIUM BLD-SCNC: 4.3 MMOL/L (ref 3.5–5.1)
RBC # BLD AUTO: 5.1 M/UL (ref 4.6–6.2)
SAMPLE: NORMAL
SODIUM BLD-SCNC: 137 MMOL/L (ref 136–145)
WBC # BLD AUTO: 5.6 K/UL (ref 3.9–12.7)

## 2021-05-13 PROCEDURE — 99284 EMERGENCY DEPT VISIT MOD MDM: CPT | Mod: ,,, | Performed by: PHYSICIAN ASSISTANT

## 2021-05-13 PROCEDURE — 85025 COMPLETE CBC W/AUTO DIFF WBC: CPT | Performed by: PHYSICIAN ASSISTANT

## 2021-05-13 PROCEDURE — 99284 PR EMERGENCY DEPT VISIT,LEVEL IV: ICD-10-PCS | Mod: ,,, | Performed by: PHYSICIAN ASSISTANT

## 2021-05-13 PROCEDURE — 99283 EMERGENCY DEPT VISIT LOW MDM: CPT

## 2021-05-13 PROCEDURE — 80047 BASIC METABLC PNL IONIZED CA: CPT

## 2021-06-11 ENCOUNTER — HOSPITAL ENCOUNTER (EMERGENCY)
Facility: HOSPITAL | Age: 71
Discharge: HOME | End: 2021-06-11
Attending: EMERGENCY MEDICINE
Payer: MEDICARE

## 2021-06-11 VITALS
SYSTOLIC BLOOD PRESSURE: 125 MMHG | OXYGEN SATURATION: 99 % | HEART RATE: 55 BPM | WEIGHT: 226.38 LBS | TEMPERATURE: 98 F | BODY MASS INDEX: 32.49 KG/M2 | DIASTOLIC BLOOD PRESSURE: 73 MMHG | RESPIRATION RATE: 16 BRPM

## 2021-06-11 DIAGNOSIS — R07.9 CHEST PAIN: ICD-10-CM

## 2021-06-11 LAB
ALBUMIN SERPL BCP-MCNC: 3.9 G/DL (ref 3.5–5.2)
ALP SERPL-CCNC: 105 U/L (ref 38–126)
ALT SERPL W/O P-5'-P-CCNC: 20 U/L (ref 10–44)
ANION GAP SERPL CALC-SCNC: 4 MMOL/L (ref 8–16)
AST SERPL-CCNC: 27 U/L (ref 15–46)
BASOPHILS # BLD AUTO: 0.04 K/UL (ref 0–0.2)
BASOPHILS NFR BLD: 0.6 % (ref 0–1.9)
BILIRUB SERPL-MCNC: 0.4 MG/DL (ref 0.1–1)
CALCIUM SERPL-MCNC: 9.1 MG/DL (ref 8.7–10.5)
CHLORIDE SERPL-SCNC: 104 MMOL/L (ref 95–110)
CO2 SERPL-SCNC: 30 MMOL/L (ref 23–29)
CREAT SERPL-MCNC: 1.16 MG/DL (ref 0.5–1.4)
DIFFERENTIAL METHOD: ABNORMAL
EOSINOPHIL # BLD AUTO: 0.2 K/UL (ref 0–0.5)
EOSINOPHIL NFR BLD: 3.4 % (ref 0–8)
ERYTHROCYTE [DISTWIDTH] IN BLOOD BY AUTOMATED COUNT: 13.4 % (ref 11.5–14.5)
EST. GFR  (AFRICAN AMERICAN): >60 ML/MIN/1.73 M^2
EST. GFR  (NON AFRICAN AMERICAN): >60 ML/MIN/1.73 M^2
GLUCOSE SERPL-MCNC: 120 MG/DL (ref 70–110)
HCT VFR BLD AUTO: 44.2 % (ref 40–54)
HGB BLD-MCNC: 13.9 G/DL (ref 14–18)
IMM GRANULOCYTES # BLD AUTO: 0.04 K/UL (ref 0–0.04)
IMM GRANULOCYTES NFR BLD AUTO: 0.6 % (ref 0–0.5)
LYMPHOCYTES # BLD AUTO: 1.9 K/UL (ref 1–4.8)
LYMPHOCYTES NFR BLD: 27.9 % (ref 18–48)
MCH RBC QN AUTO: 27.9 PG (ref 27–31)
MCHC RBC AUTO-ENTMCNC: 31.4 G/DL (ref 32–36)
MCV RBC AUTO: 89 FL (ref 82–98)
MONOCYTES # BLD AUTO: 0.9 K/UL (ref 0.3–1)
MONOCYTES NFR BLD: 13 % (ref 4–15)
NEUTROPHILS # BLD AUTO: 3.7 K/UL (ref 1.8–7.7)
NEUTROPHILS NFR BLD: 54.5 % (ref 38–73)
NRBC BLD-RTO: 0 /100 WBC
PLATELET # BLD AUTO: 210 K/UL (ref 150–450)
PMV BLD AUTO: 10.3 FL (ref 9.2–12.9)
POTASSIUM SERPL-SCNC: 3.9 MMOL/L (ref 3.5–5.1)
PROT SERPL-MCNC: 7.4 G/DL (ref 6–8.4)
RBC # BLD AUTO: 4.98 M/UL (ref 4.6–6.2)
SODIUM SERPL-SCNC: 138 MMOL/L (ref 136–145)
TROPONIN I SERPL-MCNC: <0.012 NG/ML (ref 0.01–0.03)
TROPONIN I SERPL-MCNC: <0.012 NG/ML (ref 0.01–0.03)
UUN UR-MCNC: 25 MG/DL (ref 2–20)
WBC # BLD AUTO: 6.7 K/UL (ref 3.9–12.7)

## 2021-06-11 PROCEDURE — 80053 COMPREHEN METABOLIC PANEL: CPT | Mod: ER | Performed by: EMERGENCY MEDICINE

## 2021-06-11 PROCEDURE — 93010 EKG 12-LEAD: ICD-10-PCS | Mod: ,,, | Performed by: INTERNAL MEDICINE

## 2021-06-11 PROCEDURE — 99285 EMERGENCY DEPT VISIT HI MDM: CPT | Mod: 25,ER

## 2021-06-11 PROCEDURE — 25000003 PHARM REV CODE 250: Mod: ER | Performed by: EMERGENCY MEDICINE

## 2021-06-11 PROCEDURE — 85025 COMPLETE CBC W/AUTO DIFF WBC: CPT | Mod: ER | Performed by: EMERGENCY MEDICINE

## 2021-06-11 PROCEDURE — 93005 ELECTROCARDIOGRAM TRACING: CPT | Mod: ER

## 2021-06-11 PROCEDURE — 84484 ASSAY OF TROPONIN QUANT: CPT | Mod: 91,ER | Performed by: EMERGENCY MEDICINE

## 2021-06-11 PROCEDURE — 93010 ELECTROCARDIOGRAM REPORT: CPT | Mod: ,,, | Performed by: INTERNAL MEDICINE

## 2021-06-11 RX ORDER — GABAPENTIN 300 MG/1
300 CAPSULE ORAL DAILY
COMMUNITY
End: 2024-03-20

## 2021-06-11 RX ORDER — MAG HYDROX/ALUMINUM HYD/SIMETH 200-200-20
5 SUSPENSION, ORAL (FINAL DOSE FORM) ORAL
Status: COMPLETED | OUTPATIENT
Start: 2021-06-11 | End: 2021-06-11

## 2021-06-11 RX ORDER — ASPIRIN 325 MG
325 TABLET ORAL DAILY
Status: ON HOLD | COMMUNITY
End: 2021-07-21 | Stop reason: HOSPADM

## 2021-06-11 RX ORDER — NAPROXEN SODIUM 220 MG/1
324 TABLET, FILM COATED ORAL
Status: COMPLETED | OUTPATIENT
Start: 2021-06-11 | End: 2021-06-11

## 2021-06-11 RX ORDER — PANTOPRAZOLE SODIUM 40 MG/1
40 TABLET, DELAYED RELEASE ORAL DAILY
Qty: 14 TABLET | Refills: 0 | Status: ON HOLD | OUTPATIENT
Start: 2021-06-11 | End: 2021-07-21 | Stop reason: HOSPADM

## 2021-06-11 RX ADMIN — ALUMINUM HYDROXIDE, MAGNESIUM HYDROXIDE, AND SIMETHICONE 5 ML: 200; 200; 20 SUSPENSION ORAL at 04:06

## 2021-06-11 RX ADMIN — ASPIRIN 324 MG: 81 TABLET, CHEWABLE ORAL at 04:06

## 2021-07-20 ENCOUNTER — HOSPITAL ENCOUNTER (OUTPATIENT)
Facility: HOSPITAL | Age: 71
Discharge: HOME | End: 2021-07-21
Attending: EMERGENCY MEDICINE | Admitting: INTERNAL MEDICINE
Payer: MEDICARE

## 2021-07-20 DIAGNOSIS — I10 ESSENTIAL HYPERTENSION: ICD-10-CM

## 2021-07-20 DIAGNOSIS — K21.9 GASTROESOPHAGEAL REFLUX DISEASE, UNSPECIFIED WHETHER ESOPHAGITIS PRESENT: ICD-10-CM

## 2021-07-20 DIAGNOSIS — I82.621 ACUTE DEEP VEIN THROMBOSIS (DVT) OF BRACHIAL VEIN OF RIGHT UPPER EXTREMITY: ICD-10-CM

## 2021-07-20 DIAGNOSIS — I82.A11 ACUTE DEEP VEIN THROMBOSIS (DVT) OF AXILLARY VEIN OF RIGHT UPPER EXTREMITY: Primary | ICD-10-CM

## 2021-07-20 DIAGNOSIS — N40.0 BENIGN PROSTATIC HYPERPLASIA, UNSPECIFIED WHETHER LOWER URINARY TRACT SYMPTOMS PRESENT: ICD-10-CM

## 2021-07-20 DIAGNOSIS — L03.113 CELLULITIS OF RIGHT UPPER EXTREMITY: ICD-10-CM

## 2021-07-20 LAB
ANION GAP SERPL CALC-SCNC: 4 MMOL/L (ref 8–16)
APTT BLDCRRT: 27.1 SEC (ref 21–32)
BASOPHILS # BLD AUTO: 0.05 K/UL (ref 0–0.2)
BASOPHILS NFR BLD: 0.8 % (ref 0–1.9)
CALCIUM SERPL-MCNC: 9.6 MG/DL (ref 8.7–10.5)
CHLORIDE SERPL-SCNC: 103 MMOL/L (ref 95–110)
CO2 SERPL-SCNC: 30 MMOL/L (ref 23–29)
CREAT SERPL-MCNC: 0.86 MG/DL (ref 0.5–1.4)
DIFFERENTIAL METHOD: ABNORMAL
EOSINOPHIL # BLD AUTO: 0.2 K/UL (ref 0–0.5)
EOSINOPHIL NFR BLD: 3.1 % (ref 0–8)
ERYTHROCYTE [DISTWIDTH] IN BLOOD BY AUTOMATED COUNT: 14.4 % (ref 11.5–14.5)
EST. GFR  (AFRICAN AMERICAN): >60 ML/MIN/1.73 M^2
EST. GFR  (NON AFRICAN AMERICAN): >60 ML/MIN/1.73 M^2
GLUCOSE SERPL-MCNC: 105 MG/DL (ref 70–110)
HCT VFR BLD AUTO: 46 % (ref 40–54)
HGB BLD-MCNC: 14.8 G/DL (ref 14–18)
IMM GRANULOCYTES # BLD AUTO: 0.1 K/UL (ref 0–0.04)
IMM GRANULOCYTES NFR BLD AUTO: 1.5 % (ref 0–0.5)
INR PPP: 1 (ref 0.8–1.2)
LYMPHOCYTES # BLD AUTO: 1.7 K/UL (ref 1–4.8)
LYMPHOCYTES NFR BLD: 26.2 % (ref 18–48)
MCH RBC QN AUTO: 28.6 PG (ref 27–31)
MCHC RBC AUTO-ENTMCNC: 32.2 G/DL (ref 32–36)
MCV RBC AUTO: 89 FL (ref 82–98)
MONOCYTES # BLD AUTO: 0.9 K/UL (ref 0.3–1)
MONOCYTES NFR BLD: 13 % (ref 4–15)
NEUTROPHILS # BLD AUTO: 3.6 K/UL (ref 1.8–7.7)
NEUTROPHILS NFR BLD: 55.4 % (ref 38–73)
NRBC BLD-RTO: 0 /100 WBC
PLATELET # BLD AUTO: 170 K/UL (ref 150–450)
PMV BLD AUTO: 10.1 FL (ref 9.2–12.9)
POTASSIUM SERPL-SCNC: 4.4 MMOL/L (ref 3.5–5.1)
PROTHROMBIN TIME: 10.8 SEC (ref 9–12.5)
RBC # BLD AUTO: 5.17 M/UL (ref 4.6–6.2)
SARS-COV-2 RDRP RESP QL NAA+PROBE: NEGATIVE
SODIUM SERPL-SCNC: 137 MMOL/L (ref 136–145)
UUN UR-MCNC: 23 MG/DL (ref 2–20)
WBC # BLD AUTO: 6.53 K/UL (ref 3.9–12.7)

## 2021-07-20 PROCEDURE — 85610 PROTHROMBIN TIME: CPT | Mod: ER | Performed by: NURSE PRACTITIONER

## 2021-07-20 PROCEDURE — 63600175 PHARM REV CODE 636 W HCPCS: Mod: ER | Performed by: EMERGENCY MEDICINE

## 2021-07-20 PROCEDURE — 85025 COMPLETE CBC W/AUTO DIFF WBC: CPT | Mod: ER | Performed by: NURSE PRACTITIONER

## 2021-07-20 PROCEDURE — 85730 THROMBOPLASTIN TIME PARTIAL: CPT | Mod: ER | Performed by: NURSE PRACTITIONER

## 2021-07-20 PROCEDURE — 96374 THER/PROPH/DIAG INJ IV PUSH: CPT | Mod: ER

## 2021-07-20 PROCEDURE — U0002 COVID-19 LAB TEST NON-CDC: HCPCS | Mod: ER | Performed by: EMERGENCY MEDICINE

## 2021-07-20 PROCEDURE — 99285 EMERGENCY DEPT VISIT HI MDM: CPT | Mod: 25,ER

## 2021-07-20 PROCEDURE — 80048 BASIC METABOLIC PNL TOTAL CA: CPT | Mod: ER | Performed by: NURSE PRACTITIONER

## 2021-07-20 RX ORDER — CEFEPIME HYDROCHLORIDE 1 G/50ML
2 INJECTION, SOLUTION INTRAVENOUS
Status: COMPLETED | OUTPATIENT
Start: 2021-07-20 | End: 2021-07-21

## 2021-07-20 RX ORDER — ENOXAPARIN SODIUM 100 MG/ML
1 INJECTION SUBCUTANEOUS
Status: COMPLETED | OUTPATIENT
Start: 2021-07-20 | End: 2021-07-21

## 2021-07-20 RX ADMIN — CEFEPIME HYDROCHLORIDE 2 G: 2 INJECTION, SOLUTION INTRAVENOUS at 10:07

## 2021-07-21 VITALS
HEIGHT: 70 IN | SYSTOLIC BLOOD PRESSURE: 127 MMHG | BODY MASS INDEX: 31.88 KG/M2 | HEART RATE: 75 BPM | OXYGEN SATURATION: 100 % | DIASTOLIC BLOOD PRESSURE: 65 MMHG | RESPIRATION RATE: 18 BRPM | TEMPERATURE: 98 F | WEIGHT: 222.69 LBS

## 2021-07-21 PROBLEM — N40.0 BPH (BENIGN PROSTATIC HYPERPLASIA): Status: ACTIVE | Noted: 2021-07-21

## 2021-07-21 PROBLEM — M54.12 CERVICAL RADICULOPATHY: Status: ACTIVE | Noted: 2021-07-21

## 2021-07-21 LAB
ALBUMIN SERPL BCP-MCNC: 3.2 G/DL (ref 3.5–5.2)
ALP SERPL-CCNC: 83 U/L (ref 55–135)
ALT SERPL W/O P-5'-P-CCNC: 20 U/L (ref 10–44)
ANION GAP SERPL CALC-SCNC: 9 MMOL/L (ref 8–16)
AST SERPL-CCNC: 18 U/L (ref 10–40)
BASOPHILS # BLD AUTO: 0.03 K/UL (ref 0–0.2)
BASOPHILS NFR BLD: 0.6 % (ref 0–1.9)
BILIRUB SERPL-MCNC: 0.5 MG/DL (ref 0.1–1)
BILIRUB UR QL STRIP: NEGATIVE
BUN SERPL-MCNC: 15 MG/DL (ref 8–23)
CALCIUM SERPL-MCNC: 9.1 MG/DL (ref 8.7–10.5)
CHLORIDE SERPL-SCNC: 106 MMOL/L (ref 95–110)
CLARITY UR: CLEAR
CO2 SERPL-SCNC: 23 MMOL/L (ref 23–29)
COLOR UR: YELLOW
CREAT SERPL-MCNC: 0.9 MG/DL (ref 0.5–1.4)
DIFFERENTIAL METHOD: ABNORMAL
EOSINOPHIL # BLD AUTO: 0.2 K/UL (ref 0–0.5)
EOSINOPHIL NFR BLD: 4.5 % (ref 0–8)
ERYTHROCYTE [DISTWIDTH] IN BLOOD BY AUTOMATED COUNT: 14.4 % (ref 11.5–14.5)
EST. GFR  (AFRICAN AMERICAN): >60 ML/MIN/1.73 M^2
EST. GFR  (NON AFRICAN AMERICAN): >60 ML/MIN/1.73 M^2
GLUCOSE SERPL-MCNC: 141 MG/DL (ref 70–110)
GLUCOSE UR QL STRIP: NEGATIVE
HCT VFR BLD AUTO: 43.1 % (ref 40–54)
HGB BLD-MCNC: 13.6 G/DL (ref 14–18)
HGB UR QL STRIP: NEGATIVE
IMM GRANULOCYTES # BLD AUTO: 0.08 K/UL (ref 0–0.04)
IMM GRANULOCYTES NFR BLD AUTO: 1.5 % (ref 0–0.5)
KETONES UR QL STRIP: NEGATIVE
LEUKOCYTE ESTERASE UR QL STRIP: NEGATIVE
LYMPHOCYTES # BLD AUTO: 1.2 K/UL (ref 1–4.8)
LYMPHOCYTES NFR BLD: 22.1 % (ref 18–48)
MAGNESIUM SERPL-MCNC: 2 MG/DL (ref 1.6–2.6)
MCH RBC QN AUTO: 27.7 PG (ref 27–31)
MCHC RBC AUTO-ENTMCNC: 31.6 G/DL (ref 32–36)
MCV RBC AUTO: 88 FL (ref 82–98)
MONOCYTES # BLD AUTO: 0.7 K/UL (ref 0.3–1)
MONOCYTES NFR BLD: 12.7 % (ref 4–15)
NEUTROPHILS # BLD AUTO: 3.1 K/UL (ref 1.8–7.7)
NEUTROPHILS NFR BLD: 58.6 % (ref 38–73)
NITRITE UR QL STRIP: NEGATIVE
NRBC BLD-RTO: 0 /100 WBC
PH UR STRIP: 6 [PH] (ref 5–8)
PLATELET # BLD AUTO: 170 K/UL (ref 150–450)
PMV BLD AUTO: 10.4 FL (ref 9.2–12.9)
POTASSIUM SERPL-SCNC: 4.3 MMOL/L (ref 3.5–5.1)
PROCALCITONIN SERPL IA-MCNC: 0.02 NG/ML
PROT SERPL-MCNC: 6.2 G/DL (ref 6–8.4)
PROT UR QL STRIP: NEGATIVE
RBC # BLD AUTO: 4.91 M/UL (ref 4.6–6.2)
SODIUM SERPL-SCNC: 138 MMOL/L (ref 136–145)
SP GR UR STRIP: 1.01 (ref 1–1.03)
TROPONIN I SERPL DL<=0.01 NG/ML-MCNC: <0.006 NG/ML (ref 0–0.03)
URN SPEC COLLECT METH UR: NORMAL
UROBILINOGEN UR STRIP-ACNC: NEGATIVE EU/DL
WBC # BLD AUTO: 5.29 K/UL (ref 3.9–12.7)

## 2021-07-21 PROCEDURE — 85025 COMPLETE CBC W/AUTO DIFF WBC: CPT | Performed by: STUDENT IN AN ORGANIZED HEALTH CARE EDUCATION/TRAINING PROGRAM

## 2021-07-21 PROCEDURE — 96372 THER/PROPH/DIAG INJ SC/IM: CPT | Mod: 59,ER

## 2021-07-21 PROCEDURE — 81003 URINALYSIS AUTO W/O SCOPE: CPT | Performed by: STUDENT IN AN ORGANIZED HEALTH CARE EDUCATION/TRAINING PROGRAM

## 2021-07-21 PROCEDURE — 25000003 PHARM REV CODE 250: Performed by: STUDENT IN AN ORGANIZED HEALTH CARE EDUCATION/TRAINING PROGRAM

## 2021-07-21 PROCEDURE — 63600175 PHARM REV CODE 636 W HCPCS: Mod: ER | Performed by: EMERGENCY MEDICINE

## 2021-07-21 PROCEDURE — G0378 HOSPITAL OBSERVATION PER HR: HCPCS

## 2021-07-21 PROCEDURE — 84484 ASSAY OF TROPONIN QUANT: CPT | Performed by: STUDENT IN AN ORGANIZED HEALTH CARE EDUCATION/TRAINING PROGRAM

## 2021-07-21 PROCEDURE — 96375 TX/PRO/DX INJ NEW DRUG ADDON: CPT | Mod: ER

## 2021-07-21 PROCEDURE — 36415 COLL VENOUS BLD VENIPUNCTURE: CPT | Performed by: STUDENT IN AN ORGANIZED HEALTH CARE EDUCATION/TRAINING PROGRAM

## 2021-07-21 PROCEDURE — 96372 THER/PROPH/DIAG INJ SC/IM: CPT | Mod: 59

## 2021-07-21 PROCEDURE — 84145 PROCALCITONIN (PCT): CPT | Performed by: STUDENT IN AN ORGANIZED HEALTH CARE EDUCATION/TRAINING PROGRAM

## 2021-07-21 PROCEDURE — 83735 ASSAY OF MAGNESIUM: CPT | Performed by: STUDENT IN AN ORGANIZED HEALTH CARE EDUCATION/TRAINING PROGRAM

## 2021-07-21 PROCEDURE — 63600175 PHARM REV CODE 636 W HCPCS: Performed by: STUDENT IN AN ORGANIZED HEALTH CARE EDUCATION/TRAINING PROGRAM

## 2021-07-21 PROCEDURE — 80053 COMPREHEN METABOLIC PANEL: CPT | Performed by: STUDENT IN AN ORGANIZED HEALTH CARE EDUCATION/TRAINING PROGRAM

## 2021-07-21 PROCEDURE — 25000003 PHARM REV CODE 250: Mod: ER | Performed by: EMERGENCY MEDICINE

## 2021-07-21 RX ORDER — IBUPROFEN 200 MG
16 TABLET ORAL
Status: DISCONTINUED | OUTPATIENT
Start: 2021-07-21 | End: 2021-07-21 | Stop reason: HOSPADM

## 2021-07-21 RX ORDER — HYDROCHLOROTHIAZIDE 12.5 MG/1
12.5 TABLET ORAL DAILY
Status: DISCONTINUED | OUTPATIENT
Start: 2021-07-21 | End: 2021-07-21 | Stop reason: HOSPADM

## 2021-07-21 RX ORDER — TAMSULOSIN HYDROCHLORIDE 0.4 MG/1
0.4 CAPSULE ORAL DAILY
Status: DISCONTINUED | OUTPATIENT
Start: 2021-07-21 | End: 2021-07-21 | Stop reason: HOSPADM

## 2021-07-21 RX ORDER — IBUPROFEN 200 MG
24 TABLET ORAL
Status: DISCONTINUED | OUTPATIENT
Start: 2021-07-21 | End: 2021-07-21 | Stop reason: HOSPADM

## 2021-07-21 RX ORDER — SODIUM CHLORIDE 0.9 % (FLUSH) 0.9 %
10 SYRINGE (ML) INJECTION
Status: DISCONTINUED | OUTPATIENT
Start: 2021-07-21 | End: 2021-07-21 | Stop reason: HOSPADM

## 2021-07-21 RX ORDER — GABAPENTIN 300 MG/1
300 CAPSULE ORAL NIGHTLY
Status: DISCONTINUED | OUTPATIENT
Start: 2021-07-21 | End: 2021-07-21 | Stop reason: HOSPADM

## 2021-07-21 RX ORDER — AMLODIPINE BESYLATE 5 MG/1
5 TABLET ORAL DAILY
Status: DISCONTINUED | OUTPATIENT
Start: 2021-07-21 | End: 2021-07-21 | Stop reason: HOSPADM

## 2021-07-21 RX ORDER — ENOXAPARIN SODIUM 100 MG/ML
100 INJECTION SUBCUTANEOUS EVERY 12 HOURS
Status: DISCONTINUED | OUTPATIENT
Start: 2021-07-21 | End: 2021-07-21 | Stop reason: HOSPADM

## 2021-07-21 RX ORDER — LISINOPRIL 20 MG/1
20 TABLET ORAL DAILY
Status: DISCONTINUED | OUTPATIENT
Start: 2021-07-21 | End: 2021-07-21 | Stop reason: HOSPADM

## 2021-07-21 RX ORDER — ACETAMINOPHEN 325 MG/1
650 TABLET ORAL EVERY 4 HOURS PRN
Status: DISCONTINUED | OUTPATIENT
Start: 2021-07-21 | End: 2021-07-21 | Stop reason: HOSPADM

## 2021-07-21 RX ORDER — GLUCAGON 1 MG
1 KIT INJECTION
Status: DISCONTINUED | OUTPATIENT
Start: 2021-07-21 | End: 2021-07-21 | Stop reason: HOSPADM

## 2021-07-21 RX ADMIN — ENOXAPARIN SODIUM 100 MG: 100 INJECTION SUBCUTANEOUS at 12:07

## 2021-07-21 RX ADMIN — VANCOMYCIN HYDROCHLORIDE 2250 MG: 1 INJECTION, POWDER, LYOPHILIZED, FOR SOLUTION INTRAVENOUS at 12:07

## 2021-07-21 RX ADMIN — LISINOPRIL 20 MG: 20 TABLET ORAL at 09:07

## 2021-07-21 RX ADMIN — HYDROCHLOROTHIAZIDE 12.5 MG: 12.5 TABLET ORAL at 09:07

## 2021-07-21 RX ADMIN — ENOXAPARIN SODIUM 100 MG: 40 INJECTION SUBCUTANEOUS at 09:07

## 2021-07-21 RX ADMIN — AMLODIPINE BESYLATE 5 MG: 5 TABLET ORAL at 09:07

## 2021-07-21 RX ADMIN — TAMSULOSIN HYDROCHLORIDE 0.4 MG: 0.4 CAPSULE ORAL at 09:07

## 2021-07-31 ENCOUNTER — HOSPITAL ENCOUNTER (EMERGENCY)
Facility: HOSPITAL | Age: 71
Discharge: HOME | End: 2021-07-31
Attending: FAMILY MEDICINE
Payer: MEDICARE

## 2021-07-31 VITALS
RESPIRATION RATE: 18 BRPM | BODY MASS INDEX: 31.5 KG/M2 | DIASTOLIC BLOOD PRESSURE: 76 MMHG | HEIGHT: 70 IN | WEIGHT: 220 LBS | OXYGEN SATURATION: 99 % | TEMPERATURE: 98 F | HEART RATE: 69 BPM | SYSTOLIC BLOOD PRESSURE: 108 MMHG

## 2021-07-31 DIAGNOSIS — R10.9 ACUTE RIGHT FLANK PAIN: ICD-10-CM

## 2021-07-31 LAB
BILIRUB UR QL STRIP: NEGATIVE
CLARITY UR REFRACT.AUTO: CLEAR
COLOR UR AUTO: YELLOW
GLUCOSE UR QL STRIP: NEGATIVE
HGB UR QL STRIP: ABNORMAL
KETONES UR QL STRIP: NEGATIVE
LEUKOCYTE ESTERASE UR QL STRIP: NEGATIVE
MICROSCOPIC COMMENT: NORMAL
NITRITE UR QL STRIP: NEGATIVE
PH UR STRIP: 5 [PH] (ref 5–8)
PROT UR QL STRIP: NEGATIVE
RBC #/AREA URNS AUTO: 1 /HPF (ref 0–4)
SP GR UR STRIP: 1.01 (ref 1–1.03)
URN SPEC COLLECT METH UR: ABNORMAL
UROBILINOGEN UR STRIP-ACNC: NEGATIVE EU/DL

## 2021-07-31 PROCEDURE — 99284 EMERGENCY DEPT VISIT MOD MDM: CPT | Mod: 25,ER

## 2021-07-31 PROCEDURE — 63600175 PHARM REV CODE 636 W HCPCS: Mod: ER | Performed by: FAMILY MEDICINE

## 2021-07-31 PROCEDURE — 81000 URINALYSIS NONAUTO W/SCOPE: CPT | Mod: ER | Performed by: FAMILY MEDICINE

## 2021-07-31 PROCEDURE — 96372 THER/PROPH/DIAG INJ SC/IM: CPT | Mod: ER

## 2021-07-31 RX ORDER — KETOROLAC TROMETHAMINE 30 MG/ML
30 INJECTION, SOLUTION INTRAMUSCULAR; INTRAVENOUS
Status: COMPLETED | OUTPATIENT
Start: 2021-07-31 | End: 2021-07-31

## 2021-07-31 RX ORDER — ORPHENADRINE CITRATE 30 MG/ML
60 INJECTION INTRAMUSCULAR; INTRAVENOUS
Status: COMPLETED | OUTPATIENT
Start: 2021-07-31 | End: 2021-07-31

## 2021-07-31 RX ORDER — METHOCARBAMOL 500 MG/1
1000 TABLET, FILM COATED ORAL 3 TIMES DAILY
Qty: 30 TABLET | Refills: 0 | Status: SHIPPED | OUTPATIENT
Start: 2021-07-31 | End: 2021-08-05

## 2021-07-31 RX ORDER — TRAMADOL HYDROCHLORIDE 50 MG/1
50 TABLET ORAL EVERY 6 HOURS PRN
Qty: 12 TABLET | Refills: 0 | OUTPATIENT
Start: 2021-07-31 | End: 2022-02-10

## 2021-07-31 RX ADMIN — KETOROLAC TROMETHAMINE 30 MG: 30 INJECTION, SOLUTION INTRAMUSCULAR; INTRAVENOUS at 12:07

## 2021-07-31 RX ADMIN — ORPHENADRINE CITRATE 60 MG: 30 INJECTION INTRAMUSCULAR; INTRAVENOUS at 12:07

## 2021-08-08 ENCOUNTER — HOSPITAL ENCOUNTER (EMERGENCY)
Facility: HOSPITAL | Age: 71
Discharge: HOME | End: 2021-08-08
Attending: EMERGENCY MEDICINE
Payer: MEDICARE

## 2021-08-08 VITALS
WEIGHT: 220 LBS | TEMPERATURE: 98 F | SYSTOLIC BLOOD PRESSURE: 124 MMHG | BODY MASS INDEX: 31.57 KG/M2 | OXYGEN SATURATION: 98 % | RESPIRATION RATE: 19 BRPM | DIASTOLIC BLOOD PRESSURE: 68 MMHG | HEART RATE: 56 BPM

## 2021-08-08 DIAGNOSIS — R53.1 WEAKNESS: ICD-10-CM

## 2021-08-08 DIAGNOSIS — F41.9 ANXIETY: ICD-10-CM

## 2021-08-08 DIAGNOSIS — R00.2 PALPITATIONS: Primary | ICD-10-CM

## 2021-08-08 LAB
ALBUMIN SERPL BCP-MCNC: 4.2 G/DL (ref 3.5–5.2)
ALP SERPL-CCNC: 97 U/L (ref 38–126)
ALT SERPL W/O P-5'-P-CCNC: 17 U/L (ref 10–44)
AMPHET+METHAMPHET UR QL: NEGATIVE
ANION GAP SERPL CALC-SCNC: 7 MMOL/L (ref 8–16)
AST SERPL-CCNC: 23 U/L (ref 15–46)
BARBITURATES UR QL SCN>200 NG/ML: NEGATIVE
BASOPHILS # BLD AUTO: 0.03 K/UL (ref 0–0.2)
BASOPHILS NFR BLD: 0.9 % (ref 0–1.9)
BENZODIAZ UR QL SCN>200 NG/ML: NEGATIVE
BILIRUB SERPL-MCNC: 0.4 MG/DL (ref 0.1–1)
BILIRUB UR QL STRIP: NEGATIVE
BZE UR QL SCN: NEGATIVE
CALCIUM SERPL-MCNC: 9.8 MG/DL (ref 8.7–10.5)
CANNABINOIDS UR QL SCN: NEGATIVE
CHLORIDE SERPL-SCNC: 103 MMOL/L (ref 95–110)
CLARITY UR REFRACT.AUTO: CLEAR
CO2 SERPL-SCNC: 30 MMOL/L (ref 23–29)
COLOR UR AUTO: YELLOW
CREAT SERPL-MCNC: 0.96 MG/DL (ref 0.5–1.4)
CREAT UR-MCNC: 15 MG/DL (ref 23–375)
DIFFERENTIAL METHOD: ABNORMAL
EOSINOPHIL # BLD AUTO: 0.2 K/UL (ref 0–0.5)
EOSINOPHIL NFR BLD: 5.9 % (ref 0–8)
ERYTHROCYTE [DISTWIDTH] IN BLOOD BY AUTOMATED COUNT: 13.5 % (ref 11.5–14.5)
EST. GFR  (AFRICAN AMERICAN): >60 ML/MIN/1.73 M^2
EST. GFR  (NON AFRICAN AMERICAN): >60 ML/MIN/1.73 M^2
GLUCOSE SERPL-MCNC: 117 MG/DL (ref 70–110)
GLUCOSE UR QL STRIP: NEGATIVE
HCT VFR BLD AUTO: 44.7 % (ref 40–54)
HGB BLD-MCNC: 14.1 G/DL (ref 14–18)
HGB UR QL STRIP: NEGATIVE
IMM GRANULOCYTES # BLD AUTO: 0.04 K/UL (ref 0–0.04)
IMM GRANULOCYTES NFR BLD AUTO: 1.2 % (ref 0–0.5)
KETONES UR QL STRIP: NEGATIVE
LEUKOCYTE ESTERASE UR QL STRIP: NEGATIVE
LYMPHOCYTES # BLD AUTO: 0.9 K/UL (ref 1–4.8)
LYMPHOCYTES NFR BLD: 29 % (ref 18–48)
MCH RBC QN AUTO: 27.8 PG (ref 27–31)
MCHC RBC AUTO-ENTMCNC: 31.5 G/DL (ref 32–36)
MCV RBC AUTO: 88 FL (ref 82–98)
METHADONE UR QL SCN>300 NG/ML: NEGATIVE
MONOCYTES # BLD AUTO: 0.5 K/UL (ref 0.3–1)
MONOCYTES NFR BLD: 15.7 % (ref 4–15)
NEUTROPHILS # BLD AUTO: 1.5 K/UL (ref 1.8–7.7)
NEUTROPHILS NFR BLD: 47.3 % (ref 38–73)
NITRITE UR QL STRIP: NEGATIVE
NRBC BLD-RTO: 0 /100 WBC
NT-PROBNP SERPL-MCNC: 36 PG/ML (ref 5–900)
OPIATES UR QL SCN: NEGATIVE
PCP UR QL SCN>25 NG/ML: NEGATIVE
PH UR STRIP: 5 [PH] (ref 5–8)
PLATELET # BLD AUTO: 233 K/UL (ref 150–450)
PMV BLD AUTO: 9.7 FL (ref 9.2–12.9)
POTASSIUM SERPL-SCNC: 4.2 MMOL/L (ref 3.5–5.1)
PROT SERPL-MCNC: 7.2 G/DL (ref 6–8.4)
PROT UR QL STRIP: NEGATIVE
RBC # BLD AUTO: 5.07 M/UL (ref 4.6–6.2)
SARS-COV-2 RDRP RESP QL NAA+PROBE: NEGATIVE
SODIUM SERPL-SCNC: 140 MMOL/L (ref 136–145)
SP GR UR STRIP: 1 (ref 1–1.03)
TOXICOLOGY INFORMATION: ABNORMAL
TROPONIN I SERPL-MCNC: <0.012 NG/ML (ref 0.01–0.03)
TROPONIN I SERPL-MCNC: <0.012 NG/ML (ref 0.01–0.03)
URN SPEC COLLECT METH UR: NORMAL
UROBILINOGEN UR STRIP-ACNC: NEGATIVE EU/DL
UUN UR-MCNC: 15 MG/DL (ref 2–20)
WBC # BLD AUTO: 3.24 K/UL (ref 3.9–12.7)

## 2021-08-08 PROCEDURE — 93005 ELECTROCARDIOGRAM TRACING: CPT | Mod: ER

## 2021-08-08 PROCEDURE — 99285 EMERGENCY DEPT VISIT HI MDM: CPT | Mod: 25,ER

## 2021-08-08 PROCEDURE — 96374 THER/PROPH/DIAG INJ IV PUSH: CPT | Mod: ER

## 2021-08-08 PROCEDURE — 80053 COMPREHEN METABOLIC PANEL: CPT | Mod: ER | Performed by: EMERGENCY MEDICINE

## 2021-08-08 PROCEDURE — 25000003 PHARM REV CODE 250: Mod: ER | Performed by: EMERGENCY MEDICINE

## 2021-08-08 PROCEDURE — 84484 ASSAY OF TROPONIN QUANT: CPT | Mod: 91,ER | Performed by: EMERGENCY MEDICINE

## 2021-08-08 PROCEDURE — 83880 ASSAY OF NATRIURETIC PEPTIDE: CPT | Mod: ER | Performed by: EMERGENCY MEDICINE

## 2021-08-08 PROCEDURE — 85025 COMPLETE CBC W/AUTO DIFF WBC: CPT | Mod: ER | Performed by: EMERGENCY MEDICINE

## 2021-08-08 PROCEDURE — 93010 ELECTROCARDIOGRAM REPORT: CPT | Mod: ,,, | Performed by: INTERNAL MEDICINE

## 2021-08-08 PROCEDURE — 63600175 PHARM REV CODE 636 W HCPCS: Mod: ER | Performed by: EMERGENCY MEDICINE

## 2021-08-08 PROCEDURE — U0002 COVID-19 LAB TEST NON-CDC: HCPCS | Mod: ER | Performed by: EMERGENCY MEDICINE

## 2021-08-08 PROCEDURE — 93010 EKG 12-LEAD: ICD-10-PCS | Mod: ,,, | Performed by: INTERNAL MEDICINE

## 2021-08-08 PROCEDURE — 81003 URINALYSIS AUTO W/O SCOPE: CPT | Mod: 59,ER | Performed by: EMERGENCY MEDICINE

## 2021-08-08 PROCEDURE — 96361 HYDRATE IV INFUSION ADD-ON: CPT | Mod: ER

## 2021-08-08 PROCEDURE — 80307 DRUG TEST PRSMV CHEM ANLYZR: CPT | Mod: ER | Performed by: EMERGENCY MEDICINE

## 2021-08-08 RX ORDER — PANTOPRAZOLE SODIUM 40 MG/1
40 TABLET, DELAYED RELEASE ORAL
Status: COMPLETED | OUTPATIENT
Start: 2021-08-08 | End: 2021-08-08

## 2021-08-08 RX ORDER — FAMOTIDINE 20 MG/1
20 TABLET, FILM COATED ORAL
Status: COMPLETED | OUTPATIENT
Start: 2021-08-08 | End: 2021-08-08

## 2021-08-08 RX ADMIN — SODIUM CHLORIDE 1000 ML: 0.9 INJECTION, SOLUTION INTRAVENOUS at 11:08

## 2021-08-08 RX ADMIN — LIDOCAINE HYDROCHLORIDE: 20 SOLUTION ORAL; TOPICAL at 01:08

## 2021-08-08 RX ADMIN — LORAZEPAM 0.5 MG: 2 INJECTION INTRAMUSCULAR; INTRAVENOUS at 01:08

## 2021-08-08 RX ADMIN — PANTOPRAZOLE SODIUM 40 MG: 40 TABLET, DELAYED RELEASE ORAL at 01:08

## 2021-08-08 RX ADMIN — FAMOTIDINE 20 MG: 20 TABLET, FILM COATED ORAL at 01:08

## 2021-08-12 ENCOUNTER — HOSPITAL ENCOUNTER (EMERGENCY)
Facility: HOSPITAL | Age: 71
Discharge: HOME | End: 2021-08-12
Attending: EMERGENCY MEDICINE
Payer: MEDICARE

## 2021-08-12 VITALS
DIASTOLIC BLOOD PRESSURE: 70 MMHG | RESPIRATION RATE: 20 BRPM | HEIGHT: 70 IN | TEMPERATURE: 99 F | BODY MASS INDEX: 32.21 KG/M2 | HEART RATE: 57 BPM | WEIGHT: 225 LBS | SYSTOLIC BLOOD PRESSURE: 148 MMHG | OXYGEN SATURATION: 95 %

## 2021-08-12 DIAGNOSIS — K59.00 CONSTIPATION, UNSPECIFIED CONSTIPATION TYPE: Primary | ICD-10-CM

## 2021-08-12 DIAGNOSIS — R00.2 PALPITATIONS: ICD-10-CM

## 2021-08-12 LAB
ALBUMIN SERPL BCP-MCNC: 4.2 G/DL (ref 3.5–5.2)
ALP SERPL-CCNC: 94 U/L (ref 38–126)
ALT SERPL W/O P-5'-P-CCNC: 17 U/L (ref 10–44)
ANION GAP SERPL CALC-SCNC: 6 MMOL/L (ref 8–16)
AST SERPL-CCNC: 24 U/L (ref 15–46)
BASOPHILS # BLD AUTO: 0.04 K/UL (ref 0–0.2)
BASOPHILS NFR BLD: 0.8 % (ref 0–1.9)
BILIRUB SERPL-MCNC: 0.4 MG/DL (ref 0.1–1)
BILIRUB UR QL STRIP: NEGATIVE
CALCIUM SERPL-MCNC: 9.9 MG/DL (ref 8.7–10.5)
CHLORIDE SERPL-SCNC: 103 MMOL/L (ref 95–110)
CLARITY UR REFRACT.AUTO: CLEAR
CO2 SERPL-SCNC: 30 MMOL/L (ref 23–29)
COLOR UR AUTO: YELLOW
CREAT SERPL-MCNC: 0.89 MG/DL (ref 0.5–1.4)
DIFFERENTIAL METHOD: ABNORMAL
EOSINOPHIL # BLD AUTO: 0.1 K/UL (ref 0–0.5)
EOSINOPHIL NFR BLD: 2.4 % (ref 0–8)
ERYTHROCYTE [DISTWIDTH] IN BLOOD BY AUTOMATED COUNT: 13.3 % (ref 11.5–14.5)
EST. GFR  (AFRICAN AMERICAN): >60 ML/MIN/1.73 M^2
EST. GFR  (NON AFRICAN AMERICAN): >60 ML/MIN/1.73 M^2
GLUCOSE SERPL-MCNC: 118 MG/DL (ref 70–110)
GLUCOSE UR QL STRIP: NEGATIVE
HCT VFR BLD AUTO: 45.6 % (ref 40–54)
HGB BLD-MCNC: 14.7 G/DL (ref 14–18)
HGB UR QL STRIP: NEGATIVE
IMM GRANULOCYTES # BLD AUTO: 0.05 K/UL (ref 0–0.04)
IMM GRANULOCYTES NFR BLD AUTO: 1 % (ref 0–0.5)
KETONES UR QL STRIP: NEGATIVE
LEUKOCYTE ESTERASE UR QL STRIP: NEGATIVE
LYMPHOCYTES # BLD AUTO: 1.4 K/UL (ref 1–4.8)
LYMPHOCYTES NFR BLD: 27.9 % (ref 18–48)
MCH RBC QN AUTO: 28.3 PG (ref 27–31)
MCHC RBC AUTO-ENTMCNC: 32.2 G/DL (ref 32–36)
MCV RBC AUTO: 88 FL (ref 82–98)
MONOCYTES # BLD AUTO: 0.6 K/UL (ref 0.3–1)
MONOCYTES NFR BLD: 12.5 % (ref 4–15)
NEUTROPHILS # BLD AUTO: 2.7 K/UL (ref 1.8–7.7)
NEUTROPHILS NFR BLD: 55.4 % (ref 38–73)
NITRITE UR QL STRIP: NEGATIVE
NRBC BLD-RTO: 0 /100 WBC
NT-PROBNP SERPL-MCNC: 26 PG/ML (ref 5–900)
PH UR STRIP: 5 [PH] (ref 5–8)
PLATELET # BLD AUTO: 236 K/UL (ref 150–450)
PMV BLD AUTO: 9.6 FL (ref 9.2–12.9)
POTASSIUM SERPL-SCNC: 4 MMOL/L (ref 3.5–5.1)
PROT SERPL-MCNC: 7.3 G/DL (ref 6–8.4)
PROT UR QL STRIP: NEGATIVE
RBC # BLD AUTO: 5.2 M/UL (ref 4.6–6.2)
SODIUM SERPL-SCNC: 139 MMOL/L (ref 136–145)
SP GR UR STRIP: 1.01 (ref 1–1.03)
TROPONIN I SERPL-MCNC: <0.012 NG/ML (ref 0.01–0.03)
URN SPEC COLLECT METH UR: NORMAL
UROBILINOGEN UR STRIP-ACNC: NEGATIVE EU/DL
UUN UR-MCNC: 12 MG/DL (ref 2–20)
WBC # BLD AUTO: 4.95 K/UL (ref 3.9–12.7)

## 2021-08-12 PROCEDURE — 85025 COMPLETE CBC W/AUTO DIFF WBC: CPT | Mod: ER | Performed by: EMERGENCY MEDICINE

## 2021-08-12 PROCEDURE — 93010 ELECTROCARDIOGRAM REPORT: CPT | Mod: ,,, | Performed by: INTERNAL MEDICINE

## 2021-08-12 PROCEDURE — 99285 EMERGENCY DEPT VISIT HI MDM: CPT | Mod: ER,25

## 2021-08-12 PROCEDURE — 84484 ASSAY OF TROPONIN QUANT: CPT | Mod: ER | Performed by: EMERGENCY MEDICINE

## 2021-08-12 PROCEDURE — 93005 ELECTROCARDIOGRAM TRACING: CPT | Mod: ER

## 2021-08-12 PROCEDURE — 80053 COMPREHEN METABOLIC PANEL: CPT | Mod: ER | Performed by: EMERGENCY MEDICINE

## 2021-08-12 PROCEDURE — 83880 ASSAY OF NATRIURETIC PEPTIDE: CPT | Mod: ER | Performed by: EMERGENCY MEDICINE

## 2021-08-12 PROCEDURE — 93010 EKG 12-LEAD: ICD-10-PCS | Mod: ,,, | Performed by: INTERNAL MEDICINE

## 2021-08-12 PROCEDURE — 25000003 PHARM REV CODE 250: Mod: ER | Performed by: EMERGENCY MEDICINE

## 2021-08-12 PROCEDURE — 81003 URINALYSIS AUTO W/O SCOPE: CPT | Mod: ER | Performed by: EMERGENCY MEDICINE

## 2021-08-12 RX ADMIN — LIDOCAINE HYDROCHLORIDE: 20 SOLUTION ORAL; TOPICAL at 11:08

## 2021-08-14 ENCOUNTER — HOSPITAL ENCOUNTER (EMERGENCY)
Facility: HOSPITAL | Age: 71
Discharge: HOME | End: 2021-08-14
Attending: EMERGENCY MEDICINE
Payer: MEDICARE

## 2021-08-14 VITALS
HEART RATE: 66 BPM | BODY MASS INDEX: 32.21 KG/M2 | DIASTOLIC BLOOD PRESSURE: 83 MMHG | OXYGEN SATURATION: 98 % | TEMPERATURE: 99 F | SYSTOLIC BLOOD PRESSURE: 125 MMHG | RESPIRATION RATE: 18 BRPM | HEIGHT: 70 IN | WEIGHT: 225 LBS

## 2021-08-14 DIAGNOSIS — J18.9 ATYPICAL PNEUMONIA: Primary | ICD-10-CM

## 2021-08-14 DIAGNOSIS — R07.9 CHEST PAIN: ICD-10-CM

## 2021-08-14 DIAGNOSIS — R93.89 ABNORMAL CXR: ICD-10-CM

## 2021-08-14 LAB
ALBUMIN SERPL BCP-MCNC: 3.5 G/DL (ref 3.5–5.2)
ALP SERPL-CCNC: 93 U/L (ref 55–135)
ALT SERPL W/O P-5'-P-CCNC: 12 U/L (ref 10–44)
ANION GAP SERPL CALC-SCNC: 10 MMOL/L (ref 8–16)
AST SERPL-CCNC: 15 U/L (ref 10–40)
BASOPHILS # BLD AUTO: 0.03 K/UL (ref 0–0.2)
BASOPHILS NFR BLD: 0.6 % (ref 0–1.9)
BILIRUB SERPL-MCNC: 0.3 MG/DL (ref 0.1–1)
BILIRUB UR QL STRIP: NEGATIVE
BNP SERPL-MCNC: <10 PG/ML (ref 0–99)
BUN SERPL-MCNC: 14 MG/DL (ref 8–23)
CALCIUM SERPL-MCNC: 9.5 MG/DL (ref 8.7–10.5)
CHLORIDE SERPL-SCNC: 102 MMOL/L (ref 95–110)
CLARITY UR: CLEAR
CO2 SERPL-SCNC: 26 MMOL/L (ref 23–29)
COLOR UR: COLORLESS
CREAT SERPL-MCNC: 1 MG/DL (ref 0.5–1.4)
DIFFERENTIAL METHOD: ABNORMAL
EOSINOPHIL # BLD AUTO: 0.1 K/UL (ref 0–0.5)
EOSINOPHIL NFR BLD: 2.2 % (ref 0–8)
ERYTHROCYTE [DISTWIDTH] IN BLOOD BY AUTOMATED COUNT: 13.3 % (ref 11.5–14.5)
EST. GFR  (AFRICAN AMERICAN): >60 ML/MIN/1.73 M^2
EST. GFR  (NON AFRICAN AMERICAN): >60 ML/MIN/1.73 M^2
GLUCOSE SERPL-MCNC: 121 MG/DL (ref 70–110)
GLUCOSE UR QL STRIP: NEGATIVE
HCT VFR BLD AUTO: 42.5 % (ref 40–54)
HGB BLD-MCNC: 13.8 G/DL (ref 14–18)
HGB UR QL STRIP: NEGATIVE
IMM GRANULOCYTES # BLD AUTO: 0.05 K/UL (ref 0–0.04)
IMM GRANULOCYTES NFR BLD AUTO: 1 % (ref 0–0.5)
KETONES UR QL STRIP: NEGATIVE
LACTATE SERPL-SCNC: 1.2 MMOL/L (ref 0.5–2.2)
LEUKOCYTE ESTERASE UR QL STRIP: NEGATIVE
LIPASE SERPL-CCNC: 21 U/L (ref 4–60)
LYMPHOCYTES # BLD AUTO: 1.2 K/UL (ref 1–4.8)
LYMPHOCYTES NFR BLD: 23.2 % (ref 18–48)
MCH RBC QN AUTO: 28.3 PG (ref 27–31)
MCHC RBC AUTO-ENTMCNC: 32.5 G/DL (ref 32–36)
MCV RBC AUTO: 87 FL (ref 82–98)
MONOCYTES # BLD AUTO: 0.6 K/UL (ref 0.3–1)
MONOCYTES NFR BLD: 11.2 % (ref 4–15)
NEUTROPHILS # BLD AUTO: 3.1 K/UL (ref 1.8–7.7)
NEUTROPHILS NFR BLD: 61.8 % (ref 38–73)
NITRITE UR QL STRIP: NEGATIVE
NRBC BLD-RTO: 0 /100 WBC
PH UR STRIP: 7 [PH] (ref 5–8)
PLATELET # BLD AUTO: 229 K/UL (ref 150–450)
PMV BLD AUTO: 10.1 FL (ref 9.2–12.9)
POTASSIUM SERPL-SCNC: 3.7 MMOL/L (ref 3.5–5.1)
PROT SERPL-MCNC: 6.8 G/DL (ref 6–8.4)
PROT UR QL STRIP: NEGATIVE
RBC # BLD AUTO: 4.87 M/UL (ref 4.6–6.2)
SARS-COV-2 RDRP RESP QL NAA+PROBE: NEGATIVE
SODIUM SERPL-SCNC: 138 MMOL/L (ref 136–145)
SP GR UR STRIP: 1.01 (ref 1–1.03)
TROPONIN I SERPL DL<=0.01 NG/ML-MCNC: <0.006 NG/ML (ref 0–0.03)
URN SPEC COLLECT METH UR: ABNORMAL
UROBILINOGEN UR STRIP-ACNC: NEGATIVE EU/DL
WBC # BLD AUTO: 5.08 K/UL (ref 3.9–12.7)

## 2021-08-14 PROCEDURE — 96374 THER/PROPH/DIAG INJ IV PUSH: CPT

## 2021-08-14 PROCEDURE — 25000003 PHARM REV CODE 250: Performed by: EMERGENCY MEDICINE

## 2021-08-14 PROCEDURE — 84484 ASSAY OF TROPONIN QUANT: CPT | Performed by: EMERGENCY MEDICINE

## 2021-08-14 PROCEDURE — 81003 URINALYSIS AUTO W/O SCOPE: CPT | Performed by: EMERGENCY MEDICINE

## 2021-08-14 PROCEDURE — 93005 ELECTROCARDIOGRAM TRACING: CPT

## 2021-08-14 PROCEDURE — 83880 ASSAY OF NATRIURETIC PEPTIDE: CPT | Performed by: EMERGENCY MEDICINE

## 2021-08-14 PROCEDURE — 83605 ASSAY OF LACTIC ACID: CPT | Performed by: EMERGENCY MEDICINE

## 2021-08-14 PROCEDURE — 63600175 PHARM REV CODE 636 W HCPCS: Performed by: EMERGENCY MEDICINE

## 2021-08-14 PROCEDURE — 83690 ASSAY OF LIPASE: CPT | Performed by: EMERGENCY MEDICINE

## 2021-08-14 PROCEDURE — 80053 COMPREHEN METABOLIC PANEL: CPT | Performed by: EMERGENCY MEDICINE

## 2021-08-14 PROCEDURE — 85025 COMPLETE CBC W/AUTO DIFF WBC: CPT | Performed by: EMERGENCY MEDICINE

## 2021-08-14 PROCEDURE — 93010 ELECTROCARDIOGRAM REPORT: CPT | Mod: ,,, | Performed by: INTERNAL MEDICINE

## 2021-08-14 PROCEDURE — 99284 EMERGENCY DEPT VISIT MOD MDM: CPT | Mod: 25

## 2021-08-14 PROCEDURE — U0002 COVID-19 LAB TEST NON-CDC: HCPCS | Performed by: EMERGENCY MEDICINE

## 2021-08-14 PROCEDURE — 93010 EKG 12-LEAD: ICD-10-PCS | Mod: ,,, | Performed by: INTERNAL MEDICINE

## 2021-08-14 RX ORDER — DICYCLOMINE HYDROCHLORIDE 10 MG/1
20 CAPSULE ORAL
Status: COMPLETED | OUTPATIENT
Start: 2021-08-14 | End: 2021-08-14

## 2021-08-14 RX ORDER — DOXYCYCLINE 100 MG/1
100 CAPSULE ORAL 2 TIMES DAILY
Qty: 14 CAPSULE | Refills: 0 | Status: SHIPPED | OUTPATIENT
Start: 2021-08-14 | End: 2021-08-21

## 2021-08-14 RX ORDER — ONDANSETRON 2 MG/ML
4 INJECTION INTRAMUSCULAR; INTRAVENOUS
Status: COMPLETED | OUTPATIENT
Start: 2021-08-14 | End: 2021-08-14

## 2021-08-14 RX ORDER — ALBUTEROL SULFATE 90 UG/1
2 AEROSOL, METERED RESPIRATORY (INHALATION) EVERY 4 HOURS PRN
Qty: 6.7 G | Refills: 0 | Status: SHIPPED | OUTPATIENT
Start: 2021-08-14 | End: 2021-08-21

## 2021-08-14 RX ADMIN — DICYCLOMINE HYDROCHLORIDE 20 MG: 10 CAPSULE ORAL at 05:08

## 2021-08-14 RX ADMIN — ONDANSETRON 4 MG: 2 INJECTION INTRAMUSCULAR; INTRAVENOUS at 05:08

## 2021-10-14 ENCOUNTER — HOSPITAL ENCOUNTER (EMERGENCY)
Facility: HOSPITAL | Age: 71
Discharge: HOME | End: 2021-10-14
Attending: EMERGENCY MEDICINE
Payer: MEDICARE

## 2021-10-14 VITALS
WEIGHT: 220 LBS | TEMPERATURE: 99 F | HEIGHT: 70 IN | SYSTOLIC BLOOD PRESSURE: 109 MMHG | HEART RATE: 61 BPM | OXYGEN SATURATION: 98 % | RESPIRATION RATE: 20 BRPM | DIASTOLIC BLOOD PRESSURE: 63 MMHG | BODY MASS INDEX: 31.5 KG/M2

## 2021-10-14 DIAGNOSIS — M79.601 PAIN OF RIGHT UPPER EXTREMITY: Primary | ICD-10-CM

## 2021-10-14 DIAGNOSIS — R30.0 DYSURIA: ICD-10-CM

## 2021-10-14 LAB
BILIRUB UR QL STRIP: NEGATIVE
CLARITY UR REFRACT.AUTO: CLEAR
COLOR UR AUTO: YELLOW
GLUCOSE UR QL STRIP: NEGATIVE
HGB UR QL STRIP: NEGATIVE
KETONES UR QL STRIP: NEGATIVE
LEUKOCYTE ESTERASE UR QL STRIP: NEGATIVE
NITRITE UR QL STRIP: NEGATIVE
PH UR STRIP: 5 [PH] (ref 5–8)
PROT UR QL STRIP: ABNORMAL
SP GR UR STRIP: 1.01 (ref 1–1.03)
URN SPEC COLLECT METH UR: ABNORMAL
UROBILINOGEN UR STRIP-ACNC: NEGATIVE EU/DL

## 2021-10-14 PROCEDURE — 81003 URINALYSIS AUTO W/O SCOPE: CPT | Mod: ER | Performed by: PHYSICIAN ASSISTANT

## 2021-10-14 PROCEDURE — 99284 EMERGENCY DEPT VISIT MOD MDM: CPT | Mod: 25,ER

## 2021-10-14 PROCEDURE — 25000003 PHARM REV CODE 250: Mod: ER | Performed by: PHYSICIAN ASSISTANT

## 2021-10-14 RX ORDER — LIDOCAINE 50 MG/G
1 PATCH TOPICAL DAILY
Qty: 15 PATCH | Refills: 0 | OUTPATIENT
Start: 2021-10-14 | End: 2022-02-10

## 2021-10-14 RX ORDER — ACETAMINOPHEN 500 MG
1000 TABLET ORAL
Status: COMPLETED | OUTPATIENT
Start: 2021-10-14 | End: 2021-10-14

## 2021-10-14 RX ORDER — LIDOCAINE 50 MG/G
1 PATCH TOPICAL
Status: DISCONTINUED | OUTPATIENT
Start: 2021-10-14 | End: 2021-10-14 | Stop reason: HOSPADM

## 2021-10-14 RX ADMIN — LIDOCAINE 1 PATCH: 50 PATCH CUTANEOUS at 11:10

## 2021-10-14 RX ADMIN — ACETAMINOPHEN 1000 MG: 500 TABLET ORAL at 10:10

## 2021-11-14 ENCOUNTER — HOSPITAL ENCOUNTER (EMERGENCY)
Facility: HOSPITAL | Age: 71
Discharge: HOME | End: 2021-11-14
Attending: FAMILY MEDICINE
Payer: MEDICARE

## 2021-11-14 VITALS
HEIGHT: 70 IN | DIASTOLIC BLOOD PRESSURE: 78 MMHG | WEIGHT: 215 LBS | RESPIRATION RATE: 18 BRPM | BODY MASS INDEX: 30.78 KG/M2 | TEMPERATURE: 98 F | OXYGEN SATURATION: 98 % | SYSTOLIC BLOOD PRESSURE: 132 MMHG | HEART RATE: 70 BPM

## 2021-11-14 DIAGNOSIS — N30.00 ACUTE CYSTITIS WITHOUT HEMATURIA: Primary | ICD-10-CM

## 2021-11-14 LAB
BACTERIA #/AREA URNS AUTO: ABNORMAL /HPF
BILIRUB UR QL STRIP: NEGATIVE
CLARITY UR REFRACT.AUTO: ABNORMAL
COLOR UR AUTO: YELLOW
GLUCOSE UR QL STRIP: NEGATIVE
HGB UR QL STRIP: ABNORMAL
KETONES UR QL STRIP: NEGATIVE
LEUKOCYTE ESTERASE UR QL STRIP: ABNORMAL
MICROSCOPIC COMMENT: ABNORMAL
NITRITE UR QL STRIP: NEGATIVE
PH UR STRIP: 7 [PH] (ref 5–8)
PROT UR QL STRIP: ABNORMAL
RBC #/AREA URNS AUTO: 8 /HPF (ref 0–4)
SP GR UR STRIP: 1.01 (ref 1–1.03)
URN SPEC COLLECT METH UR: ABNORMAL
UROBILINOGEN UR STRIP-ACNC: NEGATIVE EU/DL
WBC #/AREA URNS AUTO: 40 /HPF (ref 0–5)
WBC CLUMPS UR QL AUTO: ABNORMAL

## 2021-11-14 PROCEDURE — 87077 CULTURE AEROBIC IDENTIFY: CPT | Mod: ER | Performed by: FAMILY MEDICINE

## 2021-11-14 PROCEDURE — 87088 URINE BACTERIA CULTURE: CPT | Mod: ER | Performed by: FAMILY MEDICINE

## 2021-11-14 PROCEDURE — 87186 SC STD MICRODIL/AGAR DIL: CPT | Mod: ER | Performed by: FAMILY MEDICINE

## 2021-11-14 PROCEDURE — 96372 THER/PROPH/DIAG INJ SC/IM: CPT | Mod: ER

## 2021-11-14 PROCEDURE — 81000 URINALYSIS NONAUTO W/SCOPE: CPT | Mod: ER | Performed by: FAMILY MEDICINE

## 2021-11-14 PROCEDURE — 87086 URINE CULTURE/COLONY COUNT: CPT | Mod: ER | Performed by: FAMILY MEDICINE

## 2021-11-14 PROCEDURE — 63600175 PHARM REV CODE 636 W HCPCS: Mod: ER | Performed by: PHYSICIAN ASSISTANT

## 2021-11-14 PROCEDURE — 99284 EMERGENCY DEPT VISIT MOD MDM: CPT | Mod: 25,ER

## 2021-11-14 RX ORDER — PHENAZOPYRIDINE HYDROCHLORIDE 100 MG/1
100 TABLET, FILM COATED ORAL 3 TIMES DAILY PRN
Qty: 9 TABLET | Refills: 0 | Status: SHIPPED | OUTPATIENT
Start: 2021-11-14 | End: 2021-11-14 | Stop reason: SDUPTHER

## 2021-11-14 RX ORDER — CEFTRIAXONE 1 G/1
1 INJECTION, POWDER, FOR SOLUTION INTRAMUSCULAR; INTRAVENOUS
Status: COMPLETED | OUTPATIENT
Start: 2021-11-14 | End: 2021-11-14

## 2021-11-14 RX ORDER — CIPROFLOXACIN 500 MG/1
500 TABLET ORAL 2 TIMES DAILY
Qty: 20 TABLET | Refills: 0 | Status: SHIPPED | OUTPATIENT
Start: 2021-11-14 | End: 2021-11-24

## 2021-11-14 RX ORDER — PHENAZOPYRIDINE HYDROCHLORIDE 100 MG/1
100 TABLET, FILM COATED ORAL 3 TIMES DAILY PRN
Qty: 9 TABLET | Refills: 0 | Status: SHIPPED | OUTPATIENT
Start: 2021-11-14 | End: 2021-11-17

## 2021-11-14 RX ORDER — CIPROFLOXACIN 500 MG/1
500 TABLET ORAL 2 TIMES DAILY
Qty: 20 TABLET | Refills: 0 | Status: SHIPPED | OUTPATIENT
Start: 2021-11-14 | End: 2021-11-14 | Stop reason: SDUPTHER

## 2021-11-14 RX ADMIN — CEFTRIAXONE SODIUM 1 G: 1 INJECTION, POWDER, FOR SOLUTION INTRAMUSCULAR; INTRAVENOUS at 12:11

## 2021-11-16 LAB — BACTERIA UR CULT: ABNORMAL

## 2021-11-30 ENCOUNTER — HOSPITAL ENCOUNTER (EMERGENCY)
Facility: HOSPITAL | Age: 71
Discharge: HOME | End: 2021-11-30
Attending: EMERGENCY MEDICINE
Payer: MEDICARE

## 2021-11-30 VITALS
BODY MASS INDEX: 31.5 KG/M2 | WEIGHT: 220 LBS | TEMPERATURE: 98 F | RESPIRATION RATE: 20 BRPM | HEART RATE: 59 BPM | OXYGEN SATURATION: 99 % | HEIGHT: 70 IN | DIASTOLIC BLOOD PRESSURE: 72 MMHG | SYSTOLIC BLOOD PRESSURE: 123 MMHG

## 2021-11-30 DIAGNOSIS — R91.1 PULMONARY NODULE: ICD-10-CM

## 2021-11-30 DIAGNOSIS — R07.9 CHEST PAIN: ICD-10-CM

## 2021-11-30 DIAGNOSIS — F41.9 ANXIETY: Primary | ICD-10-CM

## 2021-11-30 DIAGNOSIS — R16.0 LIVER MASS: ICD-10-CM

## 2021-11-30 LAB
ALBUMIN SERPL BCP-MCNC: 4 G/DL (ref 3.5–5.2)
ALP SERPL-CCNC: 118 U/L (ref 38–126)
ALT SERPL W/O P-5'-P-CCNC: 17 U/L (ref 10–44)
ANION GAP SERPL CALC-SCNC: 9 MMOL/L (ref 8–16)
AST SERPL-CCNC: 29 U/L (ref 15–46)
BASOPHILS # BLD AUTO: 0.03 K/UL (ref 0–0.2)
BASOPHILS NFR BLD: 0.9 % (ref 0–1.9)
BILIRUB SERPL-MCNC: 0.6 MG/DL (ref 0.1–1)
CALCIUM SERPL-MCNC: 9.4 MG/DL (ref 8.7–10.5)
CHLORIDE SERPL-SCNC: 100 MMOL/L (ref 95–110)
CO2 SERPL-SCNC: 29 MMOL/L (ref 23–29)
CREAT SERPL-MCNC: 0.98 MG/DL (ref 0.5–1.4)
DIFFERENTIAL METHOD: ABNORMAL
EOSINOPHIL # BLD AUTO: 0.2 K/UL (ref 0–0.5)
EOSINOPHIL NFR BLD: 4.3 % (ref 0–8)
ERYTHROCYTE [DISTWIDTH] IN BLOOD BY AUTOMATED COUNT: 13.2 % (ref 11.5–14.5)
EST. GFR  (AFRICAN AMERICAN): >60 ML/MIN/1.73 M^2
EST. GFR  (NON AFRICAN AMERICAN): >60 ML/MIN/1.73 M^2
GLUCOSE SERPL-MCNC: 117 MG/DL (ref 70–110)
HCT VFR BLD AUTO: 44.3 % (ref 40–54)
HGB BLD-MCNC: 14 G/DL (ref 14–18)
IMM GRANULOCYTES # BLD AUTO: 0.02 K/UL (ref 0–0.04)
IMM GRANULOCYTES NFR BLD AUTO: 0.6 % (ref 0–0.5)
LYMPHOCYTES # BLD AUTO: 1.2 K/UL (ref 1–4.8)
LYMPHOCYTES NFR BLD: 33.3 % (ref 18–48)
MCH RBC QN AUTO: 27.7 PG (ref 27–31)
MCHC RBC AUTO-ENTMCNC: 31.6 G/DL (ref 32–36)
MCV RBC AUTO: 88 FL (ref 82–98)
MONOCYTES # BLD AUTO: 0.5 K/UL (ref 0.3–1)
MONOCYTES NFR BLD: 14.9 % (ref 4–15)
NEUTROPHILS # BLD AUTO: 1.6 K/UL (ref 1.8–7.7)
NEUTROPHILS NFR BLD: 46 % (ref 38–73)
NRBC BLD-RTO: 0 /100 WBC
NT-PROBNP SERPL-MCNC: 28 PG/ML (ref 5–900)
PLATELET # BLD AUTO: 203 K/UL (ref 150–450)
PMV BLD AUTO: 9.8 FL (ref 9.2–12.9)
POTASSIUM SERPL-SCNC: 3.8 MMOL/L (ref 3.5–5.1)
PROT SERPL-MCNC: 7.5 G/DL (ref 6–8.4)
RBC # BLD AUTO: 5.05 M/UL (ref 4.6–6.2)
SODIUM SERPL-SCNC: 138 MMOL/L (ref 136–145)
TROPONIN I SERPL-MCNC: 0.01 NG/ML (ref 0.01–0.03)
TROPONIN I SERPL-MCNC: <0.012 NG/ML (ref 0.01–0.03)
UUN UR-MCNC: 16 MG/DL (ref 2–20)
WBC # BLD AUTO: 3.48 K/UL (ref 3.9–12.7)

## 2021-11-30 PROCEDURE — 96374 THER/PROPH/DIAG INJ IV PUSH: CPT | Mod: 59,ER

## 2021-11-30 PROCEDURE — 99285 EMERGENCY DEPT VISIT HI MDM: CPT | Mod: 25,ER

## 2021-11-30 PROCEDURE — 93010 ELECTROCARDIOGRAM REPORT: CPT | Mod: 76,,, | Performed by: INTERNAL MEDICINE

## 2021-11-30 PROCEDURE — 83880 ASSAY OF NATRIURETIC PEPTIDE: CPT | Mod: ER | Performed by: EMERGENCY MEDICINE

## 2021-11-30 PROCEDURE — 93010 EKG 12-LEAD: ICD-10-PCS | Mod: 76,,, | Performed by: INTERNAL MEDICINE

## 2021-11-30 PROCEDURE — 84484 ASSAY OF TROPONIN QUANT: CPT | Mod: ER | Performed by: EMERGENCY MEDICINE

## 2021-11-30 PROCEDURE — 93005 ELECTROCARDIOGRAM TRACING: CPT | Mod: ER

## 2021-11-30 PROCEDURE — 25500020 PHARM REV CODE 255: Mod: ER | Performed by: EMERGENCY MEDICINE

## 2021-11-30 PROCEDURE — 85025 COMPLETE CBC W/AUTO DIFF WBC: CPT | Mod: ER | Performed by: EMERGENCY MEDICINE

## 2021-11-30 PROCEDURE — 63600175 PHARM REV CODE 636 W HCPCS: Mod: ER | Performed by: EMERGENCY MEDICINE

## 2021-11-30 PROCEDURE — 25000003 PHARM REV CODE 250: Mod: ER | Performed by: EMERGENCY MEDICINE

## 2021-11-30 PROCEDURE — 80053 COMPREHEN METABOLIC PANEL: CPT | Mod: ER | Performed by: EMERGENCY MEDICINE

## 2021-11-30 RX ORDER — ASPIRIN 325 MG
325 TABLET ORAL
Status: COMPLETED | OUTPATIENT
Start: 2021-11-30 | End: 2021-11-30

## 2021-11-30 RX ADMIN — IOHEXOL 100 ML: 350 INJECTION, SOLUTION INTRAVENOUS at 01:11

## 2021-11-30 RX ADMIN — LORAZEPAM 1 MG: 2 INJECTION INTRAMUSCULAR; INTRAVENOUS at 02:11

## 2021-11-30 RX ADMIN — ASPIRIN 325 MG ORAL TABLET 325 MG: 325 PILL ORAL at 11:11

## 2021-12-18 ENCOUNTER — HOSPITAL ENCOUNTER (EMERGENCY)
Facility: HOSPITAL | Age: 71
Discharge: HOME | End: 2021-12-18
Attending: FAMILY MEDICINE
Payer: MEDICARE

## 2021-12-18 VITALS
BODY MASS INDEX: 31.92 KG/M2 | HEIGHT: 70 IN | RESPIRATION RATE: 19 BRPM | WEIGHT: 223 LBS | HEART RATE: 58 BPM | TEMPERATURE: 98 F | DIASTOLIC BLOOD PRESSURE: 67 MMHG | SYSTOLIC BLOOD PRESSURE: 123 MMHG | OXYGEN SATURATION: 97 %

## 2021-12-18 DIAGNOSIS — R20.2 PARESTHESIA: ICD-10-CM

## 2021-12-18 DIAGNOSIS — F41.9 ANXIETY: ICD-10-CM

## 2021-12-18 DIAGNOSIS — I10 PRIMARY HYPERTENSION: Primary | ICD-10-CM

## 2021-12-18 LAB
BILIRUB UR QL STRIP: NEGATIVE
CLARITY UR REFRACT.AUTO: CLEAR
COLOR UR AUTO: YELLOW
GLUCOSE UR QL STRIP: NEGATIVE
HGB UR QL STRIP: ABNORMAL
KETONES UR QL STRIP: NEGATIVE
LEUKOCYTE ESTERASE UR QL STRIP: NEGATIVE
NITRITE UR QL STRIP: NEGATIVE
PH UR STRIP: 6 [PH] (ref 5–8)
PROT UR QL STRIP: NEGATIVE
SP GR UR STRIP: 1.01 (ref 1–1.03)
URN SPEC COLLECT METH UR: ABNORMAL
UROBILINOGEN UR STRIP-ACNC: NEGATIVE EU/DL

## 2021-12-18 PROCEDURE — 99282 EMERGENCY DEPT VISIT SF MDM: CPT | Mod: ER

## 2021-12-18 PROCEDURE — 81003 URINALYSIS AUTO W/O SCOPE: CPT | Mod: ER | Performed by: FAMILY MEDICINE

## 2021-12-18 RX ORDER — METOPROLOL TARTRATE 25 MG/1
12.5 TABLET, FILM COATED ORAL 2 TIMES DAILY
COMMUNITY
End: 2022-02-10

## 2021-12-18 RX ORDER — LOSARTAN POTASSIUM 25 MG/1
12.5 TABLET ORAL DAILY
Status: ON HOLD | COMMUNITY
End: 2022-09-02 | Stop reason: HOSPADM

## 2022-02-09 ENCOUNTER — HOSPITAL ENCOUNTER (EMERGENCY)
Facility: HOSPITAL | Age: 72
Discharge: HOME | End: 2022-02-10
Attending: EMERGENCY MEDICINE
Payer: MEDICARE

## 2022-02-09 DIAGNOSIS — R53.1 GENERALIZED WEAKNESS: ICD-10-CM

## 2022-02-09 PROCEDURE — 99285 EMERGENCY DEPT VISIT HI MDM: CPT | Mod: 25,ER

## 2022-02-09 PROCEDURE — 82962 GLUCOSE BLOOD TEST: CPT | Mod: ER

## 2022-02-10 VITALS
SYSTOLIC BLOOD PRESSURE: 133 MMHG | DIASTOLIC BLOOD PRESSURE: 73 MMHG | RESPIRATION RATE: 18 BRPM | TEMPERATURE: 98 F | HEART RATE: 56 BPM | OXYGEN SATURATION: 97 % | BODY MASS INDEX: 31.57 KG/M2 | WEIGHT: 220 LBS

## 2022-02-10 LAB
ALBUMIN SERPL BCP-MCNC: 4.3 G/DL (ref 3.5–5.2)
ALP SERPL-CCNC: 112 U/L (ref 38–126)
ALT SERPL W/O P-5'-P-CCNC: 21 U/L (ref 10–44)
AMPHET+METHAMPHET UR QL: NEGATIVE
ANION GAP SERPL CALC-SCNC: 9 MMOL/L (ref 8–16)
AST SERPL-CCNC: 29 U/L (ref 15–46)
BARBITURATES UR QL SCN>200 NG/ML: NEGATIVE
BASOPHILS # BLD AUTO: 0.04 K/UL (ref 0–0.2)
BASOPHILS NFR BLD: 0.8 % (ref 0–1.9)
BENZODIAZ UR QL SCN>200 NG/ML: NEGATIVE
BILIRUB SERPL-MCNC: 0.5 MG/DL (ref 0.1–1)
BILIRUB UR QL STRIP: NEGATIVE
BZE UR QL SCN: NEGATIVE
CALCIUM SERPL-MCNC: 9.6 MG/DL (ref 8.7–10.5)
CANNABINOIDS UR QL SCN: NEGATIVE
CHLORIDE SERPL-SCNC: 100 MMOL/L (ref 95–110)
CLARITY UR REFRACT.AUTO: CLEAR
CO2 SERPL-SCNC: 29 MMOL/L (ref 23–29)
COLOR UR AUTO: YELLOW
CREAT SERPL-MCNC: 1.25 MG/DL (ref 0.5–1.4)
CREAT UR-MCNC: 223.6 MG/DL (ref 23–375)
DIFFERENTIAL METHOD: ABNORMAL
EOSINOPHIL # BLD AUTO: 0.2 K/UL (ref 0–0.5)
EOSINOPHIL NFR BLD: 4.2 % (ref 0–8)
ERYTHROCYTE [DISTWIDTH] IN BLOOD BY AUTOMATED COUNT: 13.6 % (ref 11.5–14.5)
EST. GFR  (AFRICAN AMERICAN): >60 ML/MIN/1.73 M^2
EST. GFR  (NON AFRICAN AMERICAN): 56.8 ML/MIN/1.73 M^2
ETHANOL SERPL-MCNC: <10 MG/DL
GLUCOSE SERPL-MCNC: 121 MG/DL (ref 70–110)
GLUCOSE UR QL STRIP: NEGATIVE
HCT VFR BLD AUTO: 45 % (ref 40–54)
HGB BLD-MCNC: 14.5 G/DL (ref 14–18)
HGB UR QL STRIP: ABNORMAL
IMM GRANULOCYTES # BLD AUTO: 0.04 K/UL (ref 0–0.04)
IMM GRANULOCYTES NFR BLD AUTO: 0.8 % (ref 0–0.5)
KETONES UR QL STRIP: NEGATIVE
LEUKOCYTE ESTERASE UR QL STRIP: NEGATIVE
LYMPHOCYTES # BLD AUTO: 1.6 K/UL (ref 1–4.8)
LYMPHOCYTES NFR BLD: 32.7 % (ref 18–48)
MCH RBC QN AUTO: 28.2 PG (ref 27–31)
MCHC RBC AUTO-ENTMCNC: 32.2 G/DL (ref 32–36)
MCV RBC AUTO: 88 FL (ref 82–98)
METHADONE UR QL SCN>300 NG/ML: NEGATIVE
MONOCYTES # BLD AUTO: 0.7 K/UL (ref 0.3–1)
MONOCYTES NFR BLD: 13.9 % (ref 4–15)
NEUTROPHILS # BLD AUTO: 2.4 K/UL (ref 1.8–7.7)
NEUTROPHILS NFR BLD: 47.6 % (ref 38–73)
NITRITE UR QL STRIP: NEGATIVE
NRBC BLD-RTO: 0 /100 WBC
OPIATES UR QL SCN: NEGATIVE
PCP UR QL SCN>25 NG/ML: NEGATIVE
PH UR STRIP: 6 [PH] (ref 5–8)
PLATELET # BLD AUTO: 204 K/UL (ref 150–450)
PMV BLD AUTO: 10.3 FL (ref 9.2–12.9)
POCT GLUCOSE: 133 MG/DL (ref 70–110)
POTASSIUM SERPL-SCNC: 4 MMOL/L (ref 3.5–5.1)
PROT SERPL-MCNC: 7.6 G/DL (ref 6–8.4)
PROT UR QL STRIP: ABNORMAL
RBC # BLD AUTO: 5.14 M/UL (ref 4.6–6.2)
SARS-COV-2 RDRP RESP QL NAA+PROBE: NEGATIVE
SODIUM SERPL-SCNC: 138 MMOL/L (ref 136–145)
SP GR UR STRIP: 1.02 (ref 1–1.03)
TOXICOLOGY INFORMATION: NORMAL
TROPONIN I SERPL-MCNC: <0.012 NG/ML (ref 0.01–0.03)
URN SPEC COLLECT METH UR: ABNORMAL
UROBILINOGEN UR STRIP-ACNC: NEGATIVE EU/DL
UUN UR-MCNC: 25 MG/DL (ref 2–20)
WBC # BLD AUTO: 4.96 K/UL (ref 3.9–12.7)

## 2022-02-10 PROCEDURE — U0002 COVID-19 LAB TEST NON-CDC: HCPCS | Mod: ER | Performed by: EMERGENCY MEDICINE

## 2022-02-10 PROCEDURE — 80053 COMPREHEN METABOLIC PANEL: CPT | Mod: ER | Performed by: EMERGENCY MEDICINE

## 2022-02-10 PROCEDURE — 85025 COMPLETE CBC W/AUTO DIFF WBC: CPT | Mod: ER | Performed by: EMERGENCY MEDICINE

## 2022-02-10 PROCEDURE — 82077 ASSAY SPEC XCP UR&BREATH IA: CPT | Mod: ER | Performed by: EMERGENCY MEDICINE

## 2022-02-10 PROCEDURE — 93010 EKG 12-LEAD: ICD-10-PCS | Mod: ,,, | Performed by: INTERNAL MEDICINE

## 2022-02-10 PROCEDURE — 93005 ELECTROCARDIOGRAM TRACING: CPT | Mod: ER

## 2022-02-10 PROCEDURE — 93010 ELECTROCARDIOGRAM REPORT: CPT | Mod: ,,, | Performed by: INTERNAL MEDICINE

## 2022-02-10 PROCEDURE — 84484 ASSAY OF TROPONIN QUANT: CPT | Mod: ER | Performed by: EMERGENCY MEDICINE

## 2022-02-10 PROCEDURE — 80307 DRUG TEST PRSMV CHEM ANLYZR: CPT | Mod: ER | Performed by: EMERGENCY MEDICINE

## 2022-02-10 PROCEDURE — 81003 URINALYSIS AUTO W/O SCOPE: CPT | Mod: ER,59 | Performed by: EMERGENCY MEDICINE

## 2022-02-10 RX ORDER — METOPROLOL SUCCINATE 25 MG/1
12.5 TABLET, EXTENDED RELEASE ORAL DAILY
Status: ON HOLD | COMMUNITY
Start: 2021-11-19 | End: 2022-09-02 | Stop reason: SDUPTHER

## 2022-02-10 RX ORDER — APIXABAN 5 MG/1
5 TABLET, FILM COATED ORAL 2 TIMES DAILY
COMMUNITY
Start: 2022-01-31

## 2022-02-10 RX ORDER — FUROSEMIDE 20 MG/1
20 TABLET ORAL EVERY MORNING
COMMUNITY
Start: 2021-12-14

## 2022-02-10 RX ORDER — POTASSIUM CHLORIDE 750 MG/1
10 CAPSULE, EXTENDED RELEASE ORAL DAILY
COMMUNITY
Start: 2021-12-14 | End: 2023-07-19

## 2022-02-10 RX ORDER — LOSARTAN POTASSIUM AND HYDROCHLOROTHIAZIDE 12.5; 5 MG/1; MG/1
1 TABLET ORAL EVERY MORNING
Status: ON HOLD | COMMUNITY
Start: 2022-01-25 | End: 2022-09-02 | Stop reason: SDUPTHER

## 2022-02-10 NOTE — ED PROVIDER NOTES
Encounter Date: 2/9/2022       History     Chief Complaint   Patient presents with    Fatigue     Pt states that approx 30 min ago, he got out of bed and had blurred vision in both eyes and generalized weakness. Pt says the blurred vision lasted approx 2-3 min and has since resolved. Pt denies slurred speech, facial droop, unilateral weakness/numbness, headache, chest pain. Pt is aaox4     Patient currently presents with concern regarding generalized weakness.  Patient notes that he other bed about 30 minutes ago and suddenly became lightheaded.  He describes a brief period where his vision was blurry but notes that this improved after 1-2 minutes.  He denies any difficulty speaking or swallowing.  He did not have any apparent facial weakness nor did he experience any extremity numbness or weakness.  Headache similarly is denied.  He denies associated chest pain or shortness of breath.        Review of patient's allergies indicates:   Allergen Reactions    Penicillins Palpitations and Anxiety     Past Medical History:   Diagnosis Date    Back pain, chronic     BPH (benign prostatic hyperplasia)     Hypertension     Mitral valve prolapse syndrome     Prostate enlargement      Past Surgical History:   Procedure Laterality Date    NECK SURGERY       Family History   Problem Relation Age of Onset    Hypertension Mother     Stroke Father      Social History     Tobacco Use    Smoking status: Former Smoker    Smokeless tobacco: Never Used    Tobacco comment: >40 years ago   Substance Use Topics    Alcohol use: No    Drug use: No     Review of Systems   Constitutional: Negative for chills and fever.   HENT: Negative for congestion, postnasal drip and sore throat.    Respiratory: Negative for chest tightness and shortness of breath.    Cardiovascular: Negative for chest pain and palpitations.   Gastrointestinal: Negative for abdominal pain, diarrhea, nausea and vomiting.   Genitourinary: Negative for  difficulty urinating and dysuria.   Skin: Negative for color change and rash.   Allergic/Immunologic: Negative for immunocompromised state.   Neurological: Positive for light-headedness. Negative for facial asymmetry, speech difficulty, weakness, numbness and headaches.   Hematological: Negative for adenopathy.   All other systems reviewed and are negative.      Physical Exam     Initial Vitals [02/10/22 0004]   BP Pulse Resp Temp SpO2   (!) 172/91 63 18 98 °F (36.7 °C) 100 %      MAP       --         Vitals:    02/10/22 0004 02/10/22 0030 02/10/22 0033 02/10/22 0034   BP: (!) 172/91 (!) 145/72 (!) 149/85 (!) 151/82   Pulse: 63 60 (!) 59 61   Resp: 18      Temp: 98 °F (36.7 °C)      TempSrc: Oral      SpO2: 100%      Weight: 99.8 kg (220 lb)       02/10/22 0045 02/10/22 0046 02/10/22 0103 02/10/22 0132   BP:   123/68    Pulse:  (!) 57     Resp: 18      Temp:       TempSrc:       SpO2: 95%   97%   Weight:        02/10/22 0133 02/10/22 0202   BP:  133/73   Pulse: (!) 55 (!) 56   Resp:  18   Temp:     TempSrc:     SpO2:  97%   Weight:           Physical Exam    Constitutional: He appears well-developed and well-nourished. He is not diaphoretic. No distress.   HENT:   Head: Normocephalic and atraumatic.   Right Ear: External ear normal.   Left Ear: External ear normal.   Nose: Nose normal.   Mouth/Throat: Oropharynx is clear and moist.   Eyes: Conjunctivae and EOM are normal. Pupils are equal, round, and reactive to light. No scleral icterus.   Neck: Neck supple. No JVD present.   Cardiovascular: Normal rate, regular rhythm, normal heart sounds and intact distal pulses.   Pulmonary/Chest: Breath sounds normal. No respiratory distress.   Abdominal: Abdomen is soft. There is no abdominal tenderness.   Musculoskeletal:      Cervical back: Neck supple.     Neurological: He is alert and oriented to person, place, and time. He has normal strength. No cranial nerve deficit or sensory deficit. Coordination and gait normal. GCS  score is 15. GCS eye subscore is 4. GCS verbal subscore is 5. GCS motor subscore is 6.   Skin: Skin is warm and dry.         ED Course   Procedures  Labs Reviewed   CBC W/ AUTO DIFFERENTIAL - Abnormal; Notable for the following components:       Result Value    Immature Granulocytes 0.8 (*)     All other components within normal limits   COMPREHENSIVE METABOLIC PANEL - Abnormal; Notable for the following components:    Glucose 121 (*)     BUN 25 (*)     eGFR if non  56.8 (*)     All other components within normal limits   URINALYSIS, REFLEX TO URINE CULTURE - Abnormal; Notable for the following components:    Protein, UA Trace (*)     Occult Blood UA Trace (*)     All other components within normal limits    Narrative:     Preferred Collection Type->Urine, Clean Catch  Specimen Source->Urine  Collection Type->Urine, Clean Catch   POCT GLUCOSE - Abnormal; Notable for the following components:    POCT Glucose 133 (*)     All other components within normal limits   ALCOHOL,MEDICAL (ETHANOL)   TROPONIN I   DRUG SCREEN PANEL, URINE EMERGENCY    Narrative:     Preferred Collection Type->Urine, Clean Catch  Specimen Source->Urine  Collection Type->Urine, Clean Catch   SARS-COV-2 RNA AMPLIFICATION, QUAL    Narrative:     Is the patient symptomatic?->No     EKG Readings: (Independently Interpreted)   Initial Reading: No STEMI. Rhythm: Normal Sinus Rhythm. Heart Rate: 56. Ectopy: No Ectopy. Conduction: Normal. Axis: Normal.       Imaging Results          X-Ray Chest AP Portable (In process)               X-Rays:   Independently Interpreted Readings:   Chest X-Ray: Normal heart size.  No infiltrates.  No acute abnormalities.     Medications - No data to display  Medical Decision Making:   ED Management:  All findings were reviewed with the patient/family in detail.  I see no indication of an emergent process beyond that addressed during our encounter but have duly counseled the patient/family regarding the need  for prompt follow-up as well as the indications that should prompt immediate return to the emergency room should new or worrisome developments occur.  The patient has additionally been provided with printed information regarding diagnosis as well as instructions regarding follow up and any medications intended to manage the patient's aforementioned conditions.  The patient/family communicates understanding of all this information and all remaining questions and concerns were addressed at this time.                            Clinical Impression:   Final diagnoses:  [R53.1] Generalized weakness          ED Disposition Condition    Discharge Stable        ED Prescriptions     None        Follow-up Information     Follow up With Specialties Details Why Contact Kelvin Barker MD Family Medicine Schedule an appointment as soon as possible for a visit  for reassessment [address]  [facility] [address]  [phone]      [facility] - Emergency Dept Emergency Medicine Go to  As needed, If symptoms worsen [address] [facility] [address] [phone]           Urban Alvarez MD  02/10/22 0343

## 2022-02-10 NOTE — ED TRIAGE NOTES
Pt states that approx 30 min ago, he got out of bed and had blurred vision in both eyes and generalized weakness. Pt says the blurred vision lasted approx 2-3 min and has since resolved. Pt denies slurred speech, facial droop, unilateral weakness/numbness, headache, chest pain. Pt is aaox4

## 2022-02-17 ENCOUNTER — HOSPITAL ENCOUNTER (EMERGENCY)
Facility: HOSPITAL | Age: 72
Discharge: HOME | End: 2022-02-17
Attending: EMERGENCY MEDICINE
Payer: MEDICARE

## 2022-02-17 VITALS
BODY MASS INDEX: 31.5 KG/M2 | TEMPERATURE: 98 F | DIASTOLIC BLOOD PRESSURE: 79 MMHG | RESPIRATION RATE: 26 BRPM | SYSTOLIC BLOOD PRESSURE: 142 MMHG | HEART RATE: 53 BPM | HEIGHT: 70 IN | WEIGHT: 220 LBS | OXYGEN SATURATION: 97 %

## 2022-02-17 DIAGNOSIS — R00.2 PALPITATIONS: Primary | ICD-10-CM

## 2022-02-17 DIAGNOSIS — R07.9 CHEST PAIN: ICD-10-CM

## 2022-02-17 LAB
ALBUMIN SERPL BCP-MCNC: 4 G/DL (ref 3.5–5.2)
ALP SERPL-CCNC: 113 U/L (ref 55–135)
ALT SERPL W/O P-5'-P-CCNC: 14 U/L (ref 10–44)
ANION GAP SERPL CALC-SCNC: 10 MMOL/L (ref 8–16)
AST SERPL-CCNC: 20 U/L (ref 10–40)
BASOPHILS # BLD AUTO: 0.04 K/UL (ref 0–0.2)
BASOPHILS NFR BLD: 0.8 % (ref 0–1.9)
BILIRUB SERPL-MCNC: 0.6 MG/DL (ref 0.1–1)
BNP SERPL-MCNC: <10 PG/ML (ref 0–99)
BUN SERPL-MCNC: 20 MG/DL (ref 8–23)
CALCIUM SERPL-MCNC: 9.4 MG/DL (ref 8.7–10.5)
CHLORIDE SERPL-SCNC: 100 MMOL/L (ref 95–110)
CO2 SERPL-SCNC: 29 MMOL/L (ref 23–29)
CREAT SERPL-MCNC: 1 MG/DL (ref 0.5–1.4)
DIFFERENTIAL METHOD: ABNORMAL
EOSINOPHIL # BLD AUTO: 0.2 K/UL (ref 0–0.5)
EOSINOPHIL NFR BLD: 4.4 % (ref 0–8)
ERYTHROCYTE [DISTWIDTH] IN BLOOD BY AUTOMATED COUNT: 13.8 % (ref 11.5–14.5)
EST. GFR  (AFRICAN AMERICAN): >60 ML/MIN/1.73 M^2
EST. GFR  (NON AFRICAN AMERICAN): >60 ML/MIN/1.73 M^2
GLUCOSE SERPL-MCNC: 97 MG/DL (ref 70–110)
HCT VFR BLD AUTO: 46.9 % (ref 40–54)
HGB BLD-MCNC: 14.9 G/DL (ref 14–18)
IMM GRANULOCYTES # BLD AUTO: 0.02 K/UL (ref 0–0.04)
IMM GRANULOCYTES NFR BLD AUTO: 0.4 % (ref 0–0.5)
LYMPHOCYTES # BLD AUTO: 1.9 K/UL (ref 1–4.8)
LYMPHOCYTES NFR BLD: 37.5 % (ref 18–48)
MCH RBC QN AUTO: 27.6 PG (ref 27–31)
MCHC RBC AUTO-ENTMCNC: 31.8 G/DL (ref 32–36)
MCV RBC AUTO: 87 FL (ref 82–98)
MONOCYTES # BLD AUTO: 0.6 K/UL (ref 0.3–1)
MONOCYTES NFR BLD: 11.2 % (ref 4–15)
NEUTROPHILS # BLD AUTO: 2.4 K/UL (ref 1.8–7.7)
NEUTROPHILS NFR BLD: 45.7 % (ref 38–73)
NRBC BLD-RTO: 0 /100 WBC
PLATELET # BLD AUTO: 191 K/UL (ref 150–450)
PMV BLD AUTO: 11.1 FL (ref 9.2–12.9)
POTASSIUM SERPL-SCNC: 3.8 MMOL/L (ref 3.5–5.1)
PROT SERPL-MCNC: 7.9 G/DL (ref 6–8.4)
RBC # BLD AUTO: 5.39 M/UL (ref 4.6–6.2)
SODIUM SERPL-SCNC: 139 MMOL/L (ref 136–145)
TROPONIN I SERPL DL<=0.01 NG/ML-MCNC: 0.01 NG/ML (ref 0–0.03)
TSH SERPL DL<=0.005 MIU/L-ACNC: 1.77 UIU/ML (ref 0.4–4)
WBC # BLD AUTO: 5.18 K/UL (ref 3.9–12.7)

## 2022-02-17 PROCEDURE — 99285 EMERGENCY DEPT VISIT HI MDM: CPT | Mod: 25

## 2022-02-17 PROCEDURE — 93010 EKG 12-LEAD: ICD-10-PCS | Mod: ,,, | Performed by: INTERNAL MEDICINE

## 2022-02-17 PROCEDURE — 80053 COMPREHEN METABOLIC PANEL: CPT | Performed by: NURSE PRACTITIONER

## 2022-02-17 PROCEDURE — 85025 COMPLETE CBC W/AUTO DIFF WBC: CPT | Performed by: NURSE PRACTITIONER

## 2022-02-17 PROCEDURE — 93010 ELECTROCARDIOGRAM REPORT: CPT | Mod: ,,, | Performed by: INTERNAL MEDICINE

## 2022-02-17 PROCEDURE — 93005 ELECTROCARDIOGRAM TRACING: CPT

## 2022-02-17 PROCEDURE — 84484 ASSAY OF TROPONIN QUANT: CPT | Performed by: NURSE PRACTITIONER

## 2022-02-17 PROCEDURE — 84443 ASSAY THYROID STIM HORMONE: CPT | Performed by: NURSE PRACTITIONER

## 2022-02-17 PROCEDURE — 83880 ASSAY OF NATRIURETIC PEPTIDE: CPT | Performed by: NURSE PRACTITIONER

## 2022-02-17 RX ORDER — ASPIRIN 325 MG
325 TABLET ORAL
Status: DISCONTINUED | OUTPATIENT
Start: 2022-02-17 | End: 2022-02-17

## 2022-02-18 NOTE — ED NOTES
Pt presents with c/o chest pressure, palpitations, SOB, and tingling to face.  Patient identifiers verified by spelling and stated name on armband along with .     Review of patient's allergies indicates:   Allergen Reactions    Penicillins Palpitations and Anxiety        APPEARANCE: Alert, oriented and in no acute distress.  CARDIAC: Normal rate and rhythm, no murmur heard.   PERIPHERAL VASCULAR: peripheral pulses present. Normal cap refill. No edema. Warm to touch.    RESPIRATORY:Normal rate and effort, breath sounds clear bilaterally throughout chest. Respirations are equal and unlabored no obvious signs of distress.  GASTRO: soft, bowel sounds normal, no tenderness, no abdominal distention.  MUSC: Full ROM. No bony tenderness or soft tissue tenderness. No obvious deformity.  SKIN: Skin is warm and dry, normal skin turgor, mucous membranes moist.  NEURO: 5/5 strength major flexors/extensors bilaterally. Sensory intact to light touch bilaterally. Buford coma scale: eyes open spontaneously-4, oriented & converses-5, obeys commands-6. No neurological abnormalities.   MENTAL STATUS: awake, alert and aware of environment.  EYE: PERRL, both eyes: pupils brisk and reactive to light. Normal size.  ENT: EARS: no obvious drainage. NOSE: no active bleeding.     Patient verbalized understanding of status and plan of care. Patient changed into hospital gown.   Patient side rails are up x 2, bed is low and locked, call light is in reach   Cardiac monitor (alarms on, set, and audible), pulse oximeter, and automatic blood pressure cuff applied.   Will continue to monitor.

## 2022-02-18 NOTE — ED PROVIDER NOTES
Encounter Date: 2/17/2022       History     Chief Complaint   Patient presents with    Chest Pain     Chest pressure and palpitations since this am. Associated with tingling to face and sob.     71-year-old male history of hypertension presents for palpitations and chest pressure that began early this morning.  No shortness of breath.  No nausea vomiting.  No diaphoresis.  Patient has a history of DVT currently anticoagulated with Eliquis    The history is provided by the patient.     Review of patient's allergies indicates:   Allergen Reactions    Penicillins Palpitations and Anxiety     Past Medical History:   Diagnosis Date    Back pain, chronic     BPH (benign prostatic hyperplasia)     Hypertension     Mitral valve prolapse syndrome     Prostate enlargement      Past Surgical History:   Procedure Laterality Date    NECK SURGERY       Family History   Problem Relation Age of Onset    Hypertension Mother     Stroke Father      Social History     Tobacco Use    Smoking status: Former Smoker    Smokeless tobacco: Never Used    Tobacco comment: >40 years ago   Substance Use Topics    Alcohol use: No    Drug use: No     Review of Systems   Constitutional: Negative for chills and fever.   HENT: Negative for congestion, rhinorrhea and sore throat.    Eyes: Negative for visual disturbance.   Respiratory: Negative for cough and shortness of breath.    Cardiovascular: Positive for palpitations. Negative for chest pain.   Gastrointestinal: Negative for abdominal pain, diarrhea, nausea and vomiting.   Genitourinary: Negative for dysuria and hematuria.   Musculoskeletal: Negative for back pain and myalgias.   Skin: Negative for pallor and rash.   Neurological: Negative for dizziness and weakness.   All other systems reviewed and are negative.      Physical Exam     Initial Vitals [02/17/22 1723]   BP Pulse Resp Temp SpO2   (!) 151/76 65 18 99.2 °F (37.3 °C) 96 %      MAP       --         Physical  Exam    Nursing note and vitals reviewed.  Constitutional: He appears well-developed and well-nourished. No distress.   HENT:   Head: Normocephalic and atraumatic.   Mouth/Throat: Oropharynx is clear and moist.   Eyes: Conjunctivae and EOM are normal. Pupils are equal, round, and reactive to light.   Neck: Neck supple.   Normal range of motion.  Cardiovascular: Normal rate, regular rhythm and intact distal pulses.   Distal pulses equal bilaterally   Pulmonary/Chest: No stridor. No respiratory distress.   Abdominal: Abdomen is soft. He exhibits no distension. There is no abdominal tenderness.   Musculoskeletal:         General: Normal range of motion.      Cervical back: Normal range of motion and neck supple.      Comments: Moving all extremities with grossly normal strength     Neurological: He is alert and oriented to person, place, and time.   CN 2-12 appear grossly intact   Skin: Skin is warm and dry.   Psychiatric: He has a normal mood and affect.         ED Course   Procedures  Labs Reviewed   CBC W/ AUTO DIFFERENTIAL - Abnormal; Notable for the following components:       Result Value    MCHC 31.8 (*)     All other components within normal limits   COMPREHENSIVE METABOLIC PANEL   TROPONIN I   B-TYPE NATRIURETIC PEPTIDE   TSH     EKG Readings: (Independently Interpreted)   Initial Reading: No STEMI. Rhythm: Normal Sinus Rhythm. Heart Rate: 65. Ectopy: No Ectopy. Conduction: Normal. ST Segments: Normal ST Segments. T Waves: Normal. Axis: Left Axis Deviation. Clinical Impression: Normal Sinus Rhythm       Imaging Results          X-Ray Chest AP Portable (Final result)  Result time 02/17/22 19:40:55    Final result by Jorge Bradford MD (02/17/22 19:40:55)                 Impression:      No convincing evidence of acute chest disease since February 10th.      Electronically signed by: Jorge Bradford  Date:    02/17/2022  Time:    19:40             Narrative:    EXAMINATION:  XR CHEST AP PORTABLE    CLINICAL  HISTORY:  Chest Pain;    TECHNIQUE:  Single frontal view of the chest was performed.    COMPARISON:  02/10/2022    FINDINGS:  Unlike the heart mediastinal contours appear normal in size and configuration the trachea midline.  ACDF plate is noted in the lower cervical level.  The lungs are clear.  Bony structures are intact.                                 Medications - No data to display  Medical Decision Making:   History:   Old Medical Records: I decided to obtain old medical records.  Initial Assessment:   Well-appearing nontoxic normal vitals  Differential Diagnosis:   Palpitations, rule out arrhythmia, ACS  Independently Interpreted Test(s):   I have ordered and independently interpreted EKG Reading(s) - see prior notes  Clinical Tests:   Lab Tests: Ordered and Reviewed  The following lab test(s) were unremarkable: CBC, CMP and Troponin  Radiological Study: Ordered and Reviewed  Medical Tests: Ordered and Reviewed    Additional MDM:   Heart Score:    History:          Slightly suspicious.  ECG:             Normal  Age:               >65 years  Risk factors: 1-2 risk factors  Troponin:       Less than or equal to normal limit  Final Score: 3                       Clinical Impression:   Final diagnoses:  [R07.9] Chest pain  [R00.2] Palpitations (Primary)          ED Disposition Condition    Discharge Stable        ED Prescriptions     None        Follow-up Information     Follow up With Specialties Details Why Contact Info    SONIA Barker MD Family Medicine Schedule an appointment as soon as possible for a visit in 3 days  [address] [address] [address]  [phone]      Sukhjinder Diamond MD Cardiology Schedule an appointment as soon as possible for a visit in 3 days  [address]  [place] [address]  [phone]             Chris Bedoya,   02/17/22 5837

## 2022-02-18 NOTE — FIRST PROVIDER EVALUATION
" Emergency Department TeleTriage Encounter Note      CHIEF COMPLAINT    Chief Complaint   Patient presents with    Chest Pain     Chest pressure and palpitations since this am. Associated with tingling to face and sob.       VITAL SIGNS   Initial Vitals [02/17/22 1723]   BP Pulse Resp Temp SpO2   (!) 151/76 65 18 99.2 °F (37.3 °C) 96 %      MAP       --            ALLERGIES    Review of patient's allergies indicates:   Allergen Reactions    Penicillins Palpitations and Anxiety       PROVIDER TRIAGE NOTE  This is a teletriage evaluation of a 71 y.o. male presenting to the ED complaining of CP with associated SOB and palpitations since around 0900 today.   Reports "tingling" in his face.  Has taken his eliquis today.     Initial orders will be placed and care will be transferred to an alternate provider when patient is roomed for a full evaluation. Any additional orders and the final disposition will be determined by that provider.           ORDERS  Labs Reviewed   CBC W/ AUTO DIFFERENTIAL   COMPREHENSIVE METABOLIC PANEL   TROPONIN I   TROPONIN I   B-TYPE NATRIURETIC PEPTIDE   TSH       ED Orders (720h ago, onward)    Start Ordered     Status Ordering Provider    02/17/22 2153 02/17/22 1853  Troponin I #2  Once Timed         Ordered GERARDO FLOOD N.    02/17/22 1900 02/17/22 1853  aspirin tablet 325 mg  ED 1 Time         Ordered GERARDO FLOOD N.    02/17/22 1854 02/17/22 1853  X-Ray Chest AP Portable  1 time imaging         Ordered GERARDO FLOOD N.    02/17/22 1854 02/17/22 1853  TSH  STAT         Ordered GERARDO FLOOD N.    02/17/22 1853 02/17/22 1853  Vital signs  Every 15 min         Ordered GERARDO FLOOD N.    02/17/22 1853 02/17/22 1853  Cardiac Monitoring - Adult  Continuous        Comments: Notify Physician If:    Ordered GERARDO FLOOD N.    02/17/22 1853 02/17/22 1853  Pulse Oximetry Continuous  Continuous         Ordered GERARDO FLOOD N.    " 02/17/22 1853 02/17/22 1853  Diet NPO  Diet effective now         Ordered GERARDO FLOOD N.    02/17/22 1853 02/17/22 1853  Saline lock IV  Once         Ordered GERARDO FLOOD N.    02/17/22 1853 02/17/22 1853  EKG 12-lead  Once        Comments: Do not perform if previously done during this visit/ triage    Ordered GERARDO FLOOD N.    02/17/22 1853 02/17/22 1853  CBC auto differential  STAT         Ordered GERARDO FLOOD N.    02/17/22 1853 02/17/22 1853  Comprehensive metabolic panel  STAT         Ordered GERARDO FLOOD N.    02/17/22 1853 02/17/22 1853  Troponin I #1  STAT         Ordered GERARDO FLOOD N.    02/17/22 1853 02/17/22 1853  BNP  STAT         Ordered GERARDO FLOOD            Virtual Visit Note: The provider triage portion of this emergency department evaluation and documentation was performed via [employer], a HIPAA-compliant telemedicine application, in concert with a tele-presenter in the room. A face to face patient evaluation with one of my colleagues will occur once the patient is placed in an emergency department room.      DISCLAIMER: This note was prepared with [employer] voice recognition transcription software. Garbled syntax, mangled pronouns, and other bizarre constructions may be attributed to that software system.

## 2022-04-28 ENCOUNTER — HOSPITAL ENCOUNTER (EMERGENCY)
Facility: HOSPITAL | Age: 72
Discharge: HOME | End: 2022-04-29
Attending: EMERGENCY MEDICINE
Payer: MEDICARE

## 2022-04-28 DIAGNOSIS — K21.9 GASTROESOPHAGEAL REFLUX DISEASE, UNSPECIFIED WHETHER ESOPHAGITIS PRESENT: ICD-10-CM

## 2022-04-28 DIAGNOSIS — R00.2 PALPITATIONS: Primary | ICD-10-CM

## 2022-04-28 DIAGNOSIS — F41.9 ANXIETY: ICD-10-CM

## 2022-04-28 PROCEDURE — 93005 ELECTROCARDIOGRAM TRACING: CPT | Mod: ER

## 2022-04-28 PROCEDURE — 93010 EKG 12-LEAD: ICD-10-PCS | Mod: ,,, | Performed by: INTERNAL MEDICINE

## 2022-04-28 PROCEDURE — 93010 ELECTROCARDIOGRAM REPORT: CPT | Mod: ,,, | Performed by: INTERNAL MEDICINE

## 2022-04-28 PROCEDURE — 99284 EMERGENCY DEPT VISIT MOD MDM: CPT | Mod: 25,ER

## 2022-04-29 VITALS
DIASTOLIC BLOOD PRESSURE: 64 MMHG | SYSTOLIC BLOOD PRESSURE: 111 MMHG | RESPIRATION RATE: 19 BRPM | OXYGEN SATURATION: 99 % | WEIGHT: 220 LBS | HEART RATE: 51 BPM | HEIGHT: 70 IN | TEMPERATURE: 99 F | BODY MASS INDEX: 31.5 KG/M2

## 2022-04-29 PROCEDURE — 25000003 PHARM REV CODE 250: Mod: ER | Performed by: EMERGENCY MEDICINE

## 2022-04-29 RX ORDER — MAG HYDROX/ALUMINUM HYD/SIMETH 200-200-20
30 SUSPENSION, ORAL (FINAL DOSE FORM) ORAL ONCE
Status: COMPLETED | OUTPATIENT
Start: 2022-04-29 | End: 2022-04-29

## 2022-04-29 RX ORDER — FAMOTIDINE 20 MG/1
20 TABLET, FILM COATED ORAL 2 TIMES DAILY
Qty: 20 TABLET | Refills: 0 | Status: ON HOLD | OUTPATIENT
Start: 2022-04-29 | End: 2022-09-02 | Stop reason: SDUPTHER

## 2022-04-29 RX ORDER — PANTOPRAZOLE SODIUM 40 MG/1
40 TABLET, DELAYED RELEASE ORAL
Status: COMPLETED | OUTPATIENT
Start: 2022-04-29 | End: 2022-04-29

## 2022-04-29 RX ORDER — FAMOTIDINE 20 MG/1
20 TABLET, FILM COATED ORAL
Status: COMPLETED | OUTPATIENT
Start: 2022-04-29 | End: 2022-04-29

## 2022-04-29 RX ORDER — PANTOPRAZOLE SODIUM 20 MG/1
40 TABLET, DELAYED RELEASE ORAL DAILY
Qty: 60 TABLET | Refills: 0 | Status: ON HOLD | OUTPATIENT
Start: 2022-04-29 | End: 2022-09-02 | Stop reason: HOSPADM

## 2022-04-29 RX ORDER — LIDOCAINE HYDROCHLORIDE 20 MG/ML
10 SOLUTION OROPHARYNGEAL ONCE
Status: COMPLETED | OUTPATIENT
Start: 2022-04-29 | End: 2022-04-29

## 2022-04-29 RX ADMIN — ALUMINUM HYDROXIDE, MAGNESIUM HYDROXIDE, AND SIMETHICONE 30 ML: 200; 200; 20 SUSPENSION ORAL at 12:04

## 2022-04-29 RX ADMIN — FAMOTIDINE 20 MG: 20 TABLET ORAL at 12:04

## 2022-04-29 RX ADMIN — LIDOCAINE HYDROCHLORIDE 10 ML: 20 SOLUTION ORAL; TOPICAL at 12:04

## 2022-04-29 RX ADMIN — PANTOPRAZOLE SODIUM 40 MG: 40 TABLET, DELAYED RELEASE ORAL at 12:04

## 2022-04-29 NOTE — ED PROVIDER NOTES
Chief Complaint  Chief Complaint   Patient presents with    Anxiety     Pt states tonight he was watching tv and became jittery and shakey. Checked his blood pressure and it was elevated. Denies any dizziness chest pain or blurry vision.   Took a ativan and has had some relief.       HPI  Jerardo Callejas is a 72 y.o. male who presents with brief episode of shakiness.  The patient describes as both arms felt jittery briefly.  It came and went spontaneously.  He checked his blood pressure while it happened or briefly after and it was elevated and this made him nervous.  He reports some brief palpitations.  He reports feeling this many times.  He was seen approximately 17 times in the ER in 2021 for similar complaints.  he had a stress test in 2021.  He reports the symptoms are similar to what he had previously.  He took an Ativan and he has felt some relief before arrival to the ER.  He is concerned because he does not know what causes his jitteriness    Past medical history  Past Medical History:   Diagnosis Date    Back pain, chronic     BPH (benign prostatic hyperplasia)     Hypertension     Mitral valve prolapse syndrome     Prostate enlargement        Current Medications  No current facility-administered medications for this encounter.    Current Outpatient Medications:     albuterol (PROVENTIL/VENTOLIN HFA) 90 mcg/actuation inhaler, Inhale 2 puffs into the lungs every 4 (four) hours as needed for Wheezing or Shortness of Breath. Rescue, Disp: 6.7 g, Rfl: 0    ELIQUIS 5 mg Tab, Take 5 mg by mouth 2 (two) times daily., Disp: , Rfl:     famotidine (PEPCID) 20 MG tablet, Take 1 tablet (20 mg total) by mouth 2 (two) times daily., Disp: 20 tablet, Rfl: 0    furosemide (LASIX) 20 MG tablet, Take 20 mg by mouth every morning., Disp: , Rfl:     gabapentin (NEURONTIN) 300 MG capsule, Take 300 mg by mouth once daily. Only takes at night., Disp: , Rfl:     losartan (COZAAR) 25 MG tablet, Take 12.5 mg by  "mouth once daily., Disp: , Rfl:     losartan-hydrochlorothiazide 50-12.5 mg (HYZAAR) 50-12.5 mg per tablet, Take 1 tablet by mouth every morning., Disp: , Rfl:     metoprolol succinate (TOPROL-XL) 25 MG 24 hr tablet, Take 12.5 mg by mouth once daily., Disp: , Rfl:     pantoprazole (PROTONIX) 20 MG tablet, Take 2 tablets (40 mg total) by mouth once daily., Disp: 60 tablet, Rfl: 0    potassium chloride (MICRO-K) 10 MEQ CpSR, Take 10 mEq by mouth once daily., Disp: , Rfl:     tamsulosin (FLOMAX) 0.4 mg Cp24, Take 0.4 mg by mouth once daily., Disp: , Rfl:     Allergies  Review of patient's allergies indicates:   Allergen Reactions    Penicillins Palpitations and Anxiety       Surgical history  Past Surgical History:   Procedure Laterality Date    NECK SURGERY         Social history  Social History     Socioeconomic History    Marital status: Legally    Tobacco Use    Smoking status: Former Smoker    Smokeless tobacco: Never Used    Tobacco comment: >40 years ago   Substance and Sexual Activity    Alcohol use: No    Drug use: No    Sexual activity: Not Currently     Partners: Female   Social History Narrative    ** Merged History Encounter **            Family History  Family History   Problem Relation Age of Onset    Hypertension Mother     Stroke Father        Review of systems  Skin: No rash, abscess, or laceration.  Neurologic: No new focal weakness or sensory changes.  All systems otherwise negative except as noted in ROS and HPI    Physical Exam  Vital signs: /64   Pulse (!) 51   Temp 98.6 °F (37 °C)   Resp 19   Ht 5' 10" (1.778 m)   Wt 99.8 kg (220 lb)   SpO2 99%   BMI 31.57 kg/m²   Constitutional: No acute distress.  Well developed, alert, oriented and appropriate.  HENT: Normocephalic, atraumatic. Normal ear, nose, and throat.  Eyes: PERRL, EOMI, normal conjunctiva.  Neck: Normal range of motion, no tenderness; supple.  Respiratory: Nonlabored breathing with normal breath " sounds.  Cardiovascular: RRR with no pulse deficit.  GI: Soft, nontender, no rebound or guarding.  Musculoskeletal: Normal ROM, no tenderness, injury, or edema.  Skin: Warm, dry skin without infection or injury.  Neurologic: Normal motor, sensation with no new focal deficit.  Psychiatric: Affect normal, judgement normal, mood is anxious.  No SI, HI, and not gravely disabled.    Labs  Pertinent labs reviewed (see chart for details)  Labs Reviewed - No data to display    ECG  Results for orders placed or performed during the hospital encounter of 02/17/22   EKG 12-lead    Collection Time: 02/17/22  5:21 PM    Narrative    Test Reason : R07.9,    Vent. Rate : 065 BPM     Atrial Rate : 065 BPM     P-R Int : 166 ms          QRS Dur : 088 ms      QT Int : 416 ms       P-R-T Axes : 053 -41 038 degrees     QTc Int : 432 ms    Normal sinus rhythm  Left axis deviation  Abnormal ECG  When compared with ECG of 10-FEB-2022 00:26,  No significant change was found  Confirmed by Danielle Vang MD ([id number]) on 2/18/2022 2:00:43 PM    Referred By: [email or web address]   SELF           Confirmed By:Danielle Vang MD     ECG interpreted by ED MD    Radiology  No orders to display       Procedures    Procedures    Medications   famotidine tablet 20 mg (20 mg Oral Given 4/29/22 0017)   aluminum-magnesium hydroxide-simethicone 200-200-20 mg/5 mL suspension 30 mL (30 mLs Oral Given 4/29/22 0017)     And   LIDOcaine HCl 2% oral solution 10 mL (10 mLs Oral Given 4/29/22 0017)   pantoprazole EC tablet 40 mg (40 mg Oral Given 4/29/22 0017)       ED course    ED Course as of 04/29/22 0142   u Apr 28, 2022   2330 EKG shows sinus bradycardia at rate of 52 beats per minute with no ST elevation MI.  Normal QTC [MB]   Fri Apr 29, 2022   0055 Pt feels improved and wishes to go home at this time [MB]      ED Course User Index  [MB] Shay Méndez MD         ED management:  Long discussion with the patient.  He feels improved and wishes to go home.  We discussed  other options including further observation or further testing.  He feels comfortable and happy with the plan and will follow up outpatient with his primary doctor.      Disposition    Patient discharged in stable condition      Final impression  1. Palpitations    2. Gastroesophageal reflux disease, unspecified whether esophagitis present    3. Anxiety        Critical care time spent with this patient was 0 minutes excluding the procedure time.              Shay Méndez MD  04/29/22 0144

## 2022-04-29 NOTE — ED NOTES
Physical Assessment    LOC: The patient is awake, alert and aware of environment with an appropriate affect, the patient is oriented x 3 and speaking appropriately.     Psych: Patient is calm and cooperative with good eye contact.    APPEARANCE: Patient is clean and non toxic appearing    NEUROLOGIC:  DONALD, Follows commands without difficulty. Speech is clear. No neuro deficits observed.    HEENT: Denies HEENT complaint or injury, moist mucus membranes     RESPIRATORY: Airway is open and patent, respirations are spontaneous; patient has a normal effort and rate. Bilateral breath sounds are clear. Pink nailbeds.     CARDIAC: Patient has a normal rate and rhythm, no peripheral edema noted, capillary refill < 2 seconds. PULSES are symmetrical in all extremities - C/O chest pressure    GI/ : Soft and non tender to palpation, no distention noted.     MUSCULOSKELETAL:  Normal range of motion noted. Moves all extremities well, no c/o pain, no deformity noted.    SKIN: The skin is warm, dry and intact. Patient has normal skin turgor. No rashes or lesions. No Breakdown noted.

## 2022-04-29 NOTE — DISCHARGE INSTRUCTIONS
Please avoid caffeine, sodas, chocolate, alcohol, and cigarettes. These things can irritate the stomach and also cause reflux.  Juices and sport drinks and tomato sauces are common acids to avoid as well if your stomach is hurting.  Drink plenty of plain water.

## 2022-04-29 NOTE — ED NOTES
Provided pt a pitcher of water and placed on bedside table,  Pt sitting on ER stretcher, bed low and locked, SR up  Call bell in reach  Concerns and needs met at this time.  Will continue to monitor

## 2022-07-12 ENCOUNTER — HOSPITAL ENCOUNTER (EMERGENCY)
Facility: HOSPITAL | Age: 72
Discharge: HOME | End: 2022-07-12
Attending: EMERGENCY MEDICINE
Payer: MEDICARE

## 2022-07-12 VITALS
HEART RATE: 74 BPM | HEIGHT: 70 IN | SYSTOLIC BLOOD PRESSURE: 122 MMHG | DIASTOLIC BLOOD PRESSURE: 69 MMHG | TEMPERATURE: 98 F | RESPIRATION RATE: 18 BRPM | BODY MASS INDEX: 32.21 KG/M2 | WEIGHT: 225 LBS | OXYGEN SATURATION: 97 %

## 2022-07-12 DIAGNOSIS — N63.0 BREAST MASS: Primary | ICD-10-CM

## 2022-07-12 PROCEDURE — 99282 PR EMERGENCY DEPT VISIT,LEVEL II: ICD-10-PCS | Mod: ,,, | Performed by: EMERGENCY MEDICINE

## 2022-07-12 PROCEDURE — 99281 EMR DPT VST MAYX REQ PHY/QHP: CPT

## 2022-07-12 PROCEDURE — 99282 EMERGENCY DEPT VISIT SF MDM: CPT | Mod: ,,, | Performed by: EMERGENCY MEDICINE

## 2022-07-12 NOTE — ED TRIAGE NOTES
Patient presents to the ER with complaints of having a bump on his left breast that is progressively gotten worse over a month. Patient states it is painful to touch and itches a little.

## 2022-07-12 NOTE — ED PROVIDER NOTES
Encounter Date: 7/12/2022    SCRIBE #1 NOTE: I, Alba Santo, am scribing for, and in the presence of,  Oli Holguin MD. I have scribed the following portions of the note - Other sections scribed: HPI ROS PE.       History     Chief Complaint   Patient presents with    Breast Problem     Lump to L breast and painful for over month     Time patient was seen by the provider: 11:45 AM      The patient is a 72 y.o. male with past medical history of BPH, prostate enlargement, HTN, MVP syndrome, who presents to the ED with a complaint of left breast mass and pain onset over one month ago. The patient reports on his left breast there has been an area of a mass forming behind the nipple that has progressively worsened in the last month. He states there is pain to that area that worsens with touch. States that the area is itchy. No nipple discharge, recent weight loss or weight gain. No family breast cancer history. Denies nausea, vomiting, diarrhea, fever, cough, SOB, chest pain, abdominal pain, dysuria. A ten point review of systems was completed and is negative except as documented above. Patient denies any other acute medical complaint. The patients available PMH, PSH, Social History, medications, allergies, and triage vital signs were reviewed just prior to their medical evaluation.  Patient had US at OSH.    The history is provided by the patient and medical records. No  was used.     Review of patient's allergies indicates:   Allergen Reactions    Penicillins Palpitations and Anxiety     Past Medical History:   Diagnosis Date    Back pain, chronic     BPH (benign prostatic hyperplasia)     Hypertension     Mitral valve prolapse syndrome     Prostate enlargement      Past Surgical History:   Procedure Laterality Date    NECK SURGERY       Family History   Problem Relation Age of Onset    Hypertension Mother     Stroke Father      Social History     Tobacco Use    Smoking  status: Former Smoker    Smokeless tobacco: Never Used    Tobacco comment: >40 years ago   Substance Use Topics    Alcohol use: No    Drug use: No     Review of Systems   Constitutional: Negative for fever and unexpected weight change.   HENT: Negative for sore throat.    Eyes: Negative for visual disturbance.   Respiratory: Negative for cough and shortness of breath.    Cardiovascular: Negative for chest pain.   Gastrointestinal: Negative for abdominal pain, diarrhea, nausea and vomiting.   Genitourinary: Negative for dysuria.   Musculoskeletal: Negative for neck pain.        +Left breast mass  +Left breast pain   Skin: Negative for rash and wound.   Allergic/Immunologic: Negative for immunocompromised state.   Neurological: Negative for syncope.   Psychiatric/Behavioral: Negative for confusion.       Physical Exam     Initial Vitals [07/12/22 1046]   BP Pulse Resp Temp SpO2   122/69 74 18 98.4 °F (36.9 °C) 97 %      MAP       --         Physical Exam    Nursing note and vitals reviewed.  Constitutional: He appears well-developed and well-nourished. He is not diaphoretic. No distress.   HENT:   Head: Normocephalic and atraumatic.   Nose: Nose normal.   Eyes: Conjunctivae are normal. Right eye exhibits no discharge. Left eye exhibits no discharge.   Neck: Neck supple.   Normal range of motion.  Cardiovascular: Normal rate, regular rhythm and normal heart sounds. Exam reveals no gallop and no friction rub.    No murmur heard.  Pulmonary/Chest: Breath sounds normal. No respiratory distress. He has no wheezes. He has no rhonchi. He has no rales. Left breast exhibits mass. Left breast exhibits no nipple discharge.   Left breast has palpable mass, associated gynecomastia.   Abdominal: Abdomen is soft. He exhibits no distension. There is no abdominal tenderness. There is no rebound and no guarding.   Musculoskeletal:         General: No tenderness or edema. Normal range of motion.      Cervical back: Normal range of  motion and neck supple.     Neurological: He is alert and oriented to person, place, and time. GCS score is 15. GCS eye subscore is 4. GCS verbal subscore is 5. GCS motor subscore is 6.   Skin: Skin is warm and dry. No rash noted. No erythema.   Psychiatric: He has a normal mood and affect. His behavior is normal. Judgment and thought content normal.         ED Course   Procedures  Labs Reviewed - No data to display       Imaging Results    None          Medications - No data to display  Medical Decision Making:   History:   Old Medical Records: I decided to obtain old medical records.  Old Records Summarized: records from clinic visits.       <> Summary of Records: US unavailable in our system  ED Management:  72-year-old male presents with left breast mass.  Vitals normal.  Physical exam otherwise benign.  No indication for emergent labs or imaging.  Referred for breast surgery evaluation.  He will go outside hospital and get copy of his ultrasound report.  Patient will return to ED for worsening symptoms, inability to eat/drink, fever greater than 100.4, or any other concerns.  Did bedside teaching with return precautions.  All questions answered.  The patient acknowledges understanding.  Gave verbal discharge instructions.          Scribe Attestation:   Scribe #1: I performed the above scribed service and the documentation accurately describes the services I performed. I attest to the accuracy of the note.                 Clinical Impression:   Final diagnoses:  [N63.0] Breast mass (Primary)          ED Disposition Condition    Discharge Stable        ED Prescriptions     None        Follow-up Information     Follow up With Specialties Details Why Contact Info Additional Information    [facility]   [address] [facility] [address] [phone] All patients head to [address] [facility].  Please park in [place] [address] surface [facility] or [facility] and enter  through [facility] entrance, East side facing the parking garage    [facility] - Emergency Dept Emergency Medicine  Return to ED for worsening symptoms, inability to eat/drink, fever greater than 100.4, or any other concerns. [address]  [facility] [address]  [phone]            Oli Holguin MD  07/14/22 0811

## 2022-07-13 ENCOUNTER — TELEPHONE (OUTPATIENT)
Dept: SURGERY | Facility: CLINIC | Age: 72
End: 2022-07-13
Payer: MEDICARE

## 2022-07-29 ENCOUNTER — OFFICE VISIT (OUTPATIENT)
Dept: SURGERY | Facility: CLINIC | Age: 72
End: 2022-07-29
Payer: MEDICARE

## 2022-07-29 VITALS
WEIGHT: 225 LBS | DIASTOLIC BLOOD PRESSURE: 73 MMHG | BODY MASS INDEX: 32.21 KG/M2 | HEART RATE: 56 BPM | HEIGHT: 70 IN | SYSTOLIC BLOOD PRESSURE: 162 MMHG

## 2022-07-29 DIAGNOSIS — N62 GYNECOMASTIA, MALE: Primary | ICD-10-CM

## 2022-07-29 DIAGNOSIS — Z12.39 ENCOUNTER FOR SCREENING BREAST EXAMINATION: ICD-10-CM

## 2022-07-29 DIAGNOSIS — N63.0 BREAST MASS: ICD-10-CM

## 2022-07-29 PROCEDURE — 1125F AMNT PAIN NOTED PAIN PRSNT: CPT | Mod: CPTII,S$GLB,, | Performed by: NURSE PRACTITIONER

## 2022-07-29 PROCEDURE — 99999 PR PBB SHADOW E&M-EST. PATIENT-LVL IV: CPT | Mod: PBBFAC,,, | Performed by: NURSE PRACTITIONER

## 2022-07-29 PROCEDURE — 3077F SYST BP >= 140 MM HG: CPT | Mod: CPTII,S$GLB,, | Performed by: NURSE PRACTITIONER

## 2022-07-29 PROCEDURE — 3078F DIAST BP <80 MM HG: CPT | Mod: CPTII,S$GLB,, | Performed by: NURSE PRACTITIONER

## 2022-07-29 PROCEDURE — 1100F PTFALLS ASSESS-DOCD GE2>/YR: CPT | Mod: CPTII,S$GLB,, | Performed by: NURSE PRACTITIONER

## 2022-07-29 PROCEDURE — 99203 OFFICE O/P NEW LOW 30 MIN: CPT | Mod: S$GLB,,, | Performed by: NURSE PRACTITIONER

## 2022-07-29 PROCEDURE — 1100F PR PT FALLS ASSESS DOC 2+ FALLS/FALL W/INJURY/YR: ICD-10-PCS | Mod: CPTII,S$GLB,, | Performed by: NURSE PRACTITIONER

## 2022-07-29 PROCEDURE — 1159F PR MEDICATION LIST DOCUMENTED IN MEDICAL RECORD: ICD-10-PCS | Mod: CPTII,S$GLB,, | Performed by: NURSE PRACTITIONER

## 2022-07-29 PROCEDURE — 3008F BODY MASS INDEX DOCD: CPT | Mod: CPTII,S$GLB,, | Performed by: NURSE PRACTITIONER

## 2022-07-29 PROCEDURE — 99999 PR PBB SHADOW E&M-EST. PATIENT-LVL IV: ICD-10-PCS | Mod: PBBFAC,,, | Performed by: NURSE PRACTITIONER

## 2022-07-29 PROCEDURE — 1159F MED LIST DOCD IN RCRD: CPT | Mod: CPTII,S$GLB,, | Performed by: NURSE PRACTITIONER

## 2022-07-29 PROCEDURE — 1125F PR PAIN SEVERITY QUANTIFIED, PAIN PRESENT: ICD-10-PCS | Mod: CPTII,S$GLB,, | Performed by: NURSE PRACTITIONER

## 2022-07-29 PROCEDURE — 3288F PR FALLS RISK ASSESSMENT DOCUMENTED: ICD-10-PCS | Mod: CPTII,S$GLB,, | Performed by: NURSE PRACTITIONER

## 2022-07-29 PROCEDURE — 3008F PR BODY MASS INDEX (BMI) DOCUMENTED: ICD-10-PCS | Mod: CPTII,S$GLB,, | Performed by: NURSE PRACTITIONER

## 2022-07-29 PROCEDURE — 3077F PR MOST RECENT SYSTOLIC BLOOD PRESSURE >= 140 MM HG: ICD-10-PCS | Mod: CPTII,S$GLB,, | Performed by: NURSE PRACTITIONER

## 2022-07-29 PROCEDURE — 99203 PR OFFICE/OUTPT VISIT, NEW, LEVL III, 30-44 MIN: ICD-10-PCS | Mod: S$GLB,,, | Performed by: NURSE PRACTITIONER

## 2022-07-29 PROCEDURE — 3288F FALL RISK ASSESSMENT DOCD: CPT | Mod: CPTII,S$GLB,, | Performed by: NURSE PRACTITIONER

## 2022-07-29 PROCEDURE — 3078F PR MOST RECENT DIASTOLIC BLOOD PRESSURE < 80 MM HG: ICD-10-PCS | Mod: CPTII,S$GLB,, | Performed by: NURSE PRACTITIONER

## 2022-07-29 NOTE — PROGRESS NOTES
[facility]  Department of Surgery      REFERRING PROVIDER: Oli Holguin MD  [address] [place] [address]    Chief Complaint: Lump (Np L breast lump)      Subjective:      Patient ID: Jerardo Callejas is a 72 y.o. male who presents with new left breast mass     History of Present Illness:   Patient reports he first noticed left breast mass about 2-3 months ago. Since then it has become larger and more tender to touch. He reports it is especially tender after shower. Patient does not routinely do self breast exams.  Patient has noted a change on breast exam.  Patient denies nipple discharge overlying skin changes . Patient denies to previous breast biopsy. Patient denies a personal history of breast cancer.      Past Medical History:   Diagnosis Date    Back pain, chronic     BPH (benign prostatic hyperplasia)     Hypertension     Mitral valve prolapse syndrome     Prostate enlargement      Past Surgical History:   Procedure Laterality Date    NECK SURGERY       Current Outpatient Medications on File Prior to Visit   Medication Sig Dispense Refill    ELIQUIS 5 mg Tab Take 5 mg by mouth 2 (two) times daily.      famotidine (PEPCID) 20 MG tablet Take 1 tablet (20 mg total) by mouth 2 (two) times daily. 20 tablet 0    furosemide (LASIX) 20 MG tablet Take 20 mg by mouth every morning.      gabapentin (NEURONTIN) 300 MG capsule Take 300 mg by mouth once daily. Only takes at night.      losartan (COZAAR) 25 MG tablet Take 12.5 mg by mouth once daily.      losartan-hydrochlorothiazide 50-12.5 mg (HYZAAR) 50-12.5 mg per tablet Take 1 tablet by mouth every morning.      metoprolol succinate (TOPROL-XL) 25 MG 24 hr tablet Take 12.5 mg by mouth once daily.      potassium chloride (MICRO-K) 10 MEQ CpSR Take 10 mEq by mouth once daily.      tamsulosin (FLOMAX) 0.4 mg Cp24 Take 0.4 mg by mouth once daily.      albuterol (PROVENTIL/VENTOLIN HFA) 90 mcg/actuation inhaler Inhale 2  "puffs into the lungs every 4 (four) hours as needed for Wheezing or Shortness of Breath. Rescue 6.7 g 0    pantoprazole (PROTONIX) 20 MG tablet Take 2 tablets (40 mg total) by mouth once daily. 60 tablet 0    [DISCONTINUED] amLODIPine (NORVASC) 5 MG tablet Take 1 tablet (5 mg total) by mouth once daily. 30 tablet 3    [DISCONTINUED] hydroCHLOROthiazide (HYDRODIURIL) 12.5 MG Tab Take 12.5 mg by mouth once daily.      [DISCONTINUED] lisinopril (PRINIVIL,ZESTRIL) 20 MG tablet Take 20 mg by mouth once daily.      [DISCONTINUED] metoprolol tartrate (LOPRESSOR) 25 MG tablet Take 12.5 mg by mouth 2 (two) times daily.       No current facility-administered medications on file prior to visit.     Social History     Socioeconomic History    Marital status: Legally    Tobacco Use    Smoking status: Former Smoker    Smokeless tobacco: Never Used    Tobacco comment: >40 years ago   Substance and Sexual Activity    Alcohol use: No    Drug use: No    Sexual activity: Not Currently     Partners: Female   Social History Narrative    ** Merged History Encounter **          Family History   Problem Relation Age of Onset    Hypertension Mother     Stroke Father         Review of Systems   Constitutional: Negative for chills, fatigue and fever.   HENT: Negative for congestion.    Eyes: Negative for pain, discharge, redness and itching.   Respiratory: Negative for cough, shortness of breath and wheezing.    Cardiovascular: Negative for chest pain and palpitations.   Musculoskeletal: Negative for back pain.   Neurological: Negative for headaches.     Objective:   BP (!) 162/73 (BP Location: Left arm, Patient Position: Sitting, BP Method: Large (Automatic))   Pulse (!) 56   Ht 5' 10" (1.778 m)   Wt 102.1 kg (225 lb)   BMI 32.28 kg/m²     Physical Exam   Constitutional: He is oriented to person, place, and time. He appears well-developed and well-nourished.   HENT:   Head: Normocephalic.   Eyes: Right eye exhibits " no discharge. Left eye exhibits no discharge.   Pulmonary/Chest: Effort normal. No respiratory distress. He has no wheezes. Right breast exhibits no inverted nipple, no mass, no nipple discharge, no skin change and no tenderness. Left breast exhibits mass and tenderness. Left breast exhibits no inverted nipple, no nipple discharge and no skin change.    3 cm firm glandular tissue at the NAC, semi TTP of the left breast. No overlying skin changes. No nipple discharge elicited.    Musculoskeletal:      Comments: Walks with rolling walker    Lymphadenopathy:     He has no cervical adenopathy.   Neurological: He is alert and oriented to person, place, and time.   Skin: Skin is warm and dry. No rash noted. No erythema. No pallor.       Radiology review: Images personally reviewed by me in the clinic.     Assessment:       1. Gynecomastia, male    2. Breast mass    3. Encounter for screening breast examination        Plan:     Discussed that exam and imaging is consistent with gynecomastia. Gynecomastia is a benign proliferation of glandular tissue of the male breast. It is characterized as physiologic occurring normally in infancy, puberty, and older age or pathologic due to medications or disorders such as androgen deficiency, testicular tumors, hyperthyroidism or chronic kidney disease. In adult men seeking consultation for gynecomastia, 40% of cases of gynecomastia are due to pubertal gynecomastia or medications. 25% of cases gynecomastia are idiopathic or of unknown cause.     Although gynecomastia is usually bilateral, some patients presents with unilateral enlargement. One side of the breast may enlarge weeks to months before the other.     There is good supporting evidence of a true association with estrogens, spironolactone, ketoconazole, growth hormone, gonadotropins, antiandrogen therapy, and 5-alpha-reductase inhibitors. Drugs with fair evidence include first-generation and atypical antipsychotics in adults,  calcium channel blockers, omeprazole, HIV medications, anabolic steroids, alcohol, opioids, alkylating agents and statins.     The first step in management of new gynecomastia includes observation, discontination, if possible, of offending medications and treating underlying medical problems. Surgical intervention can be considered in men whose gynecomastia does not regress spontaneously, is causing discomfort or psychological distress, or longstanding (greaster than 12 months).     The patient is in agreement with the plan. Questions were encouraged and answered to patient's satisfaction. Jerardo will call our office with any questions or concerns.

## 2022-08-16 ENCOUNTER — HOSPITAL ENCOUNTER (EMERGENCY)
Facility: HOSPITAL | Age: 72
Discharge: HOME | End: 2022-08-16
Attending: EMERGENCY MEDICINE
Payer: MEDICARE

## 2022-08-16 VITALS
OXYGEN SATURATION: 97 % | SYSTOLIC BLOOD PRESSURE: 142 MMHG | HEIGHT: 70 IN | RESPIRATION RATE: 18 BRPM | BODY MASS INDEX: 31.35 KG/M2 | HEART RATE: 60 BPM | DIASTOLIC BLOOD PRESSURE: 77 MMHG | TEMPERATURE: 99 F | WEIGHT: 219 LBS

## 2022-08-16 DIAGNOSIS — R20.2 PARESTHESIA: ICD-10-CM

## 2022-08-16 DIAGNOSIS — N39.0 URINARY TRACT INFECTION WITHOUT HEMATURIA, SITE UNSPECIFIED: Primary | ICD-10-CM

## 2022-08-16 DIAGNOSIS — R10.9 ABDOMINAL PAIN: ICD-10-CM

## 2022-08-16 LAB
ALBUMIN SERPL BCP-MCNC: 3.7 G/DL (ref 3.5–5.2)
ALP SERPL-CCNC: 106 U/L (ref 55–135)
ALT SERPL W/O P-5'-P-CCNC: 16 U/L (ref 10–44)
ANION GAP SERPL CALC-SCNC: 10 MMOL/L (ref 8–16)
AST SERPL-CCNC: 22 U/L (ref 10–40)
BACTERIA #/AREA URNS AUTO: ABNORMAL /HPF
BASOPHILS # BLD AUTO: 0.04 K/UL (ref 0–0.2)
BASOPHILS NFR BLD: 0.6 % (ref 0–1.9)
BILIRUB SERPL-MCNC: 0.5 MG/DL (ref 0.1–1)
BILIRUB UR QL STRIP: NEGATIVE
BNP SERPL-MCNC: 22 PG/ML (ref 0–99)
BUN SERPL-MCNC: 19 MG/DL (ref 8–23)
CALCIUM SERPL-MCNC: 9.8 MG/DL (ref 8.7–10.5)
CHLORIDE SERPL-SCNC: 103 MMOL/L (ref 95–110)
CLARITY UR REFRACT.AUTO: ABNORMAL
CO2 SERPL-SCNC: 29 MMOL/L (ref 23–29)
COLOR UR AUTO: YELLOW
CREAT SERPL-MCNC: 1.1 MG/DL (ref 0.5–1.4)
DIFFERENTIAL METHOD: ABNORMAL
EOSINOPHIL # BLD AUTO: 0.2 K/UL (ref 0–0.5)
EOSINOPHIL NFR BLD: 3.1 % (ref 0–8)
ERYTHROCYTE [DISTWIDTH] IN BLOOD BY AUTOMATED COUNT: 14.2 % (ref 11.5–14.5)
EST. GFR  (NO RACE VARIABLE): >60 ML/MIN/1.73 M^2
GLUCOSE SERPL-MCNC: 91 MG/DL (ref 70–110)
GLUCOSE UR QL STRIP: NEGATIVE
HCT VFR BLD AUTO: 43.4 % (ref 40–54)
HGB BLD-MCNC: 13.7 G/DL (ref 14–18)
HGB UR QL STRIP: NEGATIVE
IMM GRANULOCYTES # BLD AUTO: 0.03 K/UL (ref 0–0.04)
IMM GRANULOCYTES NFR BLD AUTO: 0.5 % (ref 0–0.5)
KETONES UR QL STRIP: NEGATIVE
LEUKOCYTE ESTERASE UR QL STRIP: ABNORMAL
LIPASE SERPL-CCNC: 14 U/L (ref 4–60)
LYMPHOCYTES # BLD AUTO: 1.6 K/UL (ref 1–4.8)
LYMPHOCYTES NFR BLD: 25 % (ref 18–48)
MCH RBC QN AUTO: 27.8 PG (ref 27–31)
MCHC RBC AUTO-ENTMCNC: 31.6 G/DL (ref 32–36)
MCV RBC AUTO: 88 FL (ref 82–98)
MICROSCOPIC COMMENT: ABNORMAL
MONOCYTES # BLD AUTO: 0.9 K/UL (ref 0.3–1)
MONOCYTES NFR BLD: 13.7 % (ref 4–15)
NEUTROPHILS # BLD AUTO: 3.7 K/UL (ref 1.8–7.7)
NEUTROPHILS NFR BLD: 57.1 % (ref 38–73)
NITRITE UR QL STRIP: POSITIVE
NRBC BLD-RTO: 0 /100 WBC
PH UR STRIP: 6 [PH] (ref 5–8)
PLATELET # BLD AUTO: 222 K/UL (ref 150–450)
PMV BLD AUTO: 10.5 FL (ref 9.2–12.9)
POCT GLUCOSE: 95 MG/DL (ref 70–110)
POTASSIUM SERPL-SCNC: 3.9 MMOL/L (ref 3.5–5.1)
PROT SERPL-MCNC: 7.3 G/DL (ref 6–8.4)
PROT UR QL STRIP: NEGATIVE
RBC # BLD AUTO: 4.93 M/UL (ref 4.6–6.2)
RBC #/AREA URNS AUTO: 4 /HPF (ref 0–4)
SODIUM SERPL-SCNC: 142 MMOL/L (ref 136–145)
SP GR UR STRIP: 1.02 (ref 1–1.03)
SQUAMOUS #/AREA URNS AUTO: 3 /HPF
TROPONIN I SERPL DL<=0.01 NG/ML-MCNC: <0.006 NG/ML (ref 0–0.03)
URN SPEC COLLECT METH UR: ABNORMAL
WBC # BLD AUTO: 6.48 K/UL (ref 3.9–12.7)
WBC #/AREA URNS AUTO: 77 /HPF (ref 0–5)

## 2022-08-16 PROCEDURE — 25000003 PHARM REV CODE 250: Performed by: PHYSICIAN ASSISTANT

## 2022-08-16 PROCEDURE — 86803 HEPATITIS C AB TEST: CPT | Performed by: PHYSICIAN ASSISTANT

## 2022-08-16 PROCEDURE — 93010 ELECTROCARDIOGRAM REPORT: CPT | Mod: ,,, | Performed by: INTERNAL MEDICINE

## 2022-08-16 PROCEDURE — 81001 URINALYSIS AUTO W/SCOPE: CPT | Performed by: PHYSICIAN ASSISTANT

## 2022-08-16 PROCEDURE — 87086 URINE CULTURE/COLONY COUNT: CPT | Performed by: PHYSICIAN ASSISTANT

## 2022-08-16 PROCEDURE — 93005 ELECTROCARDIOGRAM TRACING: CPT

## 2022-08-16 PROCEDURE — 85025 COMPLETE CBC W/AUTO DIFF WBC: CPT | Performed by: PHYSICIAN ASSISTANT

## 2022-08-16 PROCEDURE — 84484 ASSAY OF TROPONIN QUANT: CPT | Performed by: PHYSICIAN ASSISTANT

## 2022-08-16 PROCEDURE — 99285 EMERGENCY DEPT VISIT HI MDM: CPT | Mod: 25

## 2022-08-16 PROCEDURE — 83690 ASSAY OF LIPASE: CPT | Performed by: PHYSICIAN ASSISTANT

## 2022-08-16 PROCEDURE — 36000 PLACE NEEDLE IN VEIN: CPT

## 2022-08-16 PROCEDURE — 87186 SC STD MICRODIL/AGAR DIL: CPT | Performed by: PHYSICIAN ASSISTANT

## 2022-08-16 PROCEDURE — 99285 EMERGENCY DEPT VISIT HI MDM: CPT | Mod: ,,, | Performed by: PHYSICIAN ASSISTANT

## 2022-08-16 PROCEDURE — 87077 CULTURE AEROBIC IDENTIFY: CPT | Performed by: PHYSICIAN ASSISTANT

## 2022-08-16 PROCEDURE — 82962 GLUCOSE BLOOD TEST: CPT

## 2022-08-16 PROCEDURE — 93010 EKG 12-LEAD: ICD-10-PCS | Mod: ,,, | Performed by: INTERNAL MEDICINE

## 2022-08-16 PROCEDURE — 99285 PR EMERGENCY DEPT VISIT,LEVEL V: ICD-10-PCS | Mod: ,,, | Performed by: PHYSICIAN ASSISTANT

## 2022-08-16 PROCEDURE — 80053 COMPREHEN METABOLIC PANEL: CPT | Performed by: PHYSICIAN ASSISTANT

## 2022-08-16 PROCEDURE — 83880 ASSAY OF NATRIURETIC PEPTIDE: CPT | Performed by: PHYSICIAN ASSISTANT

## 2022-08-16 PROCEDURE — 87088 URINE BACTERIA CULTURE: CPT | Performed by: PHYSICIAN ASSISTANT

## 2022-08-16 RX ORDER — CEPHALEXIN 500 MG/1
500 CAPSULE ORAL EVERY 8 HOURS
Qty: 20 CAPSULE | Refills: 0 | Status: SHIPPED | OUTPATIENT
Start: 2022-08-16 | End: 2022-08-23

## 2022-08-16 RX ORDER — CEPHALEXIN 500 MG/1
500 CAPSULE ORAL
Status: COMPLETED | OUTPATIENT
Start: 2022-08-16 | End: 2022-08-16

## 2022-08-16 RX ADMIN — CEPHALEXIN 500 MG: 500 CAPSULE ORAL at 10:08

## 2022-08-17 NOTE — ED NOTES
PATIENT LEFT ALERT AND ORIENTED X4. NO OBVIOUS DISTRESS NOTED. DISCHARGE INSTRUCTIONS EXPLAINED TO PATIENT BY PROVIDER. NO FURTHER QUESTIONS BY PATIENT/FAMILY AT THIS TIME.

## 2022-08-17 NOTE — ED PROVIDER NOTES
"Encounter Date: 8/16/2022       History     Chief Complaint   Patient presents with    Abdominal Pain     Abd pain x few hours, feels weak and tingling to his face and hands     The patient is a 72 year old male, who has a documented past medical history of chronic back pain, chronic neck pain, BPH, MVP, gynecomastia, DVT on Eliquis, and HTN. He presents to the ER for an emergent evaluation due to acute abdominal pain. He states that the pain is diffuse lower abdominal region. He rates the pain 3/10 on the pain scale. He states that the pain started last week, but became worse today, with associated lightheadedness. He states that he has felt increased pressure when urinating. He states that he has had "tingling" sensations to his face and both hands for the past week, that comes and goes. He denies any additional symptoms or concerns. He denies any pre-arrival treatment.         Review of patient's allergies indicates:   Allergen Reactions    Penicillins Palpitations and Anxiety     Past Medical History:   Diagnosis Date    Back pain, chronic     BPH (benign prostatic hyperplasia)     Hypertension     Mitral valve prolapse syndrome     Prostate enlargement      Past Surgical History:   Procedure Laterality Date    NECK SURGERY       Family History   Problem Relation Age of Onset    Hypertension Mother     Stroke Father      Social History     Tobacco Use    Smoking status: Former Smoker    Smokeless tobacco: Never Used    Tobacco comment: >40 years ago   Substance Use Topics    Alcohol use: No    Drug use: No     Review of Systems   Constitutional: Negative for activity change, appetite change, chills, diaphoresis, fatigue and fever.   HENT: Negative for congestion, ear pain, facial swelling, rhinorrhea, sinus pressure, sore throat and tinnitus.    Eyes: Negative for pain and visual disturbance.   Respiratory: Negative for cough and shortness of breath.    Cardiovascular: Negative for chest pain, " palpitations and leg swelling.   Gastrointestinal: Positive for abdominal pain. Negative for blood in stool, constipation, diarrhea, nausea, rectal pain and vomiting.   Genitourinary: Positive for dysuria. Negative for decreased urine volume, difficulty urinating, flank pain, frequency, genital sores, hematuria, penile discharge, penile pain, penile swelling, scrotal swelling, testicular pain and urgency.   Musculoskeletal: Negative for back pain, gait problem and myalgias.   Skin: Negative for color change and rash.   Allergic/Immunologic: Negative for immunocompromised state.   Neurological: Positive for light-headedness. Negative for dizziness, tremors, seizures, syncope, facial asymmetry, speech difficulty, numbness and headaches.   Psychiatric/Behavioral: Negative for confusion.       Physical Exam     Initial Vitals [08/16/22 2003]   BP Pulse Resp Temp SpO2   (!) 165/92 65 18 98.7 °F (37.1 °C) 98 %      MAP       --         Physical Exam    Nursing note and vitals reviewed.  Constitutional: He appears well-developed and well-nourished. He is not diaphoretic. No distress.   HENT:   Head: Normocephalic.   Mouth/Throat: Oropharynx is clear and moist.   Eyes: Conjunctivae are normal. Pupils are equal, round, and reactive to light.   Neck: Neck supple.   Cardiovascular: Normal rate and intact distal pulses.   Pulmonary/Chest: No respiratory distress.   Abdominal: Abdomen is soft. He exhibits no distension.   Mild suprapubic tenderness to palpation. No peritoneal signs. Benign abdominal exam.  There is no rebound and no guarding.   Musculoskeletal:         General: No tenderness or edema. Normal range of motion.      Cervical back: Neck supple.     Neurological: He is alert and oriented to person, place, and time. He has normal strength. No sensory deficit.   Normal gait. No facial droop. No dysarthric speech. 5/5 strength. No focal deficit.    Skin: Skin is warm and dry. No rash noted.   Psychiatric: He has a  normal mood and affect. His behavior is normal.         ED Course   Procedures  Labs Reviewed   CBC W/ AUTO DIFFERENTIAL - Abnormal; Notable for the following components:       Result Value    Hemoglobin 13.7 (*)     MCHC 31.6 (*)     All other components within normal limits   URINALYSIS, REFLEX TO URINE CULTURE - Abnormal; Notable for the following components:    Appearance, UA Hazy (*)     Nitrite, UA Positive (*)     Leukocytes, UA 3+ (*)     All other components within normal limits    Narrative:     Specimen Source->Urine   URINALYSIS MICROSCOPIC - Abnormal; Notable for the following components:    WBC, UA 77 (*)     All other components within normal limits    Narrative:     Specimen Source->Urine   CULTURE, URINE   COMPREHENSIVE METABOLIC PANEL   LIPASE   TROPONIN I   B-TYPE NATRIURETIC PEPTIDE   HEPATITIS C ANTIBODY   POCT GLUCOSE   POCT GLUCOSE MONITORING CONTINUOUS     Results for orders placed or performed during the hospital encounter of 08/16/22   CBC auto differential   Result Value Ref Range    WBC 6.48 3.90 - 12.70 K/uL    RBC 4.93 4.60 - 6.20 M/uL    Hemoglobin 13.7 (L) 14.0 - 18.0 g/dL    Hematocrit 43.4 40.0 - 54.0 %    MCV 88 82 - 98 fL    MCH 27.8 27.0 - 31.0 pg    MCHC 31.6 (L) 32.0 - 36.0 g/dL    RDW 14.2 11.5 - 14.5 %    Platelets 222 150 - 450 K/uL    MPV 10.5 9.2 - 12.9 fL    Immature Granulocytes 0.5 0.0 - 0.5 %    Gran # (ANC) 3.7 1.8 - 7.7 K/uL    Immature Grans (Abs) 0.03 0.00 - 0.04 K/uL    Lymph # 1.6 1.0 - 4.8 K/uL    Mono # 0.9 0.3 - 1.0 K/uL    Eos # 0.2 0.0 - 0.5 K/uL    Baso # 0.04 0.00 - 0.20 K/uL    nRBC 0 0 /100 WBC    Gran % 57.1 38.0 - 73.0 %    Lymph % 25.0 18.0 - 48.0 %    Mono % 13.7 4.0 - 15.0 %    Eosinophil % 3.1 0.0 - 8.0 %    Basophil % 0.6 0.0 - 1.9 %    Differential Method Automated    Comprehensive metabolic panel   Result Value Ref Range    Sodium 142 136 - 145 mmol/L    Potassium 3.9 3.5 - 5.1 mmol/L    Chloride 103 95 - 110 mmol/L    CO2 29 23 - 29 mmol/L     Glucose 91 70 - 110 mg/dL    BUN 19 8 - 23 mg/dL    Creatinine 1.1 0.5 - 1.4 mg/dL    Calcium 9.8 8.7 - 10.5 mg/dL    Total Protein 7.3 6.0 - 8.4 g/dL    Albumin 3.7 3.5 - 5.2 g/dL    Total Bilirubin 0.5 0.1 - 1.0 mg/dL    Alkaline Phosphatase 106 55 - 135 U/L    AST 22 10 - 40 U/L    ALT 16 10 - 44 U/L    Anion Gap 10 8 - 16 mmol/L    eGFR >60.0 >60 mL/min/1.73 m^2   Lipase   Result Value Ref Range    Lipase 14 4 - 60 U/L   Troponin I   Result Value Ref Range    Troponin I <0.006 0.000 - 0.026 ng/mL   Brain natriuretic peptide   Result Value Ref Range    BNP 22 0 - 99 pg/mL   Urinalysis, Reflex to Urine Culture Urine, Clean Catch    Specimen: Urine   Result Value Ref Range    Specimen UA Urine, Clean Catch     Color, UA Yellow Yellow, Straw, Rakel    Appearance, UA Hazy (A) Clear    pH, UA 6.0 5.0 - 8.0    Specific Gravity, UA 1.025 1.005 - 1.030    Protein, UA Negative Negative    Glucose, UA Negative Negative    Ketones, UA Negative Negative    Bilirubin (UA) Negative Negative    Occult Blood UA Negative Negative    Nitrite, UA Positive (A) Negative    Leukocytes, UA 3+ (A) Negative   Urinalysis Microscopic   Result Value Ref Range    RBC, UA 4 0 - 4 /hpf    WBC, UA 77 (H) 0 - 5 /hpf    Bacteria Occasional None-Occ /hpf    Squam Epithel, UA 3 /hpf    Microscopic Comment SEE COMMENT    POCT glucose   Result Value Ref Range    POCT Glucose 95 70 - 110 mg/dL       EKG Readings: (Independently Interpreted)   Initial Reading: No STEMI. Rhythm: Normal Sinus Rhythm. Heart Rate: 56. ST Segments: Normal ST Segments. T Waves: Normal.   ER attending physician reviewed and signed        Imaging Results          CT Head Without Contrast (Final result)  Result time 08/16/22 22:01:31    Final result by Raghav Moore MD (08/16/22 22:01:31)                 Impression:      No CT evidence of acute intracranial abnormality.      Electronically signed by: Raghav Moore MD  Date:    08/16/2022  Time:    22:01              "Narrative:    EXAMINATION:  CT HEAD WITHOUT CONTRAST    CLINICAL HISTORY:  Numbness or tingling, paresthesia (Ped 0-18y);    TECHNIQUE:  Low dose axial images were obtained through the head.  Coronal and sagittal reformations were also performed. Contrast was not administered.    COMPARISON:  None.    FINDINGS:  No evidence of acute territorial infarct, hemorrhage, mass effect, or midline shift.    Ventricles are normal in size and configuration.    No displaced calvarial fracture.    Visualized paranasal sinuses and mastoid air cells are essentially clear.                               X-Ray Chest AP Portable (Final result)  Result time 08/16/22 21:25:20    Final result by Raghav Moore MD (08/16/22 21:25:20)                 Impression:      No acute cardiopulmonary finding identified on this single view.  No detrimental change when compared with 02/17/2022.      Electronically signed by: Raghav Moore MD  Date:    08/16/2022  Time:    21:25             Narrative:    EXAMINATION:  XR CHEST AP PORTABLE    CLINICAL HISTORY:  Provided history is "  Paresthesia of skin".    TECHNIQUE:  One view of the chest.    COMPARISON:  02/17/2022.    FINDINGS:  Cardiac silhouette is not enlarged.  Atherosclerotic calcifications overlie the aortic arch.  Bilateral calcified pleural plaques again noted.  No focal consolidation.  No sizable pleural effusion.  No pneumothorax.                                 Medications   cephALEXin capsule 500 mg (500 mg Oral Given 8/16/22 2232)     Medical Decision Making:   History:   Old Medical Records: I decided to obtain old medical records.  Initial Assessment:   73 yo male, here c/o urinary symptoms, suprapubic abdominal pain, and intermittent tingling sensations to face and hands with lightheadedness today   Differential Diagnosis:   UTI, CVA, dysrhythmia, infection, LINDA, electrolyte derangement, hypoglycemia, etc   Clinical Tests:   Lab Tests: Ordered and Reviewed  Radiological " Study: Ordered and Reviewed  Medical Tests: Ordered and Reviewed  ED Management:  Vital signs reviewed   Records reviewed   Work up remarkable for Nitrite positive UTI  Previous urine C & S reviewed, sensitive to cephalosporins, pt has PCN allergy but reports tolerating Cephalosporins  Keflex started in the ER and Rx for same provided   Doubt stroke - paresthesia reportedly bilateral - CT brain negative - ot denies acute/increased neck/back pain - strength and sensation intact on exam  Pt advised to follow up with his PCP in the next 1-2 days for re-evaluation and further management   Pt advised to return to the ER promptly if unimproved or if worse in any way       Additional MDM:   EKG: I have independently interpreted EKG(s) - see notes.   X-Rays: I have independently interpreted X-Ray(s) - see notes.                    Clinical Impression:   Final diagnoses:  [R10.9] Abdominal pain  [R20.2] Paresthesia  [N39.0] Urinary tract infection without hematuria, site unspecified (Primary)          ED Disposition Condition    Discharge Stable        ED Prescriptions     Medication Sig Dispense Start Date End Date Auth. Provider    cephALEXin (KEFLEX) 500 MG capsule Take 1 capsule (500 mg total) by mouth every 8 (eight) hours. for 7 days 20 capsule 8/16/2022 8/23/2022 Yong Villaseñor PA-C        Follow-up Information     Follow up With Specialties Details Why Contact Info    SONIA Barker MD Family Medicine Schedule an appointment as soon as possible for a visit   [address]  [facility] [address] [phone]      [facility] Emergency Medicine  If symptoms worsen [address]  [facility] [id number] [phone]           Yong Villaseñor PA-C  08/16/22 4150

## 2022-08-17 NOTE — ED NOTES
Jerardo Joseph Callejas, an 72 y.o. male presents to the ED c/o generalized abd pain. Reports he feels weak. Ambulating with walkers. States that he has tingling to face and hands. Denies chest pain, fever, cough  Review of patient's allergies indicates:   Allergen Reactions    Penicillins Palpitations and Anxiety     Chief Complaint   Patient presents with    Abdominal Pain     Abd pain x few hours, feels weak and tingling to his face and hands     Past Medical History:   Diagnosis Date    Back pain, chronic     BPH (benign prostatic hyperplasia)     Hypertension     Mitral valve prolapse syndrome     Prostate enlargement

## 2022-08-18 LAB — BACTERIA UR CULT: ABNORMAL

## 2022-08-19 LAB — HCV AB SERPL QL IA: NEGATIVE

## 2022-08-19 RX ORDER — SULFAMETHOXAZOLE AND TRIMETHOPRIM 800; 160 MG/1; MG/1
1 TABLET ORAL 2 TIMES DAILY
Qty: 14 TABLET | Refills: 0 | Status: SHIPPED | OUTPATIENT
Start: 2022-08-19 | End: 2022-08-26

## 2022-08-24 ENCOUNTER — HOSPITAL ENCOUNTER (EMERGENCY)
Facility: HOSPITAL | Age: 72
Discharge: HOME | End: 2022-08-24
Attending: EMERGENCY MEDICINE
Payer: MEDICARE

## 2022-08-24 VITALS
RESPIRATION RATE: 19 BRPM | DIASTOLIC BLOOD PRESSURE: 78 MMHG | SYSTOLIC BLOOD PRESSURE: 159 MMHG | BODY MASS INDEX: 31.5 KG/M2 | HEIGHT: 70 IN | HEART RATE: 54 BPM | OXYGEN SATURATION: 100 % | WEIGHT: 220 LBS | TEMPERATURE: 98 F

## 2022-08-24 DIAGNOSIS — R07.9 CHEST PAIN: Primary | ICD-10-CM

## 2022-08-24 LAB
ALBUMIN SERPL BCP-MCNC: 4.4 G/DL (ref 3.5–5.2)
ALP SERPL-CCNC: 102 U/L (ref 38–126)
ALT SERPL W/O P-5'-P-CCNC: 22 U/L (ref 10–44)
ANION GAP SERPL CALC-SCNC: 10 MMOL/L (ref 8–16)
AST SERPL-CCNC: 31 U/L (ref 15–46)
BASOPHILS # BLD AUTO: 0.03 K/UL (ref 0–0.2)
BASOPHILS NFR BLD: 0.7 % (ref 0–1.9)
BILIRUB SERPL-MCNC: 0.4 MG/DL (ref 0.1–1)
CALCIUM SERPL-MCNC: 9.2 MG/DL (ref 8.7–10.5)
CHLORIDE SERPL-SCNC: 102 MMOL/L (ref 95–110)
CO2 SERPL-SCNC: 28 MMOL/L (ref 23–29)
CREAT SERPL-MCNC: 1.26 MG/DL (ref 0.5–1.4)
DIFFERENTIAL METHOD: ABNORMAL
EOSINOPHIL # BLD AUTO: 0.1 K/UL (ref 0–0.5)
EOSINOPHIL NFR BLD: 2.7 % (ref 0–8)
ERYTHROCYTE [DISTWIDTH] IN BLOOD BY AUTOMATED COUNT: 14 % (ref 11.5–14.5)
EST. GFR  (NO RACE VARIABLE): 59.8 ML/MIN/1.73 M^2
GLUCOSE SERPL-MCNC: 80 MG/DL (ref 70–110)
HCT VFR BLD AUTO: 43.7 % (ref 40–54)
HGB BLD-MCNC: 14 G/DL (ref 14–18)
IMM GRANULOCYTES # BLD AUTO: 0.01 K/UL (ref 0–0.04)
IMM GRANULOCYTES NFR BLD AUTO: 0.2 % (ref 0–0.5)
LYMPHOCYTES # BLD AUTO: 1.5 K/UL (ref 1–4.8)
LYMPHOCYTES NFR BLD: 35.6 % (ref 18–48)
MCH RBC QN AUTO: 27.5 PG (ref 27–31)
MCHC RBC AUTO-ENTMCNC: 32 G/DL (ref 32–36)
MCV RBC AUTO: 86 FL (ref 82–98)
MONOCYTES # BLD AUTO: 0.7 K/UL (ref 0.3–1)
MONOCYTES NFR BLD: 15.9 % (ref 4–15)
NEUTROPHILS # BLD AUTO: 1.8 K/UL (ref 1.8–7.7)
NEUTROPHILS NFR BLD: 44.9 % (ref 38–73)
NRBC BLD-RTO: 0 /100 WBC
NT-PROBNP SERPL-MCNC: 55 PG/ML (ref 5–900)
PLATELET # BLD AUTO: 223 K/UL (ref 150–450)
PMV BLD AUTO: 9.5 FL (ref 9.2–12.9)
POTASSIUM SERPL-SCNC: 3.6 MMOL/L (ref 3.5–5.1)
PROT SERPL-MCNC: 7.5 G/DL (ref 6–8.4)
RBC # BLD AUTO: 5.1 M/UL (ref 4.6–6.2)
SODIUM SERPL-SCNC: 140 MMOL/L (ref 136–145)
TROPONIN I SERPL-MCNC: <0.012 NG/ML (ref 0.01–0.03)
TROPONIN I SERPL-MCNC: <0.012 NG/ML (ref 0.01–0.03)
UUN UR-MCNC: 22 MG/DL (ref 2–20)
WBC # BLD AUTO: 4.1 K/UL (ref 3.9–12.7)

## 2022-08-24 PROCEDURE — 83880 ASSAY OF NATRIURETIC PEPTIDE: CPT | Mod: ER | Performed by: EMERGENCY MEDICINE

## 2022-08-24 PROCEDURE — 93010 ELECTROCARDIOGRAM REPORT: CPT | Mod: ,,, | Performed by: INTERNAL MEDICINE

## 2022-08-24 PROCEDURE — 85025 COMPLETE CBC W/AUTO DIFF WBC: CPT | Mod: ER | Performed by: EMERGENCY MEDICINE

## 2022-08-24 PROCEDURE — 94760 N-INVAS EAR/PLS OXIMETRY 1: CPT | Mod: ER

## 2022-08-24 PROCEDURE — 25000003 PHARM REV CODE 250: Mod: ER | Performed by: EMERGENCY MEDICINE

## 2022-08-24 PROCEDURE — 93010 EKG 12-LEAD: ICD-10-PCS | Mod: ,,, | Performed by: INTERNAL MEDICINE

## 2022-08-24 PROCEDURE — 99900035 HC TECH TIME PER 15 MIN (STAT): Mod: ER

## 2022-08-24 PROCEDURE — 93005 ELECTROCARDIOGRAM TRACING: CPT | Mod: ER

## 2022-08-24 PROCEDURE — 84484 ASSAY OF TROPONIN QUANT: CPT | Mod: ER | Performed by: EMERGENCY MEDICINE

## 2022-08-24 PROCEDURE — 80053 COMPREHEN METABOLIC PANEL: CPT | Mod: ER | Performed by: EMERGENCY MEDICINE

## 2022-08-24 PROCEDURE — 99284 EMERGENCY DEPT VISIT MOD MDM: CPT | Mod: 25,ER

## 2022-08-24 RX ORDER — ASPIRIN 325 MG
325 TABLET ORAL
Status: COMPLETED | OUTPATIENT
Start: 2022-08-24 | End: 2022-08-24

## 2022-08-24 RX ADMIN — ASPIRIN 325 MG ORAL TABLET 325 MG: 325 PILL ORAL at 04:08

## 2022-08-24 NOTE — ED PROVIDER NOTES
Encounter Date: 8/24/2022       History     Chief Complaint   Patient presents with    Chest Pain     Pt c/o intermittent left anterior chest pain that began today. Pt also states a pain in left wrist. Pt describes pain as pressure. Denies SOB or N/V/D.      This is a 72-year-old male with a history of hypertension, BPH, chronic back pain, DVT on Eliquis, mitral valve prolapse presented to the emergency department for left-sided chest discomfort.  Patient reports intermittent episodes several times all today.  The 1st episode started while riding his bike.  Each episode lasted approximately 2-3 seconds per patient and subsides.  No radiation of discomfort.  The patient does states that he feels pain/tingling sensation in his left wrist, but not the entire arm area.  Mild discomfort currently.  No shortness of breath.  No nausea/vomiting or diaphoresis.  Chart review shows the patient had a stress test and echo done in March of 2021 that was normal.  The patient also states that he had another stress test done earlier this year by his cardiologist in [facility]  that was also unremarkable.  He has no other complaints specifically headache, cough, fevers/chills, body aches, abdominal pain or any viral type symptoms.        Review of patient's allergies indicates:   Allergen Reactions    Penicillins Palpitations and Anxiety     Past Medical History:   Diagnosis Date    Back pain, chronic     BPH (benign prostatic hyperplasia)     Hypertension     Mitral valve prolapse syndrome     Prostate enlargement      Past Surgical History:   Procedure Laterality Date    NECK SURGERY       Family History   Problem Relation Age of Onset    Hypertension Mother     Stroke Father      Social History     Tobacco Use    Smoking status: Former Smoker    Smokeless tobacco: Never Used    Tobacco comment: >40 years ago   Substance Use Topics    Alcohol use: No    Drug use: No     Review of Systems   Constitutional: Negative  for fever.   HENT: Negative for sore throat.    Respiratory: Negative for shortness of breath.    Cardiovascular: Positive for chest pain.   Gastrointestinal: Negative for nausea.   Genitourinary: Negative for dysuria.   Musculoskeletal: Negative for back pain.   Skin: Negative for rash.   Neurological: Negative for weakness.   Hematological: Does not bruise/bleed easily.       Physical Exam     Initial Vitals [08/24/22 1609]   BP Pulse Resp Temp SpO2   (!) 141/73 69 20 98.2 °F (36.8 °C) 97 %      MAP       --         Physical Exam    Nursing note reviewed.  Constitutional: He appears well-developed and well-nourished.   HENT:   Head: Normocephalic and atraumatic.   Eyes: EOM are normal. Pupils are equal, round, and reactive to light.   Neck: Neck supple. No thyromegaly present.   Normal range of motion.  Cardiovascular: Normal rate, regular rhythm and normal heart sounds. Exam reveals no gallop and no friction rub.    No murmur heard.  Pulmonary/Chest: No respiratory distress. He has no wheezes. He has no rhonchi. He has no rales. He exhibits no tenderness.   Abdominal: Abdomen is soft. Bowel sounds are normal. He exhibits no distension and no mass. There is no abdominal tenderness. There is no rebound and no guarding.   Musculoskeletal:         General: No tenderness or edema. Normal range of motion.      Cervical back: Normal range of motion and neck supple.     Neurological: He is alert and oriented to person, place, and time. He has normal strength. GCS score is 15. GCS eye subscore is 4. GCS verbal subscore is 5. GCS motor subscore is 6.   Skin: Skin is warm and dry. Capillary refill takes less than 2 seconds. No rash and no abscess noted. No erythema. No pallor.   Psychiatric: He has a normal mood and affect.         ED Course   Procedures  Labs Reviewed   CBC W/ AUTO DIFFERENTIAL - Abnormal; Notable for the following components:       Result Value    Mono % 15.9 (*)     All other components within normal  limits   COMPREHENSIVE METABOLIC PANEL - Abnormal; Notable for the following components:    BUN 22 (*)     eGFR 59.8 (*)     All other components within normal limits   TROPONIN I   NT-PRO NATRIURETIC PEPTIDE   TROPONIN I     EKG Readings: (Independently Interpreted)   Heart Rate: 66.     Normal sinus rhythm, rate 66, left axis deviation, no STEMI     ECG Results          EKG 12-lead (In process)  Result time 08/24/22 16:23:19    In process by Interface, [facility] (08/24/22 16:23:19)                 Narrative:    Test Reason : R07.9,    Vent. Rate : 066 BPM     Atrial Rate : 066 BPM     P-R Int : 170 ms          QRS Dur : 094 ms      QT Int : 446 ms       P-R-T Axes : 066 -39 058 degrees     QTc Int : 467 ms    Normal sinus rhythm  Left axis deviation  Abnormal ECG  When compared with ECG of 16-AUG-2022 20:06,  Premature atrial complexes are no longer Present    Referred By: [email or web address]   SELF           Confirmed By:                             Imaging Results          X-Ray Chest AP Portable (Final result)  Result time 08/24/22 16:27:02    Final result by Joseph Ospina MD (08/24/22 16:27:02)                 Impression:      No acute findings.      Electronically signed by: Joseph Ospina MD  Date:    08/24/2022  Time:    16:27             Narrative:    EXAMINATION:  XR CHEST AP PORTABLE    CLINICAL HISTORY:  Chest Pain;    TECHNIQUE:  Single frontal view of the chest was performed.    COMPARISON:  08/16/2022    FINDINGS:  The cardiomediastinal silhouette is normal.    The lungs are clear.  No pleural effusions.    No acute osseous findings.  Lower cervical fusion.                                 Medications   aspirin tablet 325 mg (325 mg Oral Given 8/24/22 1639)     Medical Decision Making:   Clinical Tests:   Lab Tests: Ordered and Reviewed  The following lab test(s) were unremarkable: CBC, CMP and Troponin  Radiological Study: Ordered and Reviewed  Medical Tests: Ordered and Reviewed  ED Management:  Patient  is lying comfortably.  No active chest pain.  Stable vitals.  Unremarkable workup initially.  Second troponin pending at this time.  Patient care will be handed off to the oncoming ED physician at shift change, , to follow 2nd troponin, further evaluate and  final disposition.                      Clinical Impression:   Final diagnoses:  [R07.9] Chest pain                 Leno Nava MD  08/24/22 4340

## 2022-08-25 NOTE — ED NOTES
Patient reports pain level 4 out of 10 at this time. PT respirations even and unlabored. PT instructed to use call light if assistance is needed. Call light within reach. PT provided with urinal prior to nurse writer leaving room. Will continue to monitor.

## 2022-08-26 ENCOUNTER — HOSPITAL ENCOUNTER (EMERGENCY)
Facility: HOSPITAL | Age: 72
Discharge: HOME | End: 2022-08-27
Attending: EMERGENCY MEDICINE
Payer: MEDICARE

## 2022-08-26 DIAGNOSIS — R07.9 CHEST PAIN: ICD-10-CM

## 2022-08-26 DIAGNOSIS — R07.9 CHEST PAIN, UNSPECIFIED TYPE: Primary | ICD-10-CM

## 2022-08-26 LAB
ALBUMIN SERPL BCP-MCNC: 3.6 G/DL (ref 3.5–5.2)
ALP SERPL-CCNC: 112 U/L (ref 55–135)
ALT SERPL W/O P-5'-P-CCNC: 14 U/L (ref 10–44)
ANION GAP SERPL CALC-SCNC: 4 MMOL/L (ref 8–16)
AST SERPL-CCNC: 21 U/L (ref 10–40)
BASOPHILS # BLD AUTO: 0.03 K/UL (ref 0–0.2)
BASOPHILS NFR BLD: 0.5 % (ref 0–1.9)
BILIRUB SERPL-MCNC: 0.3 MG/DL (ref 0.1–1)
BUN SERPL-MCNC: 20 MG/DL (ref 8–23)
CALCIUM SERPL-MCNC: 9 MG/DL (ref 8.7–10.5)
CHLORIDE SERPL-SCNC: 107 MMOL/L (ref 95–110)
CO2 SERPL-SCNC: 26 MMOL/L (ref 23–29)
CREAT SERPL-MCNC: 1.3 MG/DL (ref 0.5–1.4)
DIFFERENTIAL METHOD: ABNORMAL
EOSINOPHIL # BLD AUTO: 0.1 K/UL (ref 0–0.5)
EOSINOPHIL NFR BLD: 1.9 % (ref 0–8)
ERYTHROCYTE [DISTWIDTH] IN BLOOD BY AUTOMATED COUNT: 14.2 % (ref 11.5–14.5)
EST. GFR  (NO RACE VARIABLE): 57.6 ML/MIN/1.73 M^2
GLUCOSE SERPL-MCNC: 109 MG/DL (ref 70–110)
HCT VFR BLD AUTO: 40.9 % (ref 40–54)
HGB BLD-MCNC: 13.8 G/DL (ref 14–18)
IMM GRANULOCYTES # BLD AUTO: 0.02 K/UL (ref 0–0.04)
IMM GRANULOCYTES NFR BLD AUTO: 0.3 % (ref 0–0.5)
LYMPHOCYTES # BLD AUTO: 1.1 K/UL (ref 1–4.8)
LYMPHOCYTES NFR BLD: 19.7 % (ref 18–48)
MCH RBC QN AUTO: 28.2 PG (ref 27–31)
MCHC RBC AUTO-ENTMCNC: 33.7 G/DL (ref 32–36)
MCV RBC AUTO: 84 FL (ref 82–98)
MONOCYTES # BLD AUTO: 0.7 K/UL (ref 0.3–1)
MONOCYTES NFR BLD: 11.7 % (ref 4–15)
NEUTROPHILS # BLD AUTO: 3.8 K/UL (ref 1.8–7.7)
NEUTROPHILS NFR BLD: 65.9 % (ref 38–73)
NRBC BLD-RTO: 0 /100 WBC
PLATELET # BLD AUTO: 207 K/UL (ref 150–450)
PMV BLD AUTO: 9.5 FL (ref 9.2–12.9)
POTASSIUM SERPL-SCNC: 4.2 MMOL/L (ref 3.5–5.1)
PROT SERPL-MCNC: 7.2 G/DL (ref 6–8.4)
RBC # BLD AUTO: 4.9 M/UL (ref 4.6–6.2)
SODIUM SERPL-SCNC: 137 MMOL/L (ref 136–145)
TROPONIN I SERPL DL<=0.01 NG/ML-MCNC: <0.006 NG/ML (ref 0–0.03)
WBC # BLD AUTO: 5.75 K/UL (ref 3.9–12.7)

## 2022-08-26 PROCEDURE — 99285 EMERGENCY DEPT VISIT HI MDM: CPT | Mod: 25

## 2022-08-26 PROCEDURE — 93010 ELECTROCARDIOGRAM REPORT: CPT | Mod: ,,, | Performed by: INTERNAL MEDICINE

## 2022-08-26 PROCEDURE — 85025 COMPLETE CBC W/AUTO DIFF WBC: CPT | Performed by: EMERGENCY MEDICINE

## 2022-08-26 PROCEDURE — 99285 PR EMERGENCY DEPT VISIT,LEVEL V: ICD-10-PCS | Mod: GC,,, | Performed by: EMERGENCY MEDICINE

## 2022-08-26 PROCEDURE — 93005 ELECTROCARDIOGRAM TRACING: CPT

## 2022-08-26 PROCEDURE — 84484 ASSAY OF TROPONIN QUANT: CPT | Performed by: EMERGENCY MEDICINE

## 2022-08-26 PROCEDURE — 93010 EKG 12-LEAD: ICD-10-PCS | Mod: ,,, | Performed by: INTERNAL MEDICINE

## 2022-08-26 PROCEDURE — 80053 COMPREHEN METABOLIC PANEL: CPT | Performed by: EMERGENCY MEDICINE

## 2022-08-26 PROCEDURE — 99285 EMERGENCY DEPT VISIT HI MDM: CPT | Mod: GC,,, | Performed by: EMERGENCY MEDICINE

## 2022-08-26 PROCEDURE — 87389 HIV-1 AG W/HIV-1&-2 AB AG IA: CPT | Performed by: PHYSICIAN ASSISTANT

## 2022-08-27 VITALS
HEART RATE: 50 BPM | DIASTOLIC BLOOD PRESSURE: 80 MMHG | TEMPERATURE: 98 F | HEIGHT: 70 IN | WEIGHT: 220 LBS | BODY MASS INDEX: 31.5 KG/M2 | RESPIRATION RATE: 21 BRPM | OXYGEN SATURATION: 97 % | SYSTOLIC BLOOD PRESSURE: 158 MMHG

## 2022-08-27 LAB — TROPONIN I SERPL DL<=0.01 NG/ML-MCNC: <0.006 NG/ML (ref 0–0.03)

## 2022-08-27 NOTE — DISCHARGE INSTRUCTIONS
Diagnosis: chest pain    Tests you had showed: EKG and labs did not show a heart attack.    Follow-Up Plan:  - Follow-up with: Primary care doctor within 3 - 5 days  - Follow-up for additional testing and/or evaluation as directed by your primary doctor    Return to the Emergency Department for symptoms including but not limited to: worsening symptoms, shortness of breath or chest pain, vomiting with inability to hold down fluids, fevers greater than 100.4°F, dizziness, passing out/fainting/unconsciousness, or other concerning symptoms.  Home Care Instructions:  - Continue taking your home medications as prescribed    Take ibuprofen (also called Advil, Motrin) for your pain. This medicine is available over-the-counter in 200 mg tablets.  - You may take 600 mg every 6 hours, or 800 mg every 8 hours as needed   - Do not take more than this amount, as it can cause kidney problems, bleeding in your stomach, and other serious problems.   - Do not also take naproxen (Aleve) at the same time or on the same day  - If you have heart problems or uncontrolled high blood pressure, you should not take ibuprofen for more than 3 days without discussing with your doctor    If your pain is not controlled with ibuprofen, you may also take acetaminophen (also called Tylenol).  - You may take up to 1,000 mg of Tylenol every 6 hours as needed  - Do not take more than 4,000 mg in 24 hours (1 day) as this may cause liver damage  - Many other medicines include acetaminophen (Tylenol) such as: Norco, Vicodin, Tylenol #3, many cold medicines, etc.  - Please read all labels carefully and do not combine medicines that include acetaminophen.  - If you have a history of liver disease or drink alcohol heavily, do not take acetaminophen (Tylenol) since it can damage your liver

## 2022-08-27 NOTE — ED PROVIDER NOTES
The Encounter Date: 8/26/2022       History     Chief Complaint   Patient presents with    Chest Pain     C/o L anterior chest pain and pressure, radiating down L arm, beginning around 6pm. Pt also c/o palpitations.     Patient presents with:  Chest Pain: C/o L anterior chest pain and pressure, radiating down L arm, beginning around 6pm. Pt also c/o palpitations.      Jerardo Callejas is a 72 y.o. male with PMH of hypertension, mitral valve prolapse, presenting to [facility] ED for chest pain.  Patient states that his pain is substernal, like a pressure, radiates to the belly, at 1 point radiated to his arm but has that has resolved.  Reports that the pain began around 530-6 p.m. Patient describes associated shortness of breath, states it is not exertional.  Patient states that he thinks that he felt like his heart rate was irregular for period of time but that has resolved.Patient denies nausea vomiting, recent fevers.            Review of patient's allergies indicates:   Allergen Reactions    Penicillins Palpitations and Anxiety     Past Medical History:   Diagnosis Date    Back pain, chronic     BPH (benign prostatic hyperplasia)     Hypertension     Mitral valve prolapse syndrome     Prostate enlargement      Past Surgical History:   Procedure Laterality Date    NECK SURGERY       Family History   Problem Relation Age of Onset    Hypertension Mother     Stroke Father      Social History     Tobacco Use    Smoking status: Former Smoker    Smokeless tobacco: Never Used    Tobacco comment: >40 years ago   Substance Use Topics    Alcohol use: No    Drug use: No     Review of Systems   Constitutional: Negative for chills and fever.   HENT: Negative for congestion, ear pain, rhinorrhea and sore throat.    Eyes: Negative for visual disturbance.   Respiratory: Positive for chest tightness and shortness of breath. Negative for cough.    Cardiovascular: Positive for chest pain. Negative for leg swelling.    Gastrointestinal: Positive for abdominal pain. Negative for abdominal distention, constipation, diarrhea, nausea and vomiting.   Endocrine: Positive for polyuria.   Genitourinary: Negative for dysuria and hematuria.   Musculoskeletal: Negative for myalgias.   Skin: Negative for color change and rash.   Neurological: Negative for light-headedness and headaches.   Psychiatric/Behavioral: Negative for decreased concentration. The patient is not nervous/anxious.        Physical Exam     Initial Vitals [08/26/22 2018]   BP Pulse Resp Temp SpO2   (!) 173/79 (!) 59 16 98.8 °F (37.1 °C) 98 %      MAP       --         Physical Exam    Nursing note and vitals reviewed.  Constitutional: Vital signs are normal. He appears well-developed and well-nourished. He is cooperative.   HENT:   Head: Normocephalic and atraumatic.   Eyes: Conjunctivae and EOM are normal. Pupils are equal, round, and reactive to light.   Neck: Trachea normal and phonation normal. Neck supple. No tracheal deviation present.   Cardiovascular: Normal rate, regular rhythm, normal heart sounds, intact distal pulses and normal pulses. Exam reveals no gallop, no S3, no S4 and no friction rub.    No murmur heard.  Pulmonary/Chest: Breath sounds normal. No respiratory distress. He has no wheezes. He has no rhonchi. He has no rales. He exhibits no tenderness.   Abdominal: Abdomen is soft. He exhibits no distension. There is no abdominal tenderness. There is no rebound.   Musculoskeletal:      Cervical back: Neck supple.      Comments: Extremities atraumatic x4.  Normal gait.  No clubbing, cyanosis, edema.     Neurological: He is alert and oriented to person, place, and time. No cranial nerve deficit or sensory deficit. GCS eye subscore is 4. GCS verbal subscore is 5. GCS motor subscore is 6.   Skin: Skin is warm, dry and intact. Capillary refill takes less than 2 seconds.   Psychiatric: He has a normal mood and affect. His speech is normal and behavior is normal.  Thought content normal.         ED Course   Procedures  Labs Reviewed   CBC W/ AUTO DIFFERENTIAL - Abnormal; Notable for the following components:       Result Value    Hemoglobin 13.8 (*)     All other components within normal limits    Narrative:     Release to patient->Immediate   COMPREHENSIVE METABOLIC PANEL - Abnormal; Notable for the following components:    Anion Gap 4 (*)     eGFR 57.6 (*)     All other components within normal limits    Narrative:     Release to patient->Immediate   TROPONIN I    Narrative:     Release to patient->Immediate   HIV 1 / 2 ANTIBODY   TROPONIN I          Imaging Results          X-Ray Chest AP Portable (Final result)  Result time 08/26/22 22:03:48    Final result by Chantal Masterson MD (08/26/22 22:03:48)                 Impression:      No acute intrathoracic abnormality identified on this single radiographic view of the chest.  No detrimental change compared to prior radiograph 08/24/2022      Electronically signed by: Chantal Masterson MD  Date:    08/26/2022  Time:    22:03             Narrative:    EXAMINATION:  XR CHEST AP PORTABLE    CLINICAL HISTORY:  Chest Pain;    TECHNIQUE:  Single frontal view of the chest was performed.    COMPARISON:  Chest radiograph 08/24/2022, CTA chest 11/30/2021    FINDINGS:  Cardiac monitoring leads overlie the chest.  The cardiomediastinal silhouette is unchanged in size and configuration with atherosclerosis of the thoracic aorta.  Mediastinal  structures are midline.  There are small calcified bilateral pleural plaques similar to prior studies.  The lungs are symmetrically expanded without confluent airspace consolidation.  No large volume of pleural fluid or pneumothorax is identified.  Osseous structures are intact                                 Medications - No data to display  Medical Decision Making:   Initial Assessment:   Jerardo Callejas is a 72 y.o. male with PMH of hypertension, mitral valve prolapse, presenting to [facility] ED for  chest pain.  Initially, patient is hemodynamically stable and clinically well appearing.  Differential Diagnosis:   Cardiac  ACS  Aortic dissection  Cardiac tamponade  Coronary artery dissection  Anemia  Myocarditis  Pericarditis  Stable angina  Valvular heart disease  Cardiomyopathy    Respiratory  Pulmonary embolism  Tension pneumothorax  Pneumonia  Pneumothorax  Pleuritis    MSK  Costochondritis  Arthritis  ED Management:  Concern for ACS based on patient's description of symptoms.  -ACS unlikely with normal EKG, normal troponin x2, and no dyspnea, diaphoresis, nausea, vomiting.   -Tension pneumothorax unlikely as patient has bilateral breath sounds diffusely with no evidence of pneumothorax on CXR.   -Aortic dissection is not suspected since patient has no aortic regurg murmur, back pain, unequal pulses, or neurological findings.  -Tamponade is also unlikely given the lack of muffled heart sounds, pulsus paradoxus, and JVD.   -Esophageal rupture is not likely since patient reports no recent vomiting and is afebrile without crepitus  -PE unlikely with no sustained tachycardia, tachypnea, or dyspnea and patient is already anticoagulated with Eliquis.    Workup as detailed below in ED course.    Discussed patient's case with Dr. Cobb, patient pending 2nd troponin.  The patient's 2nd troponin is normal, patient is appropriate to be discharged to home with strict return precautions.             ED Course as of 08/26/22 2330   Fri Aug 26, 2022   2141 CBC auto differential(!)  Normal CBC, lower suspicion for anemia. [ES]   2159 Comprehensive metabolic panel(!)  Low GFR of 57, consistent with lab taken 2 days ago. [ES]   2159 Troponin I #1  First troponin negative.  Reassuring since patient has had chest pain for greater than 4 hours. [ES]   2210 EKG 12-lead  No evidence of ST elevation on EKG [ES]   2330 X-Ray Chest AP Portable  No evidence of pneumonia, pneumothorax, dissection on chest x-ray. [ES]      ED Course  User Index  [ES] Kristy Carballo MD             Clinical Impression:   Final diagnoses:  [R07.9] Chest pain                 Kristy Carballo MD  Resident  08/26/22 [id number]

## 2022-08-27 NOTE — PROVIDER PROGRESS NOTES - EMERGENCY DEPT.
Encounter Date: 8/26/2022    ED Physician Progress Notes        Physician Note:   I received sign-out on this patient from the off going team.    Pt is a 72 y.o. male with PMH of hypertension, mitral valve prolapse, presenting to [facility] ED for chest pain.  No EKG signs consistent of cardiac injury or ischemia. First troponin negative.  Current status; patient's vital signs are currently stable no ongoing chest pain reported.  Pending; 2nd troponin  Likely dispo; discharge if 2nd troponin negative and symptoms have resolved.    8/27/22  12:40am  2nd Trop: 0.006  No ongoing chest pain.  Patient DC'ed home              I also personally evaluated the patient. He is feeling better on re-evaluation.  Has cardiology follow up soon.  Discussed return precautions for worsening symptoms or other new concerning symptoms.  Ok for discharge with cardiology follow up.   Jh Reeves MD

## 2022-08-29 LAB — HIV 1+2 AB+HIV1 P24 AG SERPL QL IA: NEGATIVE

## 2022-08-30 ENCOUNTER — HOSPITAL ENCOUNTER (OUTPATIENT)
Facility: HOSPITAL | Age: 72
Discharge: HOME | End: 2022-09-02
Attending: EMERGENCY MEDICINE | Admitting: EMERGENCY MEDICINE
Payer: MEDICARE

## 2022-08-30 DIAGNOSIS — I82.621 ACUTE DEEP VEIN THROMBOSIS (DVT) OF BRACHIAL VEIN OF RIGHT UPPER EXTREMITY: ICD-10-CM

## 2022-08-30 DIAGNOSIS — R07.9 CHEST PAIN: ICD-10-CM

## 2022-08-30 DIAGNOSIS — I10 HYPERTENSION: ICD-10-CM

## 2022-08-30 DIAGNOSIS — N30.00 ACUTE CYSTITIS WITHOUT HEMATURIA: ICD-10-CM

## 2022-08-30 DIAGNOSIS — R00.1 BRADYCARDIA: ICD-10-CM

## 2022-08-30 DIAGNOSIS — A41.9 SEPSIS, DUE TO UNSPECIFIED ORGANISM, UNSPECIFIED WHETHER ACUTE ORGAN DYSFUNCTION PRESENT: Primary | ICD-10-CM

## 2022-08-30 LAB — SARS-COV-2 RDRP RESP QL NAA+PROBE: NEGATIVE

## 2022-08-30 PROCEDURE — 93010 ELECTROCARDIOGRAM REPORT: CPT | Mod: ,,, | Performed by: INTERNAL MEDICINE

## 2022-08-30 PROCEDURE — 93005 ELECTROCARDIOGRAM TRACING: CPT

## 2022-08-30 PROCEDURE — 99285 EMERGENCY DEPT VISIT HI MDM: CPT | Mod: 25

## 2022-08-30 PROCEDURE — 87086 URINE CULTURE/COLONY COUNT: CPT | Performed by: EMERGENCY MEDICINE

## 2022-08-30 PROCEDURE — 99285 EMERGENCY DEPT VISIT HI MDM: CPT | Mod: CS,,, | Performed by: EMERGENCY MEDICINE

## 2022-08-30 PROCEDURE — 93010 EKG 12-LEAD: ICD-10-PCS | Mod: 76,,, | Performed by: INTERNAL MEDICINE

## 2022-08-30 PROCEDURE — 83605 ASSAY OF LACTIC ACID: CPT | Performed by: EMERGENCY MEDICINE

## 2022-08-30 PROCEDURE — 87088 URINE BACTERIA CULTURE: CPT | Performed by: EMERGENCY MEDICINE

## 2022-08-30 PROCEDURE — 93010 ELECTROCARDIOGRAM REPORT: CPT | Mod: 76,,, | Performed by: INTERNAL MEDICINE

## 2022-08-30 PROCEDURE — 85025 COMPLETE CBC W/AUTO DIFF WBC: CPT | Performed by: EMERGENCY MEDICINE

## 2022-08-30 PROCEDURE — 99285 PR EMERGENCY DEPT VISIT,LEVEL V: ICD-10-PCS | Mod: CS,,, | Performed by: EMERGENCY MEDICINE

## 2022-08-30 PROCEDURE — 80053 COMPREHEN METABOLIC PANEL: CPT | Performed by: EMERGENCY MEDICINE

## 2022-08-30 PROCEDURE — 87040 BLOOD CULTURE FOR BACTERIA: CPT | Performed by: EMERGENCY MEDICINE

## 2022-08-30 PROCEDURE — 87077 CULTURE AEROBIC IDENTIFY: CPT | Performed by: EMERGENCY MEDICINE

## 2022-08-30 PROCEDURE — 84484 ASSAY OF TROPONIN QUANT: CPT | Performed by: EMERGENCY MEDICINE

## 2022-08-30 PROCEDURE — 63600175 PHARM REV CODE 636 W HCPCS: Performed by: EMERGENCY MEDICINE

## 2022-08-30 PROCEDURE — 96361 HYDRATE IV INFUSION ADD-ON: CPT

## 2022-08-30 PROCEDURE — U0002 COVID-19 LAB TEST NON-CDC: HCPCS | Performed by: PHYSICIAN ASSISTANT

## 2022-08-30 PROCEDURE — 87186 SC STD MICRODIL/AGAR DIL: CPT | Performed by: EMERGENCY MEDICINE

## 2022-08-30 PROCEDURE — 81001 URINALYSIS AUTO W/SCOPE: CPT | Performed by: EMERGENCY MEDICINE

## 2022-08-30 RX ADMIN — SODIUM CHLORIDE, SODIUM LACTATE, POTASSIUM CHLORIDE, AND CALCIUM CHLORIDE 2190 ML: .6; .31; .03; .02 INJECTION, SOLUTION INTRAVENOUS at 11:08

## 2022-08-31 PROBLEM — M54.9 CHRONIC BACK PAIN: Status: ACTIVE | Noted: 2022-08-31

## 2022-08-31 PROBLEM — N18.2 STAGE 2 CHRONIC KIDNEY DISEASE: Status: ACTIVE | Noted: 2022-08-31

## 2022-08-31 PROBLEM — N30.00 ACUTE CYSTITIS: Status: ACTIVE | Noted: 2022-08-31

## 2022-08-31 PROBLEM — A41.9 SEPSIS: Status: ACTIVE | Noted: 2022-08-31

## 2022-08-31 PROBLEM — G89.29 CHRONIC BACK PAIN: Status: ACTIVE | Noted: 2022-08-31

## 2022-08-31 LAB
ALBUMIN SERPL BCP-MCNC: 3.7 G/DL (ref 3.5–5.2)
ALP SERPL-CCNC: 100 U/L (ref 55–135)
ALT SERPL W/O P-5'-P-CCNC: 14 U/L (ref 10–44)
ANION GAP SERPL CALC-SCNC: 11 MMOL/L (ref 8–16)
ANION GAP SERPL CALC-SCNC: 6 MMOL/L (ref 8–16)
ANION GAP SERPL CALC-SCNC: 6 MMOL/L (ref 8–16)
ASCENDING AORTA: 3.68 CM
AST SERPL-CCNC: 25 U/L (ref 10–40)
AV INDEX (PROSTH): 0.82
AV MEAN GRADIENT: 4 MMHG
AV PEAK GRADIENT: 6 MMHG
AV VALVE AREA: 3.14 CM2
AV VELOCITY RATIO: 0.83
BACTERIA #/AREA URNS AUTO: ABNORMAL /HPF
BASOPHILS # BLD AUTO: 0.03 K/UL (ref 0–0.2)
BASOPHILS # BLD AUTO: 0.05 K/UL (ref 0–0.2)
BASOPHILS # BLD AUTO: 0.05 K/UL (ref 0–0.2)
BASOPHILS NFR BLD: 0.2 % (ref 0–1.9)
BASOPHILS NFR BLD: 0.4 % (ref 0–1.9)
BASOPHILS NFR BLD: 0.8 % (ref 0–1.9)
BILIRUB SERPL-MCNC: 0.5 MG/DL (ref 0.1–1)
BILIRUB UR QL STRIP: NEGATIVE
BSA FOR ECHO PROCEDURE: 2.22 M2
BUN SERPL-MCNC: 18 MG/DL (ref 8–23)
BUN SERPL-MCNC: 21 MG/DL (ref 8–23)
BUN SERPL-MCNC: 23 MG/DL (ref 8–23)
CALCIUM SERPL-MCNC: 9.2 MG/DL (ref 8.7–10.5)
CALCIUM SERPL-MCNC: 9.4 MG/DL (ref 8.7–10.5)
CALCIUM SERPL-MCNC: 9.6 MG/DL (ref 8.7–10.5)
CHLORIDE SERPL-SCNC: 103 MMOL/L (ref 95–110)
CHLORIDE SERPL-SCNC: 103 MMOL/L (ref 95–110)
CHLORIDE SERPL-SCNC: 105 MMOL/L (ref 95–110)
CLARITY UR REFRACT.AUTO: CLEAR
CO2 SERPL-SCNC: 22 MMOL/L (ref 23–29)
CO2 SERPL-SCNC: 28 MMOL/L (ref 23–29)
CO2 SERPL-SCNC: 29 MMOL/L (ref 23–29)
COLOR UR AUTO: YELLOW
CREAT SERPL-MCNC: 1 MG/DL (ref 0.5–1.4)
CREAT SERPL-MCNC: 1.1 MG/DL (ref 0.5–1.4)
CREAT SERPL-MCNC: 1.2 MG/DL (ref 0.5–1.4)
CV ECHO LV RWT: 0.32 CM
DIFFERENTIAL METHOD: ABNORMAL
DOP CALC AO PEAK VEL: 1.24 M/S
DOP CALC AO VTI: 29.92 CM
DOP CALC LVOT AREA: 3.8 CM2
DOP CALC LVOT DIAMETER: 2.21 CM
DOP CALC LVOT PEAK VEL: 1.03 M/S
DOP CALC LVOT STROKE VOLUME: 93.97 CM3
DOP CALCLVOT PEAK VEL VTI: 24.51 CM
E WAVE DECELERATION TIME: 163.61 MSEC
E/A RATIO: 1.25
E/E' RATIO: 11.38 M/S
ECHO LV POSTERIOR WALL: 0.79 CM (ref 0.6–1.1)
EJECTION FRACTION: 65 %
EOSINOPHIL # BLD AUTO: 0 K/UL (ref 0–0.5)
EOSINOPHIL # BLD AUTO: 0.1 K/UL (ref 0–0.5)
EOSINOPHIL # BLD AUTO: 0.1 K/UL (ref 0–0.5)
EOSINOPHIL NFR BLD: 0.3 % (ref 0–8)
EOSINOPHIL NFR BLD: 0.9 % (ref 0–8)
EOSINOPHIL NFR BLD: 2.2 % (ref 0–8)
ERYTHROCYTE [DISTWIDTH] IN BLOOD BY AUTOMATED COUNT: 14.2 % (ref 11.5–14.5)
ERYTHROCYTE [DISTWIDTH] IN BLOOD BY AUTOMATED COUNT: 14.4 % (ref 11.5–14.5)
ERYTHROCYTE [DISTWIDTH] IN BLOOD BY AUTOMATED COUNT: 14.4 % (ref 11.5–14.5)
EST. GFR  (NO RACE VARIABLE): >60 ML/MIN/1.73 M^2
FRACTIONAL SHORTENING: 31 % (ref 28–44)
GLUCOSE SERPL-MCNC: 118 MG/DL (ref 70–110)
GLUCOSE SERPL-MCNC: 122 MG/DL (ref 70–110)
GLUCOSE SERPL-MCNC: 146 MG/DL (ref 70–110)
GLUCOSE UR QL STRIP: NEGATIVE
HCT VFR BLD AUTO: 39.6 % (ref 40–54)
HCT VFR BLD AUTO: 39.9 % (ref 40–54)
HCT VFR BLD AUTO: 43.3 % (ref 40–54)
HGB BLD-MCNC: 12.9 G/DL (ref 14–18)
HGB BLD-MCNC: 13 G/DL (ref 14–18)
HGB BLD-MCNC: 14 G/DL (ref 14–18)
HGB UR QL STRIP: ABNORMAL
IMM GRANULOCYTES # BLD AUTO: 0.02 K/UL (ref 0–0.04)
IMM GRANULOCYTES # BLD AUTO: 0.02 K/UL (ref 0–0.04)
IMM GRANULOCYTES # BLD AUTO: 0.07 K/UL (ref 0–0.04)
IMM GRANULOCYTES NFR BLD AUTO: 0.2 % (ref 0–0.5)
IMM GRANULOCYTES NFR BLD AUTO: 0.3 % (ref 0–0.5)
IMM GRANULOCYTES NFR BLD AUTO: 0.5 % (ref 0–0.5)
INTERVENTRICULAR SEPTUM: 0.85 CM (ref 0.6–1.1)
IVRT: 77.07 MSEC
KETONES UR QL STRIP: NEGATIVE
LA MAJOR: 4.63 CM
LA MINOR: 5.12 CM
LA WIDTH: 3.78 CM
LACTATE SERPL-SCNC: 1.4 MMOL/L (ref 0.5–2.2)
LACTATE SERPL-SCNC: 1.7 MMOL/L (ref 0.5–2.2)
LEFT ATRIUM SIZE: 3.5 CM
LEFT ATRIUM VOLUME INDEX MOD: 21.8 ML/M2
LEFT ATRIUM VOLUME INDEX: 25.2 ML/M2
LEFT ATRIUM VOLUME MOD: 47.38 CM3
LEFT ATRIUM VOLUME: 54.68 CM3
LEFT INTERNAL DIMENSION IN SYSTOLE: 3.42 CM (ref 2.1–4)
LEFT VENTRICLE DIASTOLIC VOLUME INDEX: 54.28 ML/M2
LEFT VENTRICLE DIASTOLIC VOLUME: 117.78 ML
LEFT VENTRICLE MASS INDEX: 64 G/M2
LEFT VENTRICLE SYSTOLIC VOLUME INDEX: 22.2 ML/M2
LEFT VENTRICLE SYSTOLIC VOLUME: 48.07 ML
LEFT VENTRICULAR INTERNAL DIMENSION IN DIASTOLE: 4.99 CM (ref 3.5–6)
LEFT VENTRICULAR MASS: 139.7 G
LEUKOCYTE ESTERASE UR QL STRIP: ABNORMAL
LV LATERAL E/E' RATIO: 10.11 M/S
LV SEPTAL E/E' RATIO: 13 M/S
LYMPHOCYTES # BLD AUTO: 0.6 K/UL (ref 1–4.8)
LYMPHOCYTES # BLD AUTO: 1.4 K/UL (ref 1–4.8)
LYMPHOCYTES # BLD AUTO: 1.8 K/UL (ref 1–4.8)
LYMPHOCYTES NFR BLD: 12.4 % (ref 18–48)
LYMPHOCYTES NFR BLD: 23.7 % (ref 18–48)
LYMPHOCYTES NFR BLD: 5.3 % (ref 18–48)
MAGNESIUM SERPL-MCNC: 2.2 MG/DL (ref 1.6–2.6)
MCH RBC QN AUTO: 27.7 PG (ref 27–31)
MCH RBC QN AUTO: 27.8 PG (ref 27–31)
MCH RBC QN AUTO: 28.5 PG (ref 27–31)
MCHC RBC AUTO-ENTMCNC: 32.3 G/DL (ref 32–36)
MCHC RBC AUTO-ENTMCNC: 32.6 G/DL (ref 32–36)
MCHC RBC AUTO-ENTMCNC: 32.6 G/DL (ref 32–36)
MCV RBC AUTO: 85 FL (ref 82–98)
MCV RBC AUTO: 86 FL (ref 82–98)
MCV RBC AUTO: 87 FL (ref 82–98)
MICROSCOPIC COMMENT: ABNORMAL
MONOCYTES # BLD AUTO: 0.7 K/UL (ref 0.3–1)
MONOCYTES # BLD AUTO: 1.2 K/UL (ref 0.3–1)
MONOCYTES # BLD AUTO: 1.5 K/UL (ref 0.3–1)
MONOCYTES NFR BLD: 10.4 % (ref 4–15)
MONOCYTES NFR BLD: 12.2 % (ref 4–15)
MONOCYTES NFR BLD: 9.9 % (ref 4–15)
MV A" WAVE DURATION": 12.27 MSEC
MV PEAK A VEL: 0.73 M/S
MV PEAK E VEL: 0.91 M/S
MV STENOSIS PRESSURE HALF TIME: 47.45 MS
MV VALVE AREA P 1/2 METHOD: 4.64 CM2
NEUTROPHILS # BLD AUTO: 10.2 K/UL (ref 1.8–7.7)
NEUTROPHILS # BLD AUTO: 10.6 K/UL (ref 1.8–7.7)
NEUTROPHILS # BLD AUTO: 3.6 K/UL (ref 1.8–7.7)
NEUTROPHILS NFR BLD: 60.8 % (ref 38–73)
NEUTROPHILS NFR BLD: 75.4 % (ref 38–73)
NEUTROPHILS NFR BLD: 84.1 % (ref 38–73)
NITRITE UR QL STRIP: POSITIVE
NRBC BLD-RTO: 0 /100 WBC
PH UR STRIP: 5 [PH] (ref 5–8)
PISA TR MAX VEL: 2.9 M/S
PLATELET # BLD AUTO: 196 K/UL (ref 150–450)
PLATELET # BLD AUTO: 201 K/UL (ref 150–450)
PLATELET # BLD AUTO: 221 K/UL (ref 150–450)
PMV BLD AUTO: 9.5 FL (ref 9.2–12.9)
PMV BLD AUTO: 9.7 FL (ref 9.2–12.9)
PMV BLD AUTO: 9.8 FL (ref 9.2–12.9)
POCT GLUCOSE: 107 MG/DL (ref 70–110)
POCT GLUCOSE: 151 MG/DL (ref 70–110)
POTASSIUM SERPL-SCNC: 3.6 MMOL/L (ref 3.5–5.1)
POTASSIUM SERPL-SCNC: 3.7 MMOL/L (ref 3.5–5.1)
POTASSIUM SERPL-SCNC: 4 MMOL/L (ref 3.5–5.1)
PROT SERPL-MCNC: 7.4 G/DL (ref 6–8.4)
PROT UR QL STRIP: NEGATIVE
PULM VEIN S/D RATIO: 1.24
PV PEAK D VEL: 0.59 M/S
PV PEAK S VEL: 0.73 M/S
RA MAJOR: 5.57 CM
RA PRESSURE: 8 MMHG
RA WIDTH: 3.52 CM
RBC # BLD AUTO: 4.53 M/UL (ref 4.6–6.2)
RBC # BLD AUTO: 4.69 M/UL (ref 4.6–6.2)
RBC # BLD AUTO: 5.04 M/UL (ref 4.6–6.2)
RBC #/AREA URNS AUTO: 4 /HPF (ref 0–4)
RIGHT VENTRICULAR END-DIASTOLIC DIMENSION: 4.93 CM
RV TISSUE DOPPLER FREE WALL SYSTOLIC VELOCITY 1 (APICAL 4 CHAMBER VIEW): 12.42 CM/S
SINUS: 3.79 CM
SODIUM SERPL-SCNC: 136 MMOL/L (ref 136–145)
SODIUM SERPL-SCNC: 138 MMOL/L (ref 136–145)
SODIUM SERPL-SCNC: 139 MMOL/L (ref 136–145)
SP GR UR STRIP: 1.02 (ref 1–1.03)
SQUAMOUS #/AREA URNS AUTO: 1 /HPF
STJ: 3.01 CM
TDI LATERAL: 0.09 M/S
TDI SEPTAL: 0.07 M/S
TDI: 0.08 M/S
TR MAX PG: 34 MMHG
TRICUSPID ANNULAR PLANE SYSTOLIC EXCURSION: 1.8 CM
TROPONIN I SERPL DL<=0.01 NG/ML-MCNC: <0.006 NG/ML (ref 0–0.03)
TV REST PULMONARY ARTERY PRESSURE: 42 MMHG
URN SPEC COLLECT METH UR: ABNORMAL
WBC # BLD AUTO: 12.17 K/UL (ref 3.9–12.7)
WBC # BLD AUTO: 14.08 K/UL (ref 3.9–12.7)
WBC # BLD AUTO: 5.91 K/UL (ref 3.9–12.7)
WBC #/AREA URNS AUTO: 99 /HPF (ref 0–5)

## 2022-08-31 PROCEDURE — G0378 HOSPITAL OBSERVATION PER HR: HCPCS

## 2022-08-31 PROCEDURE — 96365 THER/PROPH/DIAG IV INF INIT: CPT | Mod: 59

## 2022-08-31 PROCEDURE — 63600175 PHARM REV CODE 636 W HCPCS: Performed by: EMERGENCY MEDICINE

## 2022-08-31 PROCEDURE — 83735 ASSAY OF MAGNESIUM: CPT | Performed by: PHYSICIAN ASSISTANT

## 2022-08-31 PROCEDURE — 85025 COMPLETE CBC W/AUTO DIFF WBC: CPT | Mod: 91 | Performed by: PHYSICIAN ASSISTANT

## 2022-08-31 PROCEDURE — 25000003 PHARM REV CODE 250: Performed by: PHYSICIAN ASSISTANT

## 2022-08-31 PROCEDURE — 80048 BASIC METABOLIC PNL TOTAL CA: CPT

## 2022-08-31 PROCEDURE — 25000003 PHARM REV CODE 250

## 2022-08-31 PROCEDURE — 99220 PR INITIAL OBSERVATION CARE,LEVL III: ICD-10-PCS | Mod: ,,,

## 2022-08-31 PROCEDURE — 99220 PR INITIAL OBSERVATION CARE,LEVL III: CPT | Mod: ,,,

## 2022-08-31 PROCEDURE — 96361 HYDRATE IV INFUSION ADD-ON: CPT

## 2022-08-31 PROCEDURE — 80048 BASIC METABOLIC PNL TOTAL CA: CPT | Mod: 91 | Performed by: PHYSICIAN ASSISTANT

## 2022-08-31 PROCEDURE — 83605 ASSAY OF LACTIC ACID: CPT | Performed by: EMERGENCY MEDICINE

## 2022-08-31 PROCEDURE — 36415 COLL VENOUS BLD VENIPUNCTURE: CPT | Performed by: PHYSICIAN ASSISTANT

## 2022-08-31 PROCEDURE — 36415 COLL VENOUS BLD VENIPUNCTURE: CPT

## 2022-08-31 PROCEDURE — 85025 COMPLETE CBC W/AUTO DIFF WBC: CPT

## 2022-08-31 RX ORDER — ACETAMINOPHEN 325 MG/1
650 TABLET ORAL EVERY 4 HOURS PRN
Status: DISCONTINUED | OUTPATIENT
Start: 2022-08-31 | End: 2022-09-02 | Stop reason: HOSPADM

## 2022-08-31 RX ORDER — GABAPENTIN 300 MG/1
300 CAPSULE ORAL DAILY
Status: DISCONTINUED | OUTPATIENT
Start: 2022-08-31 | End: 2022-09-02 | Stop reason: HOSPADM

## 2022-08-31 RX ORDER — TALC
6 POWDER (GRAM) TOPICAL NIGHTLY PRN
Status: DISCONTINUED | OUTPATIENT
Start: 2022-08-31 | End: 2022-09-02 | Stop reason: HOSPADM

## 2022-08-31 RX ORDER — TAMSULOSIN HYDROCHLORIDE 0.4 MG/1
0.4 CAPSULE ORAL DAILY
Status: DISCONTINUED | OUTPATIENT
Start: 2022-08-31 | End: 2022-09-02 | Stop reason: HOSPADM

## 2022-08-31 RX ORDER — POLYETHYLENE GLYCOL 3350 17 G/17G
17 POWDER, FOR SOLUTION ORAL DAILY PRN
Status: DISCONTINUED | OUTPATIENT
Start: 2022-08-31 | End: 2022-09-02 | Stop reason: HOSPADM

## 2022-08-31 RX ORDER — IPRATROPIUM BROMIDE AND ALBUTEROL SULFATE 2.5; .5 MG/3ML; MG/3ML
3 SOLUTION RESPIRATORY (INHALATION) EVERY 4 HOURS PRN
Status: DISCONTINUED | OUTPATIENT
Start: 2022-08-31 | End: 2022-09-02 | Stop reason: HOSPADM

## 2022-08-31 RX ORDER — GLUCAGON 1 MG
1 KIT INJECTION
Status: DISCONTINUED | OUTPATIENT
Start: 2022-08-31 | End: 2022-09-02 | Stop reason: HOSPADM

## 2022-08-31 RX ORDER — BISACODYL 10 MG
10 SUPPOSITORY, RECTAL RECTAL DAILY PRN
Status: DISCONTINUED | OUTPATIENT
Start: 2022-08-31 | End: 2022-09-02 | Stop reason: HOSPADM

## 2022-08-31 RX ORDER — IBUPROFEN 200 MG
24 TABLET ORAL
Status: DISCONTINUED | OUTPATIENT
Start: 2022-08-31 | End: 2022-09-02 | Stop reason: HOSPADM

## 2022-08-31 RX ORDER — LEVOFLOXACIN 5 MG/ML
750 INJECTION, SOLUTION INTRAVENOUS
Status: DISCONTINUED | OUTPATIENT
Start: 2022-08-31 | End: 2022-08-31

## 2022-08-31 RX ORDER — POTASSIUM CHLORIDE 750 MG/1
10 CAPSULE, EXTENDED RELEASE ORAL DAILY
Status: DISCONTINUED | OUTPATIENT
Start: 2022-08-31 | End: 2022-09-02 | Stop reason: HOSPADM

## 2022-08-31 RX ORDER — ONDANSETRON 8 MG/1
8 TABLET, ORALLY DISINTEGRATING ORAL EVERY 8 HOURS PRN
Status: DISCONTINUED | OUTPATIENT
Start: 2022-08-31 | End: 2022-09-02 | Stop reason: HOSPADM

## 2022-08-31 RX ORDER — PROMETHAZINE HYDROCHLORIDE 25 MG/1
25 TABLET ORAL EVERY 6 HOURS PRN
Status: DISCONTINUED | OUTPATIENT
Start: 2022-08-31 | End: 2022-09-02 | Stop reason: HOSPADM

## 2022-08-31 RX ORDER — SODIUM CHLORIDE 0.9 % (FLUSH) 0.9 %
10 SYRINGE (ML) INJECTION
Status: DISCONTINUED | OUTPATIENT
Start: 2022-08-31 | End: 2022-09-02 | Stop reason: HOSPADM

## 2022-08-31 RX ORDER — LOSARTAN POTASSIUM AND HYDROCHLOROTHIAZIDE 12.5; 5 MG/1; MG/1
1 TABLET ORAL EVERY MORNING
Status: DISCONTINUED | OUTPATIENT
Start: 2022-08-31 | End: 2022-09-02 | Stop reason: HOSPADM

## 2022-08-31 RX ORDER — FAMOTIDINE 20 MG/1
20 TABLET, FILM COATED ORAL 2 TIMES DAILY
Status: DISCONTINUED | OUTPATIENT
Start: 2022-08-31 | End: 2022-09-02 | Stop reason: HOSPADM

## 2022-08-31 RX ORDER — FUROSEMIDE 20 MG/1
20 TABLET ORAL EVERY MORNING
Status: DISCONTINUED | OUTPATIENT
Start: 2022-08-31 | End: 2022-09-02 | Stop reason: HOSPADM

## 2022-08-31 RX ORDER — IBUPROFEN 200 MG
16 TABLET ORAL
Status: DISCONTINUED | OUTPATIENT
Start: 2022-08-31 | End: 2022-09-02 | Stop reason: HOSPADM

## 2022-08-31 RX ADMIN — POTASSIUM CHLORIDE 10 MEQ: 10 CAPSULE, COATED, EXTENDED RELEASE ORAL at 09:08

## 2022-08-31 RX ADMIN — APIXABAN 5 MG: 5 TABLET, FILM COATED ORAL at 02:08

## 2022-08-31 RX ADMIN — CEFTRIAXONE 1 G: 1 INJECTION, SOLUTION INTRAVENOUS at 12:08

## 2022-08-31 RX ADMIN — GABAPENTIN 300 MG: 300 CAPSULE ORAL at 09:08

## 2022-08-31 RX ADMIN — FAMOTIDINE 20 MG: 20 TABLET ORAL at 08:08

## 2022-08-31 RX ADMIN — TAMSULOSIN HYDROCHLORIDE 0.4 MG: 0.4 CAPSULE ORAL at 09:08

## 2022-08-31 RX ADMIN — FUROSEMIDE 20 MG: 20 TABLET ORAL at 09:08

## 2022-08-31 RX ADMIN — APIXABAN 5 MG: 5 TABLET, FILM COATED ORAL at 08:08

## 2022-08-31 RX ADMIN — FAMOTIDINE 20 MG: 20 TABLET ORAL at 09:08

## 2022-08-31 RX ADMIN — SODIUM CHLORIDE 500 ML: 0.9 INJECTION, SOLUTION INTRAVENOUS at 11:08

## 2022-08-31 NOTE — ASSESSMENT & PLAN NOTE
BP stable in ED, given LR bolus per sepsis protocol   - continue furosemide 20 mg daily  - continue metoprolol succinate 12.5 mg daily  - monitor pressures closely given sepsis presentation

## 2022-08-31 NOTE — SUBJECTIVE & OBJECTIVE
Past Medical History:   Diagnosis Date    Back pain, chronic     BPH (benign prostatic hyperplasia)     Hypertension     Mitral valve prolapse syndrome     Prostate enlargement        Past Surgical History:   Procedure Laterality Date    NECK SURGERY         Review of patient's allergies indicates:   Allergen Reactions    Penicillins Palpitations and Anxiety       No current facility-administered medications on file prior to encounter.     Current Outpatient Medications on File Prior to Encounter   Medication Sig    albuterol (PROVENTIL/VENTOLIN HFA) 90 mcg/actuation inhaler Inhale 2 puffs into the lungs every 4 (four) hours as needed for Wheezing or Shortness of Breath. Rescue    ELIQUIS 5 mg Tab Take 5 mg by mouth 2 (two) times daily.    famotidine (PEPCID) 20 MG tablet Take 1 tablet (20 mg total) by mouth 2 (two) times daily.    furosemide (LASIX) 20 MG tablet Take 20 mg by mouth every morning.    gabapentin (NEURONTIN) 300 MG capsule Take 300 mg by mouth once daily. Only takes at night.    losartan (COZAAR) 25 MG tablet Take 12.5 mg by mouth once daily.    losartan-hydrochlorothiazide 50-12.5 mg (HYZAAR) 50-12.5 mg per tablet Take 1 tablet by mouth every morning.    metoprolol succinate (TOPROL-XL) 25 MG 24 hr tablet Take 12.5 mg by mouth once daily.    pantoprazole (PROTONIX) 20 MG tablet Take 2 tablets (40 mg total) by mouth once daily.    potassium chloride (MICRO-K) 10 MEQ CpSR Take 10 mEq by mouth once daily.    tamsulosin (FLOMAX) 0.4 mg Cp24 Take 0.4 mg by mouth once daily.    [DISCONTINUED] amLODIPine (NORVASC) 5 MG tablet Take 1 tablet (5 mg total) by mouth once daily.    [DISCONTINUED] hydroCHLOROthiazide (HYDRODIURIL) 12.5 MG Tab Take 12.5 mg by mouth once daily.    [DISCONTINUED] lisinopril (PRINIVIL,ZESTRIL) 20 MG tablet Take 20 mg by mouth once daily.    [DISCONTINUED] metoprolol tartrate (LOPRESSOR) 25 MG tablet Take 12.5 mg by mouth 2 (two) times daily.     Family History       Problem Relation  (Age of Onset)    Hypertension Mother    Stroke Father          Tobacco Use    Smoking status: Former    Smokeless tobacco: Never    Tobacco comments:     >40 years ago   Substance and Sexual Activity    Alcohol use: No    Drug use: No    Sexual activity: Not Currently     Partners: Female     Review of Systems   Constitutional:  Positive for chills and fever.   HENT:  Negative for congestion.    Respiratory:  Negative for chest tightness and shortness of breath.    Cardiovascular:  Negative for chest pain and leg swelling.   Gastrointestinal:  Negative for abdominal pain and nausea.   Genitourinary:  Negative for difficulty urinating, dysuria, flank pain, frequency, hematuria and urgency.   Musculoskeletal:  Positive for back pain. Negative for myalgias.   Skin:  Negative for rash and wound.   Neurological:  Negative for dizziness and weakness.   Objective:     Vital Signs (Most Recent):  Temp: 99.9 °F (37.7 °C) (08/30/22 2359)  Pulse: 78 (08/30/22 2359)  Resp: 14 (08/30/22 2359)  BP: 139/74 (08/30/22 2359)  SpO2: 99 % (08/30/22 2359) Vital Signs (24h Range):  Temp:  [99.9 °F (37.7 °C)-101.2 °F (38.4 °C)] 99.9 °F (37.7 °C)  Pulse:  [72-78] 78  Resp:  [14-18] 14  SpO2:  [98 %-99 %] 99 %  BP: (137-139)/(74-81) 139/74     Weight: 99.8 kg (220 lb)  Body mass index is 31.57 kg/m².    Physical Exam  Vitals and nursing note reviewed.   Constitutional:       General: He is not in acute distress.     Appearance: He is well-developed.   HENT:      Head: Normocephalic and atraumatic.      Mouth/Throat:      Pharynx: No oropharyngeal exudate.   Eyes:      Conjunctiva/sclera: Conjunctivae normal.      Pupils: Pupils are equal, round, and reactive to light.   Cardiovascular:      Rate and Rhythm: Normal rate and regular rhythm.      Heart sounds: Normal heart sounds.   Pulmonary:      Effort: Pulmonary effort is normal. No respiratory distress.      Breath sounds: Normal breath sounds. No wheezing.   Abdominal:      General:  Bowel sounds are normal. There is no distension.      Palpations: Abdomen is soft.      Tenderness: There is no abdominal tenderness. There is no right CVA tenderness or left CVA tenderness.   Musculoskeletal:         General: No tenderness. Normal range of motion.      Cervical back: Normal range of motion and neck supple.   Lymphadenopathy:      Cervical: No cervical adenopathy.   Skin:     General: Skin is warm and dry.      Capillary Refill: Capillary refill takes less than 2 seconds.      Findings: No rash.   Neurological:      Mental Status: He is alert and oriented to person, place, and time.      Cranial Nerves: No cranial nerve deficit.      Sensory: No sensory deficit.      Coordination: Coordination normal.   Psychiatric:         Behavior: Behavior normal.         Thought Content: Thought content normal.         Judgment: Judgment normal.         CRANIAL NERVES     CN III, IV, VI   Pupils are equal, round, and reactive to light.     Significant Labs: All pertinent labs within the past 24 hours have been reviewed.  BMP:   Recent Labs   Lab 08/30/22  2232   *      K 4.0      CO2 22*   BUN 21   CREATININE 1.2   CALCIUM 9.6     CBC:   Recent Labs   Lab 08/30/22  2232   WBC 12.17   HGB 14.0   HCT 43.3          Significant Imaging: I have reviewed all pertinent imaging results/findings within the past 24 hours.

## 2022-08-31 NOTE — ASSESSMENT & PLAN NOTE
Creatine stable for now. BMP reviewed- noted Estimated Creatinine Clearance: 63.9 mL/min (based on SCr of 1.2 mg/dL). according to latest data. Monitor UOP and serial BMP and adjust therapy as needed. Renally dose meds.

## 2022-08-31 NOTE — PLAN OF CARE
Pt admitted to floor from ED this AM; pt alert, stable; plan is abx therapy; no distress noted during shift; safety maintained.    Problem: Adult Inpatient Plan of Care  Goal: Plan of Care Review  Outcome: Ongoing, Progressing  Goal: Patient-Specific Goal (Individualized)  Outcome: Ongoing, Progressing  Goal: Absence of Hospital-Acquired Illness or Injury  Outcome: Ongoing, Progressing  Goal: Optimal Comfort and Wellbeing  Outcome: Ongoing, Progressing  Goal: Readiness for Transition of Care  Outcome: Ongoing, Progressing     Problem: Infection  Goal: Absence of Infection Signs and Symptoms  Outcome: Ongoing, Progressing     Problem: Adjustment to Illness (Sepsis/Septic Shock)  Goal: Optimal Coping  Outcome: Ongoing, Progressing     Problem: Bleeding (Sepsis/Septic Shock)  Goal: Absence of Bleeding  Outcome: Ongoing, Progressing     Problem: Glycemic Control Impaired (Sepsis/Septic Shock)  Goal: Blood Glucose Level Within Desired Range  Outcome: Ongoing, Progressing     Problem: Infection Progression (Sepsis/Septic Shock)  Goal: Absence of Infection Signs and Symptoms  Outcome: Ongoing, Progressing     Problem: Nutrition Impaired (Sepsis/Septic Shock)  Goal: Optimal Nutrition Intake  Outcome: Ongoing, Progressing

## 2022-08-31 NOTE — H&P
[facility] - Emergency Dept  [facility] Medicine  History & Physical    Patient Name: Jerardo Callejas  MRN: [id number]  Patient Class: OP- Observation  Admission Date: 8/30/2022  Attending Physician: Hillary Maher MD   Primary Care Provider: DOMINIC Barker MD         Patient information was obtained from patient and ER records.     Subjective:     Principal Problem:Sepsis    Chief Complaint:   Chief Complaint   Patient presents with    Fever     Pt called EMS for fever/chills, temp 101.2 x 1 day.     Chest Pain     Pt reporting L side chest pain upon arrival to ED        HPI: Jerardo Callejas is a 72 y.o. male with a history of BPH, DVT (11/2021, on eliquis), HTN and recurrent UTIs presents to the ED with fever and chills. He states that he began to start shaking at home around 6 pm today which prompted him to take his temperature which was elevated. He states he recently completed a 7 day course of bactrim for a UTI that was incidentally found during prior admission (8/16). He reports hx of frequent UTIs and has been evaluated by urology. Denies any chest pain, SOB, N/V, abdominal or flank pain. Denies any burning with urination, urgency, or frequency.    ED: Temp: 101.2, WBC 12K. Vitals otherwise stable. CMP significant for glucose 146. Cr 1.2 (baseline 1.0-1.2). Lactic 1.7. UA significant for + nitrites, 3+ leukocytes, 99 WBC and few bacteria. Urine culture is pending. Blood culture collected. CXR without acute process. Received 2190 cc LR bolus and ceftriaxone 1 g IV in ED.      Past Medical History:   Diagnosis Date    Back pain, chronic     BPH (benign prostatic hyperplasia)     Hypertension     Mitral valve prolapse syndrome     Prostate enlargement        Past Surgical History:   Procedure Laterality Date    NECK SURGERY         Review of patient's allergies indicates:   Allergen Reactions    Penicillins Palpitations and Anxiety       No current facility-administered medications on file prior to  encounter.     Current Outpatient Medications on File Prior to Encounter   Medication Sig    albuterol (PROVENTIL/VENTOLIN HFA) 90 mcg/actuation inhaler Inhale 2 puffs into the lungs every 4 (four) hours as needed for Wheezing or Shortness of Breath. Rescue    ELIQUIS 5 mg Tab Take 5 mg by mouth 2 (two) times daily.    famotidine (PEPCID) 20 MG tablet Take 1 tablet (20 mg total) by mouth 2 (two) times daily.    furosemide (LASIX) 20 MG tablet Take 20 mg by mouth every morning.    gabapentin (NEURONTIN) 300 MG capsule Take 300 mg by mouth once daily. Only takes at night.    losartan (COZAAR) 25 MG tablet Take 12.5 mg by mouth once daily.    losartan-hydrochlorothiazide 50-12.5 mg (HYZAAR) 50-12.5 mg per tablet Take 1 tablet by mouth every morning.    metoprolol succinate (TOPROL-XL) 25 MG 24 hr tablet Take 12.5 mg by mouth once daily.    pantoprazole (PROTONIX) 20 MG tablet Take 2 tablets (40 mg total) by mouth once daily.    potassium chloride (MICRO-K) 10 MEQ CpSR Take 10 mEq by mouth once daily.    tamsulosin (FLOMAX) 0.4 mg Cp24 Take 0.4 mg by mouth once daily.    [DISCONTINUED] amLODIPine (NORVASC) 5 MG tablet Take 1 tablet (5 mg total) by mouth once daily.    [DISCONTINUED] hydroCHLOROthiazide (HYDRODIURIL) 12.5 MG Tab Take 12.5 mg by mouth once daily.    [DISCONTINUED] lisinopril (PRINIVIL,ZESTRIL) 20 MG tablet Take 20 mg by mouth once daily.    [DISCONTINUED] metoprolol tartrate (LOPRESSOR) 25 MG tablet Take 12.5 mg by mouth 2 (two) times daily.     Family History       Problem Relation (Age of Onset)    Hypertension Mother    Stroke Father          Tobacco Use    Smoking status: Former    Smokeless tobacco: Never    Tobacco comments:     >40 years ago   Substance and Sexual Activity    Alcohol use: No    Drug use: No    Sexual activity: Not Currently     Partners: Female     Review of Systems   Constitutional:  Positive for chills and fever.   HENT:  Negative for congestion.     Respiratory:  Negative for chest tightness and shortness of breath.    Cardiovascular:  Negative for chest pain and leg swelling.   Gastrointestinal:  Negative for abdominal pain and nausea.   Genitourinary:  Negative for difficulty urinating, dysuria, flank pain, frequency, hematuria and urgency.   Musculoskeletal:  Positive for back pain. Negative for myalgias.   Skin:  Negative for rash and wound.   Neurological:  Negative for dizziness and weakness.   Objective:     Vital Signs (Most Recent):  Temp: 99.9 °F (37.7 °C) (08/30/22 2359)  Pulse: 78 (08/30/22 2359)  Resp: 14 (08/30/22 2359)  BP: 139/74 (08/30/22 2359)  SpO2: 99 % (08/30/22 2359) Vital Signs (24h Range):  Temp:  [99.9 °F (37.7 °C)-101.2 °F (38.4 °C)] 99.9 °F (37.7 °C)  Pulse:  [72-78] 78  Resp:  [14-18] 14  SpO2:  [98 %-99 %] 99 %  BP: (137-139)/(74-81) 139/74     Weight: 99.8 kg (220 lb)  Body mass index is 31.57 kg/m².    Physical Exam  Vitals and nursing note reviewed.   Constitutional:       General: He is not in acute distress.     Appearance: He is well-developed.   HENT:      Head: Normocephalic and atraumatic.      Mouth/Throat:      Pharynx: No oropharyngeal exudate.   Eyes:      Conjunctiva/sclera: Conjunctivae normal.      Pupils: Pupils are equal, round, and reactive to light.   Cardiovascular:      Rate and Rhythm: Normal rate and regular rhythm.      Heart sounds: Normal heart sounds.   Pulmonary:      Effort: Pulmonary effort is normal. No respiratory distress.      Breath sounds: Normal breath sounds. No wheezing.   Abdominal:      General: Bowel sounds are normal. There is no distension.      Palpations: Abdomen is soft.      Tenderness: There is no abdominal tenderness. There is no right CVA tenderness or left CVA tenderness.   Musculoskeletal:         General: No tenderness. Normal range of motion.      Cervical back: Normal range of motion and neck supple.   Lymphadenopathy:      Cervical: No cervical adenopathy.   Skin:      General: Skin is warm and dry.      Capillary Refill: Capillary refill takes less than 2 seconds.      Findings: No rash.   Neurological:      Mental Status: He is alert and oriented to person, place, and time.      Cranial Nerves: No cranial nerve deficit.      Sensory: No sensory deficit.      Coordination: Coordination normal.   Psychiatric:         Behavior: Behavior normal.         Thought Content: Thought content normal.         Judgment: Judgment normal.         CRANIAL NERVES     CN III, IV, VI   Pupils are equal, round, and reactive to light.     Significant Labs: All pertinent labs within the past 24 hours have been reviewed.  BMP:   Recent Labs   Lab 08/30/22  2232   *      K 4.0      CO2 22*   BUN 21   CREATININE 1.2   CALCIUM 9.6     CBC:   Recent Labs   Lab 08/30/22  2232   WBC 12.17   HGB 14.0   HCT 43.3          Significant Imaging: I have reviewed all pertinent imaging results/findings within the past 24 hours.    Assessment/Plan:     * Sepsis  Acute cystitis     2/4 SRS Criteria: T 101.2, WBC 12K. likely 2/2 to UTI. No flank pain on exam, no urinary symptoms; low concern for pyelonephritis, will monitor.   - Lactic 1.7   - CXR without acute process   - UA + nitrites, 3+ leukocytes, 99 WBC, and few bacteria   - Follow reflex urine culture  - Given ceftriaxone 1 g IV in ED. Prior urine cultures and sensitivities reviewed, will continue for now.   - tylenol prn for fever       Stage 2 chronic kidney disease  Creatine stable for now. BMP reviewed- noted Estimated Creatinine Clearance: 63.9 mL/min (based on SCr of 1.2 mg/dL). according to latest data. Monitor UOP and serial BMP and adjust therapy as needed. Renally dose meds.      Chronic back pain  - continue gabapentin 300 mg daily      Gastroesophageal reflux disease  - continue famotidine 20 mg BID      Acute deep vein thrombosis (DVT) of brachial vein of right upper extremity  DVT found 7/21/22. Primary care doctor  managing Eliquis, reportedly last seen in May  - continue Eliquis 5 mg BID      BPH (benign prostatic hyperplasia)  - continue tamsulosin 0.4 mg daily      Hypertension  BP stable in ED, given LR bolus per sepsis protocol   - continue furosemide 20 mg daily  - continue metoprolol succinate 12.5 mg daily  - monitor pressures closely given sepsis presentation            VTE Risk Mitigation (From admission, onward)         Ordered     apixaban tablet 5 mg  2 times daily         08/31/22 0152                   JOSEPHINE IversonC  Department of Hospital Medicine   [facility] - Emergency Dept

## 2022-08-31 NOTE — ED TRIAGE NOTES
Jerardo Callejas, a 72 y.o. male presents to the ED w/ complaint of chest pain. Pt also reports fever, and uncontrollable shaking. Pt stated that in route to ER via EMS, pt also had left arm pain. Denies SOB.     Triage note:  Chief Complaint   Patient presents with    Fever     Pt called EMS for fever/chills, temp 101.2 x 1 day.     Chest Pain     Pt reporting L side chest pain upon arrival to ED     Review of patient's allergies indicates:   Allergen Reactions    Penicillins Palpitations and Anxiety     Past Medical History:   Diagnosis Date    Back pain, chronic     BPH (benign prostatic hyperplasia)     Hypertension     Mitral valve prolapse syndrome     Prostate enlargement

## 2022-08-31 NOTE — PLAN OF CARE
[facility] - Telemetry Stepdown ([facility])  Discharge Assessment    Primary Care Provider: DOMINIC Barker MD     Discharge Assessment (most recent)       BRIEF DISCHARGE ASSESSMENT - 08/31/22 1412          Discharge Planning    Assessment Type Discharge Planning Brief Assessment     Resource/Environmental Concerns none     Support Systems Spouse/significant other     Equipment Currently Used at Home walker, rolling     Current Living Arrangements home/apartment/condo     Patient/Family Anticipates Transition to home     Patient/Family Anticipated Services at Transition none     DME Needed Upon Discharge  none     Discharge Plan A Home     Discharge Plan B Home Health                   Pt is a 72 y.o. male admitted with sepsis and has a PMH of DVT on eliquis, HTN and recurrent UTI. He lives with his SO in a single story house and states he is independent with his ADLs and IADLs. [facility] Discharge Packet given to patient and/or family with understanding verbalized.   name and number and estimated discharge date written on white board in patient's room with request to call for any questions or concerns.  Will continue to follow for needs.  Joseph Whatley, RN,BSN

## 2022-08-31 NOTE — HPI
Jerardo Callejas is a 72 y.o. male with a history of BPH, DVT (11/2021, on eliquis), HTN and recurrent UTIs presents to the ED with fever and chills. He states that he began to start shaking at home around 6 pm today which prompted him to take his temperature which was elevated. He states he recently completed a 7 day course of bactrim for a UTI that was incidentally found during prior admission (8/16). He reports hx of frequent UTIs and has been evaluated by urology. Denies any chest pain, SOB, N/V, abdominal or flank pain. Denies any burning with urination, urgency, or frequency.    ED: Temp: 101.2, WBC 12K. Vitals otherwise stable. CMP significant for glucose 146. Cr 1.2 (baseline 1.0-1.2). Lactic 1.7. UA significant for + nitrites, 3+ leukocytes, 99 WBC and few bacteria. Urine culture is pending. Blood culture collected. CXR without acute process. Received 2190 cc LR bolus and ceftriaxone 1 g IV in ED.

## 2022-08-31 NOTE — PROGRESS NOTES
Evaluated patient.  Patient reports dysuria, urgency and frequency with urination.  He reports frequent urinary tract infections and his followed by Urology outpatient.  He is afebrile this morning.  Patient noted to have bradycardia therefore metoprolol was held.  He had an episode of V-tach less than 10 beats.  Echocardiogram pending.    Continue plan of care.  Urine cultures pending.  Check PVR

## 2022-08-31 NOTE — NURSING
"JOSE ARMANDO Serna NP secure text paged the following @ 6321;     Metropolol held this morning. HR 49-55. Pt states, "I usually have a low HR."     Beverly Feldman RN    "

## 2022-08-31 NOTE — ED PROVIDER NOTES
Encounter Date: 8/30/2022       History     Chief Complaint   Patient presents with    Fever     Pt called EMS for fever/chills, temp 101.2 x 1 day.     Chest Pain     Pt reporting L side chest pain upon arrival to ED     HPI    72yM with h/o BPH, back pain, and recent klebsiella UTI presents with acute onset fever, shaking chills, that began today. With EMS, he developed left sided CP that radiated to his right arm. He denies dysuria or hematuria but feels his urine is concentrated. Finished a course of bactrim last week. He is not currently on antibiotics. Felt well yesterday.   Review of patient's allergies indicates:   Allergen Reactions    Penicillins Palpitations and Anxiety     Past Medical History:   Diagnosis Date    Back pain, chronic     BPH (benign prostatic hyperplasia)     Hypertension     Mitral valve prolapse syndrome     Prostate enlargement      Past Surgical History:   Procedure Laterality Date    NECK SURGERY       Family History   Problem Relation Age of Onset    Hypertension Mother     Stroke Father      Social History     Tobacco Use    Smoking status: Former    Smokeless tobacco: Never    Tobacco comments:     >40 years ago   Substance Use Topics    Alcohol use: No    Drug use: No     Review of Systems   Constitutional:  Positive for chills, fatigue and fever. Negative for diaphoresis.   HENT:  Negative for congestion, rhinorrhea and sore throat.    Eyes:  Negative for visual disturbance.   Respiratory:  Negative for cough, chest tightness and shortness of breath.    Cardiovascular:  Negative for chest pain.   Gastrointestinal:  Negative for abdominal pain, blood in stool, constipation, diarrhea and vomiting.   Genitourinary:  Positive for difficulty urinating, dysuria and frequency. Negative for hematuria and urgency.   Musculoskeletal:  Negative for back pain.   Skin:  Negative for rash.   Neurological:  Negative for seizures and syncope.   Hematological:  Does not bruise/bleed easily.    Psychiatric/Behavioral:  Negative for agitation and hallucinations.      Physical Exam     Initial Vitals [08/30/22 2119]   BP Pulse Resp Temp SpO2   137/81 72 18 (!) 101.2 °F (38.4 °C) 98 %      MAP       --         Physical Exam  Gen: AxOx4, NAD, appears stated age, well appearing  Eye: EOMI, no scleral icterus, no periorbital edema or ecchymosis  Head: Normocephalic, atraumatic, no lesions, scalp grossly normal  ENT: neck supple, no stridor, no masses, no abnormal phonation or drooling  CVS: warm and well perfused, cap refill <2 seconds, no extremity pallor  PULM: normal work of breathing, no stridor, equal chest rise, no peripheral cyanosis  ABD: scaphoid, nondistended  Ext: no rash, no deformities, moving all joints with normal ROM  Neuro: SURESH, gait intact, face grossly symmetric  Psych: well groomed, mood and affect congruent, makes good eye contact, cooperative    ED Course   Procedures  Labs Reviewed   CBC W/ AUTO DIFFERENTIAL - Abnormal; Notable for the following components:       Result Value    Gran # (ANC) 10.2 (*)     Lymph # 0.6 (*)     Mono # 1.2 (*)     Gran % 84.1 (*)     Lymph % 5.3 (*)     All other components within normal limits   COMPREHENSIVE METABOLIC PANEL - Abnormal; Notable for the following components:    CO2 22 (*)     Glucose 146 (*)     All other components within normal limits   URINALYSIS, REFLEX TO URINE CULTURE - Abnormal; Notable for the following components:    Occult Blood UA Trace (*)     Nitrite, UA Positive (*)     Leukocytes, UA 3+ (*)     All other components within normal limits    Narrative:     Specimen Source->Urine   URINALYSIS MICROSCOPIC - Abnormal; Notable for the following components:    WBC, UA 99 (*)     Bacteria Few (*)     All other components within normal limits    Narrative:     Specimen Source->Urine   CULTURE, BLOOD   CULTURE, BLOOD   CULTURE, URINE   SARS-COV-2 RNA AMPLIFICATION, QUAL   LACTIC ACID, PLASMA   TROPONIN I          Imaging Results          "     X-Ray Chest AP Portable (Final result)  Result time 08/30/22 23:31:54      Final result by Tony Freeman MD (08/30/22 23:31:54)                   Impression:      Stable radiographic appearance without radiographic evidence for superimposed acute intrathoracic process.      Electronically signed by: Tony Freeman  Date:    08/30/2022  Time:    23:31               Narrative:    EXAMINATION:  XR CHEST AP PORTABLE    CLINICAL HISTORY:  Sepsis;    TECHNIQUE:  Single frontal view of the chest was performed.    COMPARISON:  Chest radiograph August 26, 2022    FINDINGS:  Single portable chest view is submitted.  The cardiomediastinal silhouette appears stable.  There appear to be bilateral calcified pleural plaques, appearing similar to prior examinations.    There is no evidence for confluent infiltrate or consolidation, significant pleural effusion or pneumothorax.  The osseous structures demonstrate chronic change, postoperative change of the cervical spine noted.                                       Medications   cefTRIAXone (ROCEPHIN) 1 g/50 mL D5W IVPB (has no administration in time range)   lactated ringers bolus 2,190 mL (0 mLs Intravenous Stopped 8/31/22 0032)     Medical Decision Making:   History:   Old Medical Records: I decided to obtain old medical records.  Old Records Summarized: records from clinic visits.  Initial Assessment:   This is a 72-year-old man with a history of recent UTIs who presents with fever, rigors at home, in the setting of urinary discomfort, some concerned about urosepsis versus bacteremia have a different etiology versus COVID given the ongoing pandemic.  Plan for broad labs, including blood cultures, lactate, and fluids, and close monitoring of his vital signs.  Clinical Tests:   Lab Tests: Ordered and Reviewed  Radiological Study: Ordered and Reviewed  Sepsis Perfusion Assessment: "I attest a sepsis perfusion exam was performed within 6 hours of sepsis, severe sepsis, or " "septic shock presentation, following fluid resuscitation."    Sepsis Perfusion Assessment Complete: 8/31/2022 12:37 AM    ED Management:  Chest x-ray by my independent interpretation is negative for acute infection.  His labs are suggestive of urinary tract infection, and an increase in his white count from baseline.  I am concerned about bacteremia and I have administered ceftriaxone after review of his prior sensitivities.  Plan for observation in the hospital to ensure clinical stability and IV antibiotics.                    Clinical Impression:   Final diagnoses:  [R07.9] Chest pain  [A41.9] Sepsis, due to unspecified organism, unspecified whether acute organ dysfunction present (Primary)        ED Disposition Condition    Observation                 Kristy Kern MD  08/31/22 0041    "

## 2022-08-31 NOTE — NURSING
Pt arrive to floor from ED via wheelchair; pt awake, alert, stable. Pt oriented to room and call light; fall safety precautions reviewed; discussed plan of care; bed in low position, wheels locked, personal items placed on bedside table near patient; will monitor.

## 2022-08-31 NOTE — ASSESSMENT & PLAN NOTE
DVT found 7/21/22. Primary care doctor managing Eliquis, reportedly last seen in May  - continue Eliquis 5 mg BID

## 2022-08-31 NOTE — ASSESSMENT & PLAN NOTE
Acute cystitis     2/4 SRS Criteria: T 101.2, WBC 12K. likely 2/2 to UTI. No flank pain on exam, no urinary symptoms; low concern for pyelonephritis, will monitor.   - Lactic 1.7   - CXR without acute process   - UA + nitrites, 3+ leukocytes, 99 WBC, and few bacteria   - Follow reflex urine culture  - Given ceftriaxone 1 g IV in ED. Prior urine cultures and sensitivities reviewed, will continue for now.   - tylenol prn for fever

## 2022-09-01 LAB
ANION GAP SERPL CALC-SCNC: 7 MMOL/L (ref 8–16)
BASOPHILS # BLD AUTO: 0.04 K/UL (ref 0–0.2)
BASOPHILS NFR BLD: 0.7 % (ref 0–1.9)
BUN SERPL-MCNC: 16 MG/DL (ref 8–23)
CALCIUM SERPL-MCNC: 9.3 MG/DL (ref 8.7–10.5)
CHLORIDE SERPL-SCNC: 107 MMOL/L (ref 95–110)
CO2 SERPL-SCNC: 25 MMOL/L (ref 23–29)
CREAT SERPL-MCNC: 0.8 MG/DL (ref 0.5–1.4)
DIFFERENTIAL METHOD: ABNORMAL
EOSINOPHIL # BLD AUTO: 0.2 K/UL (ref 0–0.5)
EOSINOPHIL NFR BLD: 2.7 % (ref 0–8)
ERYTHROCYTE [DISTWIDTH] IN BLOOD BY AUTOMATED COUNT: 14.2 % (ref 11.5–14.5)
EST. GFR  (NO RACE VARIABLE): >60 ML/MIN/1.73 M^2
GLUCOSE SERPL-MCNC: 102 MG/DL (ref 70–110)
HCT VFR BLD AUTO: 41.5 % (ref 40–54)
HGB BLD-MCNC: 13.5 G/DL (ref 14–18)
IMM GRANULOCYTES # BLD AUTO: 0.03 K/UL (ref 0–0.04)
IMM GRANULOCYTES NFR BLD AUTO: 0.5 % (ref 0–0.5)
LYMPHOCYTES # BLD AUTO: 1.4 K/UL (ref 1–4.8)
LYMPHOCYTES NFR BLD: 25.6 % (ref 18–48)
MAGNESIUM SERPL-MCNC: 2 MG/DL (ref 1.6–2.6)
MCH RBC QN AUTO: 28.4 PG (ref 27–31)
MCHC RBC AUTO-ENTMCNC: 32.5 G/DL (ref 32–36)
MCV RBC AUTO: 87 FL (ref 82–98)
MONOCYTES # BLD AUTO: 0.7 K/UL (ref 0.3–1)
MONOCYTES NFR BLD: 11.9 % (ref 4–15)
NEUTROPHILS # BLD AUTO: 3.2 K/UL (ref 1.8–7.7)
NEUTROPHILS NFR BLD: 58.6 % (ref 38–73)
NRBC BLD-RTO: 0 /100 WBC
PLATELET # BLD AUTO: 193 K/UL (ref 150–450)
PMV BLD AUTO: 10 FL (ref 9.2–12.9)
POCT GLUCOSE: 100 MG/DL (ref 70–110)
POCT GLUCOSE: 102 MG/DL (ref 70–110)
POCT GLUCOSE: 123 MG/DL (ref 70–110)
POTASSIUM SERPL-SCNC: 4.3 MMOL/L (ref 3.5–5.1)
RBC # BLD AUTO: 4.76 M/UL (ref 4.6–6.2)
SODIUM SERPL-SCNC: 139 MMOL/L (ref 136–145)
WBC # BLD AUTO: 5.54 K/UL (ref 3.9–12.7)

## 2022-09-01 PROCEDURE — 93010 EKG 12-LEAD: ICD-10-PCS | Mod: ,,, | Performed by: INTERNAL MEDICINE

## 2022-09-01 PROCEDURE — 83735 ASSAY OF MAGNESIUM: CPT | Performed by: HOSPITALIST

## 2022-09-01 PROCEDURE — 93010 ELECTROCARDIOGRAM REPORT: CPT | Mod: ,,, | Performed by: INTERNAL MEDICINE

## 2022-09-01 PROCEDURE — 85025 COMPLETE CBC W/AUTO DIFF WBC: CPT

## 2022-09-01 PROCEDURE — 96365 THER/PROPH/DIAG IV INF INIT: CPT | Mod: 59

## 2022-09-01 PROCEDURE — 63600175 PHARM REV CODE 636 W HCPCS

## 2022-09-01 PROCEDURE — 93005 ELECTROCARDIOGRAM TRACING: CPT

## 2022-09-01 PROCEDURE — 99226 PR SUBSEQUENT OBSERVATION CARE,LEVEL III: ICD-10-PCS | Mod: ,,,

## 2022-09-01 PROCEDURE — 25000003 PHARM REV CODE 250

## 2022-09-01 PROCEDURE — G0378 HOSPITAL OBSERVATION PER HR: HCPCS

## 2022-09-01 PROCEDURE — 36415 COLL VENOUS BLD VENIPUNCTURE: CPT

## 2022-09-01 PROCEDURE — 80048 BASIC METABOLIC PNL TOTAL CA: CPT

## 2022-09-01 PROCEDURE — 99226 PR SUBSEQUENT OBSERVATION CARE,LEVEL III: CPT | Mod: ,,,

## 2022-09-01 RX ADMIN — GABAPENTIN 300 MG: 300 CAPSULE ORAL at 08:09

## 2022-09-01 RX ADMIN — APIXABAN 5 MG: 5 TABLET, FILM COATED ORAL at 08:09

## 2022-09-01 RX ADMIN — POTASSIUM CHLORIDE 10 MEQ: 10 CAPSULE, COATED, EXTENDED RELEASE ORAL at 08:09

## 2022-09-01 RX ADMIN — TAMSULOSIN HYDROCHLORIDE 0.4 MG: 0.4 CAPSULE ORAL at 08:09

## 2022-09-01 RX ADMIN — FAMOTIDINE 20 MG: 20 TABLET ORAL at 08:09

## 2022-09-01 RX ADMIN — CEFTRIAXONE 1 G: 1 INJECTION, SOLUTION INTRAVENOUS at 12:09

## 2022-09-01 RX ADMIN — LOSARTAN POTASSIUM AND HYDROCHLOROTHIAZIDE 1 TABLET: 50; 12.5 TABLET, FILM COATED ORAL at 07:09

## 2022-09-01 RX ADMIN — FUROSEMIDE 20 MG: 20 TABLET ORAL at 07:09

## 2022-09-01 NOTE — PROGRESS NOTES
[facility] - Telemetry Stepdown ([place] [address])  [facility] Medicine  Progress Note    Patient Name: Jerardo Callejas  MRN: [id number]  Patient Class: OP- Observation   Admission Date: 8/30/2022  Length of Stay: 0 days  Attending Physician: Gus Pierre MD  Primary Care Provider: DOMINIC Barker MD        Subjective:     Principal Problem:Sepsis        HPI:  Jerardo Callejas is a 72 y.o. male with a history of BPH, DVT (11/2021, on eliquis), HTN and recurrent UTIs presents to the ED with fever and chills. He states that he began to start shaking at home around 6 pm today which prompted him to take his temperature which was elevated. He states he recently completed a 7 day course of bactrim for a UTI that was incidentally found during prior admission (8/16). He reports hx of frequent UTIs and has been evaluated by urology. Denies any chest pain, SOB, N/V, abdominal or flank pain. Denies any burning with urination, urgency, or frequency.    ED: Temp: 101.2, WBC 12K. Vitals otherwise stable. CMP significant for glucose 146. Cr 1.2 (baseline 1.0-1.2). Lactic 1.7. UA significant for + nitrites, 3+ leukocytes, 99 WBC and few bacteria. Urine culture is pending. Blood culture collected. CXR without acute process. Received 2190 cc LR bolus and ceftriaxone 1 g IV in ED.      Overview/Hospital Course:  Patient placed in observation for urosepsis. Fever and leukocytosis improving with IVF and ceftriaxone. Urine culture with gram negative rods, following susceptibilities. Echo with CVP of 8 and indeterminate diastolic dysfunction. Patient denies CP, SOB, or peripheral edema.      Interval History: Patient seen and examined today. VSSAF, YING. Urine culture with gram negative rods, susceptibility pending. Patient still complaining of chills overnight, otherwise has no complaints. Echo with CVP of 8 and indeterminate diastolic dysfunction. Patient denies CP, SOB, or peripheral edema.    Review of Systems   Constitutional:  Positive  for chills and fever.   HENT:  Negative for congestion.    Respiratory:  Negative for chest tightness and shortness of breath.    Cardiovascular:  Negative for chest pain and leg swelling.   Gastrointestinal:  Negative for abdominal pain and nausea.   Genitourinary:  Negative for difficulty urinating, dysuria, flank pain, frequency, hematuria and urgency.   Musculoskeletal:  Positive for back pain. Negative for myalgias.   Skin:  Negative for rash and wound.   Neurological:  Negative for dizziness and weakness.   Objective:     Vital Signs (Most Recent):  Temp: 97.5 °F (36.4 °C) (09/01/22 1148)  Pulse: 61 (09/01/22 1151)  Resp: 18 (09/01/22 1148)  BP: 129/81 (09/01/22 1148)  SpO2: 99 % (09/01/22 1148) Vital Signs (24h Range):  Temp:  [97 °F (36.1 °C)-98.2 °F (36.8 °C)] 97.5 °F (36.4 °C)  Pulse:  [45-70] 61  Resp:  [18] 18  SpO2:  [96 %-99 %] 99 %  BP: (124-167)/(63-88) 129/81     Weight: 99.8 kg (220 lb)  Body mass index is 31.57 kg/m².    Intake/Output Summary (Last 24 hours) at 9/1/2022 1342  Last data filed at 9/1/2022 1200  Gross per 24 hour   Intake 636 ml   Output 1000 ml   Net -364 ml      Physical Exam  Vitals and nursing note reviewed.   Constitutional:       General: He is not in acute distress.     Appearance: He is well-developed.   HENT:      Head: Normocephalic and atraumatic.      Mouth/Throat:      Pharynx: No oropharyngeal exudate.   Eyes:      Conjunctiva/sclera: Conjunctivae normal.      Pupils: Pupils are equal, round, and reactive to light.   Cardiovascular:      Rate and Rhythm: Normal rate and regular rhythm.      Heart sounds: Normal heart sounds.   Pulmonary:      Effort: Pulmonary effort is normal. No respiratory distress.      Breath sounds: Normal breath sounds. No wheezing.   Abdominal:      General: Bowel sounds are normal. There is no distension.      Palpations: Abdomen is soft.      Tenderness: There is no abdominal tenderness. There is no right CVA tenderness or left CVA tenderness.    Musculoskeletal:         General: No tenderness. Normal range of motion.      Cervical back: Normal range of motion and neck supple.   Lymphadenopathy:      Cervical: No cervical adenopathy.   Skin:     General: Skin is warm and dry.      Capillary Refill: Capillary refill takes less than 2 seconds.      Findings: No rash.   Neurological:      Mental Status: He is alert and oriented to person, place, and time.      Cranial Nerves: No cranial nerve deficit.      Sensory: No sensory deficit.      Coordination: Coordination normal.   Psychiatric:         Behavior: Behavior normal.         Thought Content: Thought content normal.         Judgment: Judgment normal.       Significant Labs: All pertinent labs within the past 24 hours have been reviewed.  CBC:   Recent Labs   Lab 08/31/22 0439 08/31/22 2204 09/01/22  0241   WBC 14.08* 5.91 5.54   HGB 12.9* 13.0* 13.5*   HCT 39.6* 39.9* 41.5    201 193     CMP:   Recent Labs   Lab 08/30/22 2232 08/31/22 0439 08/31/22 2204 09/01/22  0241    139 138 139   K 4.0 3.7 3.6 4.3    105 103 107   CO2 22* 28 29 25   * 118* 122* 102   BUN 21 23 18 16   CREATININE 1.2 1.1 1.0 0.8   CALCIUM 9.6 9.2 9.4 9.3   PROT 7.4  --   --   --    ALBUMIN 3.7  --   --   --    BILITOT 0.5  --   --   --    ALKPHOS 100  --   --   --    AST 25  --   --   --    ALT 14  --   --   --    ANIONGAP 11 6* 6* 7*       Significant Imaging: I have reviewed all pertinent imaging results/findings within the past 24 hours.      Assessment/Plan:      * Sepsis  Acute cystitis     2/4 SRS Criteria: T 101.2, WBC 12K. likely 2/2 to UTI. No flank pain on exam, no urinary symptoms; low concern for pyelonephritis, will monitor.   - Lactic 1.7   - CXR without acute process   - UA + nitrites, 3+ leukocytes, 99 WBC, and few bacteria   - Follow reflex urine culture  - Given ceftriaxone 1 g IV in ED. Prior urine cultures and sensitivities reviewed, will continue for now.   - urine culture with gram  negative rods, following susceptibility  - tylenol prn for fever     Hypertension  BP stable in ED, given LR bolus per sepsis protocol   - continue furosemide 20 mg daily  - continue metoprolol succinate 12.5 mg daily  - monitor pressures closely given sepsis presentation    Stage 2 chronic kidney disease  Creatine stable for now. BMP reviewed- noted Estimated Creatinine Clearance: 95.9 mL/min (based on SCr of 0.8 mg/dL). according to latest data. Monitor UOP and serial BMP and adjust therapy as needed. Renally dose meds.    Chronic back pain  - continue gabapentin 300 mg daily    Gastroesophageal reflux disease  - continue famotidine 20 mg BID    Acute deep vein thrombosis (DVT) of brachial vein of right upper extremity  DVT found 7/21/22. Primary care doctor managing Eliquis, reportedly last seen in May  - continue Eliquis 5 mg BID    BPH (benign prostatic hyperplasia)  - continue tamsulosin 0.4 mg daily        VTE Risk Mitigation (From admission, onward)         Ordered     apixaban tablet 5 mg  2 times daily         08/31/22 0152                Discharge Planning   ASHLYN: 9/2/2022     Code Status: Full Code   Is the patient medically ready for discharge?:     Reason for patient still in hospital (select all that apply): Patient trending condition, Laboratory test and Treatment  Discharge Plan A: Home                  Odessa Ramirez PA-C  Department of Hospital Medicine   [facility] - Telemetry Stepdown ([place]-)

## 2022-09-01 NOTE — ASSESSMENT & PLAN NOTE
Creatine stable for now. BMP reviewed- noted Estimated Creatinine Clearance: 95.9 mL/min (based on SCr of 0.8 mg/dL). according to latest data. Monitor UOP and serial BMP and adjust therapy as needed. Renally dose meds.

## 2022-09-01 NOTE — ASSESSMENT & PLAN NOTE
Acute cystitis     2/4 SRS Criteria: T 101.2, WBC 12K. likely 2/2 to UTI. No flank pain on exam, no urinary symptoms; low concern for pyelonephritis, will monitor.   - Lactic 1.7   - CXR without acute process   - UA + nitrites, 3+ leukocytes, 99 WBC, and few bacteria   - Follow reflex urine culture  - Given ceftriaxone 1 g IV in ED. Prior urine cultures and sensitivities reviewed, will continue for now.   - urine culture with gram negative rods, following susceptibility  - tylenol prn for fever

## 2022-09-01 NOTE — NURSING
Patient started with uncontrolled shaking of his right arm around 2000. No weakness or changes in mental status. MD was notified and ordered blood work. Patient's heart rate was in the 40's, MD ordered a bolus of fluids and EKG. Patient's heart rate went up to 56-68 when he got up to go to the bathroom. Patient had no other symptoms or issues after that episode.

## 2022-09-01 NOTE — PLAN OF CARE
Patient rested comfortably during the shift. Patient's right arm started shaking with no weakness or mental changes. MD was notified and ordered blood work. Patient's heart rate was in the 40's. MD ordered a bolus of NS and an EKG. Patient's heart rate did go up to 56-68 when ambulating to the bathroom. Patient had no other issues after that episode.

## 2022-09-01 NOTE — SUBJECTIVE & OBJECTIVE
Interval History: Patient seen and examined today. YING MAC. Urine culture with gram negative rods, susceptibility pending. Patient still complaining of chills overnight, otherwise has no complaints. Echo with CVP of 8 and indeterminate diastolic dysfunction. Patient denies CP, SOB, or peripheral edema.    Review of Systems   Constitutional:  Positive for chills and fever.   HENT:  Negative for congestion.    Respiratory:  Negative for chest tightness and shortness of breath.    Cardiovascular:  Negative for chest pain and leg swelling.   Gastrointestinal:  Negative for abdominal pain and nausea.   Genitourinary:  Negative for difficulty urinating, dysuria, flank pain, frequency, hematuria and urgency.   Musculoskeletal:  Positive for back pain. Negative for myalgias.   Skin:  Negative for rash and wound.   Neurological:  Negative for dizziness and weakness.   Objective:     Vital Signs (Most Recent):  Temp: 97.5 °F (36.4 °C) (09/01/22 1148)  Pulse: 61 (09/01/22 1151)  Resp: 18 (09/01/22 1148)  BP: 129/81 (09/01/22 1148)  SpO2: 99 % (09/01/22 1148) Vital Signs (24h Range):  Temp:  [97 °F (36.1 °C)-98.2 °F (36.8 °C)] 97.5 °F (36.4 °C)  Pulse:  [45-70] 61  Resp:  [18] 18  SpO2:  [96 %-99 %] 99 %  BP: (124-167)/(63-88) 129/81     Weight: 99.8 kg (220 lb)  Body mass index is 31.57 kg/m².    Intake/Output Summary (Last 24 hours) at 9/1/2022 1342  Last data filed at 9/1/2022 1200  Gross per 24 hour   Intake 636 ml   Output 1000 ml   Net -364 ml      Physical Exam  Vitals and nursing note reviewed.   Constitutional:       General: He is not in acute distress.     Appearance: He is well-developed.   HENT:      Head: Normocephalic and atraumatic.      Mouth/Throat:      Pharynx: No oropharyngeal exudate.   Eyes:      Conjunctiva/sclera: Conjunctivae normal.      Pupils: Pupils are equal, round, and reactive to light.   Cardiovascular:      Rate and Rhythm: Normal rate and regular rhythm.      Heart sounds: Normal heart  sounds.   Pulmonary:      Effort: Pulmonary effort is normal. No respiratory distress.      Breath sounds: Normal breath sounds. No wheezing.   Abdominal:      General: Bowel sounds are normal. There is no distension.      Palpations: Abdomen is soft.      Tenderness: There is no abdominal tenderness. There is no right CVA tenderness or left CVA tenderness.   Musculoskeletal:         General: No tenderness. Normal range of motion.      Cervical back: Normal range of motion and neck supple.   Lymphadenopathy:      Cervical: No cervical adenopathy.   Skin:     General: Skin is warm and dry.      Capillary Refill: Capillary refill takes less than 2 seconds.      Findings: No rash.   Neurological:      Mental Status: He is alert and oriented to person, place, and time.      Cranial Nerves: No cranial nerve deficit.      Sensory: No sensory deficit.      Coordination: Coordination normal.   Psychiatric:         Behavior: Behavior normal.         Thought Content: Thought content normal.         Judgment: Judgment normal.       Significant Labs: All pertinent labs within the past 24 hours have been reviewed.  CBC:   Recent Labs   Lab 08/31/22 0439 08/31/22 2204 09/01/22  0241   WBC 14.08* 5.91 5.54   HGB 12.9* 13.0* 13.5*   HCT 39.6* 39.9* 41.5    201 193     CMP:   Recent Labs   Lab 08/30/22 2232 08/31/22 0439 08/31/22 2204 09/01/22  0241    139 138 139   K 4.0 3.7 3.6 4.3    105 103 107   CO2 22* 28 29 25   * 118* 122* 102   BUN 21 23 18 16   CREATININE 1.2 1.1 1.0 0.8   CALCIUM 9.6 9.2 9.4 9.3   PROT 7.4  --   --   --    ALBUMIN 3.7  --   --   --    BILITOT 0.5  --   --   --    ALKPHOS 100  --   --   --    AST 25  --   --   --    ALT 14  --   --   --    ANIONGAP 11 6* 6* 7*       Significant Imaging: I have reviewed all pertinent imaging results/findings within the past 24 hours.

## 2022-09-01 NOTE — HOSPITAL COURSE
Patient placed in observation for urosepsis. Fever and leukocytosis improving with IVF and ceftriaxone. Urine culture with gram negative rods, following susceptibilities. Echo with CVP of 8 and indeterminate diastolic dysfunction. Patient denies CP, SOB, or peripheral edema. Urine culture with evidence of e.coli, will send patient home with course of cipro. Patient stable for discharge. Instructed patient to follow-up with PCP within 1-2 weeks. Ambulatory referral placed to urology. Return precautions given and patient verbalized understanding. All questions answered.

## 2022-09-02 VITALS
TEMPERATURE: 96 F | HEART RATE: 75 BPM | WEIGHT: 220 LBS | HEIGHT: 70 IN | BODY MASS INDEX: 31.5 KG/M2 | SYSTOLIC BLOOD PRESSURE: 134 MMHG | RESPIRATION RATE: 17 BRPM | OXYGEN SATURATION: 97 % | DIASTOLIC BLOOD PRESSURE: 76 MMHG

## 2022-09-02 LAB
ANION GAP SERPL CALC-SCNC: 8 MMOL/L (ref 8–16)
BACTERIA UR CULT: ABNORMAL
BASOPHILS # BLD AUTO: 0.05 K/UL (ref 0–0.2)
BASOPHILS NFR BLD: 1 % (ref 0–1.9)
BUN SERPL-MCNC: 13 MG/DL (ref 8–23)
CALCIUM SERPL-MCNC: 9.6 MG/DL (ref 8.7–10.5)
CHLORIDE SERPL-SCNC: 105 MMOL/L (ref 95–110)
CO2 SERPL-SCNC: 25 MMOL/L (ref 23–29)
CREAT SERPL-MCNC: 0.8 MG/DL (ref 0.5–1.4)
DIFFERENTIAL METHOD: ABNORMAL
EOSINOPHIL # BLD AUTO: 0.2 K/UL (ref 0–0.5)
EOSINOPHIL NFR BLD: 3.7 % (ref 0–8)
ERYTHROCYTE [DISTWIDTH] IN BLOOD BY AUTOMATED COUNT: 14.1 % (ref 11.5–14.5)
EST. GFR  (NO RACE VARIABLE): >60 ML/MIN/1.73 M^2
GLUCOSE SERPL-MCNC: 100 MG/DL (ref 70–110)
HCT VFR BLD AUTO: 43.1 % (ref 40–54)
HGB BLD-MCNC: 14.1 G/DL (ref 14–18)
IMM GRANULOCYTES # BLD AUTO: 0.03 K/UL (ref 0–0.04)
IMM GRANULOCYTES NFR BLD AUTO: 0.6 % (ref 0–0.5)
LYMPHOCYTES # BLD AUTO: 1.8 K/UL (ref 1–4.8)
LYMPHOCYTES NFR BLD: 37.6 % (ref 18–48)
MCH RBC QN AUTO: 28 PG (ref 27–31)
MCHC RBC AUTO-ENTMCNC: 32.7 G/DL (ref 32–36)
MCV RBC AUTO: 86 FL (ref 82–98)
MONOCYTES # BLD AUTO: 0.5 K/UL (ref 0.3–1)
MONOCYTES NFR BLD: 11.1 % (ref 4–15)
NEUTROPHILS # BLD AUTO: 2.2 K/UL (ref 1.8–7.7)
NEUTROPHILS NFR BLD: 46 % (ref 38–73)
NRBC BLD-RTO: 0 /100 WBC
PLATELET # BLD AUTO: 223 K/UL (ref 150–450)
PMV BLD AUTO: 9.7 FL (ref 9.2–12.9)
POTASSIUM SERPL-SCNC: 3.9 MMOL/L (ref 3.5–5.1)
RBC # BLD AUTO: 5.04 M/UL (ref 4.6–6.2)
SODIUM SERPL-SCNC: 138 MMOL/L (ref 136–145)
WBC # BLD AUTO: 4.87 K/UL (ref 3.9–12.7)

## 2022-09-02 PROCEDURE — 25000003 PHARM REV CODE 250

## 2022-09-02 PROCEDURE — 63600175 PHARM REV CODE 636 W HCPCS

## 2022-09-02 PROCEDURE — 99217 PR OBSERVATION CARE DISCHARGE: ICD-10-PCS | Mod: ,,,

## 2022-09-02 PROCEDURE — 80048 BASIC METABOLIC PNL TOTAL CA: CPT

## 2022-09-02 PROCEDURE — 99217 PR OBSERVATION CARE DISCHARGE: CPT | Mod: ,,,

## 2022-09-02 PROCEDURE — 96366 THER/PROPH/DIAG IV INF ADDON: CPT

## 2022-09-02 PROCEDURE — 36415 COLL VENOUS BLD VENIPUNCTURE: CPT

## 2022-09-02 PROCEDURE — 85025 COMPLETE CBC W/AUTO DIFF WBC: CPT

## 2022-09-02 PROCEDURE — G0378 HOSPITAL OBSERVATION PER HR: HCPCS

## 2022-09-02 RX ORDER — LOSARTAN POTASSIUM AND HYDROCHLOROTHIAZIDE 12.5; 5 MG/1; MG/1
1 TABLET ORAL EVERY MORNING
Qty: 30 TABLET | Refills: 0 | Status: SHIPPED | OUTPATIENT
Start: 2022-09-02 | End: 2023-07-12 | Stop reason: SDUPTHER

## 2022-09-02 RX ORDER — METOPROLOL SUCCINATE 25 MG/1
12.5 TABLET, EXTENDED RELEASE ORAL DAILY
Qty: 15 TABLET | Refills: 0 | Status: SHIPPED | OUTPATIENT
Start: 2022-09-02 | End: 2023-07-19

## 2022-09-02 RX ORDER — FAMOTIDINE 20 MG/1
20 TABLET, FILM COATED ORAL 2 TIMES DAILY
Qty: 60 TABLET | Refills: 0 | Status: SHIPPED | OUTPATIENT
Start: 2022-09-02 | End: 2022-10-25

## 2022-09-02 RX ORDER — CIPROFLOXACIN 500 MG/1
500 TABLET ORAL 2 TIMES DAILY
Qty: 14 TABLET | Refills: 0 | Status: SHIPPED | OUTPATIENT
Start: 2022-09-02 | End: 2022-09-08 | Stop reason: ALTCHOICE

## 2022-09-02 RX ADMIN — FAMOTIDINE 20 MG: 20 TABLET ORAL at 08:09

## 2022-09-02 RX ADMIN — APIXABAN 5 MG: 5 TABLET, FILM COATED ORAL at 08:09

## 2022-09-02 RX ADMIN — CEFTRIAXONE 1 G: 1 INJECTION, SOLUTION INTRAVENOUS at 12:09

## 2022-09-02 RX ADMIN — GABAPENTIN 300 MG: 300 CAPSULE ORAL at 08:09

## 2022-09-02 RX ADMIN — TAMSULOSIN HYDROCHLORIDE 0.4 MG: 0.4 CAPSULE ORAL at 08:09

## 2022-09-02 RX ADMIN — LOSARTAN POTASSIUM AND HYDROCHLOROTHIAZIDE 1 TABLET: 50; 12.5 TABLET, FILM COATED ORAL at 06:09

## 2022-09-02 RX ADMIN — FUROSEMIDE 20 MG: 20 TABLET ORAL at 06:09

## 2022-09-02 RX ADMIN — POTASSIUM CHLORIDE 10 MEQ: 10 CAPSULE, COATED, EXTENDED RELEASE ORAL at 08:09

## 2022-09-02 NOTE — PLAN OF CARE
[facility] - Telemetry Stepdown ([facility]  Discharge Final Note    Primary Care Provider: DOMINIC Barker MD    Expected Discharge Date: 9/2/2022    Future Appointments   Date Time Provider Department Center   9/6/2022  2:50 PM Shay Horan MD [facility] UROLOGY Jesi Castroi       Pt discharged home with no services.  Joseph Whatley, RN,BSN        Final Discharge Note (most recent)       Final Note - 09/02/22 1222          Final Note    Assessment Type Final Discharge Note     Anticipated Discharge Disposition Home or Self Care     Hospital Resources/Appts/Education Provided Provided patient/caregiver with written discharge plan information;Appointments scheduled and added to AVS        Post-Acute Status    Discharge Delays None known at this time                     Important Message from Medicare             Contact Info       DOMINIC Barker MD   Specialty: Family Medicine   Relationship: PCP - General    [id number]  [place] [address]   Phone: [phone]       Next Steps: Follow up on 9/9/2022    Instructions: Hospital follow up visit at 9:00am. Please bring your discharge summary, insurance card, current medications, and picture ID

## 2022-09-04 NOTE — DISCHARGE SUMMARY
[facility] - Telemetry Stepdown ([place] [id number])  [facility] Medicine  Discharge Summary      Patient Name: Jerardo Callejas  MRN: [id number]  Patient Class: OP- Observation  Admission Date: 8/30/2022  Hospital Length of Stay: 0 days  Discharge Date and Time: 9/2/2022  3:54 PM  Attending Physician: No att. providers found   Discharging Provider: Odessa Ramirez PA-C  Primary Care Provider: DOMINIC Barker MD  [facility] Medicine Team: [facility] F Odessa Ramirez PA-C    HPI:   Jerardo Callejas is a 72 y.o. male with a history of BPH, DVT (11/2021, on eliquis), HTN and recurrent UTIs presents to the ED with fever and chills. He states that he began to start shaking at home around 6 pm today which prompted him to take his temperature which was elevated. He states he recently completed a 7 day course of bactrim for a UTI that was incidentally found during prior admission (8/16). He reports hx of frequent UTIs and has been evaluated by urology. Denies any chest pain, SOB, N/V, abdominal or flank pain. Denies any burning with urination, urgency, or frequency.    ED: Temp: 101.2, WBC 12K. Vitals otherwise stable. CMP significant for glucose 146. Cr 1.2 (baseline 1.0-1.2). Lactic 1.7. UA significant for + nitrites, 3+ leukocytes, 99 WBC and few bacteria. Urine culture is pending. Blood culture collected. CXR without acute process. Received 2190 cc LR bolus and ceftriaxone 1 g IV in ED.      * No surgery found *      Hospital Course:   Patient placed in observation for urosepsis. Fever and leukocytosis improving with IVF and ceftriaxone. Urine culture with gram negative rods, following susceptibilities. Echo with CVP of 8 and indeterminate diastolic dysfunction. Patient denies CP, SOB, or peripheral edema. Urine culture with evidence of e.coli, will send patient home with course of cipro. Patient stable for discharge. Instructed patient to follow-up with PCP within 1-2 weeks. Ambulatory referral placed to urology. Return  precautions given and patient verbalized understanding. All questions answered.       Goals of Care Treatment Preferences:  Code Status: Full Code      Consults:     No new Assessment & Plan notes have been filed under this hospital service since the last note was generated.  Service: Hospital Medicine    Final Active Diagnoses:      Problems Resolved During this Admission:    Diagnosis Date Noted Date Resolved POA    Hyperlipidemia [E78.5] 11/13/2013 08/31/2022 Yes       Discharged Condition: good    Disposition: Home or Self Care    Follow Up:   Follow-up Information     DOMINIC Barker MD Follow up on 9/9/2022.    Specialty: Family Medicine  Why: Hospital follow up visit at 9:00am. Please bring your discharge summary, insurance card, current medications, and picture ID  Contact information:  [id number]  [place] [address] [phone]                       Patient Instructions:      Ambulatory referral/consult to Outpatient Case Management   Referral Priority: Routine Referral Type: Consultation   Referral Reason: Specialty Services Required   Number of Visits Requested: 1     Ambulatory referral/consult to Urology   Standing Status: Future   Referral Priority: Urgent Referral Type: Consultation   Referral Reason: Specialty Services Required   Requested Specialty: Urology   Number of Visits Requested: 1     Ambulatory referral/consult to Internal Medicine   Standing Status: Future   Referral Priority: Urgent Referral Type: Consultation   Referral Reason: Specialty Services Required   Requested Specialty: Internal Medicine   Number of Visits Requested: 1     Ambulatory referral/consult to [facility]   Standing Status: Future   Referral Priority: Routine Referral Type: Consultation   Referral Reason: Specialty Services Required   Number of Visits Requested: 1     Diet Cardiac     Notify your health care provider if you experience any of the following:  temperature >100.4      Notify your health care provider if you experience any of the following:  increased confusion or weakness     Notify your health care provider if you experience any of the following:  persistent nausea and vomiting or diarrhea     Notify your health care provider if you experience any of the following:  severe uncontrolled pain     Activity as tolerated       Significant Diagnostic Studies: Microbiology:   Urine Culture    Lab Results   Component Value Date    LABURIN ESCHERICHIA COLI  >100,000 cfu/ml   (A) 08/30/2022       Pending Diagnostic Studies:     None         Medications:  Reconciled Home Medications:      Medication List      START taking these medications    ciprofloxacin HCl 500 MG tablet  Commonly known as: CIPRO  Take 1 tablet (500 mg total) by mouth 2 (two) times daily. for 7 days        CONTINUE taking these medications    albuterol 90 mcg/actuation inhaler  Commonly known as: PROVENTIL/VENTOLIN HFA  Inhale 2 puffs into the lungs every 4 (four) hours as needed for Wheezing or Shortness of Breath. Rescue     ELIQUIS 5 mg Tab  Generic drug: apixaban  Take 5 mg by mouth 2 (two) times daily.     famotidine 20 MG tablet  Commonly known as: PEPCID  Take 1 tablet (20 mg total) by mouth 2 (two) times daily.     furosemide 20 MG tablet  Commonly known as: LASIX  Take 20 mg by mouth every morning.     gabapentin 300 MG capsule  Commonly known as: NEURONTIN  Take 300 mg by mouth once daily. Only takes at night.     losartan-hydrochlorothiazide 50-12.5 mg 50-12.5 mg per tablet  Commonly known as: HYZAAR  Take 1 tablet by mouth every morning.     metoprolol succinate 25 MG 24 hr tablet  Commonly known as: TOPROL-XL  Take 0.5 tablets (12.5 mg total) by mouth once daily.     potassium chloride 10 MEQ Cpsr  Commonly known as: MICRO-K  Take 10 mEq by mouth once daily.     tamsulosin 0.4 mg Cap  Commonly known as: FLOMAX  Take 0.4 mg by mouth once daily.        STOP taking these medications    amLODIPine 5 MG  tablet  Commonly known as: NORVASC     hydroCHLOROthiazide 12.5 MG Tab  Commonly known as: HYDRODIURIL     lisinopriL 20 MG tablet  Commonly known as: PRINIVIL,ZESTRIL     losartan 25 MG tablet  Commonly known as: COZAAR     metoprolol tartrate 25 MG tablet  Commonly known as: LOPRESSOR     pantoprazole 20 MG tablet  Commonly known as: PROTONIX            Indwelling Lines/Drains at time of discharge:   Lines/Drains/Airways     None                 Time spent on the discharge of patient: 35 minutes         Odessa Ramirez PA-C  Department of Hospital Medicine  [facility] - Telemetry Stepdown ([place])

## 2022-09-05 LAB
BACTERIA BLD CULT: NORMAL
BACTERIA BLD CULT: NORMAL

## 2022-09-06 ENCOUNTER — OFFICE VISIT (OUTPATIENT)
Dept: UROLOGY | Facility: CLINIC | Age: 72
End: 2022-09-06
Payer: MEDICARE

## 2022-09-06 VITALS
HEIGHT: 70 IN | BODY MASS INDEX: 31.82 KG/M2 | HEART RATE: 66 BPM | WEIGHT: 222.25 LBS | SYSTOLIC BLOOD PRESSURE: 140 MMHG | DIASTOLIC BLOOD PRESSURE: 77 MMHG

## 2022-09-06 DIAGNOSIS — R31.29 MICROHEMATURIA: ICD-10-CM

## 2022-09-06 DIAGNOSIS — A41.9 SEPSIS, DUE TO UNSPECIFIED ORGANISM, UNSPECIFIED WHETHER ACUTE ORGAN DYSFUNCTION PRESENT: ICD-10-CM

## 2022-09-06 DIAGNOSIS — N39.0 RECURRENT UTI: Primary | ICD-10-CM

## 2022-09-06 LAB
BILIRUB SERPL-MCNC: NEGATIVE MG/DL
BLOOD URINE, POC: NEGATIVE
CLARITY, POC UA: CLEAR
COLOR, POC UA: ABNORMAL
GLUCOSE UR QL STRIP: NORMAL
KETONES UR QL STRIP: NEGATIVE
LEUKOCYTE ESTERASE URINE, POC: ABNORMAL
NITRITE, POC UA: NEGATIVE
PH, POC UA: 5
POC RESIDUAL URINE VOLUME: 90 ML (ref 0–100)
PROTEIN, POC: NEGATIVE
SPECIFIC GRAVITY, POC UA: 1.01
UROBILINOGEN, POC UA: NORMAL

## 2022-09-06 PROCEDURE — 99204 OFFICE O/P NEW MOD 45 MIN: CPT | Mod: S$GLB,,, | Performed by: UROLOGY

## 2022-09-06 PROCEDURE — 3288F FALL RISK ASSESSMENT DOCD: CPT | Mod: CPTII,S$GLB,, | Performed by: UROLOGY

## 2022-09-06 PROCEDURE — 3077F SYST BP >= 140 MM HG: CPT | Mod: CPTII,S$GLB,, | Performed by: UROLOGY

## 2022-09-06 PROCEDURE — 1100F PTFALLS ASSESS-DOCD GE2>/YR: CPT | Mod: CPTII,S$GLB,, | Performed by: UROLOGY

## 2022-09-06 PROCEDURE — 3008F BODY MASS INDEX DOCD: CPT | Mod: CPTII,S$GLB,, | Performed by: UROLOGY

## 2022-09-06 PROCEDURE — 1100F PR PT FALLS ASSESS DOC 2+ FALLS/FALL W/INJURY/YR: ICD-10-PCS | Mod: CPTII,S$GLB,, | Performed by: UROLOGY

## 2022-09-06 PROCEDURE — 99204 PR OFFICE/OUTPT VISIT, NEW, LEVL IV, 45-59 MIN: ICD-10-PCS | Mod: S$GLB,,, | Performed by: UROLOGY

## 2022-09-06 PROCEDURE — 1160F RVW MEDS BY RX/DR IN RCRD: CPT | Mod: CPTII,S$GLB,, | Performed by: UROLOGY

## 2022-09-06 PROCEDURE — 3288F PR FALLS RISK ASSESSMENT DOCUMENTED: ICD-10-PCS | Mod: CPTII,S$GLB,, | Performed by: UROLOGY

## 2022-09-06 PROCEDURE — 3078F PR MOST RECENT DIASTOLIC BLOOD PRESSURE < 80 MM HG: ICD-10-PCS | Mod: CPTII,S$GLB,, | Performed by: UROLOGY

## 2022-09-06 PROCEDURE — 1159F MED LIST DOCD IN RCRD: CPT | Mod: CPTII,S$GLB,, | Performed by: UROLOGY

## 2022-09-06 PROCEDURE — 99999 PR PBB SHADOW E&M-EST. PATIENT-LVL IV: CPT | Mod: PBBFAC,,, | Performed by: UROLOGY

## 2022-09-06 PROCEDURE — 3008F PR BODY MASS INDEX (BMI) DOCUMENTED: ICD-10-PCS | Mod: CPTII,S$GLB,, | Performed by: UROLOGY

## 2022-09-06 PROCEDURE — 3077F PR MOST RECENT SYSTOLIC BLOOD PRESSURE >= 140 MM HG: ICD-10-PCS | Mod: CPTII,S$GLB,, | Performed by: UROLOGY

## 2022-09-06 PROCEDURE — 81002 URINALYSIS NONAUTO W/O SCOPE: CPT | Mod: S$GLB,,, | Performed by: UROLOGY

## 2022-09-06 PROCEDURE — 99999 PR PBB SHADOW E&M-EST. PATIENT-LVL IV: ICD-10-PCS | Mod: PBBFAC,,, | Performed by: UROLOGY

## 2022-09-06 PROCEDURE — 3078F DIAST BP <80 MM HG: CPT | Mod: CPTII,S$GLB,, | Performed by: UROLOGY

## 2022-09-06 PROCEDURE — 81002 POCT URINE DIPSTICK WITHOUT MICROSCOPE: ICD-10-PCS | Mod: S$GLB,,, | Performed by: UROLOGY

## 2022-09-06 PROCEDURE — 1126F PR PAIN SEVERITY QUANTIFIED, NO PAIN PRESENT: ICD-10-PCS | Mod: CPTII,S$GLB,, | Performed by: UROLOGY

## 2022-09-06 PROCEDURE — 1159F PR MEDICATION LIST DOCUMENTED IN MEDICAL RECORD: ICD-10-PCS | Mod: CPTII,S$GLB,, | Performed by: UROLOGY

## 2022-09-06 PROCEDURE — 51798 POCT BLADDER SCAN: ICD-10-PCS | Mod: S$GLB,,, | Performed by: UROLOGY

## 2022-09-06 PROCEDURE — 87086 URINE CULTURE/COLONY COUNT: CPT | Performed by: UROLOGY

## 2022-09-06 PROCEDURE — 1160F PR REVIEW ALL MEDS BY PRESCRIBER/CLIN PHARMACIST DOCUMENTED: ICD-10-PCS | Mod: CPTII,S$GLB,, | Performed by: UROLOGY

## 2022-09-06 PROCEDURE — 1126F AMNT PAIN NOTED NONE PRSNT: CPT | Mod: CPTII,S$GLB,, | Performed by: UROLOGY

## 2022-09-06 PROCEDURE — 51798 US URINE CAPACITY MEASURE: CPT | Mod: S$GLB,,, | Performed by: UROLOGY

## 2022-09-06 RX ORDER — OMEPRAZOLE 40 MG/1
40 CAPSULE, DELAYED RELEASE ORAL DAILY
COMMUNITY
Start: 2022-08-27

## 2022-09-06 NOTE — PROGRESS NOTES
Subjective:       Patient ID: Jerardo Callejas is a 72 y.o. male.    Chief Complaint: sepsis     This is a 72 y.o.  male patient that is new to me.  The patient was referred to me by MARY Ramirez for recurrent UTI, BPH.  H/o BPH on tamsulosin for years followed at OSH.  He had UTI in 11/2021 and two infections in 8/2022 with Klebsiella and E coli.  Was septic with recent infection and admitted to hospital 8/2022.  Reports years of LUTs.  Does have worsening frequency/urgency with UTI and sometimes dysuria.  Denies gross hematuria recently but had this years ago.  AUA SS 18/4, PVR ok.     Reports low libido.     I personally reviewed the images: no recent imaging    IPSS Questionnaire (AUA-SS):  Over the past month    1)  Incomplete Emptying - How often have you had a sensation of not emptying your bladder completely after you finish urinating?  1 - Less than 1 time in 5   2)  Frequency - How often have you had to urinate again less than two hours after you finished urinating? 1 - Less than 1 time in 5   3)  Intermittency - How often have you found you stopped and started again several times when you urinated?  2 - Less than half the time   4) Urgency - How difficult have you found it to postpone urination?  5 - Almost always   5) Weak Stream - How often have you had a weak urinary stream?  5 - Almost always   6) Straining  - How often have you had to push or strain to begin urination?  1 - Less than 1 time in 5   7) Nocturia - How many times did you most typically get up to urinate from the time you went to bed until the time you got up in the morning?  3 - 3 times   Total score:  0-7 mild, 8-19 moderate, 20-35 severe 18   Quality of Life:  4 - Mostly Dissatisfied              Lab Results   Component Value Date    CREATININE 0.8 09/02/2022       ---  PMH/PSH/Medications/Allergies/Social history reviewed and as in chart.    Review of Systems   Constitutional:  Negative for activity change, chills and fever.   HENT:   Negative for congestion.    Respiratory:  Negative for cough, chest tightness and shortness of breath.    Cardiovascular:  Negative for chest pain and palpitations.   Gastrointestinal:  Negative for abdominal distention, abdominal pain, nausea and vomiting.   Genitourinary:  Positive for difficulty urinating, frequency and urgency. Negative for flank pain, hematuria, penile pain, scrotal swelling and testicular pain.   Musculoskeletal:  Negative for gait problem.     Objective:      Physical Exam  HENT:      Head: Atraumatic.   Pulmonary:      Effort: Pulmonary effort is normal.   Neurological:      General: No focal deficit present.      Mental Status: He is alert and oriented to person, place, and time.       Assessment:     Problem Noted   Recurrent Uti 9/6/2022    Recurrent UTI  Hospitalized 8/2022 for UTI  Klebsiella and E coli 8/2022    BPH on tamsulosin for years    Persistent microhematuria on UAs     Microhematuria 9/6/2022   Bph (Benign Prostatic Hyperplasia) 7/21/2021       Plan:     CT urogram, cystoscopy given h/o hematuria, recurrent UTIs  Continue tamsulosin  Follow up to review  Check T given decreased libido.     Shay Horan MD

## 2022-09-07 ENCOUNTER — HOSPITAL ENCOUNTER (EMERGENCY)
Facility: HOSPITAL | Age: 72
Discharge: HOME | End: 2022-09-07
Attending: EMERGENCY MEDICINE
Payer: MEDICARE

## 2022-09-07 VITALS
OXYGEN SATURATION: 98 % | RESPIRATION RATE: 17 BRPM | HEART RATE: 58 BPM | WEIGHT: 220 LBS | BODY MASS INDEX: 31.5 KG/M2 | TEMPERATURE: 99 F | SYSTOLIC BLOOD PRESSURE: 127 MMHG | HEIGHT: 70 IN | DIASTOLIC BLOOD PRESSURE: 82 MMHG

## 2022-09-07 DIAGNOSIS — R00.2 PALPITATIONS: Primary | ICD-10-CM

## 2022-09-07 DIAGNOSIS — R00.2 PALPITATION: ICD-10-CM

## 2022-09-07 LAB
ALBUMIN SERPL BCP-MCNC: 4.6 G/DL (ref 3.5–5.2)
ALP SERPL-CCNC: 101 U/L (ref 38–126)
ALT SERPL W/O P-5'-P-CCNC: 27 U/L (ref 10–44)
ANION GAP SERPL CALC-SCNC: 8 MMOL/L (ref 8–16)
AST SERPL-CCNC: 43 U/L (ref 15–46)
BACTERIA UR CULT: NO GROWTH
BASOPHILS # BLD AUTO: 0.05 K/UL (ref 0–0.2)
BASOPHILS NFR BLD: 1.2 % (ref 0–1.9)
BILIRUB SERPL-MCNC: 0.4 MG/DL (ref 0.1–1)
CALCIUM SERPL-MCNC: 10 MG/DL (ref 8.7–10.5)
CHLORIDE SERPL-SCNC: 99 MMOL/L (ref 95–110)
CO2 SERPL-SCNC: 33 MMOL/L (ref 23–29)
CREAT SERPL-MCNC: 1.07 MG/DL (ref 0.5–1.4)
DIFFERENTIAL METHOD: ABNORMAL
EOSINOPHIL # BLD AUTO: 0.1 K/UL (ref 0–0.5)
EOSINOPHIL NFR BLD: 3.2 % (ref 0–8)
ERYTHROCYTE [DISTWIDTH] IN BLOOD BY AUTOMATED COUNT: 13.9 % (ref 11.5–14.5)
EST. GFR  (NO RACE VARIABLE): >60 ML/MIN/1.73 M^2
GLUCOSE SERPL-MCNC: 119 MG/DL (ref 70–110)
HCT VFR BLD AUTO: 46.8 % (ref 40–54)
HGB BLD-MCNC: 14.8 G/DL (ref 14–18)
IMM GRANULOCYTES # BLD AUTO: 0.04 K/UL (ref 0–0.04)
IMM GRANULOCYTES NFR BLD AUTO: 1 % (ref 0–0.5)
LYMPHOCYTES # BLD AUTO: 1.3 K/UL (ref 1–4.8)
LYMPHOCYTES NFR BLD: 31.9 % (ref 18–48)
MAGNESIUM SERPL-MCNC: 2.1 MG/DL (ref 1.6–2.6)
MCH RBC QN AUTO: 27.7 PG (ref 27–31)
MCHC RBC AUTO-ENTMCNC: 31.6 G/DL (ref 32–36)
MCV RBC AUTO: 88 FL (ref 82–98)
MONOCYTES # BLD AUTO: 0.6 K/UL (ref 0.3–1)
MONOCYTES NFR BLD: 14.2 % (ref 4–15)
NEUTROPHILS # BLD AUTO: 1.9 K/UL (ref 1.8–7.7)
NEUTROPHILS NFR BLD: 48.5 % (ref 38–73)
NRBC BLD-RTO: 0 /100 WBC
PLATELET # BLD AUTO: 229 K/UL (ref 150–450)
PMV BLD AUTO: 9.5 FL (ref 9.2–12.9)
POTASSIUM SERPL-SCNC: 3.9 MMOL/L (ref 3.5–5.1)
PROT SERPL-MCNC: 7.9 G/DL (ref 6–8.4)
RBC # BLD AUTO: 5.34 M/UL (ref 4.6–6.2)
SODIUM SERPL-SCNC: 140 MMOL/L (ref 136–145)
TROPONIN I SERPL-MCNC: <0.012 NG/ML (ref 0.01–0.03)
UUN UR-MCNC: 19 MG/DL (ref 2–20)
WBC # BLD AUTO: 4.01 K/UL (ref 3.9–12.7)

## 2022-09-07 PROCEDURE — 93005 ELECTROCARDIOGRAM TRACING: CPT | Mod: ER

## 2022-09-07 PROCEDURE — 85025 COMPLETE CBC W/AUTO DIFF WBC: CPT | Mod: ER | Performed by: EMERGENCY MEDICINE

## 2022-09-07 PROCEDURE — 93010 EKG 12-LEAD: ICD-10-PCS | Mod: ,,, | Performed by: INTERNAL MEDICINE

## 2022-09-07 PROCEDURE — 99900035 HC TECH TIME PER 15 MIN (STAT): Mod: ER

## 2022-09-07 PROCEDURE — 83735 ASSAY OF MAGNESIUM: CPT | Mod: ER | Performed by: EMERGENCY MEDICINE

## 2022-09-07 PROCEDURE — 80053 COMPREHEN METABOLIC PANEL: CPT | Mod: ER | Performed by: EMERGENCY MEDICINE

## 2022-09-07 PROCEDURE — 84484 ASSAY OF TROPONIN QUANT: CPT | Mod: ER | Performed by: EMERGENCY MEDICINE

## 2022-09-07 PROCEDURE — 99285 EMERGENCY DEPT VISIT HI MDM: CPT | Mod: 25,ER

## 2022-09-07 PROCEDURE — 93010 ELECTROCARDIOGRAM REPORT: CPT | Mod: ,,, | Performed by: INTERNAL MEDICINE

## 2022-09-07 NOTE — ED TRIAGE NOTES
Pt was recently discharged from [facility] after being diagnosed with a uti. Pt was sent home with cipro, which he says makes him have palpitations. Denies chest pain, sob, dizziness

## 2022-09-07 NOTE — ED PROVIDER NOTES
Encounter Date: 9/7/2022       History     Chief Complaint   Patient presents with    Palpitations     PT reports palpations that started last night. PT denies SOB or chest pain. PT reports recent inpatient stay for UTI     72-year-old male with history of hypertension, mitral valve prolapse presents with palpitations since last night.  Patient says that he was recently hospitalized for a bad UTI and discharged home with Cipro.  He says in the past when he has taken Cipro usually after few days he starts to develop palpitations.  He said he was concerned about this but said he would try the medication again.  Yesterday was his 4th dose.  Symptoms started last night.  No shortness of breath, vomiting, diarrhea, fever, abdominal pain or lightheadedness.    Review of patient's allergies indicates:   Allergen Reactions    Penicillins Palpitations and Anxiety     Past Medical History:   Diagnosis Date    Back pain, chronic     BPH (benign prostatic hyperplasia)     Hypertension     Mitral valve prolapse syndrome     Prostate enlargement      Past Surgical History:   Procedure Laterality Date    NECK SURGERY       Family History   Problem Relation Age of Onset    Hypertension Mother     Stroke Father      Social History     Tobacco Use    Smoking status: Former    Smokeless tobacco: Never    Tobacco comments:     >40 years ago   Substance Use Topics    Alcohol use: No    Drug use: No     Review of Systems   Constitutional:  Negative for fever.   HENT:  Negative for sore throat.    Respiratory:  Negative for shortness of breath.    Cardiovascular:  Positive for palpitations.   Gastrointestinal:  Negative for nausea.   Genitourinary:  Negative for dysuria.   Musculoskeletal:  Negative for back pain.   Skin:  Negative for rash.   Neurological:  Negative for weakness.   Hematological:  Does not bruise/bleed easily.     Physical Exam     Initial Vitals [09/07/22 0942]   BP Pulse Resp Temp SpO2   (!) 157/77 (!) 54 20 98.7 °F  (37.1 °C) 98 %      MAP       --         Physical Exam    Nursing note and vitals reviewed.  HENT:   Head: Atraumatic.   Eyes: Conjunctivae and EOM are normal.   Neck:   Normal range of motion.  Cardiovascular:      Exam reveals no gallop and no friction rub.       No murmur heard.  Pulmonary/Chest: Breath sounds normal. No respiratory distress. He has no wheezes. He has no rales.   Abdominal: Abdomen is soft. Bowel sounds are normal. He exhibits no distension. There is no abdominal tenderness.   Musculoskeletal:         General: No edema. Normal range of motion.      Cervical back: Normal range of motion.     Neurological: He is alert and oriented to person, place, and time.   Skin: Skin is warm and dry.   Psychiatric: He has a normal mood and affect.       ED Course   Procedures  Labs Reviewed   CBC W/ AUTO DIFFERENTIAL - Abnormal; Notable for the following components:       Result Value    MCHC 31.6 (*)     Immature Granulocytes 1.0 (*)     All other components within normal limits   COMPREHENSIVE METABOLIC PANEL - Abnormal; Notable for the following components:    CO2 33 (*)     Glucose 119 (*)     All other components within normal limits   TROPONIN I   MAGNESIUM          Imaging Results              X-Ray Chest AP Portable (Final result)  Result time 09/07/22 10:36:29      Final result by TONY Herrmann Sr., MD (09/07/22 10:36:29)                   Impression:      1. There is a mild amount of haziness in the lateral aspect of both lungs.  If additional imaging evaluation is clinically indicated, I recommend consideration of a CT examination of the thorax without IV contrast.  2. The size of the heart is prominent.  This may be secondary to magnification.  3. There is anterior spinal fusion hardware in the cervical spine.  .      Electronically signed by: Raheel Herrmann MD  Date:    09/07/2022  Time:    10:36               Narrative:    EXAMINATION:  XR CHEST AP PORTABLE    CLINICAL  HISTORY:  sob;    COMPARISON:  08/30/2022    FINDINGS:  The size of the heart is prominent.  There is a mild amount of haziness in the lateral aspect of both lungs.  There is no pneumothorax.  The costophrenic angles are sharp.  There is anterior spinal fusion hardware in the cervical spine.                                       Medications - No data to display  Medical Decision Making:   Initial Assessment:   72-year-old male presenting with palpitations since last night.  Patient says he frequently gets palpitations when taking ciprofloxacin which he was recently prescribed after being discharged from hospital with a UTI.  On exam he appears well.  Vital signs unremarkable.  Lung and heart sounds unremarkable.  Abdomen benign.  EKG reviewed interpreted myself shows no evidence of acute ischemia.  Chest x-ray shows no acute process.  Labs are grossly unremarkable with negative troponin.  No metabolic disturbance or significant electrolyte abnormalities.  Patient has had multiple evaluations recently for chest pain.  I do not think that further evaluation is warranted at this time.  He should follow up with Urology for further recommendations regarding antibiotic choice.  Return instructions given.  Patient verbalized understanding and agreement with plan                    Clinical Impression:   Final diagnoses:  [R00.2] Palpitations (Primary)  [R00.2] Palpitation        ED Disposition Condition    Discharge Stable          ED Prescriptions    None       Follow-up Information       Follow up With Specialties Details Why Contact Info    SONIA Barker MD Family Medicine Schedule an appointment as soon as possible for a visit   [id number]  [place] [id number] [phone]               Gordon Khan MD  09/07/22 7740

## 2022-09-08 ENCOUNTER — TELEPHONE (OUTPATIENT)
Dept: UROLOGY | Facility: CLINIC | Age: 72
End: 2022-09-08
Payer: MEDICARE

## 2022-09-08 DIAGNOSIS — N39.0 RECURRENT UTI: Primary | ICD-10-CM

## 2022-09-08 RX ORDER — NITROFURANTOIN 25; 75 MG/1; MG/1
100 CAPSULE ORAL 2 TIMES DAILY
Qty: 14 CAPSULE | Refills: 0 | Status: SHIPPED | OUTPATIENT
Start: 2022-09-08 | End: 2022-09-15

## 2022-09-08 NOTE — TELEPHONE ENCOUNTER
----- Message from Shay Horan MD sent at 9/8/2022  8:11 AM CDT -----  Regarding: abx  Ok he was given this after recent hospital stay by another provider.     Team--please let patient know to stop cipro and alternative antibiotic sent to pharmacy.    Thanks,    Cem  ----- Message -----  From: Gordon Khan MD  Sent: 9/7/2022  10:42 AM CDT  To: MD Dr. Aung Whaley,  Today I saw Mr. Callejas in the ED at [facility].  He was complaining of palpitations and says that this happens when he takes Cipro.  His workup in the ED was unremarkable.  He has taken 4 doses.  I am not sure if he plans on taking the remainder of his antibiotic course.  Can you please follow-up with him as soon as possible to discuss either alternative or continuing this medication.  Thanks  Gordon

## 2022-09-08 NOTE — TELEPHONE ENCOUNTER
I spoke with the patient to inform him to discontinue the current medication (ciprofloxacin) and continue the alternative antibiotic (Macrobid) that has been sent to his pharmacy. He voiced his understanding by confirming to contact the pharmacy to see if it's ready for .

## 2022-09-09 ENCOUNTER — HOSPITAL ENCOUNTER (EMERGENCY)
Facility: HOSPITAL | Age: 72
Discharge: HOME | End: 2022-09-09
Attending: STUDENT IN AN ORGANIZED HEALTH CARE EDUCATION/TRAINING PROGRAM
Payer: MEDICARE

## 2022-09-09 VITALS
HEIGHT: 70 IN | SYSTOLIC BLOOD PRESSURE: 151 MMHG | TEMPERATURE: 98 F | OXYGEN SATURATION: 97 % | BODY MASS INDEX: 31.5 KG/M2 | RESPIRATION RATE: 18 BRPM | DIASTOLIC BLOOD PRESSURE: 72 MMHG | WEIGHT: 220 LBS | HEART RATE: 54 BPM

## 2022-09-09 DIAGNOSIS — R00.2 PALPITATION: ICD-10-CM

## 2022-09-09 DIAGNOSIS — R07.9 CHEST PAIN: Primary | ICD-10-CM

## 2022-09-09 LAB
ALBUMIN SERPL BCP-MCNC: 4.4 G/DL (ref 3.5–5.2)
ALP SERPL-CCNC: 96 U/L (ref 55–135)
ALT SERPL W/O P-5'-P-CCNC: 20 U/L (ref 10–44)
ANION GAP SERPL CALC-SCNC: 10 MMOL/L (ref 8–16)
AST SERPL-CCNC: 25 U/L (ref 10–40)
BASOPHILS # BLD AUTO: 0.05 K/UL (ref 0–0.2)
BASOPHILS NFR BLD: 1 % (ref 0–1.9)
BILIRUB SERPL-MCNC: 0.5 MG/DL (ref 0.1–1)
BNP SERPL-MCNC: <10 PG/ML (ref 0–99)
BUN SERPL-MCNC: 19 MG/DL (ref 8–23)
CALCIUM SERPL-MCNC: 10.1 MG/DL (ref 8.7–10.5)
CHLORIDE SERPL-SCNC: 96 MMOL/L (ref 95–110)
CO2 SERPL-SCNC: 32 MMOL/L (ref 23–29)
CREAT SERPL-MCNC: 1.1 MG/DL (ref 0.5–1.4)
DIFFERENTIAL METHOD: ABNORMAL
EOSINOPHIL # BLD AUTO: 0.1 K/UL (ref 0–0.5)
EOSINOPHIL NFR BLD: 2.8 % (ref 0–8)
ERYTHROCYTE [DISTWIDTH] IN BLOOD BY AUTOMATED COUNT: 13.9 % (ref 11.5–14.5)
EST. GFR  (NO RACE VARIABLE): >60 ML/MIN/1.73 M^2
GLUCOSE SERPL-MCNC: 87 MG/DL (ref 70–110)
HCT VFR BLD AUTO: 48 % (ref 40–54)
HGB BLD-MCNC: 15.3 G/DL (ref 14–18)
IMM GRANULOCYTES # BLD AUTO: 0.02 K/UL (ref 0–0.04)
IMM GRANULOCYTES NFR BLD AUTO: 0.4 % (ref 0–0.5)
LYMPHOCYTES # BLD AUTO: 1.7 K/UL (ref 1–4.8)
LYMPHOCYTES NFR BLD: 34.6 % (ref 18–48)
MAGNESIUM SERPL-MCNC: 2.1 MG/DL (ref 1.6–2.6)
MCH RBC QN AUTO: 27.8 PG (ref 27–31)
MCHC RBC AUTO-ENTMCNC: 31.9 G/DL (ref 32–36)
MCV RBC AUTO: 87 FL (ref 82–98)
MONOCYTES # BLD AUTO: 0.6 K/UL (ref 0.3–1)
MONOCYTES NFR BLD: 12.5 % (ref 4–15)
NEUTROPHILS # BLD AUTO: 2.4 K/UL (ref 1.8–7.7)
NEUTROPHILS NFR BLD: 48.7 % (ref 38–73)
NRBC BLD-RTO: 0 /100 WBC
PLATELET # BLD AUTO: 238 K/UL (ref 150–450)
PMV BLD AUTO: 10.1 FL (ref 9.2–12.9)
POTASSIUM SERPL-SCNC: 3.4 MMOL/L (ref 3.5–5.1)
PROT SERPL-MCNC: 8.4 G/DL (ref 6–8.4)
RBC # BLD AUTO: 5.51 M/UL (ref 4.6–6.2)
SODIUM SERPL-SCNC: 138 MMOL/L (ref 136–145)
TROPONIN I SERPL DL<=0.01 NG/ML-MCNC: 0.01 NG/ML (ref 0–0.03)
TSH SERPL DL<=0.005 MIU/L-ACNC: 1.53 UIU/ML (ref 0.4–4)
WBC # BLD AUTO: 4.97 K/UL (ref 3.9–12.7)

## 2022-09-09 PROCEDURE — 93005 ELECTROCARDIOGRAM TRACING: CPT

## 2022-09-09 PROCEDURE — 83735 ASSAY OF MAGNESIUM: CPT | Performed by: NURSE PRACTITIONER

## 2022-09-09 PROCEDURE — 93010 ELECTROCARDIOGRAM REPORT: CPT | Mod: ,,, | Performed by: INTERNAL MEDICINE

## 2022-09-09 PROCEDURE — 84443 ASSAY THYROID STIM HORMONE: CPT | Performed by: NURSE PRACTITIONER

## 2022-09-09 PROCEDURE — 93010 EKG 12-LEAD: ICD-10-PCS | Mod: ,,, | Performed by: INTERNAL MEDICINE

## 2022-09-09 PROCEDURE — 36000 PLACE NEEDLE IN VEIN: CPT

## 2022-09-09 PROCEDURE — 83880 ASSAY OF NATRIURETIC PEPTIDE: CPT | Performed by: NURSE PRACTITIONER

## 2022-09-09 PROCEDURE — 99285 EMERGENCY DEPT VISIT HI MDM: CPT | Mod: 25

## 2022-09-09 PROCEDURE — 85025 COMPLETE CBC W/AUTO DIFF WBC: CPT | Performed by: NURSE PRACTITIONER

## 2022-09-09 PROCEDURE — 84484 ASSAY OF TROPONIN QUANT: CPT | Performed by: NURSE PRACTITIONER

## 2022-09-09 PROCEDURE — 80053 COMPREHEN METABOLIC PANEL: CPT | Performed by: NURSE PRACTITIONER

## 2022-09-10 NOTE — DISCHARGE INSTRUCTIONS
Thank you for coming to our Emergency Department today. It is important to remember that some problems are difficult to diagnose and may not be found during your first visit. Be sure to follow up with your primary care doctor and review any labs/imaging that was performed with them. If you do not have a primary care doctor, you may contact the one listed on your discharge paperwork or you may also call the [facility] Appointment Desk at [phone] to schedule an appointment with one.     All medications may potentially have side effects and it is impossible to predict which medications may give you side effects. If you feel that you are having a negative effect of any medication you should immediately stop taking them and seek medical attention.    Return to the ER with any questions/concerns, new/concerning symptoms, worsening or failure to improve. Do not drive or make any important decisions for 24 hours if you have received any pain medications, sedatives or mood altering drugs during your ER visit.

## 2022-09-10 NOTE — ED PROVIDER NOTES
Encounter Date: 9/9/2022       History     Chief Complaint   Patient presents with    Chest Pain     Chest pain and palpitations since this morning. Pt. Reports home ekg monitor read irregular heart beat. Denies sob or other symptoms presently     72-year-old male with history of hypertension, mitral valve pleural rub syndrome presents with palpitations and chest pain occurred earlier in the day.  He reports at the time pain substernal and without radiation.  He currently he reports being chest pain free.  Denies any shortness of breath, cough, hemoptysis, lower extremity swelling, or fevers or chills.  States he was evaluated by his cardiologist yesterday and obtain an echo as well as turned in his Holter monitor and has not heard results as of now.  He has a follow-up appointment on Monday with Cardiology.  He states that home when he checked his blood pressure was elevated in given the states he felt very anxious and came to the emergency department for evaluation.  He reports having similar symptoms in the past with unremarkable workup.      Review of patient's allergies indicates:   Allergen Reactions    Penicillins Palpitations and Anxiety     Past Medical History:   Diagnosis Date    Back pain, chronic     BPH (benign prostatic hyperplasia)     Hypertension     Mitral valve prolapse syndrome     Prostate enlargement      Past Surgical History:   Procedure Laterality Date    NECK SURGERY       Family History   Problem Relation Age of Onset    Hypertension Mother     Stroke Father      Social History     Tobacco Use    Smoking status: Former    Smokeless tobacco: Never    Tobacco comments:     >40 years ago   Substance Use Topics    Alcohol use: No    Drug use: No     Review of Systems   Constitutional:  Negative for chills and fever.   HENT:  Negative for congestion and rhinorrhea.    Eyes:  Negative for pain.   Respiratory:  Negative for cough and shortness of breath.    Cardiovascular:  Positive for chest  pain and palpitations. Negative for leg swelling.   Gastrointestinal:  Negative for abdominal pain, nausea and vomiting.   Endocrine: Negative for polyuria.   Genitourinary:  Negative for dysuria and hematuria.   Musculoskeletal:  Negative for gait problem and neck pain.   Skin:  Negative for rash.   Neurological:  Negative for weakness and headaches.     Physical Exam     Initial Vitals [09/09/22 1608]   BP Pulse Resp Temp SpO2   132/79 62 20 98.6 °F (37 °C) 97 %      MAP       --         Physical Exam    Constitutional: He appears well-developed and well-nourished. He is not diaphoretic. No distress.   HENT:   Head: Normocephalic and atraumatic.   Eyes: Conjunctivae and EOM are normal. Pupils are equal, round, and reactive to light. Right eye exhibits no discharge.   Neck: Neck supple. No JVD present.   Normal range of motion.  Cardiovascular:  Regular rhythm.   Tachycardia present.         Pulmonary/Chest: Breath sounds normal. No respiratory distress. He exhibits no tenderness.   Abdominal: Abdomen is soft. Bowel sounds are normal. He exhibits no distension. There is no abdominal tenderness.   Musculoskeletal:         General: Normal range of motion.      Cervical back: Normal range of motion and neck supple.     Neurological: He is alert and oriented to person, place, and time. GCS eye subscore is 4. GCS verbal subscore is 5. GCS motor subscore is 6.   Skin: Skin is warm. Capillary refill takes less than 2 seconds.   Psychiatric: He has a normal mood and affect.       ED Course   Procedures  Labs Reviewed   CBC W/ AUTO DIFFERENTIAL - Abnormal; Notable for the following components:       Result Value    MCHC 31.9 (*)     All other components within normal limits   COMPREHENSIVE METABOLIC PANEL - Abnormal; Notable for the following components:    Potassium 3.4 (*)     CO2 32 (*)     All other components within normal limits   TROPONIN I   B-TYPE NATRIURETIC PEPTIDE   TSH   MAGNESIUM     EKG Readings:  (Independently Interpreted)   EKG obtained at 4:05 p.m. sinus rhythm at a rate of 61, NJ QRS QTC intervals within normal limits no signs of ischemia.  Normal sinus rhythm.     Imaging Results              X-Ray Chest AP Portable (Final result)  Result time 09/09/22 20:23:06      Final result by Maulik Velasquez DO (09/09/22 20:23:06)                   Impression:      Continued mild nonspecific interstitial prominence, similar to prior.  No new focal consolidation or significant detrimental change from prior.      Electronically signed by: Maulik Velasquez  Date:    09/09/2022  Time:    20:23               Narrative:    EXAMINATION:  XR CHEST AP PORTABLE    CLINICAL HISTORY:  Chest Pain;    TECHNIQUE:  Single frontal view of the chest was performed.    COMPARISON:  09/07/2022.    FINDINGS:  The lungs are well expanded.  Again seen is mild interstitial prominence, overall similar to prior.  No focal consolidation.  The pleural spaces are clear.    The cardiac silhouette is unremarkable.    The visualized osseous structures are intact.                                       Medications - No data to display  Medical Decision Making:   Initial Assessment:   72 year old patient with hx of hypertension, MVP presenting secondary to chest pain and palpitations. History without high risk features (e.g., not substernal, no exertional component ).    Minimal CAD risk factors. Exam without evidence of volume overload. EKG without signs of active ischemia. HEART score: 2. Given the timing of pain to ER presentation, sent a single troponin to evaluate for NSTEMI which was negative. Presentation not consistent with acute PE, pneumothorax, thoracic arotic dissection, cardiac effusion or tamponade.  Patient currently stable for discharge at this time.  Return precautions and anticipatory guidance given.  Patient to follow-up with his cardiologist on Monday as previously scheduled.  Clinical Tests:   Lab Tests: Ordered and  Reviewed  Radiological Study: Ordered and Reviewed  Medical Tests: Ordered and Reviewed           ED Course as of 09/09/22 2124   Fri Sep 09, 2022   2124 CBC without significant leukocytosis, anemia, or platelet abnormalities.  Chem 14 negative for hypo-or hyper natremia, kalemia, chloridemia, or other electrolyte abnormalities; BUN and creatinine were within normal limits indicating normal kidney function, ALT and AST were within normal limits indicating normal liver function.    [AS]      ED Course User Index  [AS] Yesika Montano MD           This dictation has been generated using M-Modal Fluency Direct dictation; some phonetic errors may occur.       Clinical Impression:   Final diagnoses:  [R07.9] Chest pain (Primary)  [R00.2] Palpitation        ED Disposition Condition    Discharge Stable        This dictation has been generated using M-Modal Fluency Direct dictation; some phonetic errors may occur.     ED Prescriptions    None       Follow-up Information       Follow up With Specialties Details Why Contact Info    SONIA Barker MD Family Medicine Schedule an appointment as soon as possible for a visit in 1 day The assessment and examination. [id number]  [place] [address]  [phone]      Cardiology  In 2 days As previously scheduled              Yesika Montano MD  09/09/22 2126     normal...

## 2022-09-13 ENCOUNTER — LAB VISIT (OUTPATIENT)
Dept: LAB | Facility: HOSPITAL | Age: 72
End: 2022-09-13
Attending: STUDENT IN AN ORGANIZED HEALTH CARE EDUCATION/TRAINING PROGRAM
Payer: MEDICARE

## 2022-09-13 ENCOUNTER — TELEPHONE (OUTPATIENT)
Dept: UROLOGY | Facility: CLINIC | Age: 72
End: 2022-09-13
Payer: MEDICARE

## 2022-09-13 DIAGNOSIS — R68.82 LOW LIBIDO: Primary | ICD-10-CM

## 2022-09-13 DIAGNOSIS — R68.82 LOW LIBIDO: ICD-10-CM

## 2022-09-13 LAB — TESTOST SERPL-MCNC: 289 NG/DL (ref 304–1227)

## 2022-09-13 PROCEDURE — 36415 COLL VENOUS BLD VENIPUNCTURE: CPT | Performed by: STUDENT IN AN ORGANIZED HEALTH CARE EDUCATION/TRAINING PROGRAM

## 2022-09-13 PROCEDURE — 84403 ASSAY OF TOTAL TESTOSTERONE: CPT | Performed by: STUDENT IN AN ORGANIZED HEALTH CARE EDUCATION/TRAINING PROGRAM

## 2022-09-13 NOTE — PROGRESS NOTES
Pt called in, was trying to get testosterone lab drawn that he discussed with Dr. Horan but no order was in. Dr. Horan is out of the office this week and due to the time urgency (pt in lab now) so I placed testosterone order and will fwd result to Dr. Horan.

## 2022-09-13 NOTE — TELEPHONE ENCOUNTER
----- Message from Shaun Olivo sent at 9/13/2022  8:22 AM CDT -----  Contact: pt  .Type:  Needs Medical Advice    Who Called: pt  Would the patient rather a call back or a response via [facility]? Call back  Best Call Back Number: [phone]  Additional Information: Pt. Is at the lab drove from [facility] needs an order in for Testerone level.  Pt. Is at the lab.

## 2022-09-14 ENCOUNTER — TELEPHONE (OUTPATIENT)
Dept: UROLOGY | Facility: CLINIC | Age: 72
End: 2022-09-14
Payer: MEDICARE

## 2022-09-14 NOTE — TELEPHONE ENCOUNTER
----- Message from Chely German MD sent at 9/14/2022  8:02 AM CDT -----  Please let pt know that Dr. Horan will discuss testosterone levels when he is back in the office next week

## 2022-09-15 ENCOUNTER — HOSPITAL ENCOUNTER (OUTPATIENT)
Dept: RADIOLOGY | Facility: HOSPITAL | Age: 72
Discharge: HOME | End: 2022-09-15
Attending: UROLOGY
Payer: MEDICARE

## 2022-09-15 DIAGNOSIS — N39.0 RECURRENT UTI: ICD-10-CM

## 2022-09-15 DIAGNOSIS — R31.29 MICROHEMATURIA: ICD-10-CM

## 2022-09-15 PROCEDURE — 25500020 PHARM REV CODE 255: Mod: PO | Performed by: UROLOGY

## 2022-09-15 PROCEDURE — 74178 CT ABD&PLV WO CNTR FLWD CNTR: CPT | Mod: TC,PO

## 2022-09-15 RX ADMIN — IOHEXOL 125 ML: 350 INJECTION, SOLUTION INTRAVENOUS at 09:09

## 2022-09-19 ENCOUNTER — TELEPHONE (OUTPATIENT)
Dept: UROLOGY | Facility: CLINIC | Age: 72
End: 2022-09-19
Payer: MEDICARE

## 2022-09-19 NOTE — TELEPHONE ENCOUNTER
----- Message from Tawana Warren sent at 9/19/2022  2:15 PM CDT -----  Type:  Needs Medical Advice    Who Called: PT  Symptoms (please be specific):    How long has patient had these symptoms:    Pharmacy name and phone #:    Would the patient rather a call back or a response via [facility]? CALL  Best Call Back Number: [phone]  Additional Information: WANTS TO MAY SURE HE DOESN'T NEED A

## 2022-09-19 NOTE — TELEPHONE ENCOUNTER
I returned the patient call on wanting to know if he doesn't need a  for his procedure (cystoscopy) on Wednesday. I told him that he doesn't. He voiced his understanding on confirming the date/time of his appointment.

## 2022-09-21 ENCOUNTER — LAB VISIT (OUTPATIENT)
Dept: LAB | Facility: HOSPITAL | Age: 72
End: 2022-09-21
Attending: UROLOGY
Payer: MEDICARE

## 2022-09-21 ENCOUNTER — PROCEDURE VISIT (OUTPATIENT)
Dept: UROLOGY | Facility: CLINIC | Age: 72
End: 2022-09-21
Payer: MEDICARE

## 2022-09-21 VITALS
BODY MASS INDEX: 31.5 KG/M2 | DIASTOLIC BLOOD PRESSURE: 82 MMHG | WEIGHT: 220 LBS | HEIGHT: 70 IN | HEART RATE: 54 BPM | SYSTOLIC BLOOD PRESSURE: 139 MMHG

## 2022-09-21 DIAGNOSIS — R68.82 LOW LIBIDO: Primary | ICD-10-CM

## 2022-09-21 DIAGNOSIS — R39.12 BENIGN PROSTATIC HYPERPLASIA WITH WEAK URINARY STREAM: ICD-10-CM

## 2022-09-21 DIAGNOSIS — R68.82 LOW LIBIDO: ICD-10-CM

## 2022-09-21 DIAGNOSIS — N39.0 RECURRENT UTI: ICD-10-CM

## 2022-09-21 DIAGNOSIS — R31.29 MICROHEMATURIA: ICD-10-CM

## 2022-09-21 DIAGNOSIS — R79.89 LOW TESTOSTERONE: ICD-10-CM

## 2022-09-21 DIAGNOSIS — N40.1 BENIGN PROSTATIC HYPERPLASIA WITH WEAK URINARY STREAM: ICD-10-CM

## 2022-09-21 LAB
BASOPHILS # BLD AUTO: 0.04 K/UL (ref 0–0.2)
BASOPHILS NFR BLD: 1.1 % (ref 0–1.9)
DIFFERENTIAL METHOD: ABNORMAL
EOSINOPHIL # BLD AUTO: 0.1 K/UL (ref 0–0.5)
EOSINOPHIL NFR BLD: 3.4 % (ref 0–8)
ERYTHROCYTE [DISTWIDTH] IN BLOOD BY AUTOMATED COUNT: 13.9 % (ref 11.5–14.5)
HCT VFR BLD AUTO: 45.2 % (ref 40–54)
HGB BLD-MCNC: 14.6 G/DL (ref 14–18)
IMM GRANULOCYTES # BLD AUTO: 0.02 K/UL (ref 0–0.04)
IMM GRANULOCYTES NFR BLD AUTO: 0.6 % (ref 0–0.5)
LYMPHOCYTES # BLD AUTO: 1.3 K/UL (ref 1–4.8)
LYMPHOCYTES NFR BLD: 36 % (ref 18–48)
MCH RBC QN AUTO: 28 PG (ref 27–31)
MCHC RBC AUTO-ENTMCNC: 32.3 G/DL (ref 32–36)
MCV RBC AUTO: 87 FL (ref 82–98)
MONOCYTES # BLD AUTO: 0.5 K/UL (ref 0.3–1)
MONOCYTES NFR BLD: 13 % (ref 4–15)
NEUTROPHILS # BLD AUTO: 1.6 K/UL (ref 1.8–7.7)
NEUTROPHILS NFR BLD: 45.9 % (ref 38–73)
NRBC BLD-RTO: 0 /100 WBC
PLATELET # BLD AUTO: 207 K/UL (ref 150–450)
PMV BLD AUTO: 9.8 FL (ref 9.2–12.9)
PROLACTIN SERPL IA-MCNC: 7.3 NG/ML (ref 3.5–19.4)
PROSTATE SPECIFIC ANTIGEN, TOTAL: 3.9 NG/ML (ref 0–4)
PSA FREE MFR SERPL: 18.46 %
PSA FREE SERPL-MCNC: 0.72 NG/ML (ref 0–1.5)
RBC # BLD AUTO: 5.22 M/UL (ref 4.6–6.2)
TESTOST SERPL-MCNC: 336 NG/DL (ref 304–1227)
WBC # BLD AUTO: 3.53 K/UL (ref 3.9–12.7)

## 2022-09-21 PROCEDURE — 84146 ASSAY OF PROLACTIN: CPT | Performed by: UROLOGY

## 2022-09-21 PROCEDURE — 84153 ASSAY OF PSA TOTAL: CPT | Performed by: UROLOGY

## 2022-09-21 PROCEDURE — 84403 ASSAY OF TOTAL TESTOSTERONE: CPT | Performed by: UROLOGY

## 2022-09-21 PROCEDURE — 52000 CYSTOSCOPY: ICD-10-PCS | Mod: ,,, | Performed by: UROLOGY

## 2022-09-21 PROCEDURE — 88112 CYTOPATH CELL ENHANCE TECH: CPT | Performed by: PATHOLOGY

## 2022-09-21 PROCEDURE — 99213 OFFICE O/P EST LOW 20 MIN: CPT | Mod: 25,,, | Performed by: UROLOGY

## 2022-09-21 PROCEDURE — 85025 COMPLETE CBC W/AUTO DIFF WBC: CPT | Performed by: UROLOGY

## 2022-09-21 PROCEDURE — 52000 CYSTOURETHROSCOPY: CPT | Mod: ,,, | Performed by: UROLOGY

## 2022-09-21 PROCEDURE — 99213 PR OFFICE/OUTPT VISIT, EST, LEVL III, 20-29 MIN: ICD-10-PCS | Mod: 25,,, | Performed by: UROLOGY

## 2022-09-21 PROCEDURE — 88112 CYTOPATH CELL ENHANCE TECH: CPT | Mod: 26,,, | Performed by: PATHOLOGY

## 2022-09-21 PROCEDURE — 88112 PR  CYTOPATH, CELL ENHANCE TECH: ICD-10-PCS | Mod: 26,,, | Performed by: PATHOLOGY

## 2022-09-21 PROCEDURE — 36415 COLL VENOUS BLD VENIPUNCTURE: CPT | Performed by: UROLOGY

## 2022-09-21 PROCEDURE — 84154 ASSAY OF PSA FREE: CPT | Performed by: UROLOGY

## 2022-09-21 PROCEDURE — 84402 ASSAY OF FREE TESTOSTERONE: CPT | Performed by: UROLOGY

## 2022-09-21 RX ORDER — DIVALPROEX SODIUM 250 MG/1
750 TABLET, DELAYED RELEASE ORAL 2 TIMES DAILY
COMMUNITY
Start: 2022-09-19

## 2022-09-21 NOTE — PROCEDURES
Cystoscopy    Date/Time: 9/21/2022 10:00 AM  Performed by: Shay Horan MD  Authorized by: Shay Horan MD     Consent Done?:  Yes (Written)  Timeout: prior to procedure the correct patient, procedure, and site was verified    Prep: patient was prepped and draped in usual sterile fashion    Local anesthesia used?: Yes    Anesthesia:  Intraurethral instillation  Indications: hematuria    Position:  Supine  Anesthesia:  Intraurethral instillation  Preparation: Patient was prepped and draped in usual sterile fashion    Scope type:  Flexible cystoscope  External exam normal: Yes    Digital exam performed: No    Urethra normal: Yes    Prostate normal: No     Hyperplasia   Positive Varices   Trilobar  Bladder neck normal: Yes    Bladder normal: Yes     patient tolerated the procedure well with no immediate complications

## 2022-09-21 NOTE — PROGRESS NOTES
Subjective:       Patient ID: Jerardo Callejas is a 72 y.o. male.    Chief Complaint: No chief complaint on file.     This is a 72 y.o.  male patient that is new to me.  The patient was referred to me by MARY Ramirez for recurrent UTI, BPH.  H/o BPH on tamsulosin for years followed at OSH.  He had UTI in 11/2021 and two infections in 8/2022 with Klebsiella and E coli.  Was septic with recent infection and admitted to hospital 8/2022.  Reports years of LUTs.  Does have worsening frequency/urgency with UTI and sometimes dysuria.  Denies gross hematuria recently but had this years ago.  AUA SS 18/4, PVR ok.     Reports low libido.  Follow up for CT urogram review and cystoscopy. T was low     I personally reviewed the images: no recent imaging    IPSS Questionnaire (AUA-SS):  Over the past month    1)  Incomplete Emptying - How often have you had a sensation of not emptying your bladder completely after you finish urinating?  1 - Less than 1 time in 5   2)  Frequency - How often have you had to urinate again less than two hours after you finished urinating? 1 - Less than 1 time in 5   3)  Intermittency - How often have you found you stopped and started again several times when you urinated?  2 - Less than half the time   4) Urgency - How difficult have you found it to postpone urination?  5 - Almost always   5) Weak Stream - How often have you had a weak urinary stream?  5 - Almost always   6) Straining  - How often have you had to push or strain to begin urination?  1 - Less than 1 time in 5   7) Nocturia - How many times did you most typically get up to urinate from the time you went to bed until the time you got up in the morning?  3 - 3 times   Total score:  0-7 mild, 8-19 moderate, 20-35 severe 18   Quality of Life:  4 - Mostly Dissatisfied              Lab Results   Component Value Date    CREATININE 1.1 09/09/2022       ---  PMH/PSH/Medications/Allergies/Social history reviewed and as in chart.    Review of  Systems   Constitutional:  Negative for activity change, chills and fever.   HENT:  Negative for congestion.    Respiratory:  Negative for cough, chest tightness and shortness of breath.    Cardiovascular:  Negative for chest pain and palpitations.   Gastrointestinal:  Negative for abdominal distention, abdominal pain, nausea and vomiting.   Genitourinary:  Positive for difficulty urinating, frequency and urgency. Negative for flank pain, hematuria, penile pain, scrotal swelling and testicular pain.   Musculoskeletal:  Negative for gait problem.     Objective:      Physical Exam  HENT:      Head: Atraumatic.   Pulmonary:      Effort: Pulmonary effort is normal.   Neurological:      General: No focal deficit present.      Mental Status: He is alert and oriented to person, place, and time.         Results for orders placed or performed during the hospital encounter of 09/15/22 (from the past 2160 hour(s))   CT Urogram Abd Pelvis W WO    Impression    1. The kidneys, ureters, urinary bladder are normal in appearance.  2. There is a 9 mm oval shaped area of subtle density in the base of the left lower lobe.  There are several noncalcified pulmonary nodules in the right middle lobe. One of the larger ones measures 4 mm.  If additional imaging evaluation is clinically indicated, I recommend consideration of a follow-up CT examination of the thorax without IV contrast in 4 months.  3. There are calcified pleural plaques bilaterally.  This may represent prior exposure to asbestos.  4. There are multiple oval-shaped hypodense masses scattered throughout the liver.  One of the larger ones measures 59 mm and is located in the left lobe of the liver. It has a Hounsfield measurement of 3 on the precontrast examination.  This is characteristic of a cyst.  5. There is a 6 mm oval shaped area of hypodensity in the anterolateral aspect of the spleen.  6. There are moderate degenerative changes between L3 and L5.  7. There is a 24 mm  oval shaped area of relative radiolucency in the right iliac bone. It has a sclerotic border.  This is characteristic of a chronic process.  On the previous KUB it measured 32 mm.  All CT scans at this facility use dose modulation, iterative reconstruction, and/or weight base dosing when appropriate to reduce radiation dose when appropriate to reduce radiation dose to as low as reasonably achievable.      Electronically signed by: Raheel Herrmann MD  Date:    09/15/2022  Time:    09:56   Results for orders placed or performed during the hospital encounter of 08/16/22 (from the past 2160 hour(s))   CT Head Without Contrast    Impression    No CT evidence of acute intracranial abnormality.      Electronically signed by: Raghav Moore MD  Date:    08/16/2022  Time:    22:01       Testosterone, Total 304 - 1227 ng/dL 289 Low       Assessment:     Problem Noted   Recurrent Uti 9/6/2022    Recurrent UTI  Hospitalized 8/2022 for UTI  Klebsiella and E coli 8/2022    BPH on tamsulosin for years    Persistent microhematuria on UAs     Microhematuria 9/6/2022   Bph (Benign Prostatic Hyperplasia) 7/21/2021       Plan:     CT urogram, cystoscopy reviewed  Repeat AM testosterone with LH/HH/PSA/Prolactin  Follow up to discuss, pending psa start finasteride, consider outlet procedure    Shay Horan MD

## 2022-09-22 ENCOUNTER — TELEPHONE (OUTPATIENT)
Dept: UROLOGY | Facility: CLINIC | Age: 72
End: 2022-09-22
Payer: MEDICARE

## 2022-09-22 LAB
FINAL PATHOLOGIC DIAGNOSIS: NORMAL
Lab: NORMAL

## 2022-09-22 NOTE — TELEPHONE ENCOUNTER
I spoke with the patient to notify that his results are in and that  will discuss the results at his next appointment. He voiced his understanding by confirming the appointment that was made for 10/06 at 8:50 am.

## 2022-09-22 NOTE — TELEPHONE ENCOUNTER
----- Message from Shay Horan MD sent at 9/22/2022  8:05 AM CDT -----  Make appointment to discuss

## 2022-09-26 ENCOUNTER — TELEPHONE (OUTPATIENT)
Dept: UROLOGY | Facility: CLINIC | Age: 72
End: 2022-09-26
Payer: MEDICARE

## 2022-09-26 LAB — TESTOST FREE SERPL-MCNC: 5 PG/ML

## 2022-09-26 NOTE — TELEPHONE ENCOUNTER
Returned call, patient complains of burning with urination that has been going on for quite sometime and now has worsen.  He also stated he may have seen some blood in urine.  Voiding yellow urine, but informed me sometimes you cannot see this.  Would like to drop off urine sample instead of visit as he just saw Dr Horan last Wednesday. I voiced understanding.  Informed patient message to be sent to MD, will contact tomorrow with recommendations.  No fever, chills, N/V at this time.

## 2022-09-26 NOTE — TELEPHONE ENCOUNTER
----- Message from Afshin Yan sent at 9/26/2022  3:59 PM CDT -----  .Type:  Needs Medical Advice    Who Called: self   Would the patient rather a call back or a response via [facility]? Call back   Best Call Back Number: [phone]  Additional Information: pt would like a call back to speak with someone in the office pt states he has blood in his urine and would like a call back to see if he can get a urine sample dropped off to be tested for pssible infection.

## 2022-09-27 NOTE — TELEPHONE ENCOUNTER
----- Message from Shaun Olivo sent at 9/27/2022 12:12 PM CDT -----  Contact: pt  .Type:  Needs Medical Advice    Who Called: pt  Symptoms (please be specific): UTI  burning  Would the patient rather a call back or a response via [facility]? Call back  Best Call Back Number: [phone]  Additional Information: Pt. Is requesting an order for urine test he states he has burning when he urinates.

## 2022-09-27 NOTE — TELEPHONE ENCOUNTER
I spoke with the patient about his concern burning when urinating. I voiced that it is normal to having such burn while urinating due to his procedure(cystoscopy) he had on 09/22 but continue to drink water. He voiced his understanding that he will but if it continues he will reach out to us.

## 2022-09-28 ENCOUNTER — HOSPITAL ENCOUNTER (EMERGENCY)
Facility: HOSPITAL | Age: 72
Discharge: HOME | End: 2022-09-28
Attending: EMERGENCY MEDICINE
Payer: MEDICARE

## 2022-09-28 VITALS
SYSTOLIC BLOOD PRESSURE: 179 MMHG | WEIGHT: 220 LBS | HEIGHT: 70 IN | OXYGEN SATURATION: 100 % | DIASTOLIC BLOOD PRESSURE: 89 MMHG | BODY MASS INDEX: 31.5 KG/M2 | RESPIRATION RATE: 18 BRPM | HEART RATE: 65 BPM | TEMPERATURE: 99 F

## 2022-09-28 DIAGNOSIS — N30.00 ACUTE CYSTITIS WITHOUT HEMATURIA: Primary | ICD-10-CM

## 2022-09-28 LAB
BILIRUB UR QL STRIP: NEGATIVE
CLARITY UR REFRACT.AUTO: ABNORMAL
COLOR UR AUTO: YELLOW
GLUCOSE UR QL STRIP: NEGATIVE
HGB UR QL STRIP: ABNORMAL
KETONES UR QL STRIP: NEGATIVE
LEUKOCYTE ESTERASE UR QL STRIP: ABNORMAL
MICROSCOPIC COMMENT: ABNORMAL
NITRITE UR QL STRIP: NEGATIVE
PH UR STRIP: 7 [PH] (ref 5–8)
PROT UR QL STRIP: NEGATIVE
RBC #/AREA URNS AUTO: 5 /HPF (ref 0–4)
SP GR UR STRIP: <=1.005 (ref 1–1.03)
URN SPEC COLLECT METH UR: ABNORMAL
UROBILINOGEN UR STRIP-ACNC: NEGATIVE EU/DL
WBC #/AREA URNS AUTO: >100 /HPF (ref 0–5)

## 2022-09-28 PROCEDURE — 96372 THER/PROPH/DIAG INJ SC/IM: CPT | Performed by: EMERGENCY MEDICINE

## 2022-09-28 PROCEDURE — 87086 URINE CULTURE/COLONY COUNT: CPT | Mod: ER | Performed by: EMERGENCY MEDICINE

## 2022-09-28 PROCEDURE — 87186 SC STD MICRODIL/AGAR DIL: CPT | Mod: ER | Performed by: EMERGENCY MEDICINE

## 2022-09-28 PROCEDURE — 99284 EMERGENCY DEPT VISIT MOD MDM: CPT | Mod: 25,ER

## 2022-09-28 PROCEDURE — 87088 URINE BACTERIA CULTURE: CPT | Mod: ER | Performed by: EMERGENCY MEDICINE

## 2022-09-28 PROCEDURE — 87077 CULTURE AEROBIC IDENTIFY: CPT | Mod: ER | Performed by: EMERGENCY MEDICINE

## 2022-09-28 PROCEDURE — 63600175 PHARM REV CODE 636 W HCPCS: Mod: ER | Performed by: EMERGENCY MEDICINE

## 2022-09-28 PROCEDURE — 81000 URINALYSIS NONAUTO W/SCOPE: CPT | Mod: ER | Performed by: EMERGENCY MEDICINE

## 2022-09-28 RX ORDER — CEPHALEXIN 500 MG/1
500 CAPSULE ORAL EVERY 12 HOURS
Qty: 10 CAPSULE | Refills: 0 | Status: SHIPPED | OUTPATIENT
Start: 2022-09-28 | End: 2022-10-03

## 2022-09-28 RX ORDER — CEFTRIAXONE 1 G/1
1 INJECTION, POWDER, FOR SOLUTION INTRAMUSCULAR; INTRAVENOUS
Status: COMPLETED | OUTPATIENT
Start: 2022-09-28 | End: 2022-09-28

## 2022-09-28 RX ADMIN — CEFTRIAXONE SODIUM 1 G: 1 INJECTION, POWDER, FOR SOLUTION INTRAMUSCULAR; INTRAVENOUS at 11:09

## 2022-09-29 NOTE — ED PROVIDER NOTES
Encounter Date: 9/28/2022       History     Chief Complaint   Patient presents with    Urinary Frequency     Pt arrives to the ED c/o urinary frequency and burning. He reports that frequency started this afternoon. Pt was scoped on the 9/21/22 by the urologist. He called his doctor and was told to just drink a lot of water. He states he has been drinking a lot of water and has been urinating every 10 minutes.        Patient currently presents with concern regarding urinary symptoms.  Patient notes onset this afternoon.  The patient reports dysuria.  The patient reports urinary frequency and there is urgency.  Flank pain is not reported.  There is a history of recent cystoscopy on the 21st..  Fever and chills are not reported.  There is not accompanying emesis.          Review of patient's allergies indicates:   Allergen Reactions    Ciprofloxacin Palpitations    Penicillins Palpitations and Anxiety     Past Medical History:   Diagnosis Date    Back pain, chronic     BPH (benign prostatic hyperplasia)     Hypertension     Mitral valve prolapse syndrome     Prostate enlargement      Past Surgical History:   Procedure Laterality Date    NECK SURGERY       Family History   Problem Relation Age of Onset    Hypertension Mother     Stroke Father      Social History     Tobacco Use    Smoking status: Former    Smokeless tobacco: Never    Tobacco comments:     >40 years ago   Substance Use Topics    Alcohol use: No    Drug use: No     Review of Systems   Constitutional:  Negative for chills and fever.   HENT:  Negative for congestion and sore throat.    Respiratory:  Negative for chest tightness and shortness of breath.    Cardiovascular:  Negative for chest pain and palpitations.   Gastrointestinal:  Negative for abdominal pain and vomiting.   Genitourinary:  Positive for dysuria, frequency and urgency. Negative for difficulty urinating and hematuria.   Skin:  Negative for color change and rash.   Neurological:  Negative  "for weakness and numbness.   Hematological:  Negative for adenopathy.   All other systems reviewed and are negative.    Physical Exam     Initial Vitals [09/28/22 2223]   BP Pulse Resp Temp SpO2   (!) 189/91 61 18 98.5 °F (36.9 °C) 99 %      MAP       --         Vitals:    09/28/22 2223 09/28/22 2335   BP: (!) 189/91 (!) 179/89   Pulse: 61 65   Resp: 18 18   Temp: 98.5 °F (36.9 °C)    TempSrc: Oral    SpO2: 99% 100%   Weight: 99.8 kg (220 lb)    Height: 5' 10" (1.778 m)        Physical Exam    Constitutional: He appears well-developed and well-nourished. He is not diaphoretic. No distress.   HENT:   Head: Normocephalic and atraumatic.   Cardiovascular:  Normal rate, regular rhythm, normal heart sounds and intact distal pulses.           Pulmonary/Chest: Breath sounds normal. No respiratory distress.   Abdominal: Abdomen is soft. There is no abdominal tenderness.     Neurological: He is alert and oriented to person, place, and time.   Skin: Skin is warm and dry.       ED Course   Procedures  Labs Reviewed   URINALYSIS, REFLEX TO URINE CULTURE - Abnormal; Notable for the following components:       Result Value    Appearance, UA Hazy (*)     Occult Blood UA 2+ (*)     Leukocytes, UA 3+ (*)     All other components within normal limits    Narrative:     Preferred Collection Type->Urine, Clean Catch  Specimen Source->Urine  Collection Type->Urine, Clean Catch   URINALYSIS MICROSCOPIC - Abnormal; Notable for the following components:    RBC, UA 5 (*)     WBC, UA >100 (*)     All other components within normal limits    Narrative:     Preferred Collection Type->Urine, Clean Catch  Specimen Source->Urine  Collection Type->Urine, Clean Catch   CULTURE, URINE          Imaging Results    None          Medications   cefTRIAXone injection 1 g (1 g Intramuscular Given 9/28/22 2306)     Medical Decision Making:   Clinical Tests:   Lab Tests: Ordered and Reviewed  ED Management:  All findings were reviewed with the patient/family " in detail.  I see no indication of an emergent process beyond that addressed during our encounter but have duly counseled the patient/family regarding the need for prompt follow-up as well as the indications that should prompt immediate return to the emergency room should new or worrisome developments occur.  The patient has additionally been provided with printed information regarding diagnosis as well as instructions regarding follow up and any medications intended to manage the patient's aforementioned conditions.  The patient/family communicates understanding of all this information and all remaining questions and concerns were addressed at this time.                                Clinical Impression:   Final diagnoses:  [N30.00] Acute cystitis without hematuria (Primary)        ED Disposition Condition    Discharge Stable          ED Prescriptions       Medication Sig Dispense Start Date End Date Auth. Provider    cephALEXin (KEFLEX) 500 MG capsule Take 1 capsule (500 mg total) by mouth every 12 (twelve) hours. for 5 days 10 capsule 9/28/2022 10/3/2022 Urban Alvarez MD          Follow-up Information       Follow up With Specialties Details Why Contact Info    Shay Horan MD Urology Schedule an appointment as soon as possible for a visit  for reassessment [address]  [address]  [facility] [address]  [phone]      [facility] - Emergency Dept Emergency Medicine Go to  As needed, If symptoms worsen [address] [facility] [address] [phone]             Urban Alvarez MD  09/29/22 4523

## 2022-10-01 LAB — BACTERIA UR CULT: ABNORMAL

## 2022-10-15 ENCOUNTER — HOSPITAL ENCOUNTER (EMERGENCY)
Facility: HOSPITAL | Age: 72
Discharge: HOME | End: 2022-10-15
Attending: EMERGENCY MEDICINE
Payer: MEDICARE

## 2022-10-15 VITALS
BODY MASS INDEX: 31.5 KG/M2 | HEART RATE: 64 BPM | OXYGEN SATURATION: 98 % | TEMPERATURE: 99 F | SYSTOLIC BLOOD PRESSURE: 158 MMHG | WEIGHT: 220 LBS | RESPIRATION RATE: 16 BRPM | HEIGHT: 70 IN | DIASTOLIC BLOOD PRESSURE: 84 MMHG

## 2022-10-15 DIAGNOSIS — N30.00 ACUTE CYSTITIS WITHOUT HEMATURIA: Primary | ICD-10-CM

## 2022-10-15 LAB
BACTERIA #/AREA URNS AUTO: ABNORMAL /HPF
BILIRUB UR QL STRIP: NEGATIVE
CLARITY UR REFRACT.AUTO: ABNORMAL
COLOR UR AUTO: ABNORMAL
GLUCOSE UR QL STRIP: NEGATIVE
HGB UR QL STRIP: ABNORMAL
KETONES UR QL STRIP: NEGATIVE
LEUKOCYTE ESTERASE UR QL STRIP: ABNORMAL
MICROSCOPIC COMMENT: ABNORMAL
NITRITE UR QL STRIP: NEGATIVE
PH UR STRIP: 6 [PH] (ref 5–8)
PROT UR QL STRIP: NEGATIVE
RBC #/AREA URNS AUTO: 5 /HPF (ref 0–4)
SP GR UR STRIP: <=1.005 (ref 1–1.03)
URN SPEC COLLECT METH UR: ABNORMAL
UROBILINOGEN UR STRIP-ACNC: NEGATIVE EU/DL
WBC #/AREA URNS AUTO: >100 /HPF (ref 0–5)
WBC CLUMPS UR QL AUTO: ABNORMAL

## 2022-10-15 PROCEDURE — 96372 THER/PROPH/DIAG INJ SC/IM: CPT | Performed by: EMERGENCY MEDICINE

## 2022-10-15 PROCEDURE — 99284 EMERGENCY DEPT VISIT MOD MDM: CPT | Mod: ER

## 2022-10-15 PROCEDURE — 63600175 PHARM REV CODE 636 W HCPCS: Mod: ER | Performed by: EMERGENCY MEDICINE

## 2022-10-15 PROCEDURE — 87088 URINE BACTERIA CULTURE: CPT | Mod: ER | Performed by: EMERGENCY MEDICINE

## 2022-10-15 PROCEDURE — 81000 URINALYSIS NONAUTO W/SCOPE: CPT | Mod: ER | Performed by: EMERGENCY MEDICINE

## 2022-10-15 PROCEDURE — 87186 SC STD MICRODIL/AGAR DIL: CPT | Mod: ER | Performed by: EMERGENCY MEDICINE

## 2022-10-15 PROCEDURE — 87086 URINE CULTURE/COLONY COUNT: CPT | Mod: ER | Performed by: EMERGENCY MEDICINE

## 2022-10-15 PROCEDURE — 87077 CULTURE AEROBIC IDENTIFY: CPT | Mod: ER | Performed by: EMERGENCY MEDICINE

## 2022-10-15 RX ORDER — CEFTRIAXONE 500 MG/1
1000 INJECTION, POWDER, FOR SOLUTION INTRAMUSCULAR; INTRAVENOUS
Status: COMPLETED | OUTPATIENT
Start: 2022-10-15 | End: 2022-10-15

## 2022-10-15 RX ORDER — CEPHALEXIN 500 MG/1
500 CAPSULE ORAL EVERY 12 HOURS
Qty: 14 CAPSULE | Refills: 0 | Status: SHIPPED | OUTPATIENT
Start: 2022-10-15 | End: 2022-10-22

## 2022-10-15 RX ADMIN — CEFTRIAXONE 1000 MG: 500 INJECTION, POWDER, FOR SOLUTION INTRAMUSCULAR; INTRAVENOUS at 02:10

## 2022-10-15 NOTE — ED PROVIDER NOTES
"Encounter Date: 10/15/2022       History     Chief Complaint   Patient presents with    Male  Problem     Pt presents to the Er with increasing in burning, frequency, and pressure in urination today. Pt states "I think I have a UTI."      72-year-old male presents to the emergency department complaining of dysuria, frequency, urgency.  Onset several hours ago.  Notes he has felt the urge to urinate but only has a little production of urine.  Notes some pressure as well as dysuria.  Patient has history of frequent/recurrent UTIs.  No other symptoms reported.  Has not yet taken his blood pressure medicine.  Denies any headache, lightheadedness, dizziness, vision changes, chest pain, shortness of breath, nausea, vomiting, abdominal pain, constipation, diarrhea, fever, focal numbness or weakness.    Review of patient's allergies indicates:   Allergen Reactions    Ciprofloxacin Palpitations    Penicillins Palpitations and Anxiety     Past Medical History:   Diagnosis Date    Back pain, chronic     BPH (benign prostatic hyperplasia)     Hypertension     Mitral valve prolapse syndrome     Prostate enlargement      Past Surgical History:   Procedure Laterality Date    NECK SURGERY       Family History   Problem Relation Age of Onset    Hypertension Mother     Stroke Father      Social History     Tobacco Use    Smoking status: Former    Smokeless tobacco: Never    Tobacco comments:     >40 years ago   Substance Use Topics    Alcohol use: No    Drug use: No     Review of Systems   Constitutional:  Negative for chills, fatigue and fever.   HENT:  Negative for congestion and sore throat.    Eyes:  Negative for photophobia and visual disturbance.   Respiratory:  Negative for cough and shortness of breath.    Cardiovascular:  Negative for chest pain and palpitations.   Gastrointestinal:  Negative for abdominal pain, diarrhea and vomiting.   Musculoskeletal:  Negative for back pain, neck pain and neck stiffness.   Neurological: "  Negative for light-headedness, numbness and headaches.     Physical Exam     Initial Vitals [10/15/22 0236]   BP Pulse Resp Temp SpO2   (!) 209/94 64 20 97.6 °F (36.4 °C) 99 %      MAP       --         Physical Exam    Nursing note and vitals reviewed.  Constitutional: He appears well-developed and well-nourished. No distress.   HENT:   Head: Normocephalic and atraumatic.   Eyes: Conjunctivae and EOM are normal. Pupils are equal, round, and reactive to light.   Neck: Neck supple. No tracheal deviation present.   Normal range of motion.  Cardiovascular:  Normal rate and intact distal pulses.           Pulmonary/Chest: No respiratory distress.   Abdominal: Abdomen is soft. He exhibits no distension. There is no abdominal tenderness.   Musculoskeletal:         General: No tenderness or edema. Normal range of motion.      Cervical back: Normal range of motion and neck supple.     Neurological: He is alert and oriented to person, place, and time. He has normal strength. No cranial nerve deficit. GCS score is 15. GCS eye subscore is 4. GCS verbal subscore is 5. GCS motor subscore is 6.   Skin: Skin is warm and dry.       ED Course   Procedures  Labs Reviewed   URINALYSIS - Abnormal; Notable for the following components:       Result Value    Appearance, UA Hazy (*)     Occult Blood UA 3+ (*)     Leukocytes, UA 3+ (*)     All other components within normal limits    Narrative:     Collection Type->Urine, Clean Catch   URINALYSIS MICROSCOPIC - Abnormal; Notable for the following components:    RBC, UA 5 (*)     WBC, UA >100 (*)     WBC Clumps, UA Moderate (*)     Bacteria Few (*)     All other components within normal limits    Narrative:     Collection Type->Urine, Clean Catch   CULTURE, URINE                 Medications   cefTRIAXone injection 1,000 mg (1,000 mg Intramuscular Given 10/15/22 0251)     Medical Decision Making:   History:   Old Medical Records: I decided to obtain old medical records.  Old Records  Summarized: records from clinic visits and records from previous admission(s).       <> Summary of Records: Reviewed prior sensitivities of urine cultures; E coli appears to be the most typical causative agent, fairly broad sensitivity  Initial Assessment:   72-year-old male presents emergency department complaining of dysuria, frequency, urgency  Differential Diagnosis:   UTI, urinary retention, BPH, pyelonephritis,  Clinical Tests:   Lab Tests: Reviewed       <> Summary of Lab: Concerning for UTI  ED Management:  Urine sent for culture.  Patient given IM Rocephin.  Informed him of results and plan to discharge with prescription for Keflex.  Instructed to follow-up with primary care physician and/or Urology, given home management techniques and strict return precautions.  Vital signs stable, patient comfortable with discharge at this time.                         Clinical Impression:   Final diagnoses:  [N30.00] Acute cystitis without hematuria (Primary)      ED Disposition Condition    Discharge Stable          ED Prescriptions       Medication Sig Dispense Start Date End Date Auth. Provider    cephALEXin (KEFLEX) 500 MG capsule Take 1 capsule (500 mg total) by mouth every 12 (twelve) hours. for 7 days 14 capsule 10/15/2022 10/22/2022 Felton Dyson MD          Follow-up Information       Follow up With Specialties Details Why Contact Info    Shay Horan MD Urology Schedule an appointment as soon as possible for a visit   [address]  [address]  [place] [id number] [phone]      SONIA Barker MD Family Medicine Schedule an appointment as soon as possible for a visit   [address]  [place] [address]  [phone]               Felton Dyson MD  10/15/22 0259

## 2022-10-15 NOTE — ED NOTES
"Pause in triage because states "I need to go to the bathroom."  Pt given a urine specimen cup and he ambulated with steady gait to rest room.   "

## 2022-10-17 LAB — BACTERIA UR CULT: ABNORMAL

## 2022-10-25 ENCOUNTER — OFFICE VISIT (OUTPATIENT)
Dept: UROLOGY | Facility: CLINIC | Age: 72
End: 2022-10-25
Payer: MEDICARE

## 2022-10-25 VITALS
BODY MASS INDEX: 32.26 KG/M2 | DIASTOLIC BLOOD PRESSURE: 91 MMHG | HEART RATE: 60 BPM | SYSTOLIC BLOOD PRESSURE: 153 MMHG | WEIGHT: 225.31 LBS | HEIGHT: 70 IN

## 2022-10-25 DIAGNOSIS — R97.20 ELEVATED PSA: Primary | ICD-10-CM

## 2022-10-25 DIAGNOSIS — R39.12 BENIGN PROSTATIC HYPERPLASIA WITH WEAK URINARY STREAM: ICD-10-CM

## 2022-10-25 DIAGNOSIS — N39.0 RECURRENT UTI: ICD-10-CM

## 2022-10-25 DIAGNOSIS — N40.1 BENIGN PROSTATIC HYPERPLASIA WITH WEAK URINARY STREAM: ICD-10-CM

## 2022-10-25 LAB
BILIRUB SERPL-MCNC: NEGATIVE MG/DL
BLOOD URINE, POC: NEGATIVE
CLARITY, POC UA: CLEAR
COLOR, POC UA: ABNORMAL
GLUCOSE UR QL STRIP: NORMAL
KETONES UR QL STRIP: NEGATIVE
LEUKOCYTE ESTERASE URINE, POC: NEGATIVE
NITRITE, POC UA: NEGATIVE
PH, POC UA: 7
PROTEIN, POC: ABNORMAL
SPECIFIC GRAVITY, POC UA: 1
UROBILINOGEN, POC UA: NORMAL

## 2022-10-25 PROCEDURE — 3077F SYST BP >= 140 MM HG: CPT | Mod: CPTII,S$GLB,, | Performed by: UROLOGY

## 2022-10-25 PROCEDURE — 81002 POCT URINE DIPSTICK WITHOUT MICROSCOPE: ICD-10-PCS | Mod: S$GLB,,, | Performed by: UROLOGY

## 2022-10-25 PROCEDURE — 1126F PR PAIN SEVERITY QUANTIFIED, NO PAIN PRESENT: ICD-10-PCS | Mod: CPTII,S$GLB,, | Performed by: UROLOGY

## 2022-10-25 PROCEDURE — 99214 PR OFFICE/OUTPT VISIT, EST, LEVL IV, 30-39 MIN: ICD-10-PCS | Mod: S$GLB,,, | Performed by: UROLOGY

## 2022-10-25 PROCEDURE — 1101F PR PT FALLS ASSESS DOC 0-1 FALLS W/OUT INJ PAST YR: ICD-10-PCS | Mod: CPTII,S$GLB,, | Performed by: UROLOGY

## 2022-10-25 PROCEDURE — 1159F PR MEDICATION LIST DOCUMENTED IN MEDICAL RECORD: ICD-10-PCS | Mod: CPTII,S$GLB,, | Performed by: UROLOGY

## 2022-10-25 PROCEDURE — 3080F PR MOST RECENT DIASTOLIC BLOOD PRESSURE >= 90 MM HG: ICD-10-PCS | Mod: CPTII,S$GLB,, | Performed by: UROLOGY

## 2022-10-25 PROCEDURE — 1159F MED LIST DOCD IN RCRD: CPT | Mod: CPTII,S$GLB,, | Performed by: UROLOGY

## 2022-10-25 PROCEDURE — 3288F FALL RISK ASSESSMENT DOCD: CPT | Mod: CPTII,S$GLB,, | Performed by: UROLOGY

## 2022-10-25 PROCEDURE — 3077F PR MOST RECENT SYSTOLIC BLOOD PRESSURE >= 140 MM HG: ICD-10-PCS | Mod: CPTII,S$GLB,, | Performed by: UROLOGY

## 2022-10-25 PROCEDURE — 81002 URINALYSIS NONAUTO W/O SCOPE: CPT | Mod: S$GLB,,, | Performed by: UROLOGY

## 2022-10-25 PROCEDURE — 99214 OFFICE O/P EST MOD 30 MIN: CPT | Mod: S$GLB,,, | Performed by: UROLOGY

## 2022-10-25 PROCEDURE — 1101F PT FALLS ASSESS-DOCD LE1/YR: CPT | Mod: CPTII,S$GLB,, | Performed by: UROLOGY

## 2022-10-25 PROCEDURE — 99999 PR PBB SHADOW E&M-EST. PATIENT-LVL III: CPT | Mod: PBBFAC,,, | Performed by: UROLOGY

## 2022-10-25 PROCEDURE — 1126F AMNT PAIN NOTED NONE PRSNT: CPT | Mod: CPTII,S$GLB,, | Performed by: UROLOGY

## 2022-10-25 PROCEDURE — 3080F DIAST BP >= 90 MM HG: CPT | Mod: CPTII,S$GLB,, | Performed by: UROLOGY

## 2022-10-25 PROCEDURE — 99999 PR PBB SHADOW E&M-EST. PATIENT-LVL III: ICD-10-PCS | Mod: PBBFAC,,, | Performed by: UROLOGY

## 2022-10-25 PROCEDURE — 87086 URINE CULTURE/COLONY COUNT: CPT | Performed by: UROLOGY

## 2022-10-25 PROCEDURE — 3288F PR FALLS RISK ASSESSMENT DOCUMENTED: ICD-10-PCS | Mod: CPTII,S$GLB,, | Performed by: UROLOGY

## 2022-10-25 RX ORDER — FINASTERIDE 5 MG/1
5 TABLET, FILM COATED ORAL DAILY
Qty: 30 TABLET | Refills: 5 | Status: SHIPPED | OUTPATIENT
Start: 2022-10-25 | End: 2023-07-19

## 2022-10-25 NOTE — PROGRESS NOTES
Subjective:       Patient ID: Jerardo Callejas is a 72 y.o. male.    Chief Complaint: Follow-up (2 week)     This is a 72 y.o.  male patient with recurrent UTI, BPH.  H/o BPH on tamsulosin for years followed at OSH.  He had UTI in 11/2021 and two infections in 8/2022 with Klebsiella and E coli.  Was septic with recent infection and admitted to hospital 8/2022.  Reports years of LUTs.  Does have worsening frequency/urgency with UTI and sometimes dysuria.  Denies gross hematuria recently but had this years ago.  AUA SS 18/4, PVR ok.   H/o negative prostate biopsy in past.  Reports low libido, T normal 336 on second check.   Follow up for CT urogram review and cystoscopy.   UTI 10/22 and treated, E coli.   Completed antibiotic and no symptoms of infection. Still LUTs.     PSA   9/22--3.9, free 18%        IPSS Questionnaire (AUA-SS):  Over the past month    1)  Incomplete Emptying - How often have you had a sensation of not emptying your bladder completely after you finish urinating?  1 - Less than 1 time in 5   2)  Frequency - How often have you had to urinate again less than two hours after you finished urinating? 1 - Less than 1 time in 5   3)  Intermittency - How often have you found you stopped and started again several times when you urinated?  2 - Less than half the time   4) Urgency - How difficult have you found it to postpone urination?  5 - Almost always   5) Weak Stream - How often have you had a weak urinary stream?  5 - Almost always   6) Straining  - How often have you had to push or strain to begin urination?  1 - Less than 1 time in 5   7) Nocturia - How many times did you most typically get up to urinate from the time you went to bed until the time you got up in the morning?  3 - 3 times   Total score:  0-7 mild, 8-19 moderate, 20-35 severe 18   Quality of Life:  4 - Mostly Dissatisfied              Lab Results   Component Value Date    CREATININE 1.1 09/09/2022        ---  PMH/PSH/Medications/Allergies/Social history reviewed and as in chart.    Review of Systems   Constitutional:  Negative for activity change, chills and fever.   HENT:  Negative for congestion.    Respiratory:  Negative for cough, chest tightness and shortness of breath.    Cardiovascular:  Negative for chest pain and palpitations.   Gastrointestinal:  Negative for abdominal distention, abdominal pain, nausea and vomiting.   Genitourinary:  Positive for difficulty urinating, frequency and urgency. Negative for flank pain, hematuria, penile pain, scrotal swelling and testicular pain.   Musculoskeletal:  Negative for gait problem.     Objective:      Physical Exam  HENT:      Head: Atraumatic.   Pulmonary:      Effort: Pulmonary effort is normal.   Neurological:      General: No focal deficit present.      Mental Status: He is alert and oriented to person, place, and time.         Results for orders placed or performed during the hospital encounter of 09/15/22 (from the past 2160 hour(s))   CT Urogram Abd Pelvis W WO    Impression    1. The kidneys, ureters, urinary bladder are normal in appearance.  2. There is a 9 mm oval shaped area of subtle density in the base of the left lower lobe.  There are several noncalcified pulmonary nodules in the right middle lobe. One of the larger ones measures 4 mm.  If additional imaging evaluation is clinically indicated, I recommend consideration of a follow-up CT examination of the thorax without IV contrast in 4 months.  3. There are calcified pleural plaques bilaterally.  This may represent prior exposure to asbestos.  4. There are multiple oval-shaped hypodense masses scattered throughout the liver.  One of the larger ones measures 59 mm and is located in the left lobe of the liver. It has a Hounsfield measurement of 3 on the precontrast examination.  This is characteristic of a cyst.  5. There is a 6 mm oval shaped area of hypodensity in the anterolateral aspect of  the spleen.  6. There are moderate degenerative changes between L3 and L5.  7. There is a 24 mm oval shaped area of relative radiolucency in the right iliac bone. It has a sclerotic border.  This is characteristic of a chronic process.  On the previous KUB it measured 32 mm.  All CT scans at this facility use dose modulation, iterative reconstruction, and/or weight base dosing when appropriate to reduce radiation dose when appropriate to reduce radiation dose to as low as reasonably achievable.      Electronically signed by: Raheel Herrmann MD  Date:    09/15/2022  Time:    09:56   Results for orders placed or performed during the hospital encounter of 08/16/22 (from the past 2160 hour(s))   CT Head Without Contrast    Impression    No CT evidence of acute intracranial abnormality.      Electronically signed by: Raghav Moore MD  Date:    08/16/2022  Time:    22:01       Testosterone, Total 304 - 1227 ng/dL 289 Low     Repeat T 336     Assessment:     Problem Noted   Recurrent Uti 9/6/2022    Recurrent UTI  Hospitalized 8/2022 for UTI  Klebsiella and E coli 8/2022  E coli 10/2022    BPH on tamsulosin for years    Persistent microhematuria on UAs     Microhematuria 9/6/2022    CT urogram negative 9/2022  Cystoscopy with enlarged prostate, no lesions/masses       Bph (Benign Prostatic Hyperplasia) 7/21/2021    68 gm prostate by CT  No findings for microhematuria  On Tamsulosin for years  Initial AUA SS 18/4  PSA 3.9, free 18 % in 9/22, negative biopsy in past         Plan:      Discussed  possibility that recurrent infection caused by enlarged prostate urinary obstruction demonstrated by a uroflow and symptoms.  Recommended consider outlet procedure, off-label Rezum versus holmium laser enucleation of prostate.  He does not wish to undergo surgery at this time.  Discussed risk of continued infections and worsening urination.    Given prostate size and symptomatology will start finasteride. Side effects, risks,  benefits and alternatives of the medication discussed.    Follow up in 6 months with PSA prior     Shay Horan MD

## 2022-10-25 NOTE — Clinical Note
1920 bedside shift report recd from off going RN Mercedes David, wife and daughter at bedside, awake ff commands     2000 pt and family aware of bed available, verbalized understanding, tylenol given via peg for neck discomfort, trach     care rendered. 2200 TRANSFER - OUT REPORT:    Verbal report given to [facility] RN(name) on Ghassan Huffman  being transferred to PCU(unit) for routine progression of care       Report consisted of patients Situation, Background, Assessment and   Recommendations(SBAR). Information from the following report(s) SBAR, Kardex, Intake/Output, MAR, Recent Results, Med Rec Status and Cardiac Rhythm sinus rhythm was reviewed with the receiving nurse. Lines:   Peripheral IV 05/20/17 Left Forearm (Active)   Site Assessment Clean, dry, & intact 5/23/2017  8:00 PM   Phlebitis Assessment 0 5/23/2017  8:00 PM   Infiltration Assessment 0 5/23/2017  8:00 PM   Dressing Status Clean, dry, & intact 5/23/2017  8:00 PM   Dressing Type Transparent 5/23/2017  8:00 PM   Hub Color/Line Status Patent;Pink; Infusing 5/23/2017  8:00 PM   Action Taken Open ports on tubing capped 5/23/2017  8:00 PM   Alcohol Cap Used Yes 5/23/2017  8:00 PM        Opportunity for questions and clarification was provided.       Patient transported with:   Registered Nurse, monitor, oxygen, all pt belongings and accompanied by Daughter at bedside Follow up in 6 months with PSA prior

## 2022-10-26 LAB — BACTERIA UR CULT: NO GROWTH

## 2022-11-02 ENCOUNTER — OFFICE VISIT (OUTPATIENT)
Dept: UROLOGY | Facility: CLINIC | Age: 72
End: 2022-11-02
Payer: MEDICARE

## 2022-11-02 VITALS
HEART RATE: 58 BPM | WEIGHT: 225.88 LBS | DIASTOLIC BLOOD PRESSURE: 81 MMHG | BODY MASS INDEX: 32.41 KG/M2 | SYSTOLIC BLOOD PRESSURE: 148 MMHG

## 2022-11-02 DIAGNOSIS — N52.9 ERECTILE DYSFUNCTION, UNSPECIFIED ERECTILE DYSFUNCTION TYPE: Primary | ICD-10-CM

## 2022-11-02 PROCEDURE — 1159F PR MEDICATION LIST DOCUMENTED IN MEDICAL RECORD: ICD-10-PCS | Mod: CPTII,S$GLB,, | Performed by: UROLOGY

## 2022-11-02 PROCEDURE — 99214 OFFICE O/P EST MOD 30 MIN: CPT | Mod: S$GLB,,, | Performed by: UROLOGY

## 2022-11-02 PROCEDURE — 99999 PR PBB SHADOW E&M-EST. PATIENT-LVL II: CPT | Mod: PBBFAC,,, | Performed by: UROLOGY

## 2022-11-02 PROCEDURE — 1125F AMNT PAIN NOTED PAIN PRSNT: CPT | Mod: CPTII,S$GLB,, | Performed by: UROLOGY

## 2022-11-02 PROCEDURE — 1159F MED LIST DOCD IN RCRD: CPT | Mod: CPTII,S$GLB,, | Performed by: UROLOGY

## 2022-11-02 PROCEDURE — 3008F PR BODY MASS INDEX (BMI) DOCUMENTED: ICD-10-PCS | Mod: CPTII,S$GLB,, | Performed by: UROLOGY

## 2022-11-02 PROCEDURE — 1160F RVW MEDS BY RX/DR IN RCRD: CPT | Mod: CPTII,S$GLB,, | Performed by: UROLOGY

## 2022-11-02 PROCEDURE — 3079F DIAST BP 80-89 MM HG: CPT | Mod: CPTII,S$GLB,, | Performed by: UROLOGY

## 2022-11-02 PROCEDURE — 3077F SYST BP >= 140 MM HG: CPT | Mod: CPTII,S$GLB,, | Performed by: UROLOGY

## 2022-11-02 PROCEDURE — 3008F BODY MASS INDEX DOCD: CPT | Mod: CPTII,S$GLB,, | Performed by: UROLOGY

## 2022-11-02 PROCEDURE — 1125F PR PAIN SEVERITY QUANTIFIED, PAIN PRESENT: ICD-10-PCS | Mod: CPTII,S$GLB,, | Performed by: UROLOGY

## 2022-11-02 PROCEDURE — 99999 PR PBB SHADOW E&M-EST. PATIENT-LVL II: ICD-10-PCS | Mod: PBBFAC,,, | Performed by: UROLOGY

## 2022-11-02 PROCEDURE — 1160F PR REVIEW ALL MEDS BY PRESCRIBER/CLIN PHARMACIST DOCUMENTED: ICD-10-PCS | Mod: CPTII,S$GLB,, | Performed by: UROLOGY

## 2022-11-02 PROCEDURE — 3079F PR MOST RECENT DIASTOLIC BLOOD PRESSURE 80-89 MM HG: ICD-10-PCS | Mod: CPTII,S$GLB,, | Performed by: UROLOGY

## 2022-11-02 PROCEDURE — 99214 PR OFFICE/OUTPT VISIT, EST, LEVL IV, 30-39 MIN: ICD-10-PCS | Mod: S$GLB,,, | Performed by: UROLOGY

## 2022-11-02 PROCEDURE — 3077F PR MOST RECENT SYSTOLIC BLOOD PRESSURE >= 140 MM HG: ICD-10-PCS | Mod: CPTII,S$GLB,, | Performed by: UROLOGY

## 2022-11-02 RX ORDER — SILDENAFIL CITRATE 20 MG/1
20 TABLET ORAL DAILY PRN
Qty: 30 TABLET | Refills: 5 | Status: SHIPPED | OUTPATIENT
Start: 2022-11-02 | End: 2024-03-20 | Stop reason: SDUPTHER

## 2022-11-02 NOTE — PROGRESS NOTES
Subjective:       Patient ID: Jerardo Callejas is a 72 y.o. male.    Chief Complaint:  Follow-up     This is a 72 y.o.  male patient with recurrent UTI, BPH.  H/o BPH on tamsulosin for years followed at OSH.  He had UTI in 11/2021 and two infections in 8/2022 with Klebsiella and E coli.  Was septic with recent infection and admitted to hospital 8/2022.  Reports years of LUTs.  Does have worsening frequency/urgency with UTI and sometimes dysuria.  Denies gross hematuria recently but had this years ago.  AUA SS 18/4, PVR ok.   H/o negative prostate biopsy in past.  Reports low libido, T normal 336 on second check.   Follow up for CT urogram review and cystoscopy.   UTI 10/22 and treated, E coli.   Completed antibiotic and no symptoms of infection. Still LUTs.   Still having back pain, I feel likely not related to kidneys or bladder given negative CT urogram in 9/22.  He is still not interested in operative intervention on the prostate.  He does admit to erectile dysfunction that has been ongoing.    PSA   9/22--3.9, free 18%        IPSS Questionnaire (AUA-SS):  Over the past month    1)  Incomplete Emptying - How often have you had a sensation of not emptying your bladder completely after you finish urinating?  1 - Less than 1 time in 5   2)  Frequency - How often have you had to urinate again less than two hours after you finished urinating? 1 - Less than 1 time in 5   3)  Intermittency - How often have you found you stopped and started again several times when you urinated?  2 - Less than half the time   4) Urgency - How difficult have you found it to postpone urination?  5 - Almost always   5) Weak Stream - How often have you had a weak urinary stream?  5 - Almost always   6) Straining  - How often have you had to push or strain to begin urination?  1 - Less than 1 time in 5   7) Nocturia - How many times did you most typically get up to urinate from the time you went to bed until the time you got up in the  morning?  3 - 3 times   Total score:  0-7 mild, 8-19 moderate, 20-35 severe 18   Quality of Life:  4 - Mostly Dissatisfied              Lab Results   Component Value Date    CREATININE 1.1 09/09/2022       ---  PMH/PSH/Medications/Allergies/Social history reviewed and as in chart.    Review of Systems   Constitutional:  Negative for activity change, chills and fever.   HENT:  Negative for congestion.    Respiratory:  Negative for cough, chest tightness and shortness of breath.    Cardiovascular:  Negative for chest pain and palpitations.   Gastrointestinal:  Negative for abdominal distention, abdominal pain, nausea and vomiting.   Genitourinary:  Negative for difficulty urinating, flank pain, frequency, hematuria, penile pain, scrotal swelling, testicular pain and urgency.   Musculoskeletal:  Negative for gait problem.     Objective:      Physical Exam  HENT:      Head: Atraumatic.   Pulmonary:      Effort: Pulmonary effort is normal.   Neurological:      General: No focal deficit present.      Mental Status: He is alert and oriented to person, place, and time.         Results for orders placed or performed during the hospital encounter of 09/15/22 (from the past 2160 hour(s))   CT Urogram Abd Pelvis W WO    Impression    1. The kidneys, ureters, urinary bladder are normal in appearance.  2. There is a 9 mm oval shaped area of subtle density in the base of the left lower lobe.  There are several noncalcified pulmonary nodules in the right middle lobe. One of the larger ones measures 4 mm.  If additional imaging evaluation is clinically indicated, I recommend consideration of a follow-up CT examination of the thorax without IV contrast in 4 months.  3. There are calcified pleural plaques bilaterally.  This may represent prior exposure to asbestos.  4. There are multiple oval-shaped hypodense masses scattered throughout the liver.  One of the larger ones measures 59 mm and is located in the left lobe of the liver. It  has a Hounsfield measurement of 3 on the precontrast examination.  This is characteristic of a cyst.  5. There is a 6 mm oval shaped area of hypodensity in the anterolateral aspect of the spleen.  6. There are moderate degenerative changes between L3 and L5.  7. There is a 24 mm oval shaped area of relative radiolucency in the right iliac bone. It has a sclerotic border.  This is characteristic of a chronic process.  On the previous KUB it measured 32 mm.  All CT scans at this facility use dose modulation, iterative reconstruction, and/or weight base dosing when appropriate to reduce radiation dose when appropriate to reduce radiation dose to as low as reasonably achievable.      Electronically signed by: Raheel Herrmann MD  Date:    09/15/2022  Time:    09:56   Results for orders placed or performed during the hospital encounter of 08/16/22 (from the past 2160 hour(s))   CT Head Without Contrast    Impression    No CT evidence of acute intracranial abnormality.      Electronically signed by: Raghav Moore MD  Date:    08/16/2022  Time:    22:01       Testosterone, Total 304 - 1227 ng/dL 289 Low     Repeat T 336     Assessment:     Problem Noted   Recurrent Uti 9/6/2022    Recurrent UTI  Hospitalized 8/2022 for UTI  Klebsiella and E coli 8/2022  E coli 10/2022    BPH on tamsulosin for years    Persistent microhematuria on UAs     Microhematuria 9/6/2022    CT urogram negative 9/2022  Cystoscopy with enlarged prostate, no lesions/masses       Bph (Benign Prostatic Hyperplasia) 7/21/2021    68 gm prostate by CT  No findings for microhematuria  On Tamsulosin for years  Initial AUA SS 18/4  PSA 3.9, free 18 % in 9/22, negative biopsy in past  Qmax 10, voided 175         Plan:      Discussed  possibility that recurrent infection caused by enlarged prostate urinary obstruction demonstrated by a uroflow and symptoms.  Recommended consider outlet procedure, Rezum versus holmium laser enucleation of prostate.  He does not  wish to undergo surgery at this time.  Discussed risk of continued infections and worsening urination.   Start sildenafil for erectile dysfunction.  Side effects, risks, benefits and alternatives of the medication discussed.    Recommended discuss back pain and CT findings with PCP to see if needs neuro/pain mgt referral  Follow up in 6 months    Shay Horan MD

## 2022-12-05 PROBLEM — A41.9 SEPSIS: Status: RESOLVED | Noted: 2022-08-31 | Resolved: 2022-12-05

## 2022-12-13 ENCOUNTER — HOSPITAL ENCOUNTER (EMERGENCY)
Facility: HOSPITAL | Age: 72
Discharge: HOME | End: 2022-12-14
Attending: STUDENT IN AN ORGANIZED HEALTH CARE EDUCATION/TRAINING PROGRAM
Payer: MEDICARE

## 2022-12-13 VITALS
WEIGHT: 220 LBS | RESPIRATION RATE: 16 BRPM | HEART RATE: 53 BPM | SYSTOLIC BLOOD PRESSURE: 156 MMHG | TEMPERATURE: 98 F | OXYGEN SATURATION: 98 % | DIASTOLIC BLOOD PRESSURE: 78 MMHG | HEIGHT: 70 IN | BODY MASS INDEX: 31.5 KG/M2

## 2022-12-13 DIAGNOSIS — R42 EPISODIC LIGHTHEADEDNESS: ICD-10-CM

## 2022-12-13 LAB
ALBUMIN SERPL BCP-MCNC: 3.5 G/DL (ref 3.5–5.2)
ALP SERPL-CCNC: 88 U/L (ref 55–135)
ALT SERPL W/O P-5'-P-CCNC: 17 U/L (ref 10–44)
AMMONIA PLAS-SCNC: 37 UMOL/L (ref 10–50)
ANION GAP SERPL CALC-SCNC: 7 MMOL/L (ref 8–16)
AST SERPL-CCNC: 18 U/L (ref 10–40)
BASOPHILS # BLD AUTO: 0.04 K/UL (ref 0–0.2)
BASOPHILS NFR BLD: 0.8 % (ref 0–1.9)
BILIRUB SERPL-MCNC: 0.3 MG/DL (ref 0.1–1)
BILIRUB UR QL STRIP: NEGATIVE
BNP SERPL-MCNC: <10 PG/ML (ref 0–99)
BUN SERPL-MCNC: 22 MG/DL (ref 8–23)
CALCIUM SERPL-MCNC: 9.7 MG/DL (ref 8.7–10.5)
CHLORIDE SERPL-SCNC: 102 MMOL/L (ref 95–110)
CLARITY UR REFRACT.AUTO: CLEAR
CO2 SERPL-SCNC: 28 MMOL/L (ref 23–29)
COLOR UR AUTO: YELLOW
CREAT SERPL-MCNC: 1 MG/DL (ref 0.5–1.4)
DIFFERENTIAL METHOD: ABNORMAL
EOSINOPHIL # BLD AUTO: 0.2 K/UL (ref 0–0.5)
EOSINOPHIL NFR BLD: 4.1 % (ref 0–8)
ERYTHROCYTE [DISTWIDTH] IN BLOOD BY AUTOMATED COUNT: 14 % (ref 11.5–14.5)
EST. GFR  (NO RACE VARIABLE): >60 ML/MIN/1.73 M^2
GLUCOSE SERPL-MCNC: 100 MG/DL (ref 70–110)
GLUCOSE UR QL STRIP: NEGATIVE
HCT VFR BLD AUTO: 44.9 % (ref 40–54)
HGB BLD-MCNC: 14.2 G/DL (ref 14–18)
HGB UR QL STRIP: NEGATIVE
IMM GRANULOCYTES # BLD AUTO: 0.05 K/UL (ref 0–0.04)
IMM GRANULOCYTES NFR BLD AUTO: 0.9 % (ref 0–0.5)
KETONES UR QL STRIP: NEGATIVE
LEUKOCYTE ESTERASE UR QL STRIP: NEGATIVE
LYMPHOCYTES # BLD AUTO: 2.2 K/UL (ref 1–4.8)
LYMPHOCYTES NFR BLD: 40.6 % (ref 18–48)
MAGNESIUM SERPL-MCNC: 2.1 MG/DL (ref 1.6–2.6)
MCH RBC QN AUTO: 28.2 PG (ref 27–31)
MCHC RBC AUTO-ENTMCNC: 31.6 G/DL (ref 32–36)
MCV RBC AUTO: 89 FL (ref 82–98)
MONOCYTES # BLD AUTO: 0.7 K/UL (ref 0.3–1)
MONOCYTES NFR BLD: 12.8 % (ref 4–15)
NEUTROPHILS # BLD AUTO: 2.2 K/UL (ref 1.8–7.7)
NEUTROPHILS NFR BLD: 40.8 % (ref 38–73)
NITRITE UR QL STRIP: NEGATIVE
NRBC BLD-RTO: 0 /100 WBC
PH UR STRIP: 6 [PH] (ref 5–8)
PHOSPHATE SERPL-MCNC: 2.8 MG/DL (ref 2.7–4.5)
PLATELET # BLD AUTO: 203 K/UL (ref 150–450)
PMV BLD AUTO: 10.2 FL (ref 9.2–12.9)
POTASSIUM SERPL-SCNC: 4 MMOL/L (ref 3.5–5.1)
PROT SERPL-MCNC: 6.9 G/DL (ref 6–8.4)
PROT UR QL STRIP: NEGATIVE
RBC # BLD AUTO: 5.04 M/UL (ref 4.6–6.2)
SODIUM SERPL-SCNC: 137 MMOL/L (ref 136–145)
SP GR UR STRIP: 1.02 (ref 1–1.03)
TROPONIN I SERPL DL<=0.01 NG/ML-MCNC: 0.01 NG/ML (ref 0–0.03)
TSH SERPL DL<=0.005 MIU/L-ACNC: 2.16 UIU/ML (ref 0.4–4)
URN SPEC COLLECT METH UR: NORMAL
WBC # BLD AUTO: 5.32 K/UL (ref 3.9–12.7)

## 2022-12-13 PROCEDURE — 83880 ASSAY OF NATRIURETIC PEPTIDE: CPT | Performed by: STUDENT IN AN ORGANIZED HEALTH CARE EDUCATION/TRAINING PROGRAM

## 2022-12-13 PROCEDURE — 93010 EKG 12-LEAD: ICD-10-PCS | Mod: ,,, | Performed by: INTERNAL MEDICINE

## 2022-12-13 PROCEDURE — 84484 ASSAY OF TROPONIN QUANT: CPT | Performed by: STUDENT IN AN ORGANIZED HEALTH CARE EDUCATION/TRAINING PROGRAM

## 2022-12-13 PROCEDURE — 99285 PR EMERGENCY DEPT VISIT,LEVEL V: ICD-10-PCS | Mod: ,,, | Performed by: STUDENT IN AN ORGANIZED HEALTH CARE EDUCATION/TRAINING PROGRAM

## 2022-12-13 PROCEDURE — 93010 ELECTROCARDIOGRAM REPORT: CPT | Mod: ,,, | Performed by: INTERNAL MEDICINE

## 2022-12-13 PROCEDURE — 96360 HYDRATION IV INFUSION INIT: CPT

## 2022-12-13 PROCEDURE — 85025 COMPLETE CBC W/AUTO DIFF WBC: CPT | Performed by: STUDENT IN AN ORGANIZED HEALTH CARE EDUCATION/TRAINING PROGRAM

## 2022-12-13 PROCEDURE — 83735 ASSAY OF MAGNESIUM: CPT | Performed by: STUDENT IN AN ORGANIZED HEALTH CARE EDUCATION/TRAINING PROGRAM

## 2022-12-13 PROCEDURE — 84100 ASSAY OF PHOSPHORUS: CPT | Performed by: STUDENT IN AN ORGANIZED HEALTH CARE EDUCATION/TRAINING PROGRAM

## 2022-12-13 PROCEDURE — 25000003 PHARM REV CODE 250: Performed by: STUDENT IN AN ORGANIZED HEALTH CARE EDUCATION/TRAINING PROGRAM

## 2022-12-13 PROCEDURE — 96361 HYDRATE IV INFUSION ADD-ON: CPT

## 2022-12-13 PROCEDURE — 99285 EMERGENCY DEPT VISIT HI MDM: CPT | Mod: 25

## 2022-12-13 PROCEDURE — 82140 ASSAY OF AMMONIA: CPT | Performed by: STUDENT IN AN ORGANIZED HEALTH CARE EDUCATION/TRAINING PROGRAM

## 2022-12-13 PROCEDURE — 80053 COMPREHEN METABOLIC PANEL: CPT | Performed by: STUDENT IN AN ORGANIZED HEALTH CARE EDUCATION/TRAINING PROGRAM

## 2022-12-13 PROCEDURE — 99285 EMERGENCY DEPT VISIT HI MDM: CPT | Mod: ,,, | Performed by: STUDENT IN AN ORGANIZED HEALTH CARE EDUCATION/TRAINING PROGRAM

## 2022-12-13 PROCEDURE — 93005 ELECTROCARDIOGRAM TRACING: CPT

## 2022-12-13 PROCEDURE — 84443 ASSAY THYROID STIM HORMONE: CPT | Performed by: STUDENT IN AN ORGANIZED HEALTH CARE EDUCATION/TRAINING PROGRAM

## 2022-12-13 PROCEDURE — 81003 URINALYSIS AUTO W/O SCOPE: CPT | Performed by: STUDENT IN AN ORGANIZED HEALTH CARE EDUCATION/TRAINING PROGRAM

## 2022-12-13 RX ADMIN — SODIUM CHLORIDE 1000 ML: 0.9 INJECTION, SOLUTION INTRAVENOUS at 10:12
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
## 2022-12-14 NOTE — ED PROVIDER NOTES
Encounter Date: 12/13/2022    SCRIBE #1 NOTE: I, Magaly Mishra, am scribing for, and in the presence of,  Siena Hogan MD. I have scribed the following portions of the note - Other sections scribed: HPI,ROS, PE.     History     Chief Complaint   Patient presents with    Dizziness     States feeling dizzy and lightheaded, started a few days ago. States some mild tingling to L side of his. Denies cardiac hx     Patient is a 72 y.o. male with a past medical history of HTN, DVT on anticoagulation and mitral valve prolapse who presents to the ED for episodic lightheadedness. He states this he has been having brief episodes of lightheadedness intermittently for the last few days. He denies dizziness/vertigo. He states that symptoms are usually brief and he notices it more when goes to stand up. He is currently asymptomatic. He also reported that he had left sided facial tingling in triage however on my evaluation, the patient states that he had mild tingling over his left cheek a few days ago but is currently asymptomatic. He denies headache, vision changes, dysphagia, face and extremity numbness and weakness. He denies cp, sob, abdominal pain, N/V/D, cough, congestion, dysuria. He is concerned that his symptoms might be related to his medications but denies any recent medication changes. He denies any recent illnesses.       The history is provided by the patient and medical records. No  was used.   Review of patient's allergies indicates:   Allergen Reactions    Ciprofloxacin Palpitations    Penicillins Palpitations and Anxiety     Past Medical History:   Diagnosis Date    Back pain, chronic     BPH (benign prostatic hyperplasia)     DVT (deep venous thrombosis)     Hypertension     Mitral valve prolapse syndrome     Prostate enlargement      Past Surgical History:   Procedure Laterality Date    NECK SURGERY       Family History   Problem Relation Age of Onset    Hypertension Mother     Stroke  Father      Social History     Tobacco Use    Smoking status: Former    Smokeless tobacco: Never    Tobacco comments:     >40 years ago   Substance Use Topics    Alcohol use: No    Drug use: No     Review of Systems  Constitutional: No fever  HENT: No sore throat  Eyes: No eye pain  Respiratory: No shortness of breath  Cardiovascular: No chest pain  Gastrointestinal: No abdominal pain  Genitourinary: No dysuria  Musculoskeletal: No back pain  Neurological: No headache. +Lightheadedness. +Dizziness.  Psychiatric: No agitation    Physical Exam     Initial Vitals   BP Pulse Resp Temp SpO2   12/13/22 1904 12/13/22 1904 12/13/22 1904 12/13/22 1906 12/13/22 1904   (!) 154/73 (!) 57 18 99 °F (37.2 °C) 98 %      MAP       --                Physical Exam    Nursing note and vitals reviewed.      Constitutional: No acute distress, well appearing  Respiratory: Non-labored, lungs clear  Cardiovascular: Well perfused, RRR  Gastrointestinal: Soft, non-tender, non-distended  Integumentary: Warm and dry  Musculoskeletal: No deformity  Neurological: Awake and alert, 5/5 motor and SILT in all extremities, CN II-VII grossly intact, ambulating at baseline with rollator  Psychiatric: Cooperative    ED Course   Procedures  Labs Reviewed   CBC W/ AUTO DIFFERENTIAL - Abnormal; Notable for the following components:       Result Value    MCHC 31.6 (*)     Immature Granulocytes 0.9 (*)     Immature Grans (Abs) 0.05 (*)     All other components within normal limits   COMPREHENSIVE METABOLIC PANEL - Abnormal; Notable for the following components:    Anion Gap 7 (*)     All other components within normal limits   TROPONIN I   B-TYPE NATRIURETIC PEPTIDE   MAGNESIUM   PHOSPHORUS   TSH   URINALYSIS, REFLEX TO URINE CULTURE    Narrative:     Specimen Source->Urine   AMMONIA          Imaging Results              CT Head Without Contrast (Final result)  Result time 12/13/22 23:09:34      Final result by Diogo Kemp MD (12/13/22 23:09:34)                    Impression:      No acute intracranial process.      Electronically signed by: Diogo Stiles  Date:    12/13/2022  Time:    23:09               Narrative:    EXAMINATION:  CT HEAD WITHOUT CONTRAST    CLINICAL HISTORY:  Dizziness, persistent/recurrent, cardiac or vascular cause suspected;    TECHNIQUE:  Low dose axial CT images obtained throughout the head without intravenous contrast. Sagittal and coronal reconstructions were performed.    COMPARISON:  08/16/2022    FINDINGS:  Intracranial compartment:    Ventricles and sulci are normal in size for age without evidence of hydrocephalus. No extra-axial blood or fluid collections.    The brain parenchyma appears normal. No parenchymal mass, hemorrhage, edema or major vascular distribution infarct.    Skull/extracranial contents (limited evaluation): No fracture. Mastoid air cells and paranasal sinuses are essentially clear.                                       X-Ray Chest AP Portable (Final result)  Result time 12/13/22 22:44:00      Final result by Tony Freeman MD (12/13/22 22:44:00)                   Impression:      Stable radiographic appearance without radiographic evidence for superimposed acute intrathoracic process.      Electronically signed by: Tony Freeman  Date:    12/13/2022  Time:    22:44               Narrative:    EXAMINATION:  XR CHEST AP PORTABLE    CLINICAL HISTORY:  lightheadedness;    TECHNIQUE:  Single frontal view of the chest was performed.    COMPARISON:  Prior examinations, most recent of which is chest radiograph September 9, 2022    FINDINGS:  There are bilateral areas of opacity that appears stable, and likely correlate to appearance of areas of pleural thickening and calcification on the CT examination of the chest November 30, 2021.  There is no evidence for superimposed confluent infiltrate or consolidation, significant pleural effusion or pneumothorax.    The appearance of the cardiomediastinal silhouette is stable.   The osseous structures demonstrate chronic change.                                       Medications   sodium chloride 0.9% bolus 1,000 mL 1,000 mL (0 mLs Intravenous Stopped 12/14/22 0027)     Medical Decision Making:   History:   Old Medical Records: I decided to obtain old medical records.  Independently Interpreted Test(s):   I have ordered and independently interpreted X-rays - see prior notes.  I have ordered and independently interpreted EKG Reading(s) - see prior notes  Clinical Tests:   Lab Tests: Reviewed and Ordered  Radiological Study: Ordered and Reviewed  Medical Tests: Ordered and Reviewed  ED Management:  Patient here with episodic lightheadedness. No cp, sob. Vitals and exam reassuring and patient currently asymptomatic. He had also mentioned left cheek numbness a few days ago but is not currently there. He is neurologically intact including facial motor and sensation. His symptoms are vague and could be related to dehydration or his medications however, labs and imaging ordered to assess for cardiac, infectious, intracranial pathology. IVF ordered. Will continue to monitor and re-evaluate.    On re-evaluation, patient remains asymptomatic. He is ambulating at baseline without symptoms. Labs and imaging reviewed and reassuring. Patient feels comfortable with dc home. He was instructed to follow up with his doctor in the next 1-2 days and counseled on return precautions prior to discharge. He was also instructed to hydrate well at home.         Scribe Attestation:   Scribe #1: I performed the above scribed service and the documentation accurately describes the services I performed. I attest to the accuracy of the note.      ED Course as of 12/14/22 0332 Tue Dec 13, 2022   1912 EKG received/reviewed.  No STEMI. [LP]   2145 EKG with sinus bradycardia, rate 54, normal intervals, no STEMI [NN]      ED Course User Index  [LP] Catarino Lee III, MD  [NN] Siena Hogan MD                 Clinical  Impression:   Final diagnoses:  [R42] Episodic lightheadedness        ED Disposition Condition    Discharge Stable          ED Prescriptions    None       Follow-up Information       Follow up With Specialties Details Why Contact Info    SONIA Barker MD Family Medicine Schedule an appointment as soon as possible for a visit   [address]  [facility] [address] [phone]      [facility] - Emergency Dept Emergency Medicine  As needed, If symptoms worsen [address] [facility] [address] [phone]             Siena Hogan MD  12/14/22 0338

## 2022-12-14 NOTE — DISCHARGE INSTRUCTIONS
Please follow-up with your primary care doctor.  If your symptoms continue or worsen or you develop chest pain, shortness of breath, abdominal pain, fever or any other concerns, you should return to the emergency department for re-evaluation.

## 2022-12-20 ENCOUNTER — TELEPHONE (OUTPATIENT)
Dept: UROLOGY | Facility: CLINIC | Age: 72
End: 2022-12-20
Payer: MEDICARE

## 2022-12-20 NOTE — TELEPHONE ENCOUNTER
----- Message from Migdalia Bear sent at 12/20/2022  1:01 PM CST -----  Type:  RX Refill Request    Who Called: Pt   Refill or New Rx: refill   RX Name and Strength:tamsulosin (FLOMAX) 0.4 mg Cp24  How is the patient currently taking it? (ex. 1XDay):1XDay   Is this a 30 day or 90 day RX:90  Preferred Pharmacy with phone number:[facility]/pharmacy #[id number] - [place], [place] - [address] [facility]  Phone: [phone]  Fax:  [phone]  Would the patient rather a call back or a response via [facility]? Call back   Best Call Back Number:[phone]  Additional Information:

## 2022-12-21 DIAGNOSIS — N40.1 BENIGN PROSTATIC HYPERPLASIA WITH WEAK URINARY STREAM: Primary | ICD-10-CM

## 2022-12-21 DIAGNOSIS — R39.12 BENIGN PROSTATIC HYPERPLASIA WITH WEAK URINARY STREAM: Primary | ICD-10-CM

## 2022-12-21 RX ORDER — TAMSULOSIN HYDROCHLORIDE 0.4 MG/1
0.4 CAPSULE ORAL DAILY
Qty: 90 CAPSULE | Refills: 3 | Status: SHIPPED | OUTPATIENT
Start: 2022-12-21 | End: 2024-03-20 | Stop reason: SDUPTHER

## 2022-12-30 NOTE — DISCHARGE INSTRUCTIONS
Patient's radiofrequency ablations have been scheduled and injection letter sent.   Please make sure your drinking plenty of fluids.  Please take all antibiotics as directed.  Please call your primary care physician and/or urologist for a follow-up appointment for further evaluation and management.  Please return with any new or worsening symptoms.

## 2023-01-30 PROBLEM — N39.0 RECURRENT UTI: Status: RESOLVED | Noted: 2022-09-06 | Resolved: 2023-01-30

## 2023-05-17 ENCOUNTER — HOSPITAL ENCOUNTER (EMERGENCY)
Facility: HOSPITAL | Age: 73
Discharge: HOME | End: 2023-05-18
Attending: EMERGENCY MEDICINE
Payer: MEDICARE

## 2023-05-17 DIAGNOSIS — R42 LIGHTHEADEDNESS: Primary | ICD-10-CM

## 2023-05-17 DIAGNOSIS — R91.1 RIGHT UPPER LOBE PULMONARY NODULE: ICD-10-CM

## 2023-05-17 LAB
ALBUMIN SERPL BCP-MCNC: 3.9 G/DL (ref 3.5–5.2)
ALP SERPL-CCNC: 87 U/L (ref 38–126)
ALT SERPL W/O P-5'-P-CCNC: 24 U/L (ref 10–44)
ANION GAP SERPL CALC-SCNC: 6 MMOL/L (ref 8–16)
AST SERPL-CCNC: 29 U/L (ref 15–46)
BASOPHILS # BLD AUTO: 0.05 K/UL (ref 0–0.2)
BASOPHILS NFR BLD: 0.9 % (ref 0–1.9)
BILIRUB SERPL-MCNC: 0.4 MG/DL (ref 0.1–1)
CALCIUM SERPL-MCNC: 9.4 MG/DL (ref 8.7–10.5)
CHLORIDE SERPL-SCNC: 102 MMOL/L (ref 95–110)
CO2 SERPL-SCNC: 31 MMOL/L (ref 23–29)
CREAT SERPL-MCNC: 1.03 MG/DL (ref 0.5–1.4)
DIFFERENTIAL METHOD: NORMAL
EOSINOPHIL # BLD AUTO: 0.1 K/UL (ref 0–0.5)
EOSINOPHIL NFR BLD: 2.1 % (ref 0–8)
ERYTHROCYTE [DISTWIDTH] IN BLOOD BY AUTOMATED COUNT: 13.4 % (ref 11.5–14.5)
EST. GFR  (NO RACE VARIABLE): >60 ML/MIN/1.73 M^2
GLUCOSE SERPL-MCNC: 108 MG/DL (ref 70–110)
HCT VFR BLD AUTO: 43 % (ref 40–54)
HGB BLD-MCNC: 14.1 G/DL (ref 14–18)
IMM GRANULOCYTES # BLD AUTO: 0.02 K/UL (ref 0–0.04)
IMM GRANULOCYTES NFR BLD AUTO: 0.3 % (ref 0–0.5)
LYMPHOCYTES # BLD AUTO: 1.2 K/UL (ref 1–4.8)
LYMPHOCYTES NFR BLD: 21.7 % (ref 18–48)
MCH RBC QN AUTO: 29.4 PG (ref 27–31)
MCHC RBC AUTO-ENTMCNC: 32.8 G/DL (ref 32–36)
MCV RBC AUTO: 90 FL (ref 82–98)
MONOCYTES # BLD AUTO: 0.7 K/UL (ref 0.3–1)
MONOCYTES NFR BLD: 12.9 % (ref 4–15)
NEUTROPHILS # BLD AUTO: 3.6 K/UL (ref 1.8–7.7)
NEUTROPHILS NFR BLD: 62.1 % (ref 38–73)
NRBC BLD-RTO: 0 /100 WBC
NT-PROBNP SERPL-MCNC: 49 PG/ML (ref 5–900)
PLATELET # BLD AUTO: 176 K/UL (ref 150–450)
PMV BLD AUTO: 9.9 FL (ref 9.2–12.9)
POTASSIUM SERPL-SCNC: 3.8 MMOL/L (ref 3.5–5.1)
PROT SERPL-MCNC: 7 G/DL (ref 6–8.4)
RBC # BLD AUTO: 4.79 M/UL (ref 4.6–6.2)
SODIUM SERPL-SCNC: 139 MMOL/L (ref 136–145)
TROPONIN I SERPL-MCNC: <0.012 NG/ML (ref 0.01–0.03)
UUN UR-MCNC: 20 MG/DL (ref 2–20)
WBC # BLD AUTO: 5.72 K/UL (ref 3.9–12.7)

## 2023-05-17 PROCEDURE — 84484 ASSAY OF TROPONIN QUANT: CPT | Mod: 91,ER | Performed by: EMERGENCY MEDICINE

## 2023-05-17 PROCEDURE — 99900035 HC TECH TIME PER 15 MIN (STAT): Mod: ER

## 2023-05-17 PROCEDURE — 85025 COMPLETE CBC W/AUTO DIFF WBC: CPT | Mod: ER | Performed by: EMERGENCY MEDICINE

## 2023-05-17 PROCEDURE — 80053 COMPREHEN METABOLIC PANEL: CPT | Mod: ER | Performed by: EMERGENCY MEDICINE

## 2023-05-17 PROCEDURE — 93010 EKG 12-LEAD: ICD-10-PCS | Mod: ,,, | Performed by: INTERNAL MEDICINE

## 2023-05-17 PROCEDURE — 83880 ASSAY OF NATRIURETIC PEPTIDE: CPT | Mod: ER | Performed by: EMERGENCY MEDICINE

## 2023-05-17 PROCEDURE — 93010 ELECTROCARDIOGRAM REPORT: CPT | Mod: ,,, | Performed by: INTERNAL MEDICINE

## 2023-05-17 PROCEDURE — 25500020 PHARM REV CODE 255: Mod: ER | Performed by: EMERGENCY MEDICINE

## 2023-05-17 PROCEDURE — 93005 ELECTROCARDIOGRAM TRACING: CPT | Mod: ER

## 2023-05-17 PROCEDURE — 99285 EMERGENCY DEPT VISIT HI MDM: CPT | Mod: 25,ER

## 2023-05-17 RX ORDER — DIVALPROEX SODIUM 500 MG/1
500 TABLET, DELAYED RELEASE ORAL 2 TIMES DAILY
COMMUNITY
Start: 2023-03-29

## 2023-05-17 RX ORDER — DULOXETIN HYDROCHLORIDE 30 MG/1
30 CAPSULE, DELAYED RELEASE ORAL
COMMUNITY
Start: 2023-03-14

## 2023-05-17 RX ADMIN — IOHEXOL 125 ML: 350 INJECTION, SOLUTION INTRAVENOUS at 10:05

## 2023-05-18 VITALS
BODY MASS INDEX: 30.78 KG/M2 | HEART RATE: 56 BPM | HEIGHT: 70 IN | TEMPERATURE: 98 F | RESPIRATION RATE: 20 BRPM | SYSTOLIC BLOOD PRESSURE: 137 MMHG | DIASTOLIC BLOOD PRESSURE: 83 MMHG | OXYGEN SATURATION: 95 % | WEIGHT: 215 LBS

## 2023-05-18 LAB — TROPONIN I SERPL-MCNC: <0.012 NG/ML (ref 0.01–0.03)

## 2023-05-18 NOTE — ED PROVIDER NOTES
"ED Provider Note - 5/17/2023    History     Chief Complaint   Patient presents with    Dizziness     Pt to the Er with c/o "lightheadedness about 7:30 tonight." Last possible meal about 3pm. Pt states he felt "like he was going to pass out."     Patient currently presents with complaint of dizziness.  Onset was first noted 1-1.5 hours ago.  Sensation is described as lightheadedness.  This has occurred previously on a number of occasions.  There have not been episodes of syncope or other LOC.  There is not associated SOB or CP.  There has not been suspicion of dehydration.   Patient has not initiated new medications.  Upper respiratory symptoms have not been noted.      Review of patient's allergies indicates:   Allergen Reactions    Ciprofloxacin Palpitations    Penicillins Palpitations and Anxiety     Past Medical History:   Diagnosis Date    Back pain, chronic     BPH (benign prostatic hyperplasia)     DVT (deep venous thrombosis)     Hypertension     Mitral valve prolapse syndrome     Prostate enlargement      Past Surgical History:   Procedure Laterality Date    NECK SURGERY       Family History   Problem Relation Age of Onset    Hypertension Mother     Stroke Father      Social History     Tobacco Use    Smoking status: Former    Smokeless tobacco: Never    Tobacco comments:     >40 years ago   Substance Use Topics    Alcohol use: No    Drug use: No     Review of Systems   Constitutional:  Negative for chills and fever.   HENT:  Negative for congestion and sore throat.    Respiratory:  Negative for chest tightness and shortness of breath.    Cardiovascular:  Negative for chest pain and palpitations.   Gastrointestinal:  Negative for abdominal pain and vomiting.   Genitourinary:  Negative for difficulty urinating and dysuria.   Skin:  Negative for color change and rash.   Neurological:  Positive for weakness and light-headedness. Negative for syncope, facial asymmetry, speech difficulty, numbness and headaches. " "  All other systems reviewed and are negative.    Physical Exam     Initial Vitals [05/17/23 2028]   BP Pulse Resp Temp SpO2   (!) 187/84 (!) 59 18 98.8 °F (37.1 °C) 99 %      MAP       --         Vitals:    05/17/23 2028 05/17/23 2105 05/17/23 2132 05/17/23 2147   BP: (!) 187/84 (!) 145/85 (!) 144/83 (!) 154/79   Pulse: (!) 59 (!) 58 (!) 52 (!) 51   Resp: 18 14  16   Temp: 98.8 °F (37.1 °C)      TempSrc: Oral      SpO2: 99% 97%  96%   Weight: 97.5 kg (215 lb)      Height: 5' 10" (1.778 m)       05/17/23 2246 05/17/23 2248 05/17/23 2302 05/17/23 2318   BP:  (!) 171/79 (!) 147/70 (!) 146/90   Pulse:  60 (!) 59 61   Resp: 19  (!) 21 (!) 23   Temp:       TempSrc:       SpO2:  95% (!) 94% 96%   Weight:       Height:        05/17/23 2332 05/18/23 0014 05/18/23 0017 05/18/23 0047   BP: (!) 148/94  (!) 147/80 137/83   Pulse: (!) 56  (!) 56 (!) 56   Resp: (!) 22 20     Temp:       TempSrc:       SpO2: 97%  95% 95%   Weight:       Height:        05/18/23 0048 05/18/23 0050   BP:     Pulse:     Resp: 20    Temp:  98 °F (36.7 °C)   TempSrc:  Oral   SpO2:     Weight:     Height:       Physical Exam    Nursing note and vitals reviewed.  Constitutional: He appears well-developed and well-nourished. He is not diaphoretic. No distress.   HENT:   Head: Normocephalic and atraumatic.   Nose: Nose normal.   Mouth/Throat: Oropharynx is clear and moist.   Eyes: Conjunctivae are normal. No scleral icterus.   Cardiovascular:  Normal rate, regular rhythm, normal heart sounds and intact distal pulses.           Pulmonary/Chest: Breath sounds normal. No respiratory distress.   Abdominal: Abdomen is soft. There is no abdominal tenderness.   Musculoskeletal:         General: No edema. Normal range of motion.     Neurological: He is alert and oriented to person, place, and time. He has normal strength.   Skin: Skin is warm and dry.       ED Course   Procedures                   MDM  Differential Diagnoses   Based on available history, the " working differential diagnoses considered during this evaluation include but are not limited to peripheral vertigo, central vertigo, lightheadedness/near syncope, ataxia, generalized weakness..      LABS     Labs Reviewed   COMPREHENSIVE METABOLIC PANEL - Abnormal; Notable for the following components:       Result Value    CO2 31 (*)     Anion Gap 6 (*)     All other components within normal limits   CBC W/ AUTO DIFFERENTIAL   TROPONIN I   NT-PRO NATRIURETIC PEPTIDE   TROPONIN I     All available results from the labs ordered were independently reviewed.  BNP unremarkable, CBC unremarkable, CMP unremarkable, and Troponin RESULT : normal    Imaging     Imaging Results               CTA Chest Non-Coronary (PE Studies) (Final result)  Result time 05/17/23 22:36:34      Final result by Julian Haywood MD (05/17/23 22:36:34)                   Impression:      No definite acute abnormality.    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    8 mm right upper lobe pulmonary nodule as seen on series 2, image 209. This is enlarged in comparison to the prior exam. Outpatient consultation with pulmonology recommended to devise a follow-up schedule.    This report was flagged in Epic as containing an incidental finding.    All CT scans at this facility are performed  using dose modulation techniques as appropriate to performed exam including the following:  automated exposure control; adjustment of mA and/or kV according to the patients size (this includes techniques or standardized protocols for targeted exams where dose is matched to indication/reason for exam: i.e. extremities or head);  iterative reconstruction technique.      Electronically signed by: Julian Haywood  Date:    05/17/2023  Time:    22:36               Narrative:    EXAMINATION:  CTA CHEST NON CORONARY (PE STUDIES)    CLINICAL HISTORY:  Pulmonary embolism (PE) suspected, unknown D-dimer;    TECHNIQUE:  Low dose axial images, sagittal and  coronal reformations were obtained from the thoracic inlet to the lung bases following the IV administration of 100 mL of Omnipaque 350.  Contrast timing was optimized to evaluate the pulmonary arteries.  MIP images were performed.    COMPARISON:  Multiple priors.    FINDINGS:  Base of Neck: No significant abnormality.    Thoracic soft tissues: Unremarkable.    Aorta: Ectasia without aneurysm of the descending thoracic aorta.    Heart: Normal size. No effusion.    Pulmonary vasculature: Pulmonary arteries are well opacified.  There is no pulmonary thromboembolism.    Jane/Mediastinum: No pathologic kim enlargement.    Airways: Patent.    Lungs/Pleura: Clear lungs. No pleural effusion or thickening.  Calcified and noncalcified pleural plaques possibly related to prior asbestos exposure.  8 mm right upper lobe pulmonary nodule as seen on series 2, image 209.  This is enlarged in comparison to the prior exam.  Outpatient consultation with pulmonology recommended to devise a follow-up schedule.    Esophagus: Unremarkable.    Upper Abdomen: No abnormality of the partially imaged upper abdomen.    Bones: No acute fracture. No suspicious lytic or sclerotic lesions.                                       X-Ray Chest AP Portable (Final result)  Result time 05/17/23 20:48:02      Final result by Julian Haywood MD (05/17/23 20:48:02)                   Impression:      No acute abnormality.      Electronically signed by: Julian Haywood  Date:    05/17/2023  Time:    20:48               Narrative:    EXAMINATION:  XR CHEST AP PORTABLE    CLINICAL HISTORY:  Chest Pain;    TECHNIQUE:  Single frontal view of the chest was performed.    COMPARISON:  Multiple priors.    FINDINGS:  The lungs are clear, with normal appearance of pulmonary vasculature and no pleural effusion or pneumothorax.    The cardiac silhouette is normal in size. The hilar and mediastinal contours are unremarkable.    Bones are intact.                                          EKG   EKG Readings: (Independently Interpreted)   Initial Reading: No STEMI. Rhythm: Normal Sinus Rhythm. Heart Rate: 53. Ectopy: No Ectopy. Conduction: Normal. Axis: Left Axis Deviation.     ED Management/Discussion     Medications   iohexoL (OMNIPAQUE 350) injection 125 mL (125 mLs Intravenous Given 5/17/23 2220)                   The patient's list of active medical problems, social history, medications, and allergies as documented per RN staff has been reviewed.                 On final assessment, the patient appears to be well and he has had no further episodes of weakness.  Patient appears to be low risk for CV disease with age and HTN his only apparent risk factors.  We have additionally discussed incidental findings for the RUL lung nodule which appears to have enlarged somewhat compared to prior along with the need for pulmonary FU.  Patient notes he will schedule FU with his pulmonologist Dr Almeida as well as his cardiologist Dr Gómez.    I see no indication of an emergent process beyond that addressed during our encounter but have duly counseled the patient/family regarding the need for prompt follow-up as well as the indications that should prompt immediate return to the emergency room should new or worrisome developments occur.  The patient/family has been provided with verbal and printed direction regarding our final diagnosis(es) as well as instructions regarding use of OTC and/or Rx medications intended to manage the patient's aforementioned conditions including:  ED Prescriptions    None           Patient has been advised of the following recommended follow-up instructions:  Follow-up Information       Follow up With Specialties Details Why Contact Kelvin Barker MD Family Medicine Schedule an appointment as soon as possible for a visit  reassessment/consideration for specialty referral to pulmonology [address]  [place] [address]  [phone]      [facility] - Emergency  Dept Emergency Medicine Go to  As needed, If symptoms worsen [address] [facility] [address] [phone]          The patient/family communicates understanding of all this information and all remaining questions and concerns were addressed at this time.      Referrals:  Orders Placed This Encounter   Procedures    Ambulatory referral/consult to Pulmonology     Standing Status:   Future     Standing Expiration Date:   6/18/2024     Referral Priority:   Routine     Referral Type:   Consultation     Referral Reason:   Specialty Services Required     Requested Specialty:   Pulmonary Disease     Number of Visits Requested:   1     CLINICAL IMPRESSION    ICD-10-CM ICD-9-CM   1. Lightheadedness  R42 780.4   2. Right upper lobe pulmonary nodule  R91.1 793.11          ED Disposition Condition    Discharge Stable               Urban Alvarez MD  05/18/23 0153

## 2023-05-18 NOTE — ED NOTES
Patient remains updated on plan of care. Informed MD will be in to discuss results and plan of care after blood test is complete. Patient verbalized understanding.

## 2023-06-07 ENCOUNTER — HOSPITAL ENCOUNTER (EMERGENCY)
Facility: HOSPITAL | Age: 73
Discharge: HOME | End: 2023-06-07
Attending: STUDENT IN AN ORGANIZED HEALTH CARE EDUCATION/TRAINING PROGRAM
Payer: MEDICARE

## 2023-06-07 VITALS
SYSTOLIC BLOOD PRESSURE: 163 MMHG | WEIGHT: 215 LBS | TEMPERATURE: 98 F | HEART RATE: 56 BPM | BODY MASS INDEX: 30.85 KG/M2 | OXYGEN SATURATION: 98 % | RESPIRATION RATE: 15 BRPM | DIASTOLIC BLOOD PRESSURE: 78 MMHG

## 2023-06-07 DIAGNOSIS — H10.9 CONJUNCTIVITIS OF BOTH EYES, UNSPECIFIED CONJUNCTIVITIS TYPE: Primary | ICD-10-CM

## 2023-06-07 PROCEDURE — 99283 EMERGENCY DEPT VISIT LOW MDM: CPT | Mod: ER

## 2023-06-07 RX ORDER — POLYMYXIN B SULFATE AND TRIMETHOPRIM 1; 10000 MG/ML; [USP'U]/ML
1 SOLUTION OPHTHALMIC EVERY 4 HOURS
Qty: 2.8 ML | Refills: 0 | Status: SHIPPED | OUTPATIENT
Start: 2023-06-07 | End: 2023-06-14

## 2023-06-07 NOTE — ED NOTES
Pt presents to ED with C/O bilat eye watering c2 weeks and L eye draining more and stuck closed this am with itching and irritation with onset this am. Pt denies any pain to the area.

## 2023-06-07 NOTE — ED PROVIDER NOTES
NAME:  Jerardo Callejas  CSN:     [id number]  MRN:    [id number]  ADMIT DATE: 6/7/2023        eMERGENCY dEPARTMENT eNCOUnter    CHIEF COMPLAINT    Chief Complaint   Patient presents with    Conjunctivitis     My left when I woke up was sealed shut and it is irritated and itchy. It has been watering for awhile but today it was worse.        HPI    Jerardo Callejas is a 73 y.o. male with a past medical history of  has a past medical history of Back pain, chronic, BPH (benign prostatic hyperplasia), DVT (deep venous thrombosis), Hypertension, Mitral valve prolapse syndrome, and Prostate enlargement.     he presents to the ED due to concern for drainage from his left eye.  He states for the last 2 weeks he has had clear watery drainage from both eyes with some associated itchiness.  However, this morning when he woke up his left eye was crusted shut and the discharge was more yellow in nature with some associated pruritus.  Has never had symptoms like this before.  No recent illness.  Does not wear contacts.  Has never seen an eye doctor in the past.  Denies any changes in vision or alma eye pain.  No trauma.    HPI       PAST MEDICAL HISTORY  Past Medical History:   Diagnosis Date    Back pain, chronic     BPH (benign prostatic hyperplasia)     DVT (deep venous thrombosis)     Hypertension     Mitral valve prolapse syndrome     Prostate enlargement        SURGICAL HISTORY    Past Surgical History:   Procedure Laterality Date    NECK SURGERY         FAMILY HISTORY    Family History   Problem Relation Age of Onset    Hypertension Mother     Stroke Father        SOCIAL HISTORY    Social History     Socioeconomic History    Marital status: Legally    Tobacco Use    Smoking status: Former    Smokeless tobacco: Never    Tobacco comments:     >40 years ago   Substance and Sexual Activity    Alcohol use: No    Drug use: No    Sexual activity: Not Currently     Partners: Female   Social History Narrative    **  Merged History Encounter **            MEDICATIONS  Current Outpatient Medications   Medication Instructions    albuterol (PROVENTIL/VENTOLIN HFA) 90 mcg/actuation inhaler 2 puffs, Inhalation, Every 4 hours PRN, Rescue    divalproex (DEPAKOTE) 250 MG EC tablet No dose, route, or frequency recorded.    divalproex (DEPAKOTE) 500 mg, Oral, 2 times daily    DULoxetine (CYMBALTA) 30 mg, Oral    ELIQUIS 5 mg, Oral, 2 times daily    finasteride (PROSCAR) 5 mg, Oral, Daily    furosemide (LASIX) 20 mg, Oral, Every morning    gabapentin (NEURONTIN) 300 mg, Oral, Daily, Only takes at night.    losartan-hydrochlorothiazide 50-12.5 mg (HYZAAR) 50-12.5 mg per tablet 1 tablet, Oral, Every morning    metoprolol succinate (TOPROL-XL) 12.5 mg, Oral, Daily    omeprazole (PRILOSEC) 40 mg, Oral, Daily    polymyxin B sulf-trimethoprim (POLYTRIM) 10,000 unit- 1 mg/mL Drop 1 drop, Both Eyes, Every 4 hours    potassium chloride (MICRO-K) 10 MEQ CpSR 10 mEq, Oral, Daily    sildenafil (REVATIO) 20 mg, Oral, Daily PRN    tamsulosin (FLOMAX) 0.4 mg, Oral, Daily       ALLERGIES    Review of patient's allergies indicates:   Allergen Reactions    Ciprofloxacin Palpitations    Penicillins Palpitations and Anxiety         REVIEW OF SYSTEMS   Review of Systems       PHYSICAL EXAM    Reviewed Triage Note    VITAL SIGNS:   ED Triage Vitals   Enc Vitals Group      BP 06/07/23 0844 (!) 163/78      Pulse 06/07/23 0844 (!) 56      Resp 06/07/23 0844 15      Temp 06/07/23 0844 98.4 °F (36.9 °C)      Temp Source 06/07/23 0844 Oral      SpO2 06/07/23 0844 98 %      Weight 06/07/23 0846 215 lb      Height --       Head Circumference --       Peak Flow --       Pain Score --       Pain Loc --       Pain Edu? --       Excl. in GC? --        Patient Vitals for the past 24 hrs:   BP Temp Temp src Pulse Resp SpO2 Weight   06/07/23 0846 -- -- -- -- -- -- 97.5 kg (215 lb)   06/07/23 0844 (!) 163/78 98.4 °F (36.9 °C) Oral (!) 56 15 98 % --       Physical  Exam    Nursing note and vitals reviewed.  Constitutional: He appears well-developed and well-nourished.   HENT:   Head: Normocephalic and atraumatic.   Eyes: EOM are normal. Pupils are equal, round, and reactive to light. Right eye exhibits no chemosis. Left eye exhibits no chemosis.   Mild clear drainage and tearing of the eyes noted bilaterally.  Very minimal conjunctival injection bilaterally.   Neck: Neck supple.   Normal range of motion.  Cardiovascular:    Bradycardia present.         Pulmonary/Chest: No respiratory distress.   Musculoskeletal:         General: Normal range of motion.      Cervical back: Normal range of motion and neck supple.     Neurological: He is alert and oriented to person, place, and time.   Skin: Skin is warm and dry.   Psychiatric: He has a normal mood and affect.              EKG     Interpreted by EM physician if performed:               LABS  Pertinent labs reviewed. (See chart for details)   Labs Reviewed - No data to display      RADIOLOGY          Imaging Results    None           PROCEDURES    Procedures      ED COURSE & MEDICAL DECISION MAKING    Pertinent Labs & Imaging studies reviewed. (See chart for details and specific orders.)        Summary of review of records:     MDM:  Jerardo Callejas is a 73 y.o. male presents for evaluation discharge and pruritus ongoing last 2 weeks.  Has not had this in the past.  Given longevity of symptoms, proceed with treatment of possible underlying bacterial conjunctivitis at this time.  He states he does already have an appointment scheduled with ophthalmology and did encourage close follow-up.  Reasons to return discussed and all questions addressed.          Medications - No data to display           FINAL IMPRESSION    Final diagnoses:  [H10.9] Conjunctivitis of both eyes, unspecified conjunctivitis type (Primary)       DISPOSITION  Patient discharge in stable condition        ED Prescriptions       Medication Sig Dispense Start  Date End Date Auth. Provider    polymyxin B sulf-trimethoprim (POLYTRIM) 10,000 unit- 1 mg/mL Drop Place 1 drop into both eyes every 4 (four) hours. for 7 days 2.8 mL 6/7/2023 6/14/2023 Ana Paula Porter DO          Follow-up Information       Follow up With Specialties Details Why Contact Info    SONIA Barker MD Family Medicine Schedule an appointment as soon as possible for a visit  As needed, If symptoms worsen [id number]  [facility] [id number]  [phone]      Your eye doctor  In 1 week                DISCLAIMER: This note was prepared with [employer] voice recognition transcription software. Garbled syntax, mangled pronouns, and other bizarre constructions may be attributed to that software system.      DO Ana Paula Estrella DO  06/07/23 1140

## 2023-06-15 ENCOUNTER — TELEPHONE (OUTPATIENT)
Dept: PULMONOLOGY | Facility: CLINIC | Age: 73
End: 2023-06-15
Payer: MEDICARE

## 2023-06-15 NOTE — TELEPHONE ENCOUNTER
----- Message from Beatriz Calzada sent at 6/15/2023  2:57 PM CDT -----  Good afternoon,    The patient have a referral from the emergency department with a diagnosis of Right upper lobe pulmonary nodule. Please assist with scheduling the patient.    Thank You

## 2023-07-12 ENCOUNTER — HOSPITAL ENCOUNTER (OUTPATIENT)
Facility: HOSPITAL | Age: 73
Discharge: HOME | End: 2023-07-13
Attending: EMERGENCY MEDICINE | Admitting: EMERGENCY MEDICINE
Payer: MEDICARE

## 2023-07-12 DIAGNOSIS — R39.12 BENIGN PROSTATIC HYPERPLASIA WITH WEAK URINARY STREAM: ICD-10-CM

## 2023-07-12 DIAGNOSIS — R07.9 CHEST PAIN, UNSPECIFIED TYPE: ICD-10-CM

## 2023-07-12 DIAGNOSIS — R07.9 CHEST PAIN: ICD-10-CM

## 2023-07-12 DIAGNOSIS — E78.5 HYPERLIPIDEMIA, UNSPECIFIED HYPERLIPIDEMIA TYPE: ICD-10-CM

## 2023-07-12 DIAGNOSIS — I25.10 CORONARY ARTERY DISEASE, UNSPECIFIED VESSEL OR LESION TYPE, UNSPECIFIED WHETHER ANGINA PRESENT, UNSPECIFIED WHETHER NATIVE OR TRANSPLANTED HEART: Primary | ICD-10-CM

## 2023-07-12 DIAGNOSIS — M54.12 CERVICAL RADICULOPATHY: ICD-10-CM

## 2023-07-12 DIAGNOSIS — N40.1 BENIGN PROSTATIC HYPERPLASIA WITH WEAK URINARY STREAM: ICD-10-CM

## 2023-07-12 DIAGNOSIS — G89.29 CHRONIC BACK PAIN, UNSPECIFIED BACK LOCATION, UNSPECIFIED BACK PAIN LATERALITY: ICD-10-CM

## 2023-07-12 DIAGNOSIS — M54.9 CHRONIC BACK PAIN, UNSPECIFIED BACK LOCATION, UNSPECIFIED BACK PAIN LATERALITY: ICD-10-CM

## 2023-07-12 DIAGNOSIS — I10 PRIMARY HYPERTENSION: ICD-10-CM

## 2023-07-12 LAB
ALBUMIN SERPL BCP-MCNC: 3.8 G/DL (ref 3.5–5.2)
ALP SERPL-CCNC: 83 U/L (ref 38–126)
ALT SERPL W/O P-5'-P-CCNC: 36 U/L (ref 10–44)
ANION GAP SERPL CALC-SCNC: 7 MMOL/L (ref 8–16)
AST SERPL-CCNC: 44 U/L (ref 15–46)
BASOPHILS # BLD AUTO: 0.03 K/UL (ref 0–0.2)
BASOPHILS NFR BLD: 0.7 % (ref 0–1.9)
BILIRUB SERPL-MCNC: 0.2 MG/DL (ref 0.1–1)
CALCIUM SERPL-MCNC: 9.4 MG/DL (ref 8.7–10.5)
CHLORIDE SERPL-SCNC: 102 MMOL/L (ref 95–110)
CO2 SERPL-SCNC: 30 MMOL/L (ref 23–29)
CREAT SERPL-MCNC: 0.82 MG/DL (ref 0.5–1.4)
DIFFERENTIAL METHOD: ABNORMAL
EOSINOPHIL # BLD AUTO: 0.2 K/UL (ref 0–0.5)
EOSINOPHIL NFR BLD: 4.5 % (ref 0–8)
ERYTHROCYTE [DISTWIDTH] IN BLOOD BY AUTOMATED COUNT: 13.6 % (ref 11.5–14.5)
EST. GFR  (NO RACE VARIABLE): >60 ML/MIN/1.73 M^2
GLUCOSE SERPL-MCNC: 95 MG/DL (ref 70–110)
HCT VFR BLD AUTO: 42.7 % (ref 40–54)
HGB BLD-MCNC: 14.2 G/DL (ref 14–18)
IMM GRANULOCYTES # BLD AUTO: 0.07 K/UL (ref 0–0.04)
IMM GRANULOCYTES NFR BLD AUTO: 1.6 % (ref 0–0.5)
LYMPHOCYTES # BLD AUTO: 1.3 K/UL (ref 1–4.8)
LYMPHOCYTES NFR BLD: 30.2 % (ref 18–48)
MCH RBC QN AUTO: 29.8 PG (ref 27–31)
MCHC RBC AUTO-ENTMCNC: 33.3 G/DL (ref 32–36)
MCV RBC AUTO: 90 FL (ref 82–98)
MONOCYTES # BLD AUTO: 0.7 K/UL (ref 0.3–1)
MONOCYTES NFR BLD: 16.3 % (ref 4–15)
NEUTROPHILS # BLD AUTO: 2.1 K/UL (ref 1.8–7.7)
NEUTROPHILS NFR BLD: 46.7 % (ref 38–73)
NRBC BLD-RTO: 0 /100 WBC
NT-PROBNP SERPL-MCNC: 56 PG/ML (ref 5–900)
PLATELET # BLD AUTO: 169 K/UL (ref 150–450)
PMV BLD AUTO: 9.8 FL (ref 9.2–12.9)
POTASSIUM SERPL-SCNC: 4 MMOL/L (ref 3.5–5.1)
PROT SERPL-MCNC: 6.9 G/DL (ref 6–8.4)
RBC # BLD AUTO: 4.77 M/UL (ref 4.6–6.2)
SODIUM SERPL-SCNC: 139 MMOL/L (ref 136–145)
TROPONIN I SERPL-MCNC: <0.012 NG/ML (ref 0.01–0.03)
TROPONIN I SERPL-MCNC: <0.012 NG/ML (ref 0.01–0.03)
UUN UR-MCNC: 18 MG/DL (ref 2–20)
WBC # BLD AUTO: 4.43 K/UL (ref 3.9–12.7)

## 2023-07-12 PROCEDURE — 85025 COMPLETE CBC W/AUTO DIFF WBC: CPT | Mod: ER | Performed by: EMERGENCY MEDICINE

## 2023-07-12 PROCEDURE — 83880 ASSAY OF NATRIURETIC PEPTIDE: CPT | Mod: ER | Performed by: EMERGENCY MEDICINE

## 2023-07-12 PROCEDURE — 93010 EKG 12-LEAD: ICD-10-PCS | Mod: 76,,, | Performed by: INTERNAL MEDICINE

## 2023-07-12 PROCEDURE — G0378 HOSPITAL OBSERVATION PER HR: HCPCS

## 2023-07-12 PROCEDURE — 99285 EMERGENCY DEPT VISIT HI MDM: CPT | Mod: 25,ER

## 2023-07-12 PROCEDURE — 80053 COMPREHEN METABOLIC PANEL: CPT | Mod: ER | Performed by: EMERGENCY MEDICINE

## 2023-07-12 PROCEDURE — 94760 N-INVAS EAR/PLS OXIMETRY 1: CPT | Mod: ER

## 2023-07-12 PROCEDURE — 93005 ELECTROCARDIOGRAM TRACING: CPT | Mod: ER

## 2023-07-12 PROCEDURE — 25000003 PHARM REV CODE 250: Mod: ER | Performed by: EMERGENCY MEDICINE

## 2023-07-12 PROCEDURE — 93010 ELECTROCARDIOGRAM REPORT: CPT | Mod: ,,, | Performed by: INTERNAL MEDICINE

## 2023-07-12 PROCEDURE — 25000003 PHARM REV CODE 250

## 2023-07-12 PROCEDURE — 84484 ASSAY OF TROPONIN QUANT: CPT | Mod: ER | Performed by: EMERGENCY MEDICINE

## 2023-07-12 PROCEDURE — 94761 N-INVAS EAR/PLS OXIMETRY MLT: CPT

## 2023-07-12 PROCEDURE — 99900035 HC TECH TIME PER 15 MIN (STAT): Mod: ER

## 2023-07-12 PROCEDURE — 93010 ELECTROCARDIOGRAM REPORT: CPT | Mod: 76,,, | Performed by: INTERNAL MEDICINE

## 2023-07-12 RX ORDER — LOSARTAN POTASSIUM 50 MG/1
50 TABLET ORAL DAILY
Status: DISCONTINUED | OUTPATIENT
Start: 2023-07-13 | End: 2023-07-13 | Stop reason: HOSPADM

## 2023-07-12 RX ORDER — SODIUM CHLORIDE 0.9 % (FLUSH) 0.9 %
5 SYRINGE (ML) INJECTION
Status: DISCONTINUED | OUTPATIENT
Start: 2023-07-12 | End: 2023-07-13 | Stop reason: HOSPADM

## 2023-07-12 RX ORDER — IBUPROFEN 200 MG
16 TABLET ORAL
Status: DISCONTINUED | OUTPATIENT
Start: 2023-07-12 | End: 2023-07-13 | Stop reason: HOSPADM

## 2023-07-12 RX ORDER — HYDROCHLOROTHIAZIDE 12.5 MG/1
12.5 TABLET ORAL DAILY
Status: DISCONTINUED | OUTPATIENT
Start: 2023-07-13 | End: 2023-07-13 | Stop reason: HOSPADM

## 2023-07-12 RX ORDER — AMOXICILLIN 250 MG
1 CAPSULE ORAL 2 TIMES DAILY PRN
Status: DISCONTINUED | OUTPATIENT
Start: 2023-07-12 | End: 2023-07-13 | Stop reason: HOSPADM

## 2023-07-12 RX ORDER — LIDOCAINE 50 MG/G
1 PATCH TOPICAL DAILY PRN
Status: DISCONTINUED | OUTPATIENT
Start: 2023-07-12 | End: 2023-07-13 | Stop reason: HOSPADM

## 2023-07-12 RX ORDER — DIVALPROEX SODIUM 250 MG/1
750 TABLET, DELAYED RELEASE ORAL 2 TIMES DAILY
Status: DISCONTINUED | OUTPATIENT
Start: 2023-07-12 | End: 2023-07-13 | Stop reason: HOSPADM

## 2023-07-12 RX ORDER — ACETAMINOPHEN 325 MG/1
650 TABLET ORAL EVERY 8 HOURS PRN
Status: DISCONTINUED | OUTPATIENT
Start: 2023-07-12 | End: 2023-07-13 | Stop reason: HOSPADM

## 2023-07-12 RX ORDER — NAPROXEN SODIUM 220 MG/1
324 TABLET, FILM COATED ORAL
Status: COMPLETED | OUTPATIENT
Start: 2023-07-12 | End: 2023-07-12

## 2023-07-12 RX ORDER — TAMSULOSIN HYDROCHLORIDE 0.4 MG/1
0.4 CAPSULE ORAL DAILY
Status: DISCONTINUED | OUTPATIENT
Start: 2023-07-13 | End: 2023-07-13 | Stop reason: HOSPADM

## 2023-07-12 RX ORDER — GLUCAGON 1 MG
1 KIT INJECTION
Status: DISCONTINUED | OUTPATIENT
Start: 2023-07-12 | End: 2023-07-13 | Stop reason: HOSPADM

## 2023-07-12 RX ORDER — LOSARTAN POTASSIUM AND HYDROCHLOROTHIAZIDE 12.5; 5 MG/1; MG/1
1 TABLET ORAL EVERY MORNING
COMMUNITY

## 2023-07-12 RX ORDER — IBUPROFEN 200 MG
24 TABLET ORAL
Status: DISCONTINUED | OUTPATIENT
Start: 2023-07-12 | End: 2023-07-13 | Stop reason: HOSPADM

## 2023-07-12 RX ORDER — CLONAZEPAM 0.5 MG/1
0.5 TABLET ORAL NIGHTLY PRN
COMMUNITY
Start: 2023-05-25

## 2023-07-12 RX ORDER — CLONIDINE HYDROCHLORIDE 0.1 MG/1
0.1 TABLET ORAL EVERY 6 HOURS PRN
COMMUNITY
Start: 2023-05-19 | End: 2023-07-19

## 2023-07-12 RX ORDER — KETOROLAC TROMETHAMINE 30 MG/ML
15 INJECTION, SOLUTION INTRAMUSCULAR; INTRAVENOUS
Status: ACTIVE | OUTPATIENT
Start: 2023-07-12 | End: 2023-07-13

## 2023-07-12 RX ORDER — TALC
9 POWDER (GRAM) TOPICAL NIGHTLY PRN
Status: DISCONTINUED | OUTPATIENT
Start: 2023-07-12 | End: 2023-07-13 | Stop reason: HOSPADM

## 2023-07-12 RX ADMIN — ASPIRIN 324 MG: 81 TABLET, CHEWABLE ORAL at 06:07

## 2023-07-12 RX ADMIN — APIXABAN 5 MG: 5 TABLET, FILM COATED ORAL at 10:07

## 2023-07-12 RX ADMIN — DIVALPROEX SODIUM 750 MG: 250 TABLET, DELAYED RELEASE ORAL at 10:07

## 2023-07-12 NOTE — ASSESSMENT & PLAN NOTE
History of CAD who presents with symptoms of chest pain, bilateral arm pain, and shortness of breath, which started while at work. Although he is currently asymptomatic, his heart score of 5 and his prior history of CAD put him at high risk. Therefore, he is being admitted for further workup and management.  Repeat trop neg. EKG nsr w/o evidence of ischemia    Plan:  - Consider cards consult for possible stress test or cardiac catheterization if indicated  - Risk factor modification including optimizing blood pressure control, lipid control and and blood sugar control (f/u A1c).  - Continue eliquis 5mg bid  - Consider an echocardiogram to evaluate his known mitral valve prolapse.  - Will need close outpatient cards f/u

## 2023-07-12 NOTE — ED NOTES
"Pt reports chest pain is getting worse and pt is also feeling very "jittery"   Dr. Masterson made aware  "

## 2023-07-12 NOTE — HPI
"A 73-year-old male with a past medical history significant for coronary artery disease, mitral valve prolapse, hypertension, and benign prostatic hyperplasia, tremor (right arm) presents with bilateral arm pain and shortness of breath, with chest pain that began at work. The patient reports observing someone else working when he began to experience pain and numbness in both arms, chest pain, and shortness of breath, which prompted him to seek medical evaluation. As of now, the patient reports no ongoing symptoms. He denies any recent illnesses. He is under the care of a cardiologist, but his last appointment was nearly a year ago. His last visit due to chest pain was in September. The patient did not follow up with his physician after this visit, but did consult a doctor regarding low libido.    ED Course:  On arrival, the patient's examination was unremarkable. His lungs were clear, and he presented no leg swelling. His EKG and initial lab results, including a negative Troponin, were also unremarkable. Despite these findings, given the patient's symptoms and high-risk cardiac history (with heart score of 5), a second Troponin level was planned. The patient was not given any medication or interventions in the ED due to his asymptomatic state. Despite the option for discharge with outpatient follow-up with his cardiologist, the patient expressed discomfort with going home and a feeling that "something happened today," prompting the decision for inpatient admission for a thorough cardiac workup.  "

## 2023-07-12 NOTE — ED PROVIDER NOTES
Emergency Department Encounter  Provider Note  Encounter Date: 7/12/2023    Patient Name: Jerardo Callejas  MRN: [id number]    History of Present Illness   HPI  History of Present Illness:    Chief Complaint:   Chief Complaint   Patient presents with    multiple medical complaints     States today started having bilat hand and arm pain and feels sob. States had some chest pressure earlier. Denies any cough cold congestion. No cardiac hx.        73-year-old male presenting with bilateral arm pain and shortness of breath, chest pain that started while he was at work.  Was at rest.  Currently asymptomatic.  Takes Eliquis for DVT.     The following PMH/PSH/SocHx/FamHx has been reviewed by myself:    Past Medical History:   Diagnosis Date    Back pain, chronic     BPH (benign prostatic hyperplasia)     DVT (deep venous thrombosis)     Hypertension     Mitral valve prolapse syndrome     Prostate enlargement      Past Surgical History:   Procedure Laterality Date    NECK SURGERY       Social History     Tobacco Use    Smoking status: Former    Smokeless tobacco: Never    Tobacco comments:     >40 years ago   Substance Use Topics    Alcohol use: No    Drug use: No     Family History   Problem Relation Age of Onset    Hypertension Mother     Stroke Father        Allergies reviewed:  Review of patient's allergies indicates:   Allergen Reactions    Ciprofloxacin Palpitations    Penicillins Palpitations and Anxiety       Medications reviewed:  Medication List with Changes/Refills   Current Medications    ALBUTEROL (PROVENTIL/VENTOLIN HFA) 90 MCG/ACTUATION INHALER    Inhale 2 puffs into the lungs every 4 (four) hours as needed for Wheezing or Shortness of Breath. Rescue    DIVALPROEX (DEPAKOTE) 250 MG EC TABLET        DIVALPROEX (DEPAKOTE) 500 MG TBEC    Take 500 mg by mouth 2 (two) times daily.    DULOXETINE (CYMBALTA) 30 MG CAPSULE    Take 30 mg by mouth.    ELIQUIS 5 MG TAB    Take 5 mg by mouth 2 (two) times daily.     FINASTERIDE (PROSCAR) 5 MG TABLET    Take 1 tablet (5 mg total) by mouth once daily.    FUROSEMIDE (LASIX) 20 MG TABLET    Take 20 mg by mouth every morning.    GABAPENTIN (NEURONTIN) 300 MG CAPSULE    Take 300 mg by mouth once daily. Only takes at night.    LOSARTAN-HYDROCHLOROTHIAZIDE 50-12.5 MG (HYZAAR) 50-12.5 MG PER TABLET    Take 1 tablet by mouth every morning.    METOPROLOL SUCCINATE (TOPROL-XL) 25 MG 24 HR TABLET    Take 0.5 tablets (12.5 mg total) by mouth once daily.    OMEPRAZOLE (PRILOSEC) 40 MG CAPSULE    Take 40 mg by mouth once daily.    POTASSIUM CHLORIDE (MICRO-K) 10 MEQ CPSR    Take 10 mEq by mouth once daily.    SILDENAFIL (REVATIO) 20 MG TAB    Take 1 tablet (20 mg total) by mouth daily as needed (Take up to 5 tablets 1 hour prior to sexual activity on an empty stomach.).    TAMSULOSIN (FLOMAX) 0.4 MG CAP    Take 1 capsule (0.4 mg total) by mouth once daily.       ROS  Review of Systems:    Constitutional:  Negative for fever.   HENT:  Negative for sore throat.    Eyes:  Negative for redness.   Respiratory:  + for shortness of breath.    Cardiovascular:  + for chest pain.   Gastrointestinal:  Negative for nausea.   Genitourinary:  Negative for dysuria.   Musculoskeletal:  Negative for back pain.   Skin:  Negative for rash.   Neurological:  Negative for weakness.   Hematological:  Does not bruise/bleed easily.   Psychiatric/Behavioral:  The patient is not nervous/anxious.      Physical Exam   Physical Exam    Initial Vitals [07/12/23 1539]   BP Pulse Resp Temp SpO2   (!) 145/74 68 19 98.6 °F (37 °C) 97 %      MAP       --           Triage vital signs reviewed.    Constitutional: Well-nourished, well-developed. Not in acute distress.  HENT: Normocephalic, atraumatic. Moist mucous membranes.  Eyes: No conjunctival injection.  Resp: Normal respiratory effort, breathing unlabored.  Cardio: Regular rate and rhythm.  GI: Abdomen non-distended.   MSK: Extremities without any deformities noted. No lower  extremity edema.  Skin: Warm and dry. No rashes or lesions noted.  Neuro: Awake and alert. Moves all extremities.    ED Course   Procedures    Medical Decision Making    History Acquisition     The history is provided by the patient.     Review of prior external/non ED notes:  ED visits for chest pain, cystitis, lightheadedness    Differential Diagnoses   Based on available information and initial assessment, the working Differential Diagnosis includes, but is not limited to:  ACS/MI, PE, aortic dissection, pneumothorax, cardiac tamponade, pericarditis/myocarditis, pneumonia, infection/abscess, lung mass, trauma/fracture, costochondritis/pleurisy, MSK pain/contusion, GERD, biliary disease, pancreatitis, anemia  .    EKG   EKG ordered and independently reviewed by me:   Normal sinus rhythm, LAFB, normal intervals, no STEMI    Labs   Lab tests ordered and independently reviewed by me:    Labs Reviewed   CBC W/ AUTO DIFFERENTIAL - Abnormal; Notable for the following components:       Result Value    Immature Granulocytes 1.6 (*)     Immature Grans (Abs) 0.07 (*)     Mono % 16.3 (*)     All other components within normal limits   COMPREHENSIVE METABOLIC PANEL - Abnormal; Notable for the following components:    CO2 30 (*)     Anion Gap 7 (*)     All other components within normal limits   TROPONIN I   NT-PRO NATRIURETIC PEPTIDE   TROPONIN I         Imaging   Imaging ordered and independently reviewed by me:   Imaging Results              X-Ray Chest AP Portable (Final result)  Result time 07/12/23 16:09:08      Final result by Crow Olguin MD (07/12/23 16:09:08)                   Impression:      1.  Negative for acute process involving the chest.    2.  Stable findings as noted above.      Electronically signed by: Crow Olguin MD  Date:    07/12/2023  Time:    16:09               Narrative:    EXAMINATION:  XR CHEST AP PORTABLE    CLINICAL HISTORY:  Chest Pain;    COMPARISON:  Studies dating back to June 11,  2021    FINDINGS:  The lungs are clear. The cardiac silhouette size is normal. The trachea is midline and the mediastinal width is normal. Negative for focal infiltrate, effusion or pneumothorax. Pulmonary vasculature is normal. Negative for osseous abnormalities. Stable ectatic and tortuous aorta and degenerative changes of the spine.  Cardiophrenic fat pads again seen.                                           Additional Consideration   Jerardo Calleajs  presents to the emergency Department today with chest pain, shortness of breath at rest.  Patient is asymptomatic now.  Currently takes Eliquis for a DVT. Plan for delta troponin, dispo pending results. Patient's heart score is 5, likely needs to come in for cardiac w/u. Hasn't had one and had a visit for chest pain in 2022. Patient's cardiologist is Dr. Gómez in [facility].    Additional testing considered but not indicated during the course of this workup: further imaging and labwork, not indicated  Co-morbidities/chronic illness/exacerbation of chronic illness considered which impacted clinical decision making: advanced age, htn, hld  Procedures done in the ED or plan for the OR: No  Social determinants of care considered during development of treatment plan include: Decreased medical literacy  Discussion of management or test interpretation with external provider: Yes, describe: oncoming physician at shift change  DNR discussion: No    The patient's list of active medications and allergies as documented per RN staff has been reviewed.  Medications given in the ED and/or prescribed: Medications - No data to display          ED Course as of 07/12/23 1734 Wed Jul 12, 2023   1718 CBC auto differential(!)  Independently interpreted by me, unremarkable    [CS]   1718 Comprehensive metabolic panel(!)  Independently interpreted by me, unremarkable    [CS]   1718 Troponin I #1  neg [CS]   1718 NT-proBNP: 56  neg [CS]   1718 X-Ray Chest AP Portable  No acute  findings [CS]      ED Course User Index  [CS] Anisha Masterson MD       Explanation of disposition: Admission    Clinical Impression:     1. Chest pain         ED Disposition Condition    Observation Stable               Anisha Masterson MD  07/12/23 7706

## 2023-07-13 VITALS
OXYGEN SATURATION: 97 % | TEMPERATURE: 97 F | HEART RATE: 53 BPM | RESPIRATION RATE: 18 BRPM | SYSTOLIC BLOOD PRESSURE: 121 MMHG | BODY MASS INDEX: 30.35 KG/M2 | WEIGHT: 212 LBS | HEIGHT: 70 IN | DIASTOLIC BLOOD PRESSURE: 74 MMHG

## 2023-07-13 PROBLEM — R25.1 TREMOR: Status: ACTIVE | Noted: 2023-07-13

## 2023-07-13 LAB
ALBUMIN SERPL BCP-MCNC: 3.1 G/DL (ref 3.5–5.2)
ALP SERPL-CCNC: 61 U/L (ref 55–135)
ALT SERPL W/O P-5'-P-CCNC: 27 U/L (ref 10–44)
ANION GAP SERPL CALC-SCNC: 10 MMOL/L (ref 8–16)
ASCENDING AORTA: 3.58 CM
AST SERPL-CCNC: 28 U/L (ref 10–40)
AV INDEX (PROSTH): 0.88
AV MEAN GRADIENT: 3 MMHG
AV PEAK GRADIENT: 6 MMHG
AV VALVE AREA: 3.43 CM2
AV VELOCITY RATIO: 0.93
BASOPHILS # BLD AUTO: 0.03 K/UL (ref 0–0.2)
BASOPHILS NFR BLD: 0.8 % (ref 0–1.9)
BILIRUB SERPL-MCNC: 0.5 MG/DL (ref 0.1–1)
BSA FOR ECHO PROCEDURE: 2.18 M2
BUN SERPL-MCNC: 15 MG/DL (ref 8–23)
CALCIUM SERPL-MCNC: 9 MG/DL (ref 8.7–10.5)
CHLORIDE SERPL-SCNC: 104 MMOL/L (ref 95–110)
CHOLEST SERPL-MCNC: 115 MG/DL (ref 120–199)
CHOLEST/HDLC SERPL: 3.6 {RATIO} (ref 2–5)
CO2 SERPL-SCNC: 25 MMOL/L (ref 23–29)
CREAT SERPL-MCNC: 0.9 MG/DL (ref 0.5–1.4)
CV ECHO LV RWT: 0.42 CM
DIFFERENTIAL METHOD: ABNORMAL
DOP CALC AO PEAK VEL: 1.23 M/S
DOP CALC AO VTI: 31.9 CM
DOP CALC LVOT AREA: 3.9 CM2
DOP CALC LVOT DIAMETER: 2.23 CM
DOP CALC LVOT PEAK VEL: 1.14 M/S
DOP CALC LVOT STROKE VOLUME: 109.3 CM3
DOP CALC MV VTI: 31.2 CM
DOP CALCLVOT PEAK VEL VTI: 28 CM
E WAVE DECELERATION TIME: 194.13 MSEC
E/A RATIO: 1.11
ECHO LV POSTERIOR WALL: 1.07 CM (ref 0.6–1.1)
EJECTION FRACTION: 50 %
EOSINOPHIL # BLD AUTO: 0.2 K/UL (ref 0–0.5)
EOSINOPHIL NFR BLD: 5.6 % (ref 0–8)
ERYTHROCYTE [DISTWIDTH] IN BLOOD BY AUTOMATED COUNT: 14 % (ref 11.5–14.5)
EST. GFR  (NO RACE VARIABLE): >60 ML/MIN/1.73 M^2
ESTIMATED AVG GLUCOSE: 111 MG/DL (ref 68–131)
FRACTIONAL SHORTENING: 30 % (ref 28–44)
GLUCOSE SERPL-MCNC: 92 MG/DL (ref 70–110)
HBA1C MFR BLD: 5.5 % (ref 4–5.6)
HCT VFR BLD AUTO: 39.6 % (ref 40–54)
HDLC SERPL-MCNC: 32 MG/DL (ref 40–75)
HDLC SERPL: 27.8 % (ref 20–50)
HGB BLD-MCNC: 13 G/DL (ref 14–18)
IMM GRANULOCYTES # BLD AUTO: 0.02 K/UL (ref 0–0.04)
IMM GRANULOCYTES NFR BLD AUTO: 0.5 % (ref 0–0.5)
INTERVENTRICULAR SEPTUM: 0.69 CM (ref 0.6–1.1)
IVC DIAMETER: 1.07 CM
LA MAJOR: 4.75 CM
LA MINOR: 4.97 CM
LA WIDTH: 3.5 CM
LDLC SERPL CALC-MCNC: 71.8 MG/DL (ref 63–159)
LEFT ATRIUM SIZE: 3.01 CM
LEFT ATRIUM VOLUME INDEX MOD: 20.2 ML/M2
LEFT ATRIUM VOLUME INDEX: 20.3 ML/M2
LEFT ATRIUM VOLUME MOD: 43.26 CM3
LEFT ATRIUM VOLUME: 43.5 CM3
LEFT INTERNAL DIMENSION IN SYSTOLE: 3.59 CM (ref 2.1–4)
LEFT VENTRICLE DIASTOLIC VOLUME INDEX: 58.55 ML/M2
LEFT VENTRICLE DIASTOLIC VOLUME: 125.3 ML
LEFT VENTRICLE MASS INDEX: 75 G/M2
LEFT VENTRICLE SYSTOLIC VOLUME INDEX: 25.3 ML/M2
LEFT VENTRICLE SYSTOLIC VOLUME: 54.21 ML
LEFT VENTRICULAR INTERNAL DIMENSION IN DIASTOLE: 5.13 CM (ref 3.5–6)
LEFT VENTRICULAR MASS: 160.41 G
LVOT MG: 2.74 MMHG
LVOT MV: 0.78 CM/S
LYMPHOCYTES # BLD AUTO: 1.7 K/UL (ref 1–4.8)
LYMPHOCYTES NFR BLD: 42.8 % (ref 18–48)
MAGNESIUM SERPL-MCNC: 2 MG/DL (ref 1.6–2.6)
MCH RBC QN AUTO: 29.2 PG (ref 27–31)
MCHC RBC AUTO-ENTMCNC: 32.8 G/DL (ref 32–36)
MCV RBC AUTO: 89 FL (ref 82–98)
MONOCYTES # BLD AUTO: 0.6 K/UL (ref 0.3–1)
MONOCYTES NFR BLD: 15.4 % (ref 4–15)
MV MEAN GRADIENT: 1 MMHG
MV PEAK A VEL: 0.83 M/S
MV PEAK E VEL: 0.92 M/S
MV PEAK GRADIENT: 2 MMHG
MV VALVE AREA BY CONTINUITY EQUATION: 3.5 CM2
NEUTROPHILS # BLD AUTO: 1.4 K/UL (ref 1.8–7.7)
NEUTROPHILS NFR BLD: 34.9 % (ref 38–73)
NONHDLC SERPL-MCNC: 83 MG/DL
NRBC BLD-RTO: 0 /100 WBC
OHS LV EJECTION FRACTION SIMPSONS BIPLANE MOD: 4 %
PHOSPHATE SERPL-MCNC: 3 MG/DL (ref 2.7–4.5)
PISA TR MAX VEL: 2.75 M/S
PLATELET # BLD AUTO: 153 K/UL (ref 150–450)
PMV BLD AUTO: 10.3 FL (ref 9.2–12.9)
POCT GLUCOSE: 100 MG/DL (ref 70–110)
POCT GLUCOSE: 94 MG/DL (ref 70–110)
POTASSIUM SERPL-SCNC: 3.7 MMOL/L (ref 3.5–5.1)
PROT SERPL-MCNC: 6 G/DL (ref 6–8.4)
PULM VEIN S/D RATIO: 1.19
PV MV: 0.63 M/S
PV PEAK D VEL: 0.43 M/S
PV PEAK S VEL: 0.51 M/S
PV PEAK VELOCITY: 0.99 CM/S
RA MAJOR: 4.95 CM
RA PRESSURE: 3 MMHG
RA WIDTH: 2.9 CM
RBC # BLD AUTO: 4.45 M/UL (ref 4.6–6.2)
SODIUM SERPL-SCNC: 139 MMOL/L (ref 136–145)
STJ: 3.16 CM
TR MAX PG: 30 MMHG
TRIGL SERPL-MCNC: 56 MG/DL (ref 30–150)
TSH SERPL DL<=0.005 MIU/L-ACNC: 1.06 UIU/ML (ref 0.4–4)
TV REST PULMONARY ARTERY PRESSURE: 33 MMHG
WBC # BLD AUTO: 3.9 K/UL (ref 3.9–12.7)

## 2023-07-13 PROCEDURE — 99214 OFFICE O/P EST MOD 30 MIN: CPT | Mod: GC,,, | Performed by: INTERNAL MEDICINE

## 2023-07-13 PROCEDURE — 84100 ASSAY OF PHOSPHORUS: CPT

## 2023-07-13 PROCEDURE — 99900035 HC TECH TIME PER 15 MIN (STAT)

## 2023-07-13 PROCEDURE — 99214 PR OFFICE/OUTPT VISIT, EST, LEVL IV, 30-39 MIN: ICD-10-PCS | Mod: GC,,, | Performed by: INTERNAL MEDICINE

## 2023-07-13 PROCEDURE — 25000003 PHARM REV CODE 250

## 2023-07-13 PROCEDURE — 80053 COMPREHEN METABOLIC PANEL: CPT

## 2023-07-13 PROCEDURE — 83735 ASSAY OF MAGNESIUM: CPT

## 2023-07-13 PROCEDURE — 84443 ASSAY THYROID STIM HORMONE: CPT

## 2023-07-13 PROCEDURE — G0378 HOSPITAL OBSERVATION PER HR: HCPCS

## 2023-07-13 PROCEDURE — 85025 COMPLETE CBC W/AUTO DIFF WBC: CPT

## 2023-07-13 PROCEDURE — 80061 LIPID PANEL: CPT

## 2023-07-13 PROCEDURE — 83036 HEMOGLOBIN GLYCOSYLATED A1C: CPT

## 2023-07-13 PROCEDURE — 36415 COLL VENOUS BLD VENIPUNCTURE: CPT

## 2023-07-13 RX ORDER — ATORVASTATIN CALCIUM 40 MG/1
40 TABLET, FILM COATED ORAL DAILY
Status: DISCONTINUED | OUTPATIENT
Start: 2023-07-13 | End: 2023-07-13 | Stop reason: HOSPADM

## 2023-07-13 RX ORDER — ATORVASTATIN CALCIUM 40 MG/1
40 TABLET, FILM COATED ORAL DAILY
Qty: 90 TABLET | Refills: 3 | Status: SHIPPED | OUTPATIENT
Start: 2023-07-14 | End: 2024-07-13

## 2023-07-13 RX ADMIN — ATORVASTATIN CALCIUM 40 MG: 40 TABLET, FILM COATED ORAL at 10:07

## 2023-07-13 RX ADMIN — TAMSULOSIN HYDROCHLORIDE 0.4 MG: 0.4 CAPSULE ORAL at 10:07

## 2023-07-13 RX ADMIN — LOSARTAN POTASSIUM 50 MG: 50 TABLET, FILM COATED ORAL at 10:07

## 2023-07-13 RX ADMIN — DIVALPROEX SODIUM 750 MG: 250 TABLET, DELAYED RELEASE ORAL at 10:07

## 2023-07-13 RX ADMIN — APIXABAN 5 MG: 5 TABLET, FILM COATED ORAL at 10:07

## 2023-07-13 RX ADMIN — HYDROCHLOROTHIAZIDE 12.5 MG: 12.5 TABLET ORAL at 10:07

## 2023-07-13 NOTE — PLAN OF CARE
Problem: Adult Inpatient Plan of Care  Goal: Plan of Care Review  Outcome: Ongoing, Progressing  Goal: Patient-Specific Goal (Individualized)  Outcome: Ongoing, Progressing  Goal: Optimal Comfort and Wellbeing  Outcome: Ongoing, Progressing  Goal: Readiness for Transition of Care  Outcome: Ongoing, Progressing     Problem: Hypertension Comorbidity  Goal: Blood Pressure in Desired Range  Outcome: Ongoing, Progressing     Problem: Fluid Volume Excess (Chronic Kidney Disease)  Goal: Fluid Balance  Outcome: Ongoing, Progressing     Problem: Electrolyte Imbalance (Chronic Kidney Disease)  Goal: Electrolyte Balance  Outcome: Ongoing, Progressing

## 2023-07-13 NOTE — ASSESSMENT & PLAN NOTE
Presents with symptoms of chest pain, bilateral arm pain.  Currently asymptomatic.   Repeat trop neg.   EKG normal sinus rhythm w/o evidence of ischemia    Plan:  - [facility] consulted, appreciate recommendations.  - Continue eliquis 5mg bid  - Echo pending.  - Outpatient cards follow-up

## 2023-07-13 NOTE — NURSING
Pt HR has been in the low 40's since arrival. Pt have been asymptomatic. Pt denies any dizziness or feeling faint. MD Jewell was notified of this matter. Will continue to monitor.

## 2023-07-13 NOTE — PROGRESS NOTES
"[facility] Medicine  Progress Note    Patient Name: Jerardo Callejas  MRN: [id number]  Patient Class: OP- Observation   Admission Date: 7/12/2023  Length of Stay: 0 days  Attending Physician: So Coppola MD  Primary Care Provider: DOMINIC Barker MD        Subjective:     Principal Problem:CAD (coronary artery disease)        HPI:  A 73-year-old male with a past medical history significant for coronary artery disease, mitral valve prolapse, hypertension, and benign prostatic hyperplasia, tremor (right arm) presents with bilateral arm pain and shortness of breath, with chest pain that began at work. The patient reports observing someone else working when he began to experience pain and numbness in both arms, chest pain, and shortness of breath, which prompted him to seek medical evaluation. As of now, the patient reports no ongoing symptoms. He denies any recent illnesses. He is under the care of a cardiologist, but his last appointment was nearly a year ago. His last visit due to chest pain was in September. The patient did not follow up with his physician after this visit, but did consult a doctor regarding low libido.    ED Course:  On arrival, the patient's examination was unremarkable. His lungs were clear, and he presented no leg swelling. His EKG and initial lab results, including a negative Troponin, were also unremarkable. Despite these findings, given the patient's symptoms and high-risk cardiac history (with heart score of 5), a second Troponin level was planned. The patient was not given any medication or interventions in the ED due to his asymptomatic state. Despite the option for discharge with outpatient follow-up with his cardiologist, the patient expressed discomfort with going home and a feeling that "something happened today," prompting the decision for inpatient admission for a thorough cardiac workup.      Overview/Hospital Course:  Patient admitted to [facility] for " CAD. Repeat troponin negative. EKG remains unchanged. [facility] consulted, recommend follow-up with cardiology outpatient. Pending Echo read.      Interval History: No adverse events overnight. Patient remains asymptomatic and hemodynamically stable.    Review of Systems   Constitutional:  Negative for fever.   Respiratory:  Negative for cough and shortness of breath.    Cardiovascular:  Negative for chest pain, palpitations and leg swelling.   Gastrointestinal:  Negative for abdominal pain, constipation, diarrhea and nausea.   Genitourinary:  Negative for difficulty urinating.   Musculoskeletal:  Negative for arthralgias and myalgias.   Neurological:  Negative for tremors, facial asymmetry, light-headedness and headaches.   Objective:     Vital Signs (Most Recent):  Temp: 96.9 °F (36.1 °C) (07/13/23 0941)  Pulse: (!) 50 (07/13/23 0941)  Resp: 18 (07/13/23 0941)  BP: (!) 163/73 (07/13/23 0941)  SpO2: 99 % (07/13/23 0941) Vital Signs (24h Range):  Temp:  [96.4 °F (35.8 °C)-98.6 °F (37 °C)] 96.9 °F (36.1 °C)  Pulse:  [43-68] 50  Resp:  [17-19] 18  SpO2:  [95 %-100 %] 99 %  BP: (119-163)/(61-87) 163/73     Weight: 96.2 kg (212 lb)  Body mass index is 30.42 kg/m².  No intake or output data in the 24 hours ending 07/13/23 1148      Physical Exam  Vitals and nursing note reviewed.   Constitutional:       General: He is not in acute distress.     Appearance: Normal appearance. He is not ill-appearing, toxic-appearing or diaphoretic.   HENT:      Head: Normocephalic and atraumatic.      Right Ear: External ear normal.      Left Ear: External ear normal.      Nose: Nose normal.      Mouth/Throat:      Pharynx: Oropharynx is clear.   Eyes:      Extraocular Movements: Extraocular movements intact.   Cardiovascular:      Rate and Rhythm: Normal rate and regular rhythm.      Pulses: Normal pulses.      Heart sounds: Normal heart sounds.   Pulmonary:      Effort: Pulmonary effort is normal. No respiratory distress.       Breath sounds: Normal breath sounds. No wheezing or rhonchi.   Abdominal:      General: Abdomen is flat.      Palpations: Abdomen is soft.      Tenderness: There is no abdominal tenderness.   Musculoskeletal:         General: Normal range of motion.      Cervical back: Normal range of motion.      Right lower leg: No edema.      Left lower leg: No edema.   Skin:     General: Skin is warm.      Capillary Refill: Capillary refill takes less than 2 seconds.   Neurological:      General: No focal deficit present.      Mental Status: He is alert and oriented to person, place, and time. Mental status is at baseline.   Psychiatric:         Mood and Affect: Mood normal.         Behavior: Behavior normal.         Thought Content: Thought content normal.           Significant Labs: All pertinent labs within the past 24 hours have been reviewed.  CBC:   Recent Labs   Lab 07/12/23  1559 07/13/23  0433   WBC 4.43 3.90   HGB 14.2 13.0*   HCT 42.7 39.6*    153     CMP:   Recent Labs   Lab 07/12/23  1559 07/13/23  0433    139   K 4.0 3.7    104   CO2 30* 25   GLU 95 92   BUN 18 15   CREATININE 0.82 0.9   CALCIUM 9.4 9.0   PROT 6.9 6.0   ALBUMIN 3.8 3.1*   BILITOT 0.2 0.5   ALKPHOS 83 61   AST 44 28   ALT 36 27   ANIONGAP 7* 10       Significant Imaging: I have reviewed all pertinent imaging results/findings within the past 24 hours.    Narrative & Impression  EXAMINATION:  XR CHEST AP PORTABLE     CLINICAL HISTORY:  Chest Pain;     COMPARISON:  Studies dating back to June 11, 2021     FINDINGS:  The lungs are clear. The cardiac silhouette size is normal. The trachea is midline and the mediastinal width is normal. Negative for focal infiltrate, effusion or pneumothorax. Pulmonary vasculature is normal. Negative for osseous abnormalities. Stable ectatic and tortuous aorta and degenerative changes of the spine.  Cardiophrenic fat pads again seen.     Impression:     1.  Negative for acute process involving the  chest.     2.  Stable findings as noted above.        Electronically signed by: Crow Olguin MD  Date:                                            07/12/2023  Time:                                           16:09      Assessment/Plan:      * CAD (coronary artery disease)  Presents with symptoms of chest pain, bilateral arm pain.  Currently asymptomatic.   Repeat trop neg.   EKG normal sinus rhythm w/o evidence of ischemia    Plan:  - [facility] consulted, appreciate recommendations.  - Continue eliquis 5mg bid  - Echo pending.  - Outpatient cards follow-up    Tremor  Primarily to right hand.  Followed by neuro outpatient.    PLAN:  Continue depakote 750mg bid      Chronic back pain  Plan:  - XRays pending.  - PT/OT on discharge.    Cervical radiculopathy  Presenting symptoms potentially 2/2 cervical radiculopathy.    Plan:  - as in chronic back pain.    BPH (benign prostatic hyperplasia)  Cont home flomax 0.4mg qd      HLD (hyperlipidemia)  ASCVD 26%    Plan:  - Initiated on atorvastatin 40mg.    HTN (hypertension)  PLAN:  Continue home losartan 50mg qd, hctz 12.5mg qd        VTE Risk Mitigation (From admission, onward)         Ordered     apixaban tablet 5 mg  2 times daily         07/12/23 2218     Reason for No Pharmacological VTE Prophylaxis  Once        Question:  Reasons:  Answer:  Already adequately anticoagulated on oral Anticoagulants    07/12/23 2218     IP VTE HIGH RISK PATIENT  Once         07/12/23 2218     Place sequential compression device  Until discontinued         07/12/23 2218                Discharge Planning   ASHLYN:      Code Status: Full Code   Is the patient medically ready for discharge?:     Reason for patient still in hospital (select all that apply): Imaging, Consult recommendations and Pending disposition  Discharge Plan A: Home with family          ________________________  Aleksey Turner MD  [facility] PGY-2

## 2023-07-13 NOTE — DISCHARGE SUMMARY
"[facility] Medicine  Discharge Summary      Patient Name: Jerardo Callejas  MRN: [id number]  EMILEE: [id number]  Patient Class: OP- Observation  Admission Date: 7/12/2023  Hospital Length of Stay: 0 days  Discharge Date and Time: No discharge date for patient encounter.  Attending Physician: So Coppola MD   Discharging Provider: Aleksey Turner MD  Primary Care Provider: Vanessa Landin MD    Primary Care Team: Networked reference to record PCT     HPI:   A 73-year-old male with a past medical history significant for coronary artery disease, mitral valve prolapse, hypertension, and benign prostatic hyperplasia, tremor (right arm) presents with bilateral arm pain and shortness of breath, with chest pain that began at work. The patient reports observing someone else working when he began to experience pain and numbness in both arms, chest pain, and shortness of breath, which prompted him to seek medical evaluation. As of now, the patient reports no ongoing symptoms. He denies any recent illnesses. He is under the care of a cardiologist, but his last appointment was nearly a year ago. His last visit due to chest pain was in September. The patient did not follow up with his physician after this visit, but did consult a doctor regarding low libido.    ED Course:  On arrival, the patient's examination was unremarkable. His lungs were clear, and he presented no leg swelling. His EKG and initial lab results, including a negative Troponin, were also unremarkable. Despite these findings, given the patient's symptoms and high-risk cardiac history (with heart score of 5), a second Troponin level was planned. The patient was not given any medication or interventions in the ED due to his asymptomatic state. Despite the option for discharge with outpatient follow-up with his cardiologist, the patient expressed discomfort with going home and a feeling that "something happened today," prompting the decision for " inpatient admission for a thorough cardiac workup.      * No surgery found *      Hospital Course:   Patient admitted to [facility] for CAD. Repeat troponin negative. EKG remains unchanged. [facility] consulted, recommend follow-up with cardiology outpatient. Echo with LV EF 50%, mild tricuspid and aortic regurgitation, and an estimated PA systolic pressure is 33 mmHg. Xrays of back postoperative change C3-C6 and degenerative changes L3-L5. Patient remains stable at this time for discharge. Patient plan discussed with patient. Return precautions discussed with patient. Patient verbally acknowledged understanding and agreement with plan. Patient encouraged to take medications as prescribed and to follow up closely with PCP, Cardiology, PT/OT.       Goals of Care Treatment Preferences:  Code Status: Full Code      Consults:   Consults (From admission, onward)        Status Ordering Provider     Inpatient consult to Cardiology-[facility]  Once        Provider:  (Not yet assigned)    Completed JUAN MANUEL RAMOS          Neuro  Tremor  Primarily to right hand.  Followed by neuro outpatient.    PLAN:  Continue depakote 750mg bid      Cervical radiculopathy  Presenting symptoms potentially 2/2 cervical radiculopathy.    Plan:  - as in chronic back pain.    Cardiac/Vascular  * CAD (coronary artery disease)  Presents with symptoms of chest pain, bilateral arm pain.  Currently asymptomatic.   Repeat trop neg.   EKG normal sinus rhythm w/o evidence of ischemia    Plan:  - [facility] consulted, appreciate recommendations.  - Continue eliquis 5mg bid  - Echo pending.  - Outpatient cards follow-up    HLD (hyperlipidemia)  ASCVD 26%    Plan:  - Initiated on atorvastatin 40mg.    HTN (hypertension)  PLAN:  Continue home losartan 50mg qd, hctz 12.5mg qd      Renal/  BPH (benign prostatic hyperplasia)  Cont home flomax 0.4mg qd      Orthopedic  Chronic back pain  Plan:  - XRays as above.  - PT/OT on discharge.      Final  Active Diagnoses:    Diagnosis Date Noted POA    PRINCIPAL PROBLEM:  CAD (coronary artery disease) [I25.10] 07/12/2023 Unknown    Tremor [R25.1] 07/13/2023 Unknown    Chronic back pain [M54.9, G89.29] 08/31/2022 Yes    BPH (benign prostatic hyperplasia) [N40.0] 07/21/2021 Yes    Cervical radiculopathy [M54.12] 07/21/2021 Yes    HTN (hypertension) [I10]  Unknown    HLD (hyperlipidemia) [E78.5] 11/13/2013 Unknown      Problems Resolved During this Admission:       Discharged Condition: stable    Disposition: Home or Self Care    Follow Up:    Patient Instructions:      Ambulatory referral/consult to Cardiology   Standing Status: Future   Referral Priority: Routine Referral Type: Consultation   Referral Reason: Specialty Services Required   Referred to Provider: KRISTIAN KAUFFMAN Requested Specialty: Cardiology   Number of Visits Requested: 1     Ambulatory referral/consult to Physical/Occupational Therapy   Standing Status: Future   Referral Priority: Routine Referral Type: Physical Medicine   Referral Reason: Specialty Services Required   Number of Visits Requested: 1     Diet Cardiac     Notify your health care provider if you experience any of the following:  temperature >100.4     Notify your health care provider if you experience any of the following:  persistent nausea and vomiting or diarrhea     Notify your health care provider if you experience any of the following:  severe uncontrolled pain     Notify your health care provider if you experience any of the following:  redness, tenderness, or signs of infection (pain, swelling, redness, odor or green/yellow discharge around incision site)     Notify your health care provider if you experience any of the following:  difficulty breathing or increased cough     Notify your health care provider if you experience any of the following:  severe persistent headache     Notify your health care provider if you experience any of the following:  worsening rash      Notify your health care provider if you experience any of the following:  persistent dizziness, light-headedness, or visual disturbances     Notify your health care provider if you experience any of the following:  increased confusion or weakness     Activity as tolerated       Significant Diagnostic Studies: N/A    Pending Diagnostic Studies:     None         Medications:  Reconciled Home Medications:      Medication List      START taking these medications    atorvastatin 40 MG tablet  Commonly known as: LIPITOR  Take 1 tablet (40 mg total) by mouth once daily.  Start taking on: July 14, 2023        CONTINUE taking these medications    albuterol 90 mcg/actuation inhaler  Commonly known as: PROVENTIL/VENTOLIN HFA  Inhale 2 puffs into the lungs every 4 (four) hours as needed for Wheezing or Shortness of Breath. Rescue     clonazePAM 0.5 MG tablet  Commonly known as: KlonoPIN  Take 0.5 mg by mouth nightly as needed.     cloNIDine 0.1 MG tablet  Commonly known as: CATAPRES  Take 0.1 mg by mouth every 6 (six) hours as needed.     * divalproex 250 MG EC tablet  Commonly known as: DEPAKOTE  Take 750 mg by mouth 2 (two) times a day.     * divalproex 500 MG Tbec  Commonly known as: DEPAKOTE  Take 500 mg by mouth 2 (two) times daily.     DULoxetine 30 MG capsule  Commonly known as: CYMBALTA  Take 30 mg by mouth.     ELIQUIS 5 mg Tab  Generic drug: apixaban  Take 5 mg by mouth 2 (two) times daily.     finasteride 5 mg tablet  Commonly known as: PROSCAR  Take 1 tablet (5 mg total) by mouth once daily.     furosemide 20 MG tablet  Commonly known as: LASIX  Take 20 mg by mouth every morning.     gabapentin 300 MG capsule  Commonly known as: NEURONTIN  Take 300 mg by mouth once daily. Only takes at night.     losartan-hydrochlorothiazide 50-12.5 mg 50-12.5 mg per tablet  Commonly known as: HYZAAR  Take 1 tablet by mouth every morning.     metoprolol succinate 25 MG 24 hr tablet  Commonly known as: TOPROL-XL  Take 0.5 tablets  (12.5 mg total) by mouth once daily.     omeprazole 40 MG capsule  Commonly known as: PRILOSEC  Take 40 mg by mouth once daily.     potassium chloride 10 MEQ Cpsr  Commonly known as: MICRO-K  Take 10 mEq by mouth once daily.     sildenafil 20 mg Tab  Commonly known as: REVATIO  Take 1 tablet (20 mg total) by mouth daily as needed (Take up to 5 tablets 1 hour prior to sexual activity on an empty stomach.).     tamsulosin 0.4 mg Cap  Commonly known as: FLOMAX  Take 1 capsule (0.4 mg total) by mouth once daily.         * This list has 2 medication(s) that are the same as other medications prescribed for you. Read the directions carefully, and ask your doctor or other care provider to review them with you.                Indwelling Lines/Drains at time of discharge:   Lines/Drains/Airways     None                 ________________________  Aleksey Turner MD  [facility] PGY-2

## 2023-07-13 NOTE — SUBJECTIVE & OBJECTIVE
Interval History: No adverse events overnight. Patient remains asymptomatic and hemodynamically stable.    Review of Systems   Constitutional:  Negative for fever.   Respiratory:  Negative for cough and shortness of breath.    Cardiovascular:  Negative for chest pain, palpitations and leg swelling.   Gastrointestinal:  Negative for abdominal pain, constipation, diarrhea and nausea.   Genitourinary:  Negative for difficulty urinating.   Musculoskeletal:  Negative for arthralgias and myalgias.   Neurological:  Negative for tremors, facial asymmetry, light-headedness and headaches.   Objective:     Vital Signs (Most Recent):  Temp: 96.9 °F (36.1 °C) (07/13/23 0941)  Pulse: (!) 50 (07/13/23 0941)  Resp: 18 (07/13/23 0941)  BP: (!) 163/73 (07/13/23 0941)  SpO2: 99 % (07/13/23 0941) Vital Signs (24h Range):  Temp:  [96.4 °F (35.8 °C)-98.6 °F (37 °C)] 96.9 °F (36.1 °C)  Pulse:  [43-68] 50  Resp:  [17-19] 18  SpO2:  [95 %-100 %] 99 %  BP: (119-163)/(61-87) 163/73     Weight: 96.2 kg (212 lb)  Body mass index is 30.42 kg/m².  No intake or output data in the 24 hours ending 07/13/23 1148      Physical Exam  Vitals and nursing note reviewed.   Constitutional:       General: He is not in acute distress.     Appearance: Normal appearance. He is not ill-appearing, toxic-appearing or diaphoretic.   HENT:      Head: Normocephalic and atraumatic.      Right Ear: External ear normal.      Left Ear: External ear normal.      Nose: Nose normal.      Mouth/Throat:      Pharynx: Oropharynx is clear.   Eyes:      Extraocular Movements: Extraocular movements intact.   Cardiovascular:      Rate and Rhythm: Normal rate and regular rhythm.      Pulses: Normal pulses.      Heart sounds: Normal heart sounds.   Pulmonary:      Effort: Pulmonary effort is normal. No respiratory distress.      Breath sounds: Normal breath sounds. No wheezing or rhonchi.   Abdominal:      General: Abdomen is flat.      Palpations: Abdomen is soft.      Tenderness:  There is no abdominal tenderness.   Musculoskeletal:         General: Normal range of motion.      Cervical back: Normal range of motion.      Right lower leg: No edema.      Left lower leg: No edema.   Skin:     General: Skin is warm.      Capillary Refill: Capillary refill takes less than 2 seconds.   Neurological:      General: No focal deficit present.      Mental Status: He is alert and oriented to person, place, and time. Mental status is at baseline.   Psychiatric:         Mood and Affect: Mood normal.         Behavior: Behavior normal.         Thought Content: Thought content normal.           Significant Labs: All pertinent labs within the past 24 hours have been reviewed.  CBC:   Recent Labs   Lab 07/12/23  1559 07/13/23  0433   WBC 4.43 3.90   HGB 14.2 13.0*   HCT 42.7 39.6*    153     CMP:   Recent Labs   Lab 07/12/23  1559 07/13/23  0433    139   K 4.0 3.7    104   CO2 30* 25   GLU 95 92   BUN 18 15   CREATININE 0.82 0.9   CALCIUM 9.4 9.0   PROT 6.9 6.0   ALBUMIN 3.8 3.1*   BILITOT 0.2 0.5   ALKPHOS 83 61   AST 44 28   ALT 36 27   ANIONGAP 7* 10       Significant Imaging: I have reviewed all pertinent imaging results/findings within the past 24 hours.    Narrative & Impression  EXAMINATION:  XR CHEST AP PORTABLE     CLINICAL HISTORY:  Chest Pain;     COMPARISON:  Studies dating back to June 11, 2021     FINDINGS:  The lungs are clear. The cardiac silhouette size is normal. The trachea is midline and the mediastinal width is normal. Negative for focal infiltrate, effusion or pneumothorax. Pulmonary vasculature is normal. Negative for osseous abnormalities. Stable ectatic and tortuous aorta and degenerative changes of the spine.  Cardiophrenic fat pads again seen.     Impression:     1.  Negative for acute process involving the chest.     2.  Stable findings as noted above.        Electronically signed by: Crow Olguin MD  Date:                                             07/12/2023  Time:                                           16:09

## 2023-07-13 NOTE — PLAN OF CARE
SW met with pt at bedside to complete assessment. Pt is AxO x3 and able to verbally answer assessment questions. Pt confirmed demographic information and PO [address]. Pt reports to live at home with mukesh Gipson and feeling safe. Pt reports being independent of ADL's. Pt has rolling walker at home to support him if needed. Pt reports to have a cane that he does not use. Pt reports able to drive. At time of discharge pt is hoping to have his [facility] transport home but will let staff know. SW updated whiteboard with [facility] name and contact information. SW confirmed pt understanding of Observation unit and expected discharge plan. SW will continue to follow pt throughout care and assist with any discharge needs.         07/13/23 1147   Discharge Planning   Assessment Type Discharge Planning Brief Assessment   Resource/Environmental Concerns none   Support Systems Spouse/significant other;Family members   Equipment Currently Used at Home walker, rolling;cane, straight   Current Living Arrangements home   Patient/Family Anticipates Transition to home with family   Patient/Family Anticipated Services at Transition none   DME Needed Upon Discharge  none   Discharge Plan A Home with family       Future Appointments   Date Time Provider Department Center   8/10/2023  3:00 PM Denver Kearns MD [facility] Case Management  [phone]

## 2023-07-13 NOTE — HOSPITAL COURSE
Patient admitted to [facility] for CAD. Repeat troponin negative. EKG remains unchanged. [facility] consulted, recommend follow-up with cardiology outpatient. Echo with LV EF 50%, mild tricuspid and aortic regurgitation, and an estimated PA systolic pressure is 33 mmHg. Xrays of back postoperative change C3-C6 and degenerative changes L3-L5. Patient remains stable at this time for discharge. Patient plan discussed with patient. Return precautions discussed with patient. Patient verbally acknowledged understanding and agreement with plan. Patient encouraged to take medications as prescribed and to follow up closely with PCP, Cardiology, PT/OT.

## 2023-07-13 NOTE — PLAN OF CARE
07/12/23 2124   Admission   Initial VN Admission Questions Complete   Communication Issues? None   Shift   Virtual Nurse - Rounding Complete   Pain Management Interventions quiet environment facilitated;relaxation techniques promoted   Virtual Nurse - Patient Verbalized Approval Of VN Rounding;Camera Use   Type of Frequent Check   Type Telemetry Monitoring;Patient Rounds   Safety/Activity   Patient Rounds bed in low position;bed wheels locked;call light in patient/parent reach;clutter free environment maintained;ID band on;visualized patient;placement of personal items at bedside   Safety Promotion/Fall Prevention assistive device/personal item within reach;bed alarm set;side rails raised x 2;nonskid shoes/socks when out of bed;Fall Risk reviewed with patient/family;instructed to call staff for mobility;room near unit station;medications reviewed   Safety Precautions emergency equipment at bedside   Safety Bands on Patient Fall Risk Band   Activity Management Sitting at edge of bed - L2   Activity Assistance Provided independent   Elimination Assistance urinal within reach   Positioning   Body Position position changed independently   Head of Bed (HOB) Positioning HOB elevated   Positioning/Transfer Devices pillows;in use   Pain/Comfort/Sleep   Sleep/Rest/Relaxation no problem identified    VN cued into room to complete admit assessment. VIP model introduced; VN working alongside bedside treatment team.  Plan of care reviewed with patient. Patient informed of fall risk, fall precautions, call light within reach, side rails x2 elevated. Patient notified to ask staff for assistance. Patient verbalized complete understanding. Time allowed for questions. Will continue to monitor and intervene as needed.

## 2023-07-13 NOTE — CONSULTS
[facility]   Consult   Consult Requested By:  Hospital Medicine  Reason for Consult: Chest Pain    SUBJECTIVE:     History of Present Illness:  Patient is a 73 y.o. male presents with h/o HTN, DVT on DOAC, obesity presenting with chest pain. Cardiology consulted for ischemic work-up.    The patient reported CP associated with SOB and bilateral arm pain at rest yesterday. He has not experienced any CP since then. The chest pain has been going on for years. He last saw his cardiologist Dr. Gómez 3 months ago. Per chart review and records from [facility] cardiologist, his ischemic work up consists Negative stress TTE 3/2021, negative NST 12/2021, normal coronary arteries LHC 10/2022.     In the ED, pt CP free, trop negative, EKG no ST changes.        Review of patient's allergies indicates:   Allergen Reactions    Ciprofloxacin Palpitations    Penicillins Palpitations and Anxiety       Past Medical History:   Diagnosis Date    Back pain, chronic     BPH (benign prostatic hyperplasia)     DVT (deep venous thrombosis)     Hypertension     Mitral valve prolapse syndrome     Prostate enlargement        Past Surgical History:   Procedure Laterality Date    NECK SURGERY         Family History   Problem Relation Age of Onset    Hypertension Mother     Stroke Father        Social History     Tobacco Use    Smoking status: Former    Smokeless tobacco: Never    Tobacco comments:     >40 years ago   Substance Use Topics    Alcohol use: No    Drug use: No        Home meds:  No current facility-administered medications on file prior to encounter.     Current Outpatient Medications on File Prior to Encounter   Medication Sig Dispense Refill    cloNIDine (CATAPRES) 0.1 MG tablet Take 0.1 mg by mouth every 6 (six) hours as needed.      divalproex (DEPAKOTE) 250 MG EC tablet Take 750 mg by mouth 2 (two) times a day.      ELIQUIS 5 mg Tab Take 5 mg by mouth 2 (two) times daily.      furosemide (LASIX) 20 MG tablet Take 20 mg by  mouth every morning.      gabapentin (NEURONTIN) 300 MG capsule Take 300 mg by mouth once daily. Only takes at night.      losartan-hydrochlorothiazide 50-12.5 mg (HYZAAR) 50-12.5 mg per tablet Take 1 tablet by mouth every morning.      omeprazole (PRILOSEC) 40 MG capsule Take 40 mg by mouth once daily.      tamsulosin (FLOMAX) 0.4 mg Cap Take 1 capsule (0.4 mg total) by mouth once daily. 90 capsule 3    albuterol (PROVENTIL/VENTOLIN HFA) 90 mcg/actuation inhaler Inhale 2 puffs into the lungs every 4 (four) hours as needed for Wheezing or Shortness of Breath. Rescue 6.7 g 0    clonazePAM (KLONOPIN) 0.5 MG tablet Take 0.5 mg by mouth nightly as needed.      divalproex (DEPAKOTE) 500 MG TbEC Take 500 mg by mouth 2 (two) times daily.      DULoxetine (CYMBALTA) 30 MG capsule Take 30 mg by mouth.      finasteride (PROSCAR) 5 mg tablet Take 1 tablet (5 mg total) by mouth once daily. 30 tablet 5    metoprolol succinate (TOPROL-XL) 25 MG 24 hr tablet Take 0.5 tablets (12.5 mg total) by mouth once daily. 15 tablet 0    potassium chloride (MICRO-K) 10 MEQ CpSR Take 10 mEq by mouth once daily.      sildenafil (REVATIO) 20 mg Tab Take 1 tablet (20 mg total) by mouth daily as needed (Take up to 5 tablets 1 hour prior to sexual activity on an empty stomach.). 30 tablet 5    [DISCONTINUED] amLODIPine (NORVASC) 5 MG tablet Take 1 tablet (5 mg total) by mouth once daily. 30 tablet 3    [DISCONTINUED] hydroCHLOROthiazide (HYDRODIURIL) 12.5 MG Tab Take 12.5 mg by mouth once daily.      [DISCONTINUED] lisinopril (PRINIVIL,ZESTRIL) 20 MG tablet Take 20 mg by mouth once daily.      [DISCONTINUED] metoprolol tartrate (LOPRESSOR) 25 MG tablet Take 12.5 mg by mouth 2 (two) times daily.         Current meds:    Scheduled Meds:   apixaban  5 mg Oral BID    atorvastatin  40 mg Oral Daily    divalproex  750 mg Oral BID    hydroCHLOROthiazide  12.5 mg Oral Daily    losartan  50 mg Oral Daily    tamsulosin  0.4 mg Oral Daily     Continuous  "Infusions:  PRN Meds:.acetaminophen, dextrose 10%, dextrose 10%, glucagon (human recombinant), glucose, glucose, LIDOcaine, melatonin, senna-docusate 8.6-50 mg, sodium chloride 0.9%        OBJECTIVE:     Vital Signs (Most Recent)  Vitals:    07/13/23 0500 07/13/23 0620 07/13/23 0941 07/13/23 1004   BP:  136/63 (!) 163/73    BP Location:  Left arm Right arm    Patient Position:  Lying Lying    Pulse:  (!) 53 (!) 50    Resp:  18 18    Temp:  96.4 °F (35.8 °C) 96.9 °F (36.1 °C)    TempSrc:  Oral Oral    SpO2:  98% 99%    Weight: 96.2 kg (212 lb)   96.2 kg (212 lb)   Height:    5' 10" (1.778 m)         Physical Exam:  General appearance: alert, appears stated age, and cooperative  Lungs:  non labored breathing  Chest wall: no tenderness  Heart: regular rate and rhythm, S1, S2 normal, no murmur, click, rub or gallop  Abdomen: soft, non-tender; bowel sounds normal; no masses,  no organomegaly  Extremities: extremities normal, atraumatic, no cyanosis or edema  Pulses: 2+ and symmetric        LABS      CBC  Hemoglobin (g/dL)   Date Value   07/13/2023 13.0 (L)   07/12/2023 14.2   05/17/2023 14.1     POC Hematocrit (%PCV)   Date Value   05/13/2021 47   03/17/2021 44     Hematocrit (%)   Date Value   07/13/2023 39.6 (L)   07/12/2023 42.7   05/17/2023 43.0          BMP  Sodium (mmol/L)   Date Value   07/13/2023 139   07/12/2023 139   05/17/2023 139     Potassium (mmol/L)   Date Value   07/13/2023 3.7   07/12/2023 4.0   05/17/2023 3.8     CO2 (mmol/L)   Date Value   07/13/2023 25   07/12/2023 30 (H)   05/17/2023 31 (H)     Chloride (mmol/L)   Date Value   07/13/2023 104   07/12/2023 102   05/17/2023 102     BUN (mg/dL)   Date Value   07/13/2023 15   07/12/2023 18   05/17/2023 20     Creatinine (mg/dL)   Date Value   07/13/2023 0.9   07/12/2023 0.82   05/17/2023 1.03     Glucose (mg/dL)   Date Value   07/13/2023 92   07/12/2023 95   05/17/2023 108       BNP  BNP (pg/mL)   Date Value   12/13/2022 <10   09/09/2022 <10   08/16/2022 " 22       Troponin panel:   No results found for: TROPONIN      COAGS  INR (no units)   Date Value   07/20/2021 1.0   05/18/2019 1.1   03/25/2019 1.3 (H)     aPTT (sec)   Date Value   07/20/2021 27.1   09/02/2017 26.3       Lipid panel:    Lab Results   Component Value Date    CHOL 115 (L) 07/13/2023    CHOL 133 03/26/2021     Lab Results   Component Value Date    HDL 32 (L) 07/13/2023    HDL 35 (L) 03/26/2021     Lab Results   Component Value Date    LDLCALC 71.8 07/13/2023    LDLCALC 80.4 03/26/2021     Lab Results   Component Value Date    TRIG 56 07/13/2023    TRIG 88 03/26/2021     Lab Results   Component Value Date    CHOLHDL 27.8 07/13/2023    CHOLHDL 26.3 03/26/2021       Diagnostic Results:    EKG: NSR, Left fascicular block  CXR: no acute processes   Echo: pending    Chart review:    Echo 08/31/2022  The estimated ejection fraction is 65%.  The left ventricle is normal in size with normal systolic function.  Indeterminate left ventricular diastolic function.  Normal right ventricular size with normal right ventricular systolic function.  There is pulmonary hypertension.  The estimated PA systolic pressure is 42 mmHg.  Intermediate central venous pressure (8 mmHg).    Stress Echo 03/31/2021:  The test was stopped because the patient experienced fatigue.  The patient's exercise capacity was average.  During stress, the following significant arrhythmias were observed: occasional PACs.  The ECG portion of this study is negative for myocardial ischemia.  The left ventricle is normal in size with normal systolic function. The estimated ejection fraction is 63%  Normal left ventricular diastolic function.  Normal right ventricular size with normal right ventricular systolic function.  Moderate right atrial enlargement.  Normal central venous pressure (3 mmHg).  The estimated PA systolic pressure is 28 mmHg.  The stress echo portion of this study is negative for myocardial ischemia.    ASSESSMENT/PLAN:     Patient  is a 73 y.o. male presents with h/o HTN, DVT on DOAC, obesity presenting with chest pain. Cardiology consulted for ischemic work-up. Chest pain is likely not angina.    Non-cardiogenic Chest Pain: unlikely angina based on story and no ST changes, negative troponin and previous normal coronaries [facility] 10/2022. CAD risk factors include HTN, obesity. LDL at goal, A1c normal.   -f/u with his cardiologist outpatient      Hanane Templeton, PGY4  [employer] Cardiology Fellow

## 2023-07-13 NOTE — PLAN OF CARE
07/13/23 1721   AVS Confirmation   Discharge instructions and AVS given to and reviewed with patient and/or significant other. Yes     AVS printed and handed to patient by bedside nurse. VN reviewed discharge instructions with patient using teachback method.  Allowed time for questions, all questions answered.  Patient verbalized complete understanding of discharge instructions and voices no concerns. Discharge instructions complete. Patient instructed to call the nurses station for wheelchair once ride arrives.  Bedside nurse notified.

## 2023-07-13 NOTE — PHARMACY MED REC
"[facility]           Pharmacy  Admission Medication History     The home medication history was taken by Darby Ospina.      Medication history obtained from Medications listed below were obtained from: [employer] software- [employer]    Based on information gathered for medication list, you may go to "Admission" then "Reconcile Home Medications" tabs to review and/or act upon those items.     The home medication list has been updated by the Pharmacy department.   Please read ALL comments highlighted in yellow.   Please address this information as you see fit.    Feel free to contact us if you have any questions or require assistance.        No current facility-administered medications on file prior to encounter.     Current Outpatient Medications on File Prior to Encounter   Medication Sig Dispense Refill    cloNIDine (CATAPRES) 0.1 MG tablet Take 0.1 mg by mouth every 6 (six) hours as needed.      divalproex (DEPAKOTE) 250 MG EC tablet Take 750 mg by mouth 2 (two) times a day.      ELIQUIS 5 mg Tab Take 5 mg by mouth 2 (two) times daily.      furosemide (LASIX) 20 MG tablet Take 20 mg by mouth every morning.      gabapentin (NEURONTIN) 300 MG capsule Take 300 mg by mouth once daily. Only takes at night.      losartan-hydrochlorothiazide 50-12.5 mg (HYZAAR) 50-12.5 mg per tablet Take 1 tablet by mouth every morning.      omeprazole (PRILOSEC) 40 MG capsule Take 40 mg by mouth once daily.      tamsulosin (FLOMAX) 0.4 mg Cap Take 1 capsule (0.4 mg total) by mouth once daily. 90 capsule 3    albuterol (PROVENTIL/VENTOLIN HFA) 90 mcg/actuation inhaler Inhale 2 puffs into the lungs every 4 (four) hours as needed for Wheezing or Shortness of Breath. Rescue 6.7 g 0    clonazePAM (KLONOPIN) 0.5 MG tablet Take 0.5 mg by mouth nightly as needed.      divalproex (DEPAKOTE) 500 MG TbEC Take 500 mg by mouth 2 (two) times daily.      DULoxetine (CYMBALTA) 30 MG capsule Take 30 mg by mouth.      finasteride " (PROSCAR) 5 mg tablet Take 1 tablet (5 mg total) by mouth once daily. 30 tablet 5    metoprolol succinate (TOPROL-XL) 25 MG 24 hr tablet Take 0.5 tablets (12.5 mg total) by mouth once daily. 15 tablet 0    potassium chloride (MICRO-K) 10 MEQ CpSR Take 10 mEq by mouth once daily.      sildenafil (REVATIO) 20 mg Tab Take 1 tablet (20 mg total) by mouth daily as needed (Take up to 5 tablets 1 hour prior to sexual activity on an empty stomach.). 30 tablet 5       Please address this information as you see fit.  Feel free to contact us if you have any questions or require assistance.    Darby Ospina  [phone]                  .

## 2023-07-13 NOTE — PROGRESS NOTES
[facility]           Pharmacy        Current Drug Shortage     Due to national backorder and [facility] is critically low on inventory of Dextrose 50% (D50) Syringes and Vials, pharmacy has automatically switched from D50% to D10% IVPB at the equivalent dose until resolution of the shortage per P&T approved protocol.               Leah Dominguez, PharmD  [phone]

## 2023-07-13 NOTE — SUBJECTIVE & OBJECTIVE
Past Medical History:   Diagnosis Date    Back pain, chronic     BPH (benign prostatic hyperplasia)     DVT (deep venous thrombosis)     Hypertension     Mitral valve prolapse syndrome     Prostate enlargement        Past Surgical History:   Procedure Laterality Date    NECK SURGERY         Review of patient's allergies indicates:   Allergen Reactions    Ciprofloxacin Palpitations    Penicillins Palpitations and Anxiety       No current facility-administered medications on file prior to encounter.     Current Outpatient Medications on File Prior to Encounter   Medication Sig    cloNIDine (CATAPRES) 0.1 MG tablet Take 0.1 mg by mouth every 6 (six) hours as needed.    divalproex (DEPAKOTE) 250 MG EC tablet Take 750 mg by mouth 2 (two) times a day.    ELIQUIS 5 mg Tab Take 5 mg by mouth 2 (two) times daily.    furosemide (LASIX) 20 MG tablet Take 20 mg by mouth every morning.    gabapentin (NEURONTIN) 300 MG capsule Take 300 mg by mouth once daily. Only takes at night.    losartan-hydrochlorothiazide 50-12.5 mg (HYZAAR) 50-12.5 mg per tablet Take 1 tablet by mouth every morning.    omeprazole (PRILOSEC) 40 MG capsule Take 40 mg by mouth once daily.    tamsulosin (FLOMAX) 0.4 mg Cap Take 1 capsule (0.4 mg total) by mouth once daily.    albuterol (PROVENTIL/VENTOLIN HFA) 90 mcg/actuation inhaler Inhale 2 puffs into the lungs every 4 (four) hours as needed for Wheezing or Shortness of Breath. Rescue    clonazePAM (KLONOPIN) 0.5 MG tablet Take 0.5 mg by mouth nightly as needed.    divalproex (DEPAKOTE) 500 MG TbEC Take 500 mg by mouth 2 (two) times daily.    DULoxetine (CYMBALTA) 30 MG capsule Take 30 mg by mouth.    finasteride (PROSCAR) 5 mg tablet Take 1 tablet (5 mg total) by mouth once daily.    metoprolol succinate (TOPROL-XL) 25 MG 24 hr tablet Take 0.5 tablets (12.5 mg total) by mouth once daily.    potassium chloride (MICRO-K) 10 MEQ CpSR Take 10 mEq by mouth once daily.    sildenafil (REVATIO) 20 mg Tab Take 1  tablet (20 mg total) by mouth daily as needed (Take up to 5 tablets 1 hour prior to sexual activity on an empty stomach.).    [DISCONTINUED] amLODIPine (NORVASC) 5 MG tablet Take 1 tablet (5 mg total) by mouth once daily.    [DISCONTINUED] hydroCHLOROthiazide (HYDRODIURIL) 12.5 MG Tab Take 12.5 mg by mouth once daily.    [DISCONTINUED] lisinopril (PRINIVIL,ZESTRIL) 20 MG tablet Take 20 mg by mouth once daily.    [DISCONTINUED] metoprolol tartrate (LOPRESSOR) 25 MG tablet Take 12.5 mg by mouth 2 (two) times daily.     Family History       Problem Relation (Age of Onset)    Hypertension Mother    Stroke Father          Tobacco Use    Smoking status: Former    Smokeless tobacco: Never    Tobacco comments:     >40 years ago   Substance and Sexual Activity    Alcohol use: No    Drug use: No    Sexual activity: Not Currently     Partners: Female     Review of Systems   Constitutional:  Negative for fever.   Respiratory:  Positive for shortness of breath. Negative for cough.    Cardiovascular:  Positive for chest pain. Negative for palpitations and leg swelling.   Gastrointestinal:  Negative for abdominal pain, constipation, diarrhea and nausea.   Genitourinary:  Negative for difficulty urinating.   Musculoskeletal:  Negative for arthralgias and myalgias.   Neurological:  Positive for tremors and light-headedness. Negative for facial asymmetry and headaches.   Objective:     Vital Signs (Most Recent):  Temp: 96.7 °F (35.9 °C) (07/12/23 2357)  Pulse: (!) 49 (07/12/23 2357)  Resp: 18 (07/12/23 2357)  BP: 119/64 (07/12/23 2357)  SpO2: 96 % (07/12/23 2357) Vital Signs (24h Range):  Temp:  [96.7 °F (35.9 °C)-98.6 °F (37 °C)] 96.7 °F (35.9 °C)  Pulse:  [49-68] 49  Resp:  [17-19] 18  SpO2:  [95 %-100 %] 96 %  BP: (119-145)/(61-87) 119/64     Weight: 96.6 kg (213 lb)  Body mass index is 30.56 kg/m².     Physical Exam  Constitutional:       Appearance: Normal appearance. He is obese.   HENT:      Head: Normocephalic and atraumatic.       Mouth/Throat:      Mouth: Mucous membranes are moist.   Cardiovascular:      Rate and Rhythm: Normal rate and regular rhythm.      Pulses: Normal pulses.      Heart sounds: Normal heart sounds.      Comments: No murmur appreciated  Pulmonary:      Effort: Pulmonary effort is normal.      Breath sounds: Normal breath sounds. No wheezing or rhonchi.   Abdominal:      General: Abdomen is flat.      Palpations: Abdomen is soft.      Tenderness: There is no abdominal tenderness.   Musculoskeletal:      Right lower leg: No edema.      Left lower leg: No edema.   Neurological:      Mental Status: He is alert and oriented to person, place, and time.   Psychiatric:         Mood and Affect: Mood normal.         Behavior: Behavior normal.         Thought Content: Thought content normal.         Judgment: Judgment normal.              Significant Labs: All pertinent labs within the past 24 hours have been reviewed.  CBC:   Recent Labs   Lab 07/12/23  1559   WBC 4.43   HGB 14.2   HCT 42.7        CMP:   Recent Labs   Lab 07/12/23  1559      K 4.0      CO2 30*   GLU 95   BUN 18   CREATININE 0.82   CALCIUM 9.4   PROT 6.9   ALBUMIN 3.8   BILITOT 0.2   ALKPHOS 83   AST 44   ALT 36   ANIONGAP 7*       Significant Imaging: I have reviewed all pertinent imaging results/findings within the past 24 hours.

## 2023-07-13 NOTE — PLAN OF CARE
D/C recs noted. Pt to have PCP and cardiology follow up. Pharmacist will go over home medications and reasons for medications. VN and bedside nurse to reiterate final discharge instructions.       At time of discharge pt will be transported home by family.    DME at discharge: None  Home Health: none    Pt has follow up appointments requested.       07/13/23 1533   Final Note   Assessment Type Final Discharge Note   Anticipated Discharge Disposition Home   What phone number can be called within the next 1-3 days to see how you are doing after discharge? [phone]   Hospital Resources/Appts/Education Provided Appointments scheduled and added to AVS   Post-Acute Status   Discharge Delays None known at this time       Future Appointments   Date Time Provider Department Center   8/10/2023  3:00 PM Denver Kearns MD [facility] Castroi     [facility] Case Management  [phone]

## 2023-07-13 NOTE — ED NOTES
[facility] [address] arrived to transport pt to [facility] [place] [place] Jesi Rm [id number]

## 2023-07-13 NOTE — ED NOTES
Attempted to call report- transferred and phone rang until busy signal. Will attempt to call again.

## 2023-07-13 NOTE — H&P
[facility] Medicine  History & Physical    Patient Name: Jerardo Callejas  MRN: [id number]  Patient Class: OP- Observation  Admission Date: 7/12/2023  Attending Physician: Tor Merrill III, MD   Primary Care Provider: DOMINIC Barker MD         Patient information was obtained from patient and ER records.     Subjective:     Principal Problem:CAD (coronary artery disease)    Chief Complaint:   Chief Complaint   Patient presents with    multiple medical complaints     States today started having bilat hand and arm pain and feels sob. States had some chest pressure earlier. Denies any cough cold congestion. No cardiac hx.         HPI: A 73-year-old male with a past medical history significant for coronary artery disease, mitral valve prolapse, hypertension, and benign prostatic hyperplasia, tremor (right arm) presents with bilateral arm pain and shortness of breath, with chest pain that began at work. The patient reports observing someone else working when he began to experience pain and numbness in both arms, chest pain, and shortness of breath, which prompted him to seek medical evaluation. As of now, the patient reports no ongoing symptoms. He denies any recent illnesses. He is under the care of a cardiologist, but his last appointment was nearly a year ago. His last visit due to chest pain was in September. The patient did not follow up with his physician after this visit, but did consult a doctor regarding low libido.    ED Course:  On arrival, the patient's examination was unremarkable. His lungs were clear, and he presented no leg swelling. His EKG and initial lab results, including a negative Troponin, were also unremarkable. Despite these findings, given the patient's symptoms and high-risk cardiac history (with heart score of 5), a second Troponin level was planned. The patient was not given any medication or interventions in the ED due to his asymptomatic state. Despite the option for discharge  "with outpatient follow-up with his cardiologist, the patient expressed discomfort with going home and a feeling that "something happened today," prompting the decision for inpatient admission for a thorough cardiac workup.      Past Medical History:   Diagnosis Date    Back pain, chronic     BPH (benign prostatic hyperplasia)     DVT (deep venous thrombosis)     Hypertension     Mitral valve prolapse syndrome     Prostate enlargement        Past Surgical History:   Procedure Laterality Date    NECK SURGERY         Review of patient's allergies indicates:   Allergen Reactions    Ciprofloxacin Palpitations    Penicillins Palpitations and Anxiety       No current facility-administered medications on file prior to encounter.     Current Outpatient Medications on File Prior to Encounter   Medication Sig    cloNIDine (CATAPRES) 0.1 MG tablet Take 0.1 mg by mouth every 6 (six) hours as needed.    divalproex (DEPAKOTE) 250 MG EC tablet Take 750 mg by mouth 2 (two) times a day.    ELIQUIS 5 mg Tab Take 5 mg by mouth 2 (two) times daily.    furosemide (LASIX) 20 MG tablet Take 20 mg by mouth every morning.    gabapentin (NEURONTIN) 300 MG capsule Take 300 mg by mouth once daily. Only takes at night.    losartan-hydrochlorothiazide 50-12.5 mg (HYZAAR) 50-12.5 mg per tablet Take 1 tablet by mouth every morning.    omeprazole (PRILOSEC) 40 MG capsule Take 40 mg by mouth once daily.    tamsulosin (FLOMAX) 0.4 mg Cap Take 1 capsule (0.4 mg total) by mouth once daily.    albuterol (PROVENTIL/VENTOLIN HFA) 90 mcg/actuation inhaler Inhale 2 puffs into the lungs every 4 (four) hours as needed for Wheezing or Shortness of Breath. Rescue    clonazePAM (KLONOPIN) 0.5 MG tablet Take 0.5 mg by mouth nightly as needed.    divalproex (DEPAKOTE) 500 MG TbEC Take 500 mg by mouth 2 (two) times daily.    DULoxetine (CYMBALTA) 30 MG capsule Take 30 mg by mouth.    finasteride (PROSCAR) 5 mg tablet Take 1 tablet (5 mg total) " by mouth once daily.    metoprolol succinate (TOPROL-XL) 25 MG 24 hr tablet Take 0.5 tablets (12.5 mg total) by mouth once daily.    potassium chloride (MICRO-K) 10 MEQ CpSR Take 10 mEq by mouth once daily.    sildenafil (REVATIO) 20 mg Tab Take 1 tablet (20 mg total) by mouth daily as needed (Take up to 5 tablets 1 hour prior to sexual activity on an empty stomach.).    [DISCONTINUED] amLODIPine (NORVASC) 5 MG tablet Take 1 tablet (5 mg total) by mouth once daily.    [DISCONTINUED] hydroCHLOROthiazide (HYDRODIURIL) 12.5 MG Tab Take 12.5 mg by mouth once daily.    [DISCONTINUED] lisinopril (PRINIVIL,ZESTRIL) 20 MG tablet Take 20 mg by mouth once daily.    [DISCONTINUED] metoprolol tartrate (LOPRESSOR) 25 MG tablet Take 12.5 mg by mouth 2 (two) times daily.     Family History       Problem Relation (Age of Onset)    Hypertension Mother    Stroke Father          Tobacco Use    Smoking status: Former    Smokeless tobacco: Never    Tobacco comments:     >40 years ago   Substance and Sexual Activity    Alcohol use: No    Drug use: No    Sexual activity: Not Currently     Partners: Female     Review of Systems   Constitutional:  Negative for fever.   Respiratory:  Positive for shortness of breath. Negative for cough.    Cardiovascular:  Positive for chest pain. Negative for palpitations and leg swelling.   Gastrointestinal:  Negative for abdominal pain, constipation, diarrhea and nausea.   Genitourinary:  Negative for difficulty urinating.   Musculoskeletal:  Negative for arthralgias and myalgias.   Neurological:  Positive for tremors and light-headedness. Negative for facial asymmetry and headaches.   Objective:     Vital Signs (Most Recent):  Temp: 96.7 °F (35.9 °C) (07/12/23 2357)  Pulse: (!) 49 (07/12/23 2357)  Resp: 18 (07/12/23 2357)  BP: 119/64 (07/12/23 2357)  SpO2: 96 % (07/12/23 2357) Vital Signs (24h Range):  Temp:  [96.7 °F (35.9 °C)-98.6 °F (37 °C)] 96.7 °F (35.9 °C)  Pulse:  [49-68] 49  Resp:   [17-19] 18  SpO2:  [95 %-100 %] 96 %  BP: (119-145)/(61-87) 119/64     Weight: 96.6 kg (213 lb)  Body mass index is 30.56 kg/m².     Physical Exam  Constitutional:       Appearance: Normal appearance. He is obese.   HENT:      Head: Normocephalic and atraumatic.      Mouth/Throat:      Mouth: Mucous membranes are moist.   Cardiovascular:      Rate and Rhythm: Normal rate and regular rhythm.      Pulses: Normal pulses.      Heart sounds: Normal heart sounds.      Comments: No murmur appreciated  Pulmonary:      Effort: Pulmonary effort is normal.      Breath sounds: Normal breath sounds. No wheezing or rhonchi.   Abdominal:      General: Abdomen is flat.      Palpations: Abdomen is soft.      Tenderness: There is no abdominal tenderness.   Musculoskeletal:      Right lower leg: No edema.      Left lower leg: No edema.   Neurological:      Mental Status: He is alert and oriented to person, place, and time.   Psychiatric:         Mood and Affect: Mood normal.         Behavior: Behavior normal.         Thought Content: Thought content normal.         Judgment: Judgment normal.              Significant Labs: All pertinent labs within the past 24 hours have been reviewed.  CBC:   Recent Labs   Lab 07/12/23  1559   WBC 4.43   HGB 14.2   HCT 42.7        CMP:   Recent Labs   Lab 07/12/23  1559      K 4.0      CO2 30*   GLU 95   BUN 18   CREATININE 0.82   CALCIUM 9.4   PROT 6.9   ALBUMIN 3.8   BILITOT 0.2   ALKPHOS 83   AST 44   ALT 36   ANIONGAP 7*       Significant Imaging: I have reviewed all pertinent imaging results/findings within the past 24 hours.    Assessment/Plan:     * CAD (coronary artery disease)  History of CAD who presents with symptoms of chest pain, bilateral arm pain, and shortness of breath, which started while at work. Although he is currently asymptomatic, his heart score of 5 and his prior history of CAD put him at high risk. Therefore, he is being admitted for further workup and  management.  Repeat trop neg. EKG nsr w/o evidence of ischemia    Plan:  - Consider cards consult for possible stress test or cardiac catheterization if indicated  - Risk factor modification including optimizing blood pressure control, lipid control and and blood sugar control (f/u A1c).  - Continue eliquis 5mg bid  - Consider an echocardiogram to evaluate his known mitral valve prolapse.  - Will need close outpatient cards f/u      HTN (hypertension)  Currently 119/64    PLAN:  Continue home losartan 50mg qd, hctz 12.5mg qd      Tremor  Primarily to right hand  Followed by neuro    PLAN:  Continue depakote 750mg bid      BPH (benign prostatic hyperplasia)  Cont home flomax 0.4mg qd        VTE Risk Mitigation (From admission, onward)         Ordered     apixaban tablet 5 mg  2 times daily         07/12/23 2218     Reason for No Pharmacological VTE Prophylaxis  Once        Question:  Reasons:  Answer:  Already adequately anticoagulated on oral Anticoagulants    07/12/23 2218     IP VTE HIGH RISK PATIENT  Once         07/12/23 2218     Place sequential compression device  Until discontinued         07/12/23 2218                 Eliel Mcdaniel MD  Department of Hospital Medicine  [place] [facility]

## 2023-07-18 NOTE — PROGRESS NOTES
"[facility] Cardiology [address]     SUBJECTIVE:     History of Present Illness:  Patient is a 73 y.o. male presents with chest pain admitted for this recently and discharged. Was followed at [facility] and had cath which was normal. .     Primary Diagnosis:  Hypertension: positive  DM:none   Smoker: past over 40 years ago  Family history of early CAD: none  Heart disease:none  DVT right arm and legs: after having Pic line for UTI   DVT on left leg? After surgery? One year ago?   ROS    Since last seen in the hospital:   Blood pressures OK  No shortness of breath; no PND or orthopnea  No real chest pains      Past Hospitalization    Review of patient's allergies indicates:   Allergen Reactions    Ciprofloxacin Palpitations    Penicillins Palpitations and Anxiety       Past Medical History:   Diagnosis Date    Back pain, chronic     BPH (benign prostatic hyperplasia)     DVT (deep venous thrombosis)     Hypertension     Mitral valve prolapse syndrome     Prostate enlargement        Past Surgical History:   Procedure Laterality Date    NECK SURGERY                 Cardiac meds:   Atorvastatin 40 mg - just started   Losartan/HCTZ 50 mg/12.5 mg daily  Lasix 20 mg as needed   Eliquis 5 mg BID           OBJECTIVE:     Vital Signs (Most Recent)  Vitals:    23 0915   BP: (!) 153/87   Pulse: (!) 57   SpO2: 97%   Weight: 89.7 kg (197 lb 12 oz)   Height: 5' 10" (1.778 m)         Physical Exam:  Neck: normal carotids, no bruits; normal JVP  Lungs :clear  Heart: RR, normal S1,S2, no murmurs, no gallops  Abd: no masses; no bruits;   Exts: normal DP and PT pulses bilaterally, normal radials; no edema noted           LABS    : lipids, CMP normal; TSH normal; A1c normal        Diagnostic Results:    1.EK/23: sinus bradycardia; nonspecific T wave changes   2.Echo: : low normal EF; RVE; mild TR, mild AI  3.stress test:negative  nuclear   4. Cath:10/22: normal coronaries     Chart review:  DVT right arm 2021 - resolved in " 10/21  CT 5/23: no PE; 8 mm right upper lobe nodule bigger than before;   ASSESSMENT/PLAN:     Noncardiac chest pains   Lung nodule bigger on CT from previous CT  On eliquis for DVT? He says he had a workup for thrombotic disease and was negative  Hypertension: controlled?    Plan: 1. To pulmonary   2. Continue the same medications for now   3. Decrease atorvastatin to 20 mg (last LDL off medications was close to 100)  4. Monitor blood pressures   5. He will check with his primary regarding information on recent venous studies or on information regarding factor leiden def etc  6. Return 6 months   Christel Delgadillo MD

## 2023-07-19 ENCOUNTER — OFFICE VISIT (OUTPATIENT)
Dept: CARDIOLOGY | Facility: CLINIC | Age: 73
End: 2023-07-19
Payer: MEDICARE

## 2023-07-19 VITALS
HEIGHT: 70 IN | BODY MASS INDEX: 28.31 KG/M2 | OXYGEN SATURATION: 97 % | WEIGHT: 197.75 LBS | HEART RATE: 57 BPM | SYSTOLIC BLOOD PRESSURE: 153 MMHG | DIASTOLIC BLOOD PRESSURE: 87 MMHG

## 2023-07-19 DIAGNOSIS — I25.10 CORONARY ARTERY DISEASE, UNSPECIFIED VESSEL OR LESION TYPE, UNSPECIFIED WHETHER ANGINA PRESENT, UNSPECIFIED WHETHER NATIVE OR TRANSPLANTED HEART: ICD-10-CM

## 2023-07-19 DIAGNOSIS — R93.89 ABNORMAL CT OF THE CHEST: Primary | ICD-10-CM

## 2023-07-19 PROCEDURE — 1101F PR PT FALLS ASSESS DOC 0-1 FALLS W/OUT INJ PAST YR: ICD-10-PCS | Mod: CPTII,S$GLB,, | Performed by: INTERNAL MEDICINE

## 2023-07-19 PROCEDURE — 3044F HG A1C LEVEL LT 7.0%: CPT | Mod: CPTII,S$GLB,, | Performed by: INTERNAL MEDICINE

## 2023-07-19 PROCEDURE — 1101F PT FALLS ASSESS-DOCD LE1/YR: CPT | Mod: CPTII,S$GLB,, | Performed by: INTERNAL MEDICINE

## 2023-07-19 PROCEDURE — 3288F FALL RISK ASSESSMENT DOCD: CPT | Mod: CPTII,S$GLB,, | Performed by: INTERNAL MEDICINE

## 2023-07-19 PROCEDURE — 99999 PR PBB SHADOW E&M-EST. PATIENT-LVL V: ICD-10-PCS | Mod: PBBFAC,,, | Performed by: INTERNAL MEDICINE

## 2023-07-19 PROCEDURE — 3044F PR MOST RECENT HEMOGLOBIN A1C LEVEL <7.0%: ICD-10-PCS | Mod: CPTII,S$GLB,, | Performed by: INTERNAL MEDICINE

## 2023-07-19 PROCEDURE — 99999 PR PBB SHADOW E&M-EST. PATIENT-LVL V: CPT | Mod: PBBFAC,,, | Performed by: INTERNAL MEDICINE

## 2023-07-19 PROCEDURE — 1126F AMNT PAIN NOTED NONE PRSNT: CPT | Mod: CPTII,S$GLB,, | Performed by: INTERNAL MEDICINE

## 2023-07-19 PROCEDURE — 1126F PR PAIN SEVERITY QUANTIFIED, NO PAIN PRESENT: ICD-10-PCS | Mod: CPTII,S$GLB,, | Performed by: INTERNAL MEDICINE

## 2023-07-19 PROCEDURE — 3077F SYST BP >= 140 MM HG: CPT | Mod: CPTII,S$GLB,, | Performed by: INTERNAL MEDICINE

## 2023-07-19 PROCEDURE — 99214 PR OFFICE/OUTPT VISIT, EST, LEVL IV, 30-39 MIN: ICD-10-PCS | Mod: S$GLB,,, | Performed by: INTERNAL MEDICINE

## 2023-07-19 PROCEDURE — 3288F PR FALLS RISK ASSESSMENT DOCUMENTED: ICD-10-PCS | Mod: CPTII,S$GLB,, | Performed by: INTERNAL MEDICINE

## 2023-07-19 PROCEDURE — 3079F PR MOST RECENT DIASTOLIC BLOOD PRESSURE 80-89 MM HG: ICD-10-PCS | Mod: CPTII,S$GLB,, | Performed by: INTERNAL MEDICINE

## 2023-07-19 PROCEDURE — 1160F RVW MEDS BY RX/DR IN RCRD: CPT | Mod: CPTII,S$GLB,, | Performed by: INTERNAL MEDICINE

## 2023-07-19 PROCEDURE — 3079F DIAST BP 80-89 MM HG: CPT | Mod: CPTII,S$GLB,, | Performed by: INTERNAL MEDICINE

## 2023-07-19 PROCEDURE — 99214 OFFICE O/P EST MOD 30 MIN: CPT | Mod: S$GLB,,, | Performed by: INTERNAL MEDICINE

## 2023-07-19 PROCEDURE — 1159F PR MEDICATION LIST DOCUMENTED IN MEDICAL RECORD: ICD-10-PCS | Mod: CPTII,S$GLB,, | Performed by: INTERNAL MEDICINE

## 2023-07-19 PROCEDURE — 1159F MED LIST DOCD IN RCRD: CPT | Mod: CPTII,S$GLB,, | Performed by: INTERNAL MEDICINE

## 2023-07-19 PROCEDURE — 1160F PR REVIEW ALL MEDS BY PRESCRIBER/CLIN PHARMACIST DOCUMENTED: ICD-10-PCS | Mod: CPTII,S$GLB,, | Performed by: INTERNAL MEDICINE

## 2023-07-19 PROCEDURE — 3077F PR MOST RECENT SYSTOLIC BLOOD PRESSURE >= 140 MM HG: ICD-10-PCS | Mod: CPTII,S$GLB,, | Performed by: INTERNAL MEDICINE

## 2023-07-19 RX ORDER — ALPRAZOLAM 0.25 MG/1
0.25 TABLET ORAL EVERY 12 HOURS PRN
COMMUNITY
Start: 2023-05-08

## 2023-07-19 RX ORDER — CHOLECALCIFEROL (VITAMIN D3) 25 MCG
1000 TABLET ORAL DAILY
COMMUNITY

## 2023-07-19 RX ORDER — MELOXICAM 15 MG/1
15 TABLET ORAL
COMMUNITY
Start: 2023-03-29

## 2023-08-10 ENCOUNTER — OFFICE VISIT (OUTPATIENT)
Dept: PULMONOLOGY | Facility: CLINIC | Age: 73
End: 2023-08-10
Payer: MEDICARE

## 2023-08-10 VITALS
HEART RATE: 64 BPM | SYSTOLIC BLOOD PRESSURE: 127 MMHG | WEIGHT: 211 LBS | DIASTOLIC BLOOD PRESSURE: 80 MMHG | HEIGHT: 70 IN | BODY MASS INDEX: 30.21 KG/M2 | OXYGEN SATURATION: 98 %

## 2023-08-10 DIAGNOSIS — R91.1 LUNG NODULE: ICD-10-CM

## 2023-08-10 DIAGNOSIS — R91.1 RIGHT UPPER LOBE PULMONARY NODULE: ICD-10-CM

## 2023-08-10 DIAGNOSIS — R93.89 ABNORMAL CT OF THE CHEST: ICD-10-CM

## 2023-08-10 PROCEDURE — 3079F DIAST BP 80-89 MM HG: CPT | Mod: CPTII,S$GLB,, | Performed by: INTERNAL MEDICINE

## 2023-08-10 PROCEDURE — 99204 OFFICE O/P NEW MOD 45 MIN: CPT | Mod: S$GLB,,, | Performed by: INTERNAL MEDICINE

## 2023-08-10 PROCEDURE — 1101F PR PT FALLS ASSESS DOC 0-1 FALLS W/OUT INJ PAST YR: ICD-10-PCS | Mod: CPTII,S$GLB,, | Performed by: INTERNAL MEDICINE

## 2023-08-10 PROCEDURE — 3044F HG A1C LEVEL LT 7.0%: CPT | Mod: CPTII,S$GLB,, | Performed by: INTERNAL MEDICINE

## 2023-08-10 PROCEDURE — 3288F FALL RISK ASSESSMENT DOCD: CPT | Mod: CPTII,S$GLB,, | Performed by: INTERNAL MEDICINE

## 2023-08-10 PROCEDURE — 1126F AMNT PAIN NOTED NONE PRSNT: CPT | Mod: CPTII,S$GLB,, | Performed by: INTERNAL MEDICINE

## 2023-08-10 PROCEDURE — 1101F PT FALLS ASSESS-DOCD LE1/YR: CPT | Mod: CPTII,S$GLB,, | Performed by: INTERNAL MEDICINE

## 2023-08-10 PROCEDURE — 3074F SYST BP LT 130 MM HG: CPT | Mod: CPTII,S$GLB,, | Performed by: INTERNAL MEDICINE

## 2023-08-10 PROCEDURE — 3288F PR FALLS RISK ASSESSMENT DOCUMENTED: ICD-10-PCS | Mod: CPTII,S$GLB,, | Performed by: INTERNAL MEDICINE

## 2023-08-10 PROCEDURE — 3079F PR MOST RECENT DIASTOLIC BLOOD PRESSURE 80-89 MM HG: ICD-10-PCS | Mod: CPTII,S$GLB,, | Performed by: INTERNAL MEDICINE

## 2023-08-10 PROCEDURE — 99999 PR PBB SHADOW E&M-EST. PATIENT-LVL V: ICD-10-PCS | Mod: PBBFAC,,, | Performed by: INTERNAL MEDICINE

## 2023-08-10 PROCEDURE — 99204 PR OFFICE/OUTPT VISIT, NEW, LEVL IV, 45-59 MIN: ICD-10-PCS | Mod: S$GLB,,, | Performed by: INTERNAL MEDICINE

## 2023-08-10 PROCEDURE — 1126F PR PAIN SEVERITY QUANTIFIED, NO PAIN PRESENT: ICD-10-PCS | Mod: CPTII,S$GLB,, | Performed by: INTERNAL MEDICINE

## 2023-08-10 PROCEDURE — 99999 PR PBB SHADOW E&M-EST. PATIENT-LVL V: CPT | Mod: PBBFAC,,, | Performed by: INTERNAL MEDICINE

## 2023-08-10 PROCEDURE — 1159F PR MEDICATION LIST DOCUMENTED IN MEDICAL RECORD: ICD-10-PCS | Mod: CPTII,S$GLB,, | Performed by: INTERNAL MEDICINE

## 2023-08-10 PROCEDURE — 1159F MED LIST DOCD IN RCRD: CPT | Mod: CPTII,S$GLB,, | Performed by: INTERNAL MEDICINE

## 2023-08-10 PROCEDURE — 3074F PR MOST RECENT SYSTOLIC BLOOD PRESSURE < 130 MM HG: ICD-10-PCS | Mod: CPTII,S$GLB,, | Performed by: INTERNAL MEDICINE

## 2023-08-10 PROCEDURE — 3044F PR MOST RECENT HEMOGLOBIN A1C LEVEL <7.0%: ICD-10-PCS | Mod: CPTII,S$GLB,, | Performed by: INTERNAL MEDICINE

## 2023-08-10 NOTE — PROGRESS NOTES
Subjective:      Patient ID: Jerardo Callejas is a 73 y.o. male.    Chief Complaint: No chief complaint on file.    73 year old with CT chest during hospitalization.   8 mm right upper lobe pulmonary nodule as seen on series 2, image 209.  This is enlarged in comparison to the prior exam.   Remote smoking.   Brothers with lung cancer.   No complaints. No weight loss.     Pulmonary Nodules  This is a chronic problem. The current episode started more than 1 month ago. The problem occurs constantly. The problem has been gradually worsening. Nothing aggravates the symptoms.     Review of Systems   Constitutional:  Negative for weight loss.   Respiratory:  Negative for shortness of breath.      Objective:     Physical Exam   Constitutional: He is oriented to person, place, and time. He appears well-developed and well-nourished. No distress.   HENT:   Head: Normocephalic.   Nose: Nose normal.   Cardiovascular: Normal rate.   Pulmonary/Chest: Normal expansion. He has no rhonchi. He has no rales.   Neurological: He is alert and oriented to person, place, and time.   Skin: Skin is warm and dry. He is not diaphoretic.   Nursing note and vitals reviewed.    Personal Diagnostic Review  none pertinent  No flowsheet data found.     Assessment:     1. Right upper lobe pulmonary nodule    2. Abnormal CT of the chest         Outpatient Encounter Medications as of 8/10/2023   Medication Sig Dispense Refill    ALPRAZolam (XANAX) 0.25 MG tablet Take 0.25 mg by mouth every 12 (twelve) hours as needed.      atorvastatin (LIPITOR) 40 MG tablet Take 1 tablet (40 mg total) by mouth once daily. 90 tablet 3    clonazePAM (KLONOPIN) 0.5 MG tablet Take 0.5 mg by mouth nightly as needed.      divalproex (DEPAKOTE) 250 MG EC tablet Take 750 mg by mouth 2 (two) times a day.      divalproex (DEPAKOTE) 500 MG TbEC Take 500 mg by mouth 2 (two) times daily.      DULoxetine (CYMBALTA) 30 MG capsule Take 30 mg by mouth.      ELIQUIS 5 mg Tab Take 5 mg  by mouth 2 (two) times daily.      furosemide (LASIX) 20 MG tablet Take 20 mg by mouth every morning.      gabapentin (NEURONTIN) 300 MG capsule Take 300 mg by mouth once daily. Only takes at night.      losartan-hydrochlorothiazide 50-12.5 mg (HYZAAR) 50-12.5 mg per tablet Take 1 tablet by mouth every morning.      meloxicam (MOBIC) 15 MG tablet Take 15 mg by mouth.      omeprazole (PRILOSEC) 40 MG capsule Take 40 mg by mouth once daily.      tamsulosin (FLOMAX) 0.4 mg Cap Take 1 capsule (0.4 mg total) by mouth once daily. 90 capsule 3    vitamin D (VITAMIN D3) 1000 units Tab Take 1,000 Units by mouth once daily.      albuterol (PROVENTIL/VENTOLIN HFA) 90 mcg/actuation inhaler Inhale 2 puffs into the lungs every 4 (four) hours as needed for Wheezing or Shortness of Breath. Rescue 6.7 g 0    sildenafil (REVATIO) 20 mg Tab Take 1 tablet (20 mg total) by mouth daily as needed (Take up to 5 tablets 1 hour prior to sexual activity on an empty stomach.). 30 tablet 5    [DISCONTINUED] amLODIPine (NORVASC) 5 MG tablet Take 1 tablet (5 mg total) by mouth once daily. 30 tablet 3    [DISCONTINUED] cloNIDine (CATAPRES) 0.1 MG tablet Take 0.1 mg by mouth every 6 (six) hours as needed.      [DISCONTINUED] finasteride (PROSCAR) 5 mg tablet Take 1 tablet (5 mg total) by mouth once daily. 30 tablet 5    [DISCONTINUED] hydroCHLOROthiazide (HYDRODIURIL) 12.5 MG Tab Take 12.5 mg by mouth once daily.      [DISCONTINUED] lisinopril (PRINIVIL,ZESTRIL) 20 MG tablet Take 20 mg by mouth once daily.      [DISCONTINUED] metoprolol succinate (TOPROL-XL) 25 MG 24 hr tablet Take 0.5 tablets (12.5 mg total) by mouth once daily. 15 tablet 0    [DISCONTINUED] metoprolol tartrate (LOPRESSOR) 25 MG tablet Take 12.5 mg by mouth 2 (two) times daily.      [DISCONTINUED] potassium chloride (MICRO-K) 10 MEQ CpSR Take 10 mEq by mouth once daily.      [] ketorolac injection 15 mg       [DISCONTINUED] acetaminophen tablet 650 mg       [DISCONTINUED]  apixaban tablet 5 mg       [DISCONTINUED] dextrose 10% bolus 125 mL 125 mL       [DISCONTINUED] dextrose 10% bolus 250 mL 250 mL       [DISCONTINUED] divalproex EC tablet 750 mg       [DISCONTINUED] glucagon (human recombinant) injection 1 mg       [DISCONTINUED] glucose chewable tablet 16 g       [DISCONTINUED] glucose chewable tablet 24 g       [DISCONTINUED] hydroCHLOROthiazide tablet 12.5 mg       [DISCONTINUED] LIDOcaine 5 % patch 1 patch       [DISCONTINUED] losartan tablet 50 mg       [DISCONTINUED] melatonin tablet 9 mg       [DISCONTINUED] senna-docusate 8.6-50 mg per tablet 1 tablet       [DISCONTINUED] sodium chloride 0.9% flush 5 mL       [DISCONTINUED] tamsulosin 24 hr capsule 0.4 mg        No facility-administered encounter medications on file as of 8/10/2023.     No orders of the defined types were placed in this encounter.      Plan:   1. Right upper lobe pulmonary nodule  -     Ambulatory referral/consult to Pulmonology    2. Abnormal CT of the chest  -     Ambulatory referral/consult to Pulmonology  -     CT Chest Without Contrast; Future; Expected date: 08/10/2023    3. Lung nodule  Assessment & Plan:  CT chest in May:  8 mm right upper lobe pulmonary nodule as seen on series 2, image 209.  This is enlarged in comparison to the prior exam.   Will repeat CT chest.   Patient with only remote smoking history and no symptoms.

## 2023-08-10 NOTE — ASSESSMENT & PLAN NOTE
CT chest in May:  8 mm right upper lobe pulmonary nodule as seen on series 2, image 209.  This is enlarged in comparison to the prior exam.   Will repeat CT chest.   Patient with only remote smoking history and no symptoms.

## 2023-08-28 ENCOUNTER — HOSPITAL ENCOUNTER (OUTPATIENT)
Dept: RADIOLOGY | Facility: HOSPITAL | Age: 73
Discharge: HOME | End: 2023-08-28
Attending: INTERNAL MEDICINE
Payer: MEDICARE

## 2023-08-28 DIAGNOSIS — R93.89 ABNORMAL CT OF THE CHEST: ICD-10-CM

## 2023-08-28 PROCEDURE — 71250 CT THORAX DX C-: CPT | Mod: TC,PO

## 2023-08-28 PROCEDURE — 71250 CT CHEST WITHOUT CONTRAST: ICD-10-PCS | Mod: 26,,, | Performed by: RADIOLOGY

## 2023-08-28 PROCEDURE — 71250 CT THORAX DX C-: CPT | Mod: 26,,, | Performed by: RADIOLOGY

## 2023-08-29 ENCOUNTER — HOSPITAL ENCOUNTER (EMERGENCY)
Facility: HOSPITAL | Age: 73
Discharge: HOME | End: 2023-08-29
Attending: EMERGENCY MEDICINE
Payer: MEDICARE

## 2023-08-29 VITALS
HEART RATE: 54 BPM | SYSTOLIC BLOOD PRESSURE: 133 MMHG | TEMPERATURE: 98 F | RESPIRATION RATE: 16 BRPM | OXYGEN SATURATION: 98 % | DIASTOLIC BLOOD PRESSURE: 66 MMHG | BODY MASS INDEX: 30.28 KG/M2 | WEIGHT: 211 LBS

## 2023-08-29 DIAGNOSIS — R25.1 TREMORS OF NERVOUS SYSTEM: ICD-10-CM

## 2023-08-29 DIAGNOSIS — R25.1 TREMOR: Primary | ICD-10-CM

## 2023-08-29 LAB
ALBUMIN SERPL BCP-MCNC: 3.6 G/DL (ref 3.5–5.2)
ALP SERPL-CCNC: 86 U/L (ref 55–135)
ALT SERPL W/O P-5'-P-CCNC: 31 U/L (ref 10–44)
ANION GAP SERPL CALC-SCNC: 8 MMOL/L (ref 8–16)
AST SERPL-CCNC: 27 U/L (ref 10–40)
BACTERIA #/AREA URNS AUTO: NORMAL /HPF
BASOPHILS # BLD AUTO: 0.03 K/UL (ref 0–0.2)
BASOPHILS NFR BLD: 0.7 % (ref 0–1.9)
BILIRUB SERPL-MCNC: 0.4 MG/DL (ref 0.1–1)
BILIRUB UR QL STRIP: NEGATIVE
BUN SERPL-MCNC: 16 MG/DL (ref 8–23)
CALCIUM SERPL-MCNC: 10.2 MG/DL (ref 8.7–10.5)
CHLORIDE SERPL-SCNC: 103 MMOL/L (ref 95–110)
CLARITY UR REFRACT.AUTO: CLEAR
CO2 SERPL-SCNC: 30 MMOL/L (ref 23–29)
COLOR UR AUTO: YELLOW
CREAT SERPL-MCNC: 1.1 MG/DL (ref 0.5–1.4)
DIFFERENTIAL METHOD: ABNORMAL
EOSINOPHIL # BLD AUTO: 0.1 K/UL (ref 0–0.5)
EOSINOPHIL NFR BLD: 2.5 % (ref 0–8)
ERYTHROCYTE [DISTWIDTH] IN BLOOD BY AUTOMATED COUNT: 14 % (ref 11.5–14.5)
EST. GFR  (NO RACE VARIABLE): >60 ML/MIN/1.73 M^2
GLUCOSE SERPL-MCNC: 145 MG/DL (ref 70–110)
GLUCOSE UR QL STRIP: NEGATIVE
HCT VFR BLD AUTO: 44.1 % (ref 40–54)
HGB BLD-MCNC: 13.9 G/DL (ref 14–18)
HGB UR QL STRIP: ABNORMAL
IMM GRANULOCYTES # BLD AUTO: 0.02 K/UL (ref 0–0.04)
IMM GRANULOCYTES NFR BLD AUTO: 0.5 % (ref 0–0.5)
KETONES UR QL STRIP: NEGATIVE
LEUKOCYTE ESTERASE UR QL STRIP: NEGATIVE
LYMPHOCYTES # BLD AUTO: 0.7 K/UL (ref 1–4.8)
LYMPHOCYTES NFR BLD: 16.1 % (ref 18–48)
MAGNESIUM SERPL-MCNC: 1.9 MG/DL (ref 1.6–2.6)
MCH RBC QN AUTO: 28.7 PG (ref 27–31)
MCHC RBC AUTO-ENTMCNC: 31.5 G/DL (ref 32–36)
MCV RBC AUTO: 91 FL (ref 82–98)
MICROSCOPIC COMMENT: NORMAL
MONOCYTES # BLD AUTO: 0.6 K/UL (ref 0.3–1)
MONOCYTES NFR BLD: 14.5 % (ref 4–15)
NEUTROPHILS # BLD AUTO: 2.9 K/UL (ref 1.8–7.7)
NEUTROPHILS NFR BLD: 65.7 % (ref 38–73)
NITRITE UR QL STRIP: NEGATIVE
NRBC BLD-RTO: 0 /100 WBC
PH UR STRIP: 5 [PH] (ref 5–8)
PLATELET # BLD AUTO: 144 K/UL (ref 150–450)
PMV BLD AUTO: 10.6 FL (ref 9.2–12.9)
POCT GLUCOSE: 134 MG/DL (ref 70–110)
POTASSIUM SERPL-SCNC: 3.9 MMOL/L (ref 3.5–5.1)
PROT SERPL-MCNC: 7 G/DL (ref 6–8.4)
PROT UR QL STRIP: NEGATIVE
RBC # BLD AUTO: 4.85 M/UL (ref 4.6–6.2)
RBC #/AREA URNS AUTO: 3 /HPF (ref 0–4)
SODIUM SERPL-SCNC: 141 MMOL/L (ref 136–145)
SP GR UR STRIP: 1.02 (ref 1–1.03)
SQUAMOUS #/AREA URNS AUTO: 3 /HPF
URN SPEC COLLECT METH UR: ABNORMAL
VALPROATE SERPL-MCNC: 75.1 UG/ML (ref 50–100)
WBC # BLD AUTO: 4.41 K/UL (ref 3.9–12.7)
WBC #/AREA URNS AUTO: 2 /HPF (ref 0–5)

## 2023-08-29 PROCEDURE — 93010 EKG 12-LEAD: ICD-10-PCS | Mod: ,,, | Performed by: INTERNAL MEDICINE

## 2023-08-29 PROCEDURE — 81001 URINALYSIS AUTO W/SCOPE: CPT | Performed by: NURSE PRACTITIONER

## 2023-08-29 PROCEDURE — 80053 COMPREHEN METABOLIC PANEL: CPT | Performed by: NURSE PRACTITIONER

## 2023-08-29 PROCEDURE — 80164 ASSAY DIPROPYLACETIC ACD TOT: CPT | Performed by: EMERGENCY MEDICINE

## 2023-08-29 PROCEDURE — 85025 COMPLETE CBC W/AUTO DIFF WBC: CPT | Performed by: NURSE PRACTITIONER

## 2023-08-29 PROCEDURE — 93005 ELECTROCARDIOGRAM TRACING: CPT

## 2023-08-29 PROCEDURE — 83735 ASSAY OF MAGNESIUM: CPT | Performed by: EMERGENCY MEDICINE

## 2023-08-29 PROCEDURE — 93010 ELECTROCARDIOGRAM REPORT: CPT | Mod: ,,, | Performed by: INTERNAL MEDICINE

## 2023-08-29 PROCEDURE — 82962 GLUCOSE BLOOD TEST: CPT

## 2023-08-29 PROCEDURE — 99284 EMERGENCY DEPT VISIT MOD MDM: CPT

## 2023-08-29 NOTE — FIRST PROVIDER EVALUATION
Emergency Department TeleTriage Encounter Note      CHIEF COMPLAINT    Chief Complaint   Patient presents with    Tremors     R arm        VITAL SIGNS   Initial Vitals [08/29/23 1550]   BP Pulse Resp Temp SpO2   (!) 134/57 69 19 98 °F (36.7 °C) 97 %      MAP       --            ALLERGIES    Review of patient's allergies indicates:   Allergen Reactions    Ciprofloxacin Palpitations    Penicillins Palpitations and Anxiety       PROVIDER TRIAGE NOTE  Verbal consent for the teletriage evaluation was given by the patient at the start of the evaluation.  All efforts will be made to maintain patient's privacy during the evaluation.      This is a teletriage evaluation of a 73 y.o. male presenting to the ED with c/o tremors in right arm and HA that started 30 minutes PTA.  PMH includes enlarged prostate, lung nodules, HTN.  Has had these tremors in the past and has been taking Depakote per Neurology for tremors. Limited physical exam via telehealth: The patient is awake, alert, answering questions appropriately and is not in respiratory distress.  As the Teletriage provider, I performed an initial assessment and ordered appropriate labs and imaging studies, if any, to facilitate the patient's care once placed in the ED. Once a room is available, care and a full evaluation will be completed by an alternate ED provider.  Any additional orders and the final disposition will be determined by that provider.  All imaging and labs will not be followed-up by the Teletriage Team, including myself.          ORDERS  Labs Reviewed - No data to display    ED Orders (720h ago, onward)      Start Ordered     Status Ordering Provider    08/29/23 1637 08/29/23 1636  POCT glucose  Once         Ordered ABRAHAM WALDEN    08/29/23 1637 08/29/23 1636  Urinalysis, Reflex to Urine Culture Urine, Clean Catch  STAT         Ordered ABRAHAM WALDEN    08/29/23 1636 08/29/23 1636  Saline lock IV  Once         Ordered ABRAHAM WALDEN    08/29/23 1636  08/29/23 1636  CBC auto differential  STAT         Ordered ABRAHAM WALDEN    08/29/23 1636 08/29/23 1636  Comprehensive metabolic panel  STAT         Ordered ABRAHAM WALDEN    08/29/23 1636 08/29/23 1636  EKG 12-lead  Once         Ordered ABRAHAM WALDEN              Virtual Visit Note: The provider triage portion of this emergency department evaluation and documentation was performed via [employer], a HIPAA-compliant telemedicine application, in concert with a tele-presenter in the room. A face to face patient evaluation with one of my colleagues will occur once the patient is placed in an emergency department room.      DISCLAIMER: This note was prepared with [employer] voice recognition transcription software. Garbled syntax, mangled pronouns, and other bizarre constructions may be attributed to that software system.

## 2023-08-29 NOTE — DISCHARGE INSTRUCTIONS
Please call your neurologist to discuss the fact that your tremors worsening.  They may offer further testing or change in medications.  Your blood work here including her Depakote level were all normal.

## 2023-08-29 NOTE — ED PROVIDER NOTES
Encounter Date: 8/29/2023       History     Chief Complaint   Patient presents with    Tremors     R arm      HPI  74yo M with a PMH of MVP, CAD, HTN, BPH, and right arm tremors who presents with tremors of the right hand.  He has had tremors for years, always in the right arm.  He does see a neurologist and reports he is on Depakote for it.  He says that he typically gets tremors once every few weeks.  He reports that they have been slightly worse recently.  Today he was actually with a family member in the emergency department here when he felt full body shaking but noticed more tremors in his right hand that made him nervous.  It lasted for a brief amount of time with a full body shaking, not described as rigors or seizure-like activity.  He was not confused or altered or unconscious.  No fevers.  Denies any infectious symptoms including cough, shortness of breath, chest pain, congestion, body aches, abdominal pain, nausea or vomiting, diarrhea, urinary symptoms, rashes or red areas, back pain or joint swelling or pain.  He denies any vision change, facial droop, speech changes, focal weakness or numbness.  He does report a mild posterior headache which she gets frequently off and on.  No head trauma.  No neck pain or stiffness.  The family member with him in the room currently does report that he has been under quite a lot of stress recently.  He is not had any medication changes and has been taking his medications regularly.      Review of patient's allergies indicates:   Allergen Reactions    Ciprofloxacin Palpitations    Penicillins Palpitations and Anxiety     Past Medical History:   Diagnosis Date    Back pain, chronic     BPH (benign prostatic hyperplasia)     DVT (deep venous thrombosis)     Hypertension     Mitral valve prolapse syndrome     Prostate enlargement      Past Surgical History:   Procedure Laterality Date    NECK SURGERY       Family History   Problem Relation Age of Onset    Hypertension  Mother     Stroke Father      Social History     Tobacco Use    Smoking status: Former    Smokeless tobacco: Never    Tobacco comments:     >40 years ago   Substance Use Topics    Alcohol use: No    Drug use: No     Review of Systems   Constitutional:  Negative for fever.   HENT:  Negative for sore throat.    Respiratory:  Negative for shortness of breath.    Cardiovascular:  Negative for chest pain.   Gastrointestinal:  Negative for nausea.   Genitourinary:  Negative for dysuria.   Musculoskeletal:  Negative for back pain.   Skin:  Negative for rash.   Neurological:  Positive for tremors. Negative for weakness.   Hematological:  Does not bruise/bleed easily.   All other systems reviewed and are negative.      Physical Exam     Initial Vitals [08/29/23 1550]   BP Pulse Resp Temp SpO2   (!) 134/57 69 19 98 °F (36.7 °C) 97 %      MAP       --         Physical Exam    Nursing note and vitals reviewed.  Constitutional: He appears well-developed and well-nourished. No distress.   HENT:   Head: Normocephalic and atraumatic.   Nose: Nose normal.   Mouth/Throat: No oropharyngeal exudate.   Eyes: Conjunctivae and EOM are normal. Pupils are equal, round, and reactive to light.   Neck: Neck supple.   Normal range of motion.  Cardiovascular:  Normal rate, regular rhythm, normal heart sounds and intact distal pulses.     Exam reveals no gallop and no friction rub.       No murmur heard.  Pulmonary/Chest: Breath sounds normal. No respiratory distress. He has no wheezes. He has no rhonchi. He has no rales.   Abdominal: Abdomen is soft. He exhibits no distension. There is no abdominal tenderness. There is no rebound and no guarding.   Musculoskeletal:         General: No tenderness or edema. Normal range of motion.      Cervical back: Normal range of motion and neck supple.     Neurological: He is alert and oriented to person, place, and time. GCS eye subscore is 4. GCS verbal subscore is 5. GCS motor subscore is 6.   EOMI   No  facial droop   Visual fields intact  No aphasia or dysarthria   Equal strength and sensation in bilateral upper and lower extremities   No drift in bilateral upper and lower extremities   Normal finger-to-nose bilaterally   Occasional resting tremor of the right hand   Skin: Capillary refill takes less than 2 seconds.   Psychiatric: He has a normal mood and affect.         ED Course   Procedures  Labs Reviewed   CBC W/ AUTO DIFFERENTIAL - Abnormal; Notable for the following components:       Result Value    Hemoglobin 13.9 (*)     MCHC 31.5 (*)     Platelets 144 (*)     Lymph # 0.7 (*)     Lymph % 16.1 (*)     All other components within normal limits   COMPREHENSIVE METABOLIC PANEL - Abnormal; Notable for the following components:    CO2 30 (*)     Glucose 145 (*)     All other components within normal limits   URINALYSIS, REFLEX TO URINE CULTURE - Abnormal; Notable for the following components:    Occult Blood UA 1+ (*)     All other components within normal limits    Narrative:     Specimen Source->Urine   POCT GLUCOSE - Abnormal; Notable for the following components:    POCT Glucose 134 (*)     All other components within normal limits   VALPROIC ACID   MAGNESIUM   URINALYSIS MICROSCOPIC    Narrative:     Specimen Source->Urine   POCT GLUCOSE MONITORING CONTINUOUS     EKG Readings: (Independently Interpreted)   Sinus, regular, rate 60, normal intervals, left anterior fascicular block, no ST changes consistent with ischemia       Imaging Results    None          Medications - No data to display  Medical Decision Making  73-year-old male who presents with right tremor.  He also had a brief episode of shaking while here in the emergency department.  It does not sound like it was full body rigors and it lasted for a brief amount of time.  He was awake and oriented.  Does not sound like it was a seizure.  He did not lose consciousness.  He really has no other symptoms other than that his right tremor has gotten  slightly worse.  His family member does report he has been under significant stressors recently.  He does not have any infectious symptoms.  No symptoms consistent with ACS.  He is no neurologic complaints other than a mild headache which is not new for him, just intermittent.  He is a normal neurologic exam here.  He does have an occasional resting right hand tremor.  Vital signs all reassuring.  We will get some basic labs as well as a Depakote level to make sure this is normal.  I think it is all this is reassuring, he will be safe for discharge home with instructions to contact his neurologist if the tremors continue as he may need medication changes or further evaluation.    Workup benign.  Depakote level normal.  At this point I think he is safe for discharge with follow up with his neurologist.    Amount and/or Complexity of Data Reviewed  Labs: ordered.                               Clinical Impression:   Final diagnoses:  [R25.1] Tremors of nervous system  [R25.1] Tremor (Primary)        ED Disposition Condition    Discharge Stable          ED Prescriptions    None       Follow-up Information    None          Katerina Bowden MD  08/29/23 4107       Katerina Bowden MD  08/29/23 4789

## 2023-08-29 NOTE — ED NOTES
Patient identifiers verified and correct for  MR Callejas  C/C:  Tremors SEE NN  APPEARANCE: awake and alert in NAD. PAIN  0/10  SKIN: warm, dry and intact. No breakdown or bruising.  MUSCULOSKELETAL: Patient moving all extremities spontaneously, no obvious swelling or deformities noted. Ambulates independently.  RESPIRATORY: Denies shortness of breath.Respirations unlabored.   CARDIAC: Denies CP, 2+ distal pulses; no peripheral edema  ABDOMEN: S/ND/NT, Denies nausea  : voids spontaneously, denies difficulty  Neurologic: AAO x 4; follows commands equal strength in all extremities; denies numbness/tingling. Denies dizziness Denies new weaness, reports mild tremors

## 2023-09-12 ENCOUNTER — TELEPHONE (OUTPATIENT)
Dept: PULMONOLOGY | Facility: CLINIC | Age: 73
End: 2023-09-12
Payer: MEDICARE

## 2023-09-12 NOTE — TELEPHONE ENCOUNTER
----- Message from Starr Brewster MA sent at 9/12/2023 10:35 AM CDT -----  Pt request a call to get results of Ct scan

## 2023-09-14 ENCOUNTER — TELEPHONE (OUTPATIENT)
Dept: PULMONOLOGY | Facility: CLINIC | Age: 73
End: 2023-09-14
Payer: MEDICARE

## 2023-09-14 NOTE — TELEPHONE ENCOUNTER
Per Dr Kearns: CT is unchanged. Please let him no that none of the nodules are bigger. He does not need any further MRIs      Called and discussed with patient. Mr Callejas verbalized understanding.

## 2023-09-14 NOTE — TELEPHONE ENCOUNTER
----- Message from Franchesca Dawn sent at 9/14/2023 10:21 AM CDT -----  Type:  Test Results    Who Called:  pt  Name of Test (Lab/Mammo/Etc):  CT scan  Date of Test:  8/28/23  Ordering Provider:  Dr. Kearns  Where the test was performed:  [facility]  Would the patient rather a call back or a response via [facility]? call  Best Call Back Number:  [phone]  Additional Information:

## 2024-01-21 NOTE — PROGRESS NOTES
"[facility]     SUBJECTIVE:     History of Present Illness:  Patient is a 73 y.o. male presents with chest pain admitted for this recently and discharged. Was followed at [facility] and had cath which was normal. .     Primary Diagnosis:    Hypertension: positive  DM:none   Smoker: past over 40 years ago  Family history of early CAD: none  Heart disease:none  DVT right arm and legs: after having Pic line for UTI   DVT on left leg? After surgery? One year ago?     ROS  Since last visit 7/23:   Blood pressures around 140''s but usually around 120's   No shortness of breath; no PND or orthopnea  No real chest pains  4. Heart rates still low   5. No dizziness  6. No syncope  7. Rare lightheadedness - very poorly described.   8. Activity: walks in the house; walked over to clinic with walker today without issues; occasionally uses his bike with no issues   Past Hospitalization    Review of patient's allergies indicates:   Allergen Reactions    Ciprofloxacin Palpitations    Penicillins Palpitations and Anxiety       Past Medical History:   Diagnosis Date    Back pain, chronic     BPH (benign prostatic hyperplasia)     DVT (deep venous thrombosis)     Hypertension     Mitral valve prolapse syndrome     Prostate enlargement        Past Surgical History:   Procedure Laterality Date    NECK SURGERY                 Cardiac meds:     Atorvastatin 20 mg   Losartan/HCTZ 50 mg/12.5 mg daily  Lasix 20 mg as needed   Eliquis 5 mg BID           OBJECTIVE:     Vital Signs (Most Recent)  Vitals:    01/22/24 0939   BP: (!) 151/80   Pulse: (!) 54   SpO2: 99%   Weight: 96.9 kg (213 lb 10 oz)   Height: 5' 10" (1.778 m)           Physical Exam:  Neck: normal carotids, no bruits; normal JVP  Lungs :clear  Heart: RR, normal S1,S2, no murmurs, no gallops  Abd: no masses; no bruits;   Exts: normal DP and PT pulses bilaterally, normal radials; no edema noted           LABS    7/23: lipids, CMP normal; TSH normal; A1c normal  "       Diagnostic Results:    1.EK/23: sinus bradycardia; nonspecific T wave changes   2.Echo: : low normal EF; RVE; mild TR, mild AI  3.stress test:negative  nuclear   4. Cath:10/22: normal coronaries     Chart review:  DVT right arm 2021 - resolved in 10/21  CT : no PE; 8 mm right upper lobe nodule bigger than before;     ASSESSMENT/PLAN:     Noncardiac chest pains   Lung nodule bigger on CT from previous CT - followed by pulmonary   On eliquis for DVT? He says he had a workup for thrombotic disease and was negative  Hypertension: controlled?     Plan: 1. . Continue the same medications for now   2.continue anticoagulation for now since I don't have any information   3. Return 6 months   Christel Delgadillo MD

## 2024-01-22 ENCOUNTER — OFFICE VISIT (OUTPATIENT)
Dept: CARDIOLOGY | Facility: CLINIC | Age: 74
End: 2024-01-22
Payer: MEDICARE

## 2024-01-22 VITALS
WEIGHT: 213.63 LBS | DIASTOLIC BLOOD PRESSURE: 80 MMHG | HEIGHT: 70 IN | SYSTOLIC BLOOD PRESSURE: 151 MMHG | HEART RATE: 54 BPM | BODY MASS INDEX: 30.58 KG/M2 | OXYGEN SATURATION: 99 %

## 2024-01-22 DIAGNOSIS — R93.89 ABNORMAL CT OF THE CHEST: ICD-10-CM

## 2024-01-22 DIAGNOSIS — I10 PRIMARY HYPERTENSION: Primary | ICD-10-CM

## 2024-01-22 PROCEDURE — 1159F MED LIST DOCD IN RCRD: CPT | Mod: CPTII,S$GLB,, | Performed by: INTERNAL MEDICINE

## 2024-01-22 PROCEDURE — 3077F SYST BP >= 140 MM HG: CPT | Mod: CPTII,S$GLB,, | Performed by: INTERNAL MEDICINE

## 2024-01-22 PROCEDURE — 1101F PT FALLS ASSESS-DOCD LE1/YR: CPT | Mod: CPTII,S$GLB,, | Performed by: INTERNAL MEDICINE

## 2024-01-22 PROCEDURE — 3288F FALL RISK ASSESSMENT DOCD: CPT | Mod: CPTII,S$GLB,, | Performed by: INTERNAL MEDICINE

## 2024-01-22 PROCEDURE — 3079F DIAST BP 80-89 MM HG: CPT | Mod: CPTII,S$GLB,, | Performed by: INTERNAL MEDICINE

## 2024-01-22 PROCEDURE — 99214 OFFICE O/P EST MOD 30 MIN: CPT | Mod: S$GLB,,, | Performed by: INTERNAL MEDICINE

## 2024-01-22 PROCEDURE — 1160F RVW MEDS BY RX/DR IN RCRD: CPT | Mod: CPTII,S$GLB,, | Performed by: INTERNAL MEDICINE

## 2024-01-22 PROCEDURE — 1126F AMNT PAIN NOTED NONE PRSNT: CPT | Mod: CPTII,S$GLB,, | Performed by: INTERNAL MEDICINE

## 2024-01-22 PROCEDURE — 99999 PR PBB SHADOW E&M-EST. PATIENT-LVL IV: CPT | Mod: PBBFAC,,, | Performed by: INTERNAL MEDICINE

## 2024-02-01 ENCOUNTER — HOSPITAL ENCOUNTER (EMERGENCY)
Facility: HOSPITAL | Age: 74
Discharge: HOME | End: 2024-02-01
Attending: EMERGENCY MEDICINE
Payer: MEDICARE

## 2024-02-01 VITALS
BODY MASS INDEX: 30.64 KG/M2 | HEIGHT: 70 IN | OXYGEN SATURATION: 99 % | DIASTOLIC BLOOD PRESSURE: 74 MMHG | HEART RATE: 58 BPM | WEIGHT: 214 LBS | TEMPERATURE: 98 F | RESPIRATION RATE: 20 BRPM | SYSTOLIC BLOOD PRESSURE: 158 MMHG

## 2024-02-01 DIAGNOSIS — S01.81XA LACERATION OF BROW WITHOUT COMPLICATION, INITIAL ENCOUNTER: ICD-10-CM
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
## 2024-02-01 DIAGNOSIS — S61.011A LACERATION OF RIGHT THUMB WITHOUT FOREIGN BODY WITHOUT DAMAGE TO NAIL, INITIAL ENCOUNTER: ICD-10-CM

## 2024-02-01 PROCEDURE — 25000003 PHARM REV CODE 250: Mod: ER | Performed by: EMERGENCY MEDICINE

## 2024-02-01 PROCEDURE — 63600175 PHARM REV CODE 636 W HCPCS: Mod: ER | Performed by: EMERGENCY MEDICINE

## 2024-02-01 PROCEDURE — 99284 EMERGENCY DEPT VISIT MOD MDM: CPT | Mod: ER

## 2024-02-01 PROCEDURE — 90715 TDAP VACCINE 7 YRS/> IM: CPT | Mod: ER | Performed by: EMERGENCY MEDICINE

## 2024-02-01 PROCEDURE — 90471 IMMUNIZATION ADMIN: CPT | Mod: ER | Performed by: EMERGENCY MEDICINE

## 2024-02-01 RX ADMIN — BACITRACIN ZINC, NEOMYCIN, POLYMYXIN B 1 EACH: 400; 3.5; 5 OINTMENT TOPICAL at 09:02

## 2024-02-01 RX ADMIN — TETANUS TOXOID, REDUCED DIPHTHERIA TOXOID AND ACELLULAR PERTUSSIS VACCINE, ADSORBED 0.5 ML: 5; 2.5; 8; 8; 2.5 SUSPENSION INTRAMUSCULAR at 09:02

## 2024-02-02 NOTE — ED NOTES
Cleaned abrasion to the left brow & right thumb with a saline flush and sterile gauze.  Pt tolerated well.
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
## 2024-03-20 ENCOUNTER — OFFICE VISIT (OUTPATIENT)
Dept: UROLOGY | Facility: CLINIC | Age: 74
End: 2024-03-20
Payer: MEDICARE

## 2024-03-20 VITALS
DIASTOLIC BLOOD PRESSURE: 77 MMHG | WEIGHT: 214.31 LBS | BODY MASS INDEX: 30.68 KG/M2 | SYSTOLIC BLOOD PRESSURE: 125 MMHG | HEART RATE: 59 BPM | HEIGHT: 70 IN

## 2024-03-20 DIAGNOSIS — N40.1 BENIGN PROSTATIC HYPERPLASIA WITH WEAK URINARY STREAM: Primary | ICD-10-CM

## 2024-03-20 DIAGNOSIS — R39.12 BENIGN PROSTATIC HYPERPLASIA WITH WEAK URINARY STREAM: Primary | ICD-10-CM

## 2024-03-20 DIAGNOSIS — N48.6 PEYRONIE'S DISEASE: ICD-10-CM

## 2024-03-20 DIAGNOSIS — N52.9 ERECTILE DYSFUNCTION, UNSPECIFIED ERECTILE DYSFUNCTION TYPE: ICD-10-CM

## 2024-03-20 LAB
BILIRUB SERPL-MCNC: NEGATIVE MG/DL
BLOOD URINE, POC: NEGATIVE
CLARITY, POC UA: CLEAR
COLOR, POC UA: ABNORMAL
GLUCOSE UR QL STRIP: NORMAL
KETONES UR QL STRIP: NEGATIVE
LEUKOCYTE ESTERASE URINE, POC: NEGATIVE
NITRITE, POC UA: NEGATIVE
PH, POC UA: 6
POC RESIDUAL URINE VOLUME: 69 ML (ref 0–100)
PROTEIN, POC: ABNORMAL
SPECIFIC GRAVITY, POC UA: 1.01
UROBILINOGEN, POC UA: NORMAL

## 2024-03-20 PROCEDURE — 51798 US URINE CAPACITY MEASURE: CPT | Mod: S$GLB,,, | Performed by: UROLOGY

## 2024-03-20 PROCEDURE — 81002 URINALYSIS NONAUTO W/O SCOPE: CPT | Mod: S$GLB,,, | Performed by: UROLOGY

## 2024-03-20 PROCEDURE — 99999 PR PBB SHADOW E&M-EST. PATIENT-LVL IV: CPT | Mod: PBBFAC,,, | Performed by: UROLOGY

## 2024-03-20 PROCEDURE — 3288F FALL RISK ASSESSMENT DOCD: CPT | Mod: CPTII,S$GLB,, | Performed by: UROLOGY

## 2024-03-20 PROCEDURE — 1125F AMNT PAIN NOTED PAIN PRSNT: CPT | Mod: CPTII,S$GLB,, | Performed by: UROLOGY

## 2024-03-20 PROCEDURE — 1160F RVW MEDS BY RX/DR IN RCRD: CPT | Mod: CPTII,S$GLB,, | Performed by: UROLOGY

## 2024-03-20 PROCEDURE — 3078F DIAST BP <80 MM HG: CPT | Mod: CPTII,S$GLB,, | Performed by: UROLOGY

## 2024-03-20 PROCEDURE — 1159F MED LIST DOCD IN RCRD: CPT | Mod: CPTII,S$GLB,, | Performed by: UROLOGY

## 2024-03-20 PROCEDURE — 99214 OFFICE O/P EST MOD 30 MIN: CPT | Mod: S$GLB,,, | Performed by: UROLOGY

## 2024-03-20 PROCEDURE — 3074F SYST BP LT 130 MM HG: CPT | Mod: CPTII,S$GLB,, | Performed by: UROLOGY

## 2024-03-20 PROCEDURE — 1101F PT FALLS ASSESS-DOCD LE1/YR: CPT | Mod: CPTII,S$GLB,, | Performed by: UROLOGY

## 2024-03-20 RX ORDER — FINASTERIDE 5 MG/1
5 TABLET, FILM COATED ORAL DAILY
Qty: 30 TABLET | Refills: 11 | Status: SHIPPED | OUTPATIENT
Start: 2024-03-20 | End: 2025-03-20

## 2024-03-20 RX ORDER — SILDENAFIL CITRATE 20 MG/1
20 TABLET ORAL DAILY PRN
Qty: 30 TABLET | Refills: 5 | Status: ACTIVE | OUTPATIENT
Start: 2024-03-20 | End: 2024-09-16

## 2024-03-20 RX ORDER — TESTOSTERONE CYPIONATE 200 MG/ML
INJECTION, SOLUTION INTRAMUSCULAR
COMMUNITY
Start: 2023-11-28

## 2024-03-20 RX ORDER — GABAPENTIN 600 MG/1
600 TABLET ORAL 3 TIMES DAILY
COMMUNITY
Start: 2024-02-29

## 2024-03-20 RX ORDER — CLONIDINE HYDROCHLORIDE 0.1 MG/1
0.1 TABLET ORAL 2 TIMES DAILY
COMMUNITY
Start: 2024-03-13

## 2024-03-20 RX ORDER — TAMSULOSIN HYDROCHLORIDE 0.4 MG/1
0.4 CAPSULE ORAL DAILY
Qty: 90 CAPSULE | Refills: 3 | Status: SHIPPED | OUTPATIENT
Start: 2024-03-20 | End: 2025-03-20

## 2024-03-20 NOTE — PROGRESS NOTES
Subjective:       Patient ID: Jerardo Callejas is a 73 y.o. male.    Chief Complaint:  Follow-up     This is a 73 y.o.  male patient with recurrent UTI, BPH.    H/o BPH on tamsulosin for years followed at OSH.  He had UTI in 11/2021 and two infections in 8/2022 with Klebsiella and E coli.  Was septic with recent infection and admitted to hospital 8/2022.  Reports years of LUTs.  Does have worsening frequency/urgency with UTI and sometimes dysuria.  Denies gross hematuria recently but had this years ago.  AUA SS 18/4, PVR ok.   H/o negative prostate biopsy in past.  Reports low libido, T normal 336 on second check.   Follow up for CT urogram review and cystoscopy. CTU no significant  findings 9/2022.  Cystoscopy negative for findings to explain microhematuria.    UTI 10/22 and treated, E coli.   Completed antibiotic and no symptoms of infection. Still LUTs.   Still having back pain, I feel likely not related to kidneys or bladder given negative CT urogram in 9/22.  In 11/2022 he was not interested in operative intervention on the prostate.  He does admit to erectile dysfunction that has been ongoing and was given Sildenafil.    3/20/24:  reports never took sildenafil, has peyronie's and reports ventral bend over 30 degrees.  Agreed to finasteride. Never took Sildenafil, sometimes has issues obtaining or maintaining erections.      PSA   9/22--3.9, free 18%        IPSS Questionnaire (AUA-SS) 11/2022:  Over the past month    1)  Incomplete Emptying - How often have you had a sensation of not emptying your bladder completely after you finish urinating?  1 - Less than 1 time in 5   2)  Frequency - How often have you had to urinate again less than two hours after you finished urinating? 1 - Less than 1 time in 5   3)  Intermittency - How often have you found you stopped and started again several times when you urinated?  2 - Less than half the time   4) Urgency - How difficult have you found it to postpone urination?   5 - Almost always   5) Weak Stream - How often have you had a weak urinary stream?  5 - Almost always   6) Straining  - How often have you had to push or strain to begin urination?  1 - Less than 1 time in 5   7) Nocturia - How many times did you most typically get up to urinate from the time you went to bed until the time you got up in the morning?  3 - 3 times   Total score:  0-7 mild, 8-19 moderate, 20-35 severe 18   Quality of Life:  4 - Mostly Dissatisfied              Lab Results   Component Value Date    CREATININE 1.1 08/29/2023       ---  PMH/PSH/Medications/Allergies/Social history reviewed and as in chart.    Review of Systems   Constitutional:  Negative for activity change, chills and fever.   HENT:  Negative for congestion.    Respiratory:  Negative for cough, chest tightness and shortness of breath.    Cardiovascular:  Negative for chest pain and palpitations.   Gastrointestinal:  Negative for abdominal distention, abdominal pain, nausea and vomiting.   Genitourinary:  Negative for difficulty urinating, flank pain, frequency, hematuria, penile pain, scrotal swelling, testicular pain and urgency.   Musculoskeletal:  Negative for gait problem.       Objective:      Physical Exam  HENT:      Head: Atraumatic.   Pulmonary:      Effort: Pulmonary effort is normal.   Neurological:      General: No focal deficit present.      Mental Status: He is alert and oriented to person, place, and time.           No results found for this or any previous visit (from the past 2160 hour(s)).      Testosterone, Total 304 - 1227 ng/dL 289 Low     Repeat T 336     Assessment:     Problem Noted   Microhematuria 9/6/2022    CT urogram negative 9/2022  Cystoscopy with enlarged prostate, no lesions/masses       Bph (Benign Prostatic Hyperplasia) 7/21/2021    68 gm prostate by CT  No findings for microhematuria  On Tamsulosin for years  Initial AUA SS 18/4  PSA 3.9, free 18 % in 9/22, negative biopsy in past  Qmax 10, voided  175     Peyronie's Disease 3/20/2024   Erectile Dysfunction 3/20/2024   Recurrent Uti (Resolved) 9/6/2022    Recurrent UTI  Hospitalized 8/2022 for UTI  Klebsiella and E coli 8/2022  E coli 10/2022    BPH on tamsulosin for years    Persistent microhematuria on UAs         Plan:      Discussed  possibility that recurrent infection caused by enlarged prostate urinary obstruction demonstrated by a uroflow and symptoms.  Recommended consider outlet procedure, Rezum versus holmium laser enucleation of prostate.  He does not wish to undergo surgery at this time.  Discussed risk of continued infections and worsening urination.   Start sildenafil for erectile dysfunction.  Side effects, risks, benefits and alternatives of the medication discussed.    Also start finasteride.    Offered referral to andrologist.  Wishes to hold for now and see how sildenafil does for peyronies.   Follow up in 6 months    Shay Horan MD

## 2024-04-20 ENCOUNTER — HOSPITAL ENCOUNTER (EMERGENCY)
Facility: HOSPITAL | Age: 74
Discharge: HOME | End: 2024-04-20
Attending: EMERGENCY MEDICINE
Payer: MEDICARE

## 2024-04-20 VITALS
SYSTOLIC BLOOD PRESSURE: 167 MMHG | BODY MASS INDEX: 30.35 KG/M2 | RESPIRATION RATE: 16 BRPM | WEIGHT: 212 LBS | TEMPERATURE: 98 F | OXYGEN SATURATION: 98 % | HEART RATE: 53 BPM | HEIGHT: 70 IN | DIASTOLIC BLOOD PRESSURE: 82 MMHG

## 2024-04-20 DIAGNOSIS — I10 HYPERTENSION, UNSPECIFIED TYPE: Primary | ICD-10-CM

## 2024-04-20 LAB
BILIRUB UR QL STRIP: NEGATIVE
CLARITY UR REFRACT.AUTO: CLEAR
COLOR UR AUTO: YELLOW
GLUCOSE UR QL STRIP: NEGATIVE
HGB UR QL STRIP: NEGATIVE
KETONES UR QL STRIP: NEGATIVE
LEUKOCYTE ESTERASE UR QL STRIP: NEGATIVE
NITRITE UR QL STRIP: NEGATIVE
PH UR STRIP: 6 [PH] (ref 5–8)
PROT UR QL STRIP: NEGATIVE
SP GR UR STRIP: <=1.005 (ref 1–1.03)
URN SPEC COLLECT METH UR: ABNORMAL
UROBILINOGEN UR STRIP-ACNC: NEGATIVE EU/DL

## 2024-04-20 PROCEDURE — 99282 EMERGENCY DEPT VISIT SF MDM: CPT | Mod: ER

## 2024-04-20 PROCEDURE — 81003 URINALYSIS AUTO W/O SCOPE: CPT | Mod: ER | Performed by: EMERGENCY MEDICINE

## 2024-04-20 NOTE — ED NOTES
Upon arrival to patient room after triage patient reports he has been having frequent urination. No other urinary symptoms associated with urinary frequency.

## 2024-04-20 NOTE — ED PROVIDER NOTES
Encounter Date: 4/19/2024       History     Chief Complaint   Patient presents with    Hypertension     PT to the ED with C/O fluctuating BP throughout the day. PT reports being compliant with BP medications. PT reports taking an extra Losartan 2-3 hrs ago after BP was 176/100 at home. Patient is asymptomatic.       Jerardo Callejas is a 73 y.o. male who  has a past medical history of Back pain, chronic, BPH (benign prostatic hyperplasia), DVT (deep venous thrombosis), Hypertension, Mitral valve prolapse syndrome, and Prostate enlargement.    He presents today due to elevated blood pressure readings at home.  Patient states he has no symptoms at this of weakness or dizziness.  He does report some urinary frequency and states he feels mildly anxious.  He has no other concerns at this time.  He states his cardiologist has given him clonidine 0.1 mg tablets q.6 hours to take if his blood pressure readings are elevated aboe 185 systolic. He took an extra blood pressure pill (Losartan/hctz) this evening .    The history is provided by the patient.     Review of patient's allergies indicates:   Allergen Reactions    Ciprofloxacin Palpitations    Penicillins Palpitations and Anxiety     Past Medical History:   Diagnosis Date    Back pain, chronic     BPH (benign prostatic hyperplasia)     DVT (deep venous thrombosis)     Hypertension     Mitral valve prolapse syndrome     Prostate enlargement      Past Surgical History:   Procedure Laterality Date    NECK SURGERY       Family History   Problem Relation Name Age of Onset    Hypertension Mother      Stroke Father       Social History     Tobacco Use    Smoking status: Former    Smokeless tobacco: Never    Tobacco comments:     >40 years ago   Substance Use Topics    Alcohol use: No    Drug use: No     Review of Systems   Constitutional:  Negative for fever.   HENT:  Negative for sore throat.    Respiratory:  Negative for shortness of breath.    Cardiovascular:  Negative for  chest pain.   Gastrointestinal:  Negative for nausea.   Genitourinary:  Positive for frequency. Negative for dysuria.   Musculoskeletal:  Negative for back pain.   Skin:  Negative for rash.   Neurological:  Negative for weakness.   Hematological:  Does not bruise/bleed easily.       Physical Exam     Initial Vitals [04/20/24 0003]   BP Pulse Resp Temp SpO2   (!) 174/82 (!) 51 16 97.7 °F (36.5 °C) 99 %      MAP       --         Physical Exam    Nursing note and vitals reviewed.  Constitutional: He appears well-developed and well-nourished. He is not diaphoretic. No distress.   HENT:   Head: Normocephalic and atraumatic.   Mouth/Throat: Oropharynx is clear and moist.   Eyes: EOM are normal. Pupils are equal, round, and reactive to light.   Neck: No tracheal deviation present.   Cardiovascular:  Normal rate, regular rhythm, normal heart sounds and intact distal pulses.           Pulmonary/Chest: Breath sounds normal. No stridor. No respiratory distress.   Abdominal: Abdomen is soft. He exhibits no distension and no mass. There is no abdominal tenderness.   Musculoskeletal:         General: No edema. Normal range of motion.     Neurological: He is alert.   CN 2-12 intact  Finger to nose within normal limits    Gait normal  Equal strength to bilateral upper extremities, SILT  Equal strength to bilateral lower extremities, SILT     Skin: Skin is warm and dry. Capillary refill takes less than 2 seconds. No rash noted.   Psychiatric: He has a normal mood and affect. His behavior is normal. Thought content normal.         ED Course   Procedures  Labs Reviewed   URINALYSIS, REFLEX TO URINE CULTURE - Abnormal; Notable for the following components:       Result Value    Specific Gravity, UA <=1.005 (*)     All other components within normal limits    Narrative:     Preferred Collection Type->Urine, Clean Catch  Specimen Source->Urine          Imaging Results    None          Medications - No data to display  Medical Decision  Making  Differential Diagnosis includes, but is not limited to:  Hypertensive encephalopathy, CVA/TIA, intracranial hemorrhage, MI/ACS, aortic/carotid artery dissection, AAA, hypertensive nephropathy, medication non-compliance, anxiety, drug abuse/intoxication, essential hypertension      Amount and/or Complexity of Data Reviewed  Labs:  Decision-making details documented in ED Course.    Risk  Decision regarding hospitalization.  Risk Details: After complete evaluation, including thorough history and physical exam, the patient was found to have asymptomatic elevated blood pressure, likely secondary to  chronic uncontrolled HTN. The patient's symptoms are not consistent with SAH/intracranial bleed. Physical exam is benign without focal weakness, sensory deficit, or cerebellar dysfunction to suggest acute stroke or intracranial mass. There is no meningismus, fever, or other evidence of infection to suggest meningitis/encephalitis. There is no other evidence of end-organ damage consistent with hypertensive urgency/emergency at time of this evaluation. I do not feel further ED evaluation or intervention is warranted. The patient was counseled extensively on medication compliance and was instructed to follow-up with a PCP for further management of elevated BP.    The patient appears to be low risk for an emergent medical condition and I feel it is safe and appropriate at this time for the patient to be discharged to follow-up as detailed in their discharge instructions for reevaluation and possible continued outpatient workup and management. I have discussed the specifics of the workup with the patient and the patient has verbalized understanding of the details of the workup, the diagnosis, the treatment plan, and the need for outpatient follow-up.  Although the patient has no emergent etiology today this does not preclude the development of an emergent condition so in addition, I have advised the patient that they can  return to the ED and/or activate EMS at any time with worsening of their symptoms, change of their symptoms, or with any other medical complaint.  The patient remained comfortable and stable during their visit in the ED.  Discharge and follow-up instructions discussed with the patient who expressed understanding and willingness to comply with my recommendations.                 ED Course as of 04/21/24 1100   Sat Apr 20, 2024   0134 Urinalysis, Reflex to Urine Culture Urine, Clean Catch(!) [RN]      ED Course User Index  [RN] Sj Khan Jr., MD                           Clinical Impression:  Final diagnoses:  [I10] Hypertension, unspecified type (Primary)          ED Disposition Condition    Discharge Stable          ED Prescriptions    None       Follow-up Information       Follow up With Specialties Details Why Contact Info    Vanessa Landin MD Internal Medicine In 3 days  [address]  [facility] [address] [phone]            Portions of this note were dictated using voice recognition software and may contain dictation related errors in spelling/grammar/syntax not found on text review       Sj Khan Jr., MD  04/21/24 1100

## 2024-04-20 NOTE — DISCHARGE INSTRUCTIONS

## 2024-05-19 ENCOUNTER — HOSPITAL ENCOUNTER (EMERGENCY)
Facility: HOSPITAL | Age: 74
Discharge: HOME | End: 2024-05-19
Attending: FAMILY MEDICINE
Payer: MEDICARE

## 2024-05-19 VITALS
SYSTOLIC BLOOD PRESSURE: 152 MMHG | HEIGHT: 70 IN | BODY MASS INDEX: 30.49 KG/M2 | RESPIRATION RATE: 20 BRPM | HEART RATE: 63 BPM | WEIGHT: 213 LBS | DIASTOLIC BLOOD PRESSURE: 70 MMHG | TEMPERATURE: 99 F | OXYGEN SATURATION: 99 %

## 2024-05-19 DIAGNOSIS — R94.31 ABNORMAL EKG: ICD-10-CM

## 2024-05-19 DIAGNOSIS — I95.9 HYPOTENSION: Primary | ICD-10-CM

## 2024-05-19 LAB
ALBUMIN SERPL BCP-MCNC: 4.2 G/DL (ref 3.5–5.2)
ALP SERPL-CCNC: 73 U/L (ref 38–126)
ALT SERPL W/O P-5'-P-CCNC: 21 U/L (ref 10–44)
ANION GAP SERPL CALC-SCNC: 10 MMOL/L (ref 8–16)
AST SERPL-CCNC: 30 U/L (ref 15–46)
BASOPHILS # BLD AUTO: 0.02 K/UL (ref 0–0.2)
BASOPHILS NFR BLD: 0.4 % (ref 0–1.9)
BILIRUB SERPL-MCNC: 0.7 MG/DL (ref 0.1–1)
CALCIUM SERPL-MCNC: 9.8 MG/DL (ref 8.7–10.5)
CHLORIDE SERPL-SCNC: 98 MMOL/L (ref 95–110)
CO2 SERPL-SCNC: 30 MMOL/L (ref 23–29)
CREAT SERPL-MCNC: 1.39 MG/DL (ref 0.5–1.4)
DIFFERENTIAL METHOD BLD: ABNORMAL
EOSINOPHIL # BLD AUTO: 0 K/UL (ref 0–0.5)
EOSINOPHIL NFR BLD: 0.4 % (ref 0–8)
ERYTHROCYTE [DISTWIDTH] IN BLOOD BY AUTOMATED COUNT: 13.6 % (ref 11.5–14.5)
EST. GFR  (NO RACE VARIABLE): 52.5 ML/MIN/1.73 M^2
GLUCOSE SERPL-MCNC: 107 MG/DL (ref 70–110)
HCT VFR BLD AUTO: 43.6 % (ref 40–54)
HGB BLD-MCNC: 14.4 G/DL (ref 14–18)
IMM GRANULOCYTES # BLD AUTO: 0.02 K/UL (ref 0–0.04)
IMM GRANULOCYTES NFR BLD AUTO: 0.4 % (ref 0–0.5)
LYMPHOCYTES # BLD AUTO: 1 K/UL (ref 1–4.8)
LYMPHOCYTES NFR BLD: 17.3 % (ref 18–48)
MCH RBC QN AUTO: 29.6 PG (ref 27–31)
MCHC RBC AUTO-ENTMCNC: 33 G/DL (ref 32–36)
MCV RBC AUTO: 90 FL (ref 82–98)
MONOCYTES # BLD AUTO: 0.7 K/UL (ref 0.3–1)
MONOCYTES NFR BLD: 11.9 % (ref 4–15)
NEUTROPHILS # BLD AUTO: 3.9 K/UL (ref 1.8–7.7)
NEUTROPHILS NFR BLD: 69.6 % (ref 38–73)
NRBC BLD-RTO: 0 /100 WBC
PLATELET # BLD AUTO: 166 K/UL (ref 150–450)
PMV BLD AUTO: 10.1 FL (ref 9.2–12.9)
POTASSIUM SERPL-SCNC: 3.9 MMOL/L (ref 3.5–5.1)
PROT SERPL-MCNC: 7.3 G/DL (ref 6–8.4)
RBC # BLD AUTO: 4.87 M/UL (ref 4.6–6.2)
SODIUM SERPL-SCNC: 138 MMOL/L (ref 136–145)
TROPONIN I SERPL-MCNC: <0.012 NG/ML (ref 0.01–0.03)
TSH SERPL DL<=0.005 MIU/L-ACNC: 1.09 UIU/ML (ref 0.4–4)
UUN UR-MCNC: 33 MG/DL (ref 2–20)
WBC # BLD AUTO: 5.56 K/UL (ref 3.9–12.7)

## 2024-05-19 PROCEDURE — 93010 ELECTROCARDIOGRAM REPORT: CPT | Mod: 76,,, | Performed by: INTERNAL MEDICINE

## 2024-05-19 PROCEDURE — 80053 COMPREHEN METABOLIC PANEL: CPT | Mod: ER

## 2024-05-19 PROCEDURE — 84443 ASSAY THYROID STIM HORMONE: CPT | Mod: ER

## 2024-05-19 PROCEDURE — 93005 ELECTROCARDIOGRAM TRACING: CPT | Mod: ER

## 2024-05-19 PROCEDURE — 84484 ASSAY OF TROPONIN QUANT: CPT | Mod: ER

## 2024-05-19 PROCEDURE — 99284 EMERGENCY DEPT VISIT MOD MDM: CPT | Mod: 25,ER

## 2024-05-19 PROCEDURE — 93010 ELECTROCARDIOGRAM REPORT: CPT | Mod: ,,, | Performed by: INTERNAL MEDICINE

## 2024-05-19 PROCEDURE — 85025 COMPLETE CBC W/AUTO DIFF WBC: CPT | Mod: ER

## 2024-05-19 NOTE — ED PROVIDER NOTES
Encounter Date: 5/19/2024       History     Chief Complaint   Patient presents with    Hypotension     Patient reports blurred vision and low blood pressure reading at home today.     Headache     Posterior headache that began yesterday.      74-year-old male was driving back home from [place] when he noticed visual changes.  He came home and checked blood pressure which was low.  Patient recollects blood pressure being 75/48.  Repeat blood pressure 90/58.  Patient's symptoms have resolved within an hour.  He is asymptomatic on arrival to ED.  Patient claims he had left posterior headache for few minutes yesterday.  Denies current headache.  Denies chest pain, palpitations.  No shortness of breath.  History of DVT, hypertension, mitral valve prolapse prostate hypertrophy.  Chronic back pain.    The history is provided by the patient.     Review of patient's allergies indicates:   Allergen Reactions    Ciprofloxacin Palpitations    Penicillins Palpitations and Anxiety     Past Medical History:   Diagnosis Date    Back pain, chronic     BPH (benign prostatic hyperplasia)     DVT (deep venous thrombosis)     Hypertension     Mitral valve prolapse syndrome     Prostate enlargement      Past Surgical History:   Procedure Laterality Date    NECK SURGERY       Family History   Problem Relation Name Age of Onset    Hypertension Mother      Stroke Father       Social History     Tobacco Use    Smoking status: Former    Smokeless tobacco: Never    Tobacco comments:     >40 years ago   Substance Use Topics    Alcohol use: No    Drug use: No     Review of Systems   Constitutional:  Negative for activity change.   Eyes:  Positive for visual disturbance.   Gastrointestinal:  Negative for abdominal distention, abdominal pain, nausea and vomiting.   Neurological:  Positive for weakness. Negative for dizziness, seizures, syncope, facial asymmetry, speech difficulty, light-headedness, numbness and headaches.   All other systems  reviewed and are negative.      Physical Exam     Initial Vitals [05/19/24 1506]   BP Pulse Resp Temp SpO2   (!) 106/56 68 20 98.7 °F (37.1 °C) 96 %      MAP       --         Physical Exam    Nursing note and vitals reviewed.  Constitutional: Vital signs are normal. He appears well-developed and well-nourished. He is active. No distress.   HENT:   Head: Normocephalic.   Nose: Nose normal.   Mouth/Throat: Oropharynx is clear and moist and mucous membranes are normal.   Eyes: Conjunctivae, EOM and lids are normal. Pupils are equal, round, and reactive to light.   Neck: Neck supple.   Normal range of motion.  Cardiovascular:  Normal rate, regular rhythm, S1 normal, S2 normal and normal heart sounds.           Pulmonary/Chest: Breath sounds normal. No respiratory distress. He has no wheezes. He has no rhonchi. He has no rales.   Abdominal: Abdomen is soft. Bowel sounds are normal. He exhibits no distension. There is no abdominal tenderness. There is no rebound.   Musculoskeletal:         General: Normal range of motion.      Right upper arm: Normal.      Left upper arm: Normal.      Cervical back: Normal range of motion and neck supple.      Right lower leg: Normal.      Left lower leg: Normal.     Neurological: He is alert and oriented to person, place, and time. He has normal strength. He displays normal reflexes. No cranial nerve deficit or sensory deficit. GCS score is 15. GCS eye subscore is 4. GCS verbal subscore is 5. GCS motor subscore is 6.   Skin: Skin is warm. Capillary refill takes less than 2 seconds.   Psychiatric: He has a normal mood and affect. His speech is normal and behavior is normal. Thought content normal. Cognition and memory are normal.         ED Course   Procedures  Labs Reviewed   CBC W/ AUTO DIFFERENTIAL - Abnormal; Notable for the following components:       Result Value    Lymph % 17.3 (*)     All other components within normal limits   COMPREHENSIVE METABOLIC PANEL - Abnormal; Notable for  the following components:    CO2 30 (*)     BUN 33 (*)     eGFR 52.5 (*)     All other components within normal limits   TROPONIN I   TSH   TSH     EKG Readings: (Independently Interpreted)   Rhythm: Atrial Fibrillation. Heart Rate: 74. Axis: Left Axis Deviation. Clinical Impression: Atrial Fibrillation       Imaging Results    None          Medications - No data to display  Medical Decision Making  Differential diagnosis including not limited to- cardiac arrhythmia, orthostatic hypotension, medication induced hypotension, dehydration, hypoglycemia.  Electrolyte imbalance.    Orthostatics negative in ED.  EKG paroxysmal atrial fibrillation noted.  Versus artifact since patient is also having tremors.  Patient already on Eliquis.  For previous DVT.  Labs reviewed with CBC normal.  Repeat EKG ordered to re-evaluate.  Pending repeat evaluation patient is checked out to Dr. Villafuerte at shift change    Amount and/or Complexity of Data Reviewed  Labs: ordered.     Details: Troponin negative, TSH normal levels, lytes within normal range, CBC normal.  ECG/medicine tests: ordered and independent interpretation performed. Decision-making details documented in ED Course.                                      Clinical Impression:  Final diagnoses:  [I95.9] Hypotension (Primary)  [R94.31] Abnormal EKG          ED Disposition Condition    Discharge Stable          ED Prescriptions    None       Follow-up Information       Follow up With Specialties Details Why Contact Info    Vanessa Landin MD Internal Medicine Schedule an appointment as soon as possible for a visit   [address] [facility] [address]  [facility] [address]  [phone]               Carlo Rehman MD  05/20/24 5499

## 2024-05-21 LAB
OHS QRS DURATION: 92 MS
OHS QRS DURATION: 94 MS
OHS QTC CALCULATION: 441 MS
OHS QTC CALCULATION: 455 MS

## 2024-06-14 ENCOUNTER — HOSPITAL ENCOUNTER (EMERGENCY)
Facility: HOSPITAL | Age: 74
Discharge: HOME | End: 2024-06-14
Attending: EMERGENCY MEDICINE
Payer: MEDICARE

## 2024-06-14 VITALS
RESPIRATION RATE: 20 BRPM | DIASTOLIC BLOOD PRESSURE: 72 MMHG | WEIGHT: 210 LBS | HEIGHT: 70 IN | HEART RATE: 67 BPM | SYSTOLIC BLOOD PRESSURE: 150 MMHG | TEMPERATURE: 98 F | BODY MASS INDEX: 30.06 KG/M2 | OXYGEN SATURATION: 97 %

## 2024-06-14 DIAGNOSIS — Z71.1 FEARED CONDITION NOT DEMONSTRATED: Primary | ICD-10-CM

## 2024-06-14 PROCEDURE — 99281 EMR DPT VST MAYX REQ PHY/QHP: CPT | Mod: ER

## 2024-06-15 NOTE — ED PROVIDER NOTES
Encounter Date: 6/14/2024       History     Chief Complaint   Patient presents with    Fever    Male  Problem     Patient saw his PCP today and was placed on a course of antibiotics for a UTI. He reports fever, burning and foul odor to urine that began 3 days ago.   Patient is concerned that he had a temp of 99.5 degress pta.        Patient is a 74 y.o. male who presents to the ED for evaluation of possible fever.  Was diagnosed with a UTI by his PCP and prescribed Bactrim.  Patient picked up the Bactrim earlier today and took 1 dose.  Patient states that he checked his temperature which was 99.5°.  Came to the ER for evaluation.  Denies any other symptoms at this time.        The history is provided by the patient.     Review of patient's allergies indicates:   Allergen Reactions    Ciprofloxacin Palpitations    Penicillins Palpitations and Anxiety     Past Medical History:   Diagnosis Date    Back pain, chronic     BPH (benign prostatic hyperplasia)     DVT (deep venous thrombosis)     Hypertension     Mitral valve prolapse syndrome     Prostate enlargement      Past Surgical History:   Procedure Laterality Date    NECK SURGERY       Family History   Problem Relation Name Age of Onset    Hypertension Mother      Stroke Father       Social History     Tobacco Use    Smoking status: Former    Smokeless tobacco: Never    Tobacco comments:     >40 years ago   Substance Use Topics    Alcohol use: No    Drug use: No     Review of Systems   Constitutional:  Negative for chills and fever.   Gastrointestinal:  Negative for abdominal distention, nausea and vomiting.   Genitourinary:  Negative for dysuria, flank pain, frequency, hematuria, penile swelling, scrotal swelling, testicular pain and urgency.   Musculoskeletal:  Negative for back pain.       Physical Exam     Initial Vitals [06/14/24 2012]   BP Pulse Resp Temp SpO2   (!) 150/72 67 20 98.2 °F (36.8 °C) 97 %      MAP       --         Physical  Exam    Constitutional: He appears well-developed and well-nourished.   HENT:   Head: Normocephalic and atraumatic.   Abdominal: He exhibits no distension. There is no abdominal tenderness.     Neurological: He is alert.         ED Course   Procedures  Labs Reviewed - No data to display       Imaging Results    None          Medications - No data to display  Medical Decision Making  Patient is an afebrile, well appearing 74 y.o. male who presents for evaluation. VSS and do not suggest sepsis.  Denies chest pain, SOB, or any other complaints at this time. A&Ox4. The patient remained comfortable and stable during their visit in the ED. Details of ED course documented in ED workup.     Differential diagnosis includes, but is not limited to: Acute cystitis, feared condition not demonstrated    There are no concerning features on physical exam to suggest an emergent or life threatening condition.  Patient afebrile in ED.  Vital signs stable.  Encouraged patient to continue taking his antibiotics as prescribed by his PCP and to follow up with PCP for any questions.  No further intervention is indicated at this time and I am of the belief that that it is safe to discharge the patient from the emergency department.     Patient has been counseled regarding the need for follow-up as well as the indications to return to the emergency room should new or worrisome developments (including but not limited to worsening pain, cyanosis, or loss of strength or sensation) occur. Additionally, patient instructed to follow up with PCP  in 2-3 days for recheck of today's complaints.    Discharge and follow-up instructions discussed with the patient who expressed understanding and willingness to comply with recommendations. Patient discharged from the emergency department in stable condition, in no acute distress.                                             Clinical Impression:  Final diagnoses:  [Z71.1] Feared condition not demonstrated  (Primary)          ED Disposition Condition    Discharge Stable          ED Prescriptions    None       Follow-up Information    None          Dafne Thompson PA-C  06/14/24 2039

## 2024-06-15 NOTE — DISCHARGE INSTRUCTIONS
Continue taking the antibiotics prescribed by her primary care provider as prescribed    Take your Pyridium as prescribed.

## 2024-06-15 NOTE — ED NOTES
APPEARANCE: Alert, oriented and in no acute distress.  HEENT: Speaks without hoarseness.  CARDIAC: Normal rate and rhythm.    PERIPHERAL VASCULAR: peripheral pulses present. Normal cap refill. No edema. Warm to touch.    RESPIRATORY:Normal rate and effort. Respirations are equal and unlabored no obvious signs of distress.  GASTRO: soft, nondistended, nontender. Denies nausea, vomiting, or diarrhea.  : Voids spontaneously with frequency, burning and foul odor to urine.   MUSC: Full ROM. No obvious deformity. Ambulatory with a steady gait  SKIN: Skin is warm and dry, without discoloration. Mucous membranes moist. No fever.   NEURO: Pt is awake, alert, aware of environment. No neurologic deficits noted.

## 2024-07-11 ENCOUNTER — HOSPITAL ENCOUNTER (EMERGENCY)
Facility: HOSPITAL | Age: 74
Discharge: HOME | End: 2024-07-12
Attending: STUDENT IN AN ORGANIZED HEALTH CARE EDUCATION/TRAINING PROGRAM
Payer: MEDICARE

## 2024-07-11 DIAGNOSIS — N39.0 URINARY TRACT INFECTION WITHOUT HEMATURIA, SITE UNSPECIFIED: Primary | ICD-10-CM

## 2024-07-11 PROCEDURE — 87088 URINE BACTERIA CULTURE: CPT | Mod: ER | Performed by: STUDENT IN AN ORGANIZED HEALTH CARE EDUCATION/TRAINING PROGRAM

## 2024-07-11 PROCEDURE — 87086 URINE CULTURE/COLONY COUNT: CPT | Mod: ER | Performed by: STUDENT IN AN ORGANIZED HEALTH CARE EDUCATION/TRAINING PROGRAM

## 2024-07-11 PROCEDURE — 87502 INFLUENZA DNA AMP PROBE: CPT | Mod: ER | Performed by: STUDENT IN AN ORGANIZED HEALTH CARE EDUCATION/TRAINING PROGRAM

## 2024-07-11 PROCEDURE — 81000 URINALYSIS NONAUTO W/SCOPE: CPT | Mod: ER | Performed by: STUDENT IN AN ORGANIZED HEALTH CARE EDUCATION/TRAINING PROGRAM

## 2024-07-11 PROCEDURE — 87186 SC STD MICRODIL/AGAR DIL: CPT | Mod: ER | Performed by: STUDENT IN AN ORGANIZED HEALTH CARE EDUCATION/TRAINING PROGRAM

## 2024-07-11 PROCEDURE — U0002 COVID-19 LAB TEST NON-CDC: HCPCS | Mod: ER | Performed by: STUDENT IN AN ORGANIZED HEALTH CARE EDUCATION/TRAINING PROGRAM

## 2024-07-11 PROCEDURE — 99284 EMERGENCY DEPT VISIT MOD MDM: CPT | Mod: ER

## 2024-07-12 VITALS
DIASTOLIC BLOOD PRESSURE: 74 MMHG | BODY MASS INDEX: 30.06 KG/M2 | HEIGHT: 70 IN | TEMPERATURE: 98 F | OXYGEN SATURATION: 100 % | WEIGHT: 210 LBS | SYSTOLIC BLOOD PRESSURE: 132 MMHG | RESPIRATION RATE: 16 BRPM | HEART RATE: 75 BPM

## 2024-07-12 LAB
BACTERIA #/AREA URNS AUTO: ABNORMAL /HPF
BILIRUB UR QL STRIP: NEGATIVE
CLARITY UR REFRACT.AUTO: CLEAR
COLOR UR AUTO: YELLOW
GLUCOSE UR QL STRIP: NEGATIVE
HGB UR QL STRIP: ABNORMAL
KETONES UR QL STRIP: NEGATIVE
LEUKOCYTE ESTERASE UR QL STRIP: ABNORMAL
MICROSCOPIC COMMENT: ABNORMAL
NITRITE UR QL STRIP: POSITIVE
PH UR STRIP: 6 [PH] (ref 5–8)
PROT UR QL STRIP: NEGATIVE
RBC #/AREA URNS AUTO: 5 /HPF (ref 0–4)
SP GR UR STRIP: 1.01 (ref 1–1.03)
URN SPEC COLLECT METH UR: ABNORMAL
UROBILINOGEN UR STRIP-ACNC: NEGATIVE EU/DL
WBC #/AREA URNS AUTO: 100 /HPF (ref 0–5)

## 2024-07-12 PROCEDURE — 63600175 PHARM REV CODE 636 W HCPCS: Mod: ER | Performed by: STUDENT IN AN ORGANIZED HEALTH CARE EDUCATION/TRAINING PROGRAM

## 2024-07-12 PROCEDURE — 96372 THER/PROPH/DIAG INJ SC/IM: CPT | Performed by: STUDENT IN AN ORGANIZED HEALTH CARE EDUCATION/TRAINING PROGRAM

## 2024-07-12 RX ORDER — CEFTRIAXONE 1 G/1
1 INJECTION, POWDER, FOR SOLUTION INTRAMUSCULAR; INTRAVENOUS
Status: COMPLETED | OUTPATIENT
Start: 2024-07-12 | End: 2024-07-12

## 2024-07-12 RX ORDER — CEFUROXIME AXETIL 250 MG/1
500 TABLET ORAL
Status: DISCONTINUED | OUTPATIENT
Start: 2024-07-12 | End: 2024-07-12

## 2024-07-12 RX ORDER — CEFUROXIME AXETIL 500 MG/1
500 TABLET ORAL 2 TIMES DAILY
Qty: 14 TABLET | Refills: 0 | Status: SHIPPED | OUTPATIENT
Start: 2024-07-12 | End: 2024-07-12

## 2024-07-12 RX ORDER — CEFUROXIME AXETIL 500 MG/1
500 TABLET ORAL 2 TIMES DAILY
Qty: 14 TABLET | Refills: 0 | Status: SHIPPED | OUTPATIENT
Start: 2024-07-12 | End: 2024-07-19

## 2024-07-12 RX ADMIN — CEFTRIAXONE SODIUM 1 G: 1 INJECTION, POWDER, FOR SOLUTION INTRAMUSCULAR; INTRAVENOUS at 12:07

## 2024-07-12 NOTE — DISCHARGE INSTRUCTIONS

## 2024-07-12 NOTE — ED PROVIDER NOTES
Encounter Date: 7/11/2024       History     Chief Complaint   Patient presents with    Fever     PT to the ED with c/o fever, chills, body aches, and headache to occipital region. PT states symptoms started at approx 8 pm this evening. Two extra strength Tylenol taken at 9 pm evening. No cough, SOD, or sore throat.      74-year-old male presents with reported fever of 99.5 at home.  He reports intermittent chills in a transient headache to the back of his head.  He says he noticed these symptoms and a p.m. this evening.  No recent instrumentation.  He additionally complains of dysuria, dribbling and urinary hesitancy.  He reports history of BPH.      Review of patient's allergies indicates:   Allergen Reactions    Ciprofloxacin Palpitations    Penicillins Palpitations and Anxiety     Past Medical History:   Diagnosis Date    Back pain, chronic     BPH (benign prostatic hyperplasia)     DVT (deep venous thrombosis)     Hypertension     Mitral valve prolapse syndrome     Prostate enlargement      Past Surgical History:   Procedure Laterality Date    NECK SURGERY       Family History   Problem Relation Name Age of Onset    Hypertension Mother      Stroke Father       Social History     Tobacco Use    Smoking status: Former    Smokeless tobacco: Never    Tobacco comments:     >40 years ago   Substance Use Topics    Alcohol use: No    Drug use: No     Review of Systems    Physical Exam     Initial Vitals [07/11/24 2306]   BP Pulse Resp Temp SpO2   (!) 151/72 70 17 98.9 °F (37.2 °C) 97 %      MAP       --         Physical Exam    Nursing note and vitals reviewed.  Constitutional: He appears well-developed and well-nourished.   HENT:   Head: Normocephalic and atraumatic.   Eyes: EOM are normal. Pupils are equal, round, and reactive to light.   Neck: Neck supple. No JVD present.   Normal range of motion.  Cardiovascular:  Normal rate and regular rhythm.           Pulmonary/Chest: Breath sounds normal. No stridor. No  respiratory distress.   Abdominal: Abdomen is soft. There is no abdominal tenderness.   Musculoskeletal:         General: No tenderness or edema. Normal range of motion.      Cervical back: Normal range of motion and neck supple.     Neurological: He is alert and oriented to person, place, and time. GCS score is 15. GCS eye subscore is 4. GCS verbal subscore is 5. GCS motor subscore is 6.   Skin: Skin is warm and dry. Capillary refill takes less than 2 seconds.   Psychiatric: He has a normal mood and affect. Thought content normal.         ED Course   Procedures  Labs Reviewed   URINALYSIS, REFLEX TO URINE CULTURE - Abnormal; Notable for the following components:       Result Value    Occult Blood UA Trace (*)     Nitrite, UA Positive (*)     Leukocytes, UA 2+ (*)     All other components within normal limits    Narrative:     Preferred Collection Type->Urine, Clean Catch  Specimen Source->Urine   URINALYSIS MICROSCOPIC - Abnormal; Notable for the following components:    RBC, UA 5 (*)     WBC,  (*)     Bacteria Many (*)     All other components within normal limits    Narrative:     Preferred Collection Type->Urine, Clean Catch  Specimen Source->Urine   INFLUENZA A & B BY MOLECULAR   CULTURE, URINE   SARS-COV-2 RNA AMPLIFICATION, QUAL          Imaging Results    None          Medications   cefUROXime tablet 500 mg (has no administration in time range)     Medical Decision Making  Hemodynamically stable. Afebrile. Phonating and protecting the airway spontaneously. No clinical evidence for cardiovascular instability or impending airway compromise. Examination as above. Prior medical records reviewed. Per ED course. Current co-morbidities considered that will impact clinical decision making include as above.    Plan:  Very well-appearing, nontoxic.  Reportedly had a temperature of 99.5° which I informed him was not a fever.  Patient does not have a history of a recorded immunologic compromise that would result  in an inability to mount a fever.  Swabs negative.  Urinalysis ordered.  We had a shared decision-making conversation regarding next steps beyond the urine and at this time I felt it was not emergently necessary.      Risk  Prescription drug management.               ED Course as of 07/12/24 0032   Thu Jul 11, 2024   2306 Recent gu note reviewed.  History of BPH. [BG]      ED Course User Index  [BG] Pablo Manzano MD                Labs reviewed. Cefuroxime ordered due to micro sensitivities. The patient was reassessed and on subsequent re-evaluation, they were subjectively feeling better. They were resting comfortably and in no acute distress. I discussed the laboratory and diagnostic findings with the patient. Education was provided and all questions were answered. As discussed, they were recommended to follow up with their primary care physician within the next few days and to return to the emergency department sooner for any new or worsening. They acknowledged and verbalized agreement to the treatment plan. The patient was discharged home in stable condition.     DISCLAIMER: This note was prepared with Hyper[employer] voice recognition transcription software. Garbled syntax, mangled pronouns, and other bizarre constructions may be attributed to that software system.             Clinical Impression:  Final diagnoses:  [N39.0] Urinary tract infection without hematuria, site unspecified (Primary)          ED Disposition Condition    Discharge Stable          ED Prescriptions       Medication Sig Dispense Start Date End Date Auth. Provider    cefUROXime (CEFTIN) 500 MG tablet Take 1 tablet (500 mg total) by mouth 2 (two) times daily. for 7 days 14 tablet 7/12/2024 7/19/2024 Pablo Manzano MD          Follow-up Information       Follow up With Specialties Details Why Contact Info    Vanessa Landin MD Internal Medicine Go to  As needed [address]  [place] [address]  [phone]               Pablo Manzano,  MD  07/12/24 0032

## 2024-07-15 LAB — BACTERIA UR CULT: ABNORMAL

## 2024-07-17 ENCOUNTER — OFFICE VISIT (OUTPATIENT)
Dept: UROLOGY | Facility: CLINIC | Age: 74
End: 2024-07-17
Payer: MEDICARE

## 2024-07-17 VITALS
BODY MASS INDEX: 30.63 KG/M2 | HEART RATE: 58 BPM | DIASTOLIC BLOOD PRESSURE: 71 MMHG | HEIGHT: 70 IN | SYSTOLIC BLOOD PRESSURE: 129 MMHG | WEIGHT: 213.94 LBS

## 2024-07-17 DIAGNOSIS — N40.1 BENIGN PROSTATIC HYPERPLASIA WITH WEAK URINARY STREAM: ICD-10-CM

## 2024-07-17 DIAGNOSIS — R39.12 BENIGN PROSTATIC HYPERPLASIA WITH WEAK URINARY STREAM: ICD-10-CM

## 2024-07-17 DIAGNOSIS — R31.29 MICROHEMATURIA: ICD-10-CM

## 2024-07-17 DIAGNOSIS — N39.0 RECURRENT UTI: Primary | ICD-10-CM

## 2024-07-17 DIAGNOSIS — N39.0 URINARY TRACT INFECTION WITHOUT HEMATURIA, SITE UNSPECIFIED: ICD-10-CM

## 2024-07-17 PROCEDURE — 1159F MED LIST DOCD IN RCRD: CPT | Mod: CPTII,S$GLB,, | Performed by: UROLOGY

## 2024-07-17 PROCEDURE — 1101F PT FALLS ASSESS-DOCD LE1/YR: CPT | Mod: CPTII,S$GLB,, | Performed by: UROLOGY

## 2024-07-17 PROCEDURE — 3074F SYST BP LT 130 MM HG: CPT | Mod: CPTII,S$GLB,, | Performed by: UROLOGY

## 2024-07-17 PROCEDURE — 99999 PR PBB SHADOW E&M-EST. PATIENT-LVL IV: CPT | Mod: PBBFAC,,, | Performed by: UROLOGY

## 2024-07-17 PROCEDURE — 3078F DIAST BP <80 MM HG: CPT | Mod: CPTII,S$GLB,, | Performed by: UROLOGY

## 2024-07-17 PROCEDURE — 1160F RVW MEDS BY RX/DR IN RCRD: CPT | Mod: CPTII,S$GLB,, | Performed by: UROLOGY

## 2024-07-17 PROCEDURE — 3288F FALL RISK ASSESSMENT DOCD: CPT | Mod: CPTII,S$GLB,, | Performed by: UROLOGY

## 2024-07-17 PROCEDURE — 1125F AMNT PAIN NOTED PAIN PRSNT: CPT | Mod: CPTII,S$GLB,, | Performed by: UROLOGY

## 2024-07-17 PROCEDURE — 99214 OFFICE O/P EST MOD 30 MIN: CPT | Mod: S$GLB,,, | Performed by: UROLOGY

## 2024-07-17 NOTE — PROGRESS NOTES
Subjective:       Patient ID: Jerardo Callejas is a 74 y.o. male.    Chief Complaint:  Follow-up     This is a 74 y.o.  male patient with recurrent UTI, BPH.    H/o BPH on tamsulosin for years followed at OSH.  He had UTI in 11/2021 and two infections in 8/2022 with Klebsiella and E coli.  Was septic with recent infection and admitted to hospital 8/2022.  Reports years of LUTs.  Does have worsening frequency/urgency with UTI and sometimes dysuria.  Denies gross hematuria recently but had this years ago.  AUA SS 18/4, PVR ok.   H/o negative prostate biopsy in past.  Reports low libido, T normal 336 on second check.   Follow up for CT urogram review and cystoscopy. CTU no significant  findings 9/2022.  Cystoscopy negative for findings to explain microhematuria.    UTI 10/22 and treated, E coli.   Completed antibiotic and no symptoms of infection. Still LUTs.   Still having back pain, I feel likely not related to kidneys or bladder given negative CT urogram in 9/22.  In 11/2022 he was not interested in operative intervention on the prostate.  He does admit to erectile dysfunction that has been ongoing and was given Sildenafil.    3/20/24:  reports never took sildenafil, has peyronie's and reports ventral bend over 30 degrees.  Agreed to finasteride. Never took Sildenafil, sometimes has issues obtaining or maintaining erections.    UTI 7/2024 E coli 50-99k, treated.  Still LUTs on tamsulosin and finasteride.      PSA   9/22--3.9, free 18%        IPSS Questionnaire (AUA-SS) 11/2022:  Over the past month    1)  Incomplete Emptying - How often have you had a sensation of not emptying your bladder completely after you finish urinating?  1 - Less than 1 time in 5   2)  Frequency - How often have you had to urinate again less than two hours after you finished urinating? 1 - Less than 1 time in 5   3)  Intermittency - How often have you found you stopped and started again several times when you urinated?  2 - Less than half  the time   4) Urgency - How difficult have you found it to postpone urination?  5 - Almost always   5) Weak Stream - How often have you had a weak urinary stream?  5 - Almost always   6) Straining  - How often have you had to push or strain to begin urination?  1 - Less than 1 time in 5   7) Nocturia - How many times did you most typically get up to urinate from the time you went to bed until the time you got up in the morning?  3 - 3 times   Total score:  0-7 mild, 8-19 moderate, 20-35 severe 18   Quality of Life:  4 - Mostly Dissatisfied              Lab Results   Component Value Date    CREATININE 1.39 05/19/2024       ---  PMH/PSH/Medications/Allergies/Social history reviewed and as in chart.    Review of Systems   Constitutional:  Negative for activity change, chills and fever.   HENT:  Negative for congestion.    Respiratory:  Negative for cough, chest tightness and shortness of breath.    Cardiovascular:  Negative for chest pain and palpitations.   Gastrointestinal:  Negative for abdominal distention, abdominal pain, nausea and vomiting.   Genitourinary:  Negative for difficulty urinating, flank pain, frequency, hematuria, penile pain, scrotal swelling, testicular pain and urgency.   Musculoskeletal:  Negative for gait problem.       Objective:      Physical Exam  HENT:      Head: Atraumatic.   Pulmonary:      Effort: Pulmonary effort is normal.   Neurological:      General: No focal deficit present.      Mental Status: He is alert and oriented to person, place, and time.           No results found for this or any previous visit (from the past 2160 hour(s)).      Testosterone, Total 304 - 1227 ng/dL 289 Low     Repeat T 336     Assessment:     Problem Noted   Recurrent Uti 9/6/2022    Recurrent UTI  Hospitalized 8/2022 for UTI  Klebsiella and E coli 8/2022  E coli 10/2022    BPH on tamsulosin for years    Persistent microhematuria on Uas  UTI 7/2024 E coli      Microhematuria 9/6/2022    CT urogram negative  9/2022  Cystoscopy with enlarged prostate, no lesions/masses       Bph (Benign Prostatic Hyperplasia) 7/21/2021    68 gm prostate by CT  No findings for microhematuria  On Tamsulosin for years  Initial AUA SS 18/4  PSA 3.9, free 18 % in 9/22, negative biopsy in past  Qmax 10, voided 175     Peyronie's Disease 3/20/2024   Erectile Dysfunction 3/20/2024       Plan:      Discussed possibility that recurrent infections caused by enlarged prostate and urinary obstruction demonstrated by a uroflow and symptoms.  Recommended consider outlet procedure, Rezum versus holmium laser enucleation of prostate.  He does not wish to undergo surgery at this time.  Discussed risk of continued infections and worsening urination. After discussion, recommended surgery he wishes to not do at current.     Sildenafil for erectile dysfunction.    Follow up in 6 months    Shay Horan MD

## 2024-07-18 ENCOUNTER — OFFICE VISIT (OUTPATIENT)
Dept: PULMONOLOGY | Facility: CLINIC | Age: 74
End: 2024-07-18
Payer: MEDICARE

## 2024-07-18 ENCOUNTER — HOSPITAL ENCOUNTER (EMERGENCY)
Facility: HOSPITAL | Age: 74
Discharge: HOME | End: 2024-07-18
Attending: EMERGENCY MEDICINE
Payer: MEDICARE

## 2024-07-18 VITALS
RESPIRATION RATE: 18 BRPM | TEMPERATURE: 98 F | HEART RATE: 66 BPM | DIASTOLIC BLOOD PRESSURE: 67 MMHG | BODY MASS INDEX: 30.49 KG/M2 | OXYGEN SATURATION: 99 % | WEIGHT: 213 LBS | HEIGHT: 70 IN | SYSTOLIC BLOOD PRESSURE: 142 MMHG

## 2024-07-18 VITALS
RESPIRATION RATE: 17 BRPM | SYSTOLIC BLOOD PRESSURE: 119 MMHG | DIASTOLIC BLOOD PRESSURE: 80 MMHG | OXYGEN SATURATION: 99 % | BODY MASS INDEX: 29.92 KG/M2 | HEIGHT: 70 IN | WEIGHT: 209 LBS | HEART RATE: 67 BPM

## 2024-07-18 DIAGNOSIS — M54.12 CERVICAL RADICULOPATHY: Primary | ICD-10-CM

## 2024-07-18 DIAGNOSIS — R91.1 LUNG NODULE: Primary | ICD-10-CM

## 2024-07-18 PROCEDURE — 1101F PT FALLS ASSESS-DOCD LE1/YR: CPT | Mod: CPTII,S$GLB,, | Performed by: INTERNAL MEDICINE

## 2024-07-18 PROCEDURE — 99283 EMERGENCY DEPT VISIT LOW MDM: CPT

## 2024-07-18 PROCEDURE — 99213 OFFICE O/P EST LOW 20 MIN: CPT | Mod: S$GLB,,, | Performed by: INTERNAL MEDICINE

## 2024-07-18 PROCEDURE — 3079F DIAST BP 80-89 MM HG: CPT | Mod: CPTII,S$GLB,, | Performed by: INTERNAL MEDICINE

## 2024-07-18 PROCEDURE — 3074F SYST BP LT 130 MM HG: CPT | Mod: CPTII,S$GLB,, | Performed by: INTERNAL MEDICINE

## 2024-07-18 PROCEDURE — 3288F FALL RISK ASSESSMENT DOCD: CPT | Mod: CPTII,S$GLB,, | Performed by: INTERNAL MEDICINE

## 2024-07-18 PROCEDURE — 99999 PR PBB SHADOW E&M-EST. PATIENT-LVL III: CPT | Mod: PBBFAC,,, | Performed by: INTERNAL MEDICINE

## 2024-07-18 PROCEDURE — 25000003 PHARM REV CODE 250

## 2024-07-18 PROCEDURE — 1126F AMNT PAIN NOTED NONE PRSNT: CPT | Mod: CPTII,S$GLB,, | Performed by: INTERNAL MEDICINE

## 2024-07-18 RX ORDER — LIDOCAINE 50 MG/G
1 PATCH TOPICAL DAILY
Qty: 8 PATCH | Refills: 0 | Status: SHIPPED | OUTPATIENT
Start: 2024-07-18

## 2024-07-18 RX ORDER — LIDOCAINE 50 MG/G
1 PATCH TOPICAL
Status: DISCONTINUED | OUTPATIENT
Start: 2024-07-18 | End: 2024-07-18 | Stop reason: HOSPADM

## 2024-07-18 RX ORDER — ACETAMINOPHEN 500 MG
500 TABLET ORAL EVERY 6 HOURS PRN
Qty: 60 TABLET | Refills: 0 | Status: SHIPPED | OUTPATIENT
Start: 2024-07-18

## 2024-07-18 RX ORDER — ACETAMINOPHEN 500 MG
1000 TABLET ORAL
Status: COMPLETED | OUTPATIENT
Start: 2024-07-18 | End: 2024-07-18

## 2024-07-18 RX ADMIN — ACETAMINOPHEN 1000 MG: 500 TABLET ORAL at 01:07

## 2024-07-18 RX ADMIN — LIDOCAINE 1 PATCH: 50 PATCH CUTANEOUS at 01:07

## 2024-07-18 NOTE — ED NOTES
Patient identifiers verified and correct.      LOC: The patient is awake, alert and aware of environment with an appropriate affect, the patient is oriented x 4 and speaking appropriately.      APPEARANCE: Patient appears comfortable and in no acute distress, patient is clean and well groomed.     HEENT: Head symmetrical. Eyes bilateral.  Bilateral ears without drainage. Bilateral nares patent, throat clear.     SKIN: The skin is warm and dry, color consistent with ethnicity, patient has normal skin turgor and moist mucus membranes, skin intact, no breakdown or bruising noted.      MUSCULOSKELETAL: Patient moving all extremities spontaneously, no swelling noted. Reports right arm pain and intermittent tingling sensation. Denies any recent injuries.     Genitourinary: Patient denies any dysuria, bleeding, and discharge. Patient denies any reports of incontinence.      RESPIRATORY: Airway is open and patent, respirations are spontaneous, patient has a normal effort and rate, no accessory muscle use noted.      CARDIAC: Patient has a normal rate, no edema noted, capillary refill < 3 seconds.      GASTRO: Abdomen soft and non-distended.      NEURO: Pt opens eyes spontaneously pupils equal, round, and reactive. behavior appropriate to situation, follows commands, facial expression symmetrical,   bilateral hand grasp equal and even, purposeful motor response noted, normal sensation in all extremities when touched with a finger.     NEUROVASCULAR: All extremities are warm and pink.

## 2024-07-18 NOTE — ED NOTES
Pt presents to ED with C/O L back of head pain that has been intermittent x2 months. Pt also C/O R arm pain numbness and tingling that radiates down to his fingers on his R hand with onset over x2 months. Pt states the pain has gotten worse over the past x2 weeks.

## 2024-07-18 NOTE — DISCHARGE INSTRUCTIONS

## 2024-07-18 NOTE — PROGRESS NOTES
"Subjective:      Patient ID: Jerardo Callejas is a 74 y.o. male.    Chief Complaint: No chief complaint on file.    73 year old with CT chest during hospitalization.   8 mm right upper lobe pulmonary nodule as seen on series 2, image 209.  This is enlarged in comparison to the prior exam.   Remote smoking.   Brothers with lung cancer.   No complaints. No weight loss.     Interval hx: Needs repeat CT chest. Did not get follow up.   No respiratory issues.       Review of Systems   Respiratory:  Negative for shortness of breath, wheezing and dyspnea on extertion.      Objective:     Physical Exam   Constitutional: He is oriented to person, place, and time. He appears well-developed and well-nourished.   Pulmonary/Chest: Normal expansion and symmetric chest wall expansion. He has no rhonchi. He has no rales.   Neurological: He is alert and oriented to person, place, and time.   Skin: Skin is warm and dry.   Nursing note and vitals reviewed.    Personal Diagnostic Review  none pertinent      7/18/2024     2:38 PM 7/18/2024    11:20 AM 7/17/2024     8:05 AM 7/12/2024     1:33 AM 7/11/2024    11:06 PM 6/14/2024     8:12 PM 5/19/2024     6:30 PM   Pulmonary Function Tests   SpO2 99 % 99 %  100 % 97 % 97 % 99 %   Height 5' 10" (1.778 m) 5' 10" (1.778 m) 5' 10" (1.778 m)  5' 10" (1.778 m) 5' 10" (1.778 m)    Weight 94.8 kg (209 lb) 96.6 kg (213 lb) 97 kg (213 lb 15.3 oz)  95.3 kg (210 lb) 95.3 kg (210 lb)    BMI (Calculated) 30 30.6 30.7  30.1 30.1         Assessment:     No diagnosis found.     Outpatient Encounter Medications as of 7/18/2024   Medication Sig Dispense Refill    acetaminophen (TYLENOL) 500 MG tablet Take 1 tablet (500 mg total) by mouth every 6 (six) hours as needed for Pain. 60 tablet 0    albuterol (PROVENTIL/VENTOLIN HFA) 90 mcg/actuation inhaler Inhale 2 puffs into the lungs every 4 (four) hours as needed for Wheezing or Shortness of Breath. Rescue 6.7 g 0    ALPRAZolam (XANAX) 0.25 MG tablet Take 0.25 " mg by mouth every 12 (twelve) hours as needed.      atorvastatin (LIPITOR) 40 MG tablet Take 1 tablet (40 mg total) by mouth once daily. 90 tablet 3    cefUROXime (CEFTIN) 500 MG tablet Take 1 tablet (500 mg total) by mouth 2 (two) times daily. for 7 days 14 tablet 0    clonazePAM (KLONOPIN) 0.5 MG tablet Take 0.5 mg by mouth nightly as needed.      cloNIDine (CATAPRES) 0.1 MG tablet Take 0.1 mg by mouth 2 (two) times daily.      divalproex (DEPAKOTE) 250 MG EC tablet Take 750 mg by mouth 2 (two) times a day.      divalproex (DEPAKOTE) 500 MG TbEC Take 500 mg by mouth 2 (two) times daily.      DULoxetine (CYMBALTA) 30 MG capsule Take 30 mg by mouth.      ELIQUIS 5 mg Tab Take 5 mg by mouth 2 (two) times daily.      finasteride (PROSCAR) 5 mg tablet Take 1 tablet (5 mg total) by mouth once daily. 30 tablet 11    furosemide (LASIX) 20 MG tablet Take 20 mg by mouth every morning.      gabapentin (NEURONTIN) 600 MG tablet Take 600 mg by mouth 3 (three) times daily.      LIDOcaine (LIDODERM) 5 % Place 1 patch onto the skin once daily. Remove & Discard patch within 12 hours or as directed by MD 8 patch 0    losartan-hydrochlorothiazide 50-12.5 mg (HYZAAR) 50-12.5 mg per tablet Take 1 tablet by mouth every morning.      meloxicam (MOBIC) 15 MG tablet Take 15 mg by mouth.      omeprazole (PRILOSEC) 40 MG capsule Take 40 mg by mouth once daily.      sildenafil (REVATIO) 20 mg Tab Take 1 tablet (20 mg total) by mouth daily as needed (Take up to 5 tablets 1 hour prior to sexual activity on an empty stomach.). 30 tablet 5    tamsulosin (FLOMAX) 0.4 mg Cap Take 1 capsule (0.4 mg total) by mouth once daily. 90 capsule 3    testosterone cypionate (DEPOTESTOTERONE CYPIONATE) 200 mg/mL injection Inject into the muscle.      vitamin D (VITAMIN D3) 1000 units Tab Take 1,000 Units by mouth once daily.      [DISCONTINUED] amLODIPine (NORVASC) 5 MG tablet Take 1 tablet (5 mg total) by mouth once daily. 30 tablet 3    [DISCONTINUED]  cefUROXime (CEFTIN) 500 MG tablet Take 1 tablet (500 mg total) by mouth 2 (two) times daily. for 7 days 14 tablet 0    [DISCONTINUED] hydroCHLOROthiazide (HYDRODIURIL) 12.5 MG Tab Take 12.5 mg by mouth once daily.      [DISCONTINUED] lisinopril (PRINIVIL,ZESTRIL) 20 MG tablet Take 20 mg by mouth once daily.      [DISCONTINUED] metoprolol tartrate (LOPRESSOR) 25 MG tablet Take 12.5 mg by mouth 2 (two) times daily.       Facility-Administered Encounter Medications as of 7/18/2024   Medication Dose Route Frequency Provider Last Rate Last Admin    [COMPLETED] acetaminophen tablet 1,000 mg  1,000 mg Oral ED 1 Time Julieth Hull PA-C   1,000 mg at 07/18/24 1313    [COMPLETED] cefTRIAXone injection 1 g  1 g Intramuscular ED 1 Time Pablo Manzano MD   1 g at 07/12/24 0055    [DISCONTINUED] cefUROXime tablet 500 mg  500 mg Oral ED 1 Time Pablo Manzano MD        [DISCONTINUED] LIDOcaine 5 % patch 1 patch  1 patch Transdermal ED 1 Time Julieth Hull PA-C   1 patch at 07/18/24 1313     No orders of the defined types were placed in this encounter.      Plan:     1. Lung nodule  Assessment & Plan:  Patient with sub centimeter nodule. Did not follow up. Needs follow up CT chest.     Orders:  -     CT Chest Without Contrast; Future; Expected date: 07/18/2024      Denver Kearns MD

## 2024-07-19 NOTE — ED PROVIDER NOTES
Encounter Date: 7/18/2024       History     Chief Complaint   Patient presents with    Arm Pain     Pt arrives with complaints of right arm pain and intermittent tingling sensation that has been present for a few days now. Denies any recent injuries.      74-year-old male with history of chronic back pain, BPH, DVT (Eliquis), hypertension, mitral valve prolapse presents today for evaluation of right-sided neck and right arm pain for the last few days.  He has not fallen or injured himself.  He has not taken any medications for his discomfort.  Pain is worse with right lateral rotation of the head.  He complains of pain and tingling going down the arm into his hand.  Denies any weakness, fever, chills, nausea, vomiting, chest pain, shortness of breath.    The history is provided by the patient and medical records. No  was used.     Review of patient's allergies indicates:   Allergen Reactions    Ciprofloxacin Palpitations    Penicillins Palpitations and Anxiety     Past Medical History:   Diagnosis Date    Back pain, chronic     BPH (benign prostatic hyperplasia)     DVT (deep venous thrombosis)     Hypertension     Mitral valve prolapse syndrome     Prostate enlargement      Past Surgical History:   Procedure Laterality Date    NECK SURGERY       Family History   Problem Relation Name Age of Onset    Hypertension Mother      Stroke Father       Social History     Tobacco Use    Smoking status: Former    Smokeless tobacco: Never    Tobacco comments:     >40 years ago   Substance Use Topics    Alcohol use: No    Drug use: No     Review of Systems   Constitutional:  Negative for fever.   HENT:  Negative for sore throat.    Respiratory:  Negative for shortness of breath.    Cardiovascular:  Negative for chest pain.   Gastrointestinal:  Negative for nausea.   Genitourinary:  Negative for dysuria.   Musculoskeletal:  Positive for arthralgias and neck pain. Negative for back pain.   Skin:  Negative for  rash.   Neurological:  Negative for weakness.   Hematological:  Does not bruise/bleed easily.       Physical Exam     Initial Vitals   BP Pulse Resp Temp SpO2   07/18/24 1120 07/18/24 1120 07/18/24 1120 07/18/24 1124 07/18/24 1120   (!) 142/67 66 18 98 °F (36.7 °C) 99 %      MAP       --                Physical Exam    Nursing note and vitals reviewed.  Constitutional: He appears well-developed and well-nourished. He is not diaphoretic. No distress.   HENT:   Head: Normocephalic.   Nose: Nose normal.   Eyes: Conjunctivae are normal.   Neck: Neck supple.   Cardiovascular:  Normal rate and intact distal pulses.           Pulmonary/Chest: Breath sounds normal. No respiratory distress. He has no wheezes.   Abdominal: Abdomen is soft. He exhibits no distension. There is no abdominal tenderness.   Musculoskeletal:      Cervical back: Neck supple.     Neurological: He is oriented to person, place, and time. He has normal strength. No sensory deficit. GCS score is 15. GCS eye subscore is 4. GCS verbal subscore is 5. GCS motor subscore is 6.   Skin: Skin is warm and dry. Capillary refill takes less than 2 seconds.   Psychiatric: He has a normal mood and affect. His behavior is normal.         ED Course   Procedures  Labs Reviewed - No data to display       Imaging Results    None          Medications   acetaminophen tablet 1,000 mg (1,000 mg Oral Given 7/18/24 1313)     Medical Decision Making  74-year-old male with history of chronic back pain, BPH, DVT (Eliquis), hypertension, mitral valve prolapse presents today for evaluation of right-sided neck and right arm pain for the last few days. On exam he appears to be resting comfortably in no acute distress and non-toxic appearing. VSS.  There is no midline cervical, thoracic, lumbar bony tenderness or palpable deformities.  There is no swelling, erythema, tenderness to palpation of the right upper extremity.  He has full active range motion of the right shoulder, right elbow,  right wrist.  Full abduction of the right shoulder does reproduce some of his paresthesias.  Strength and sensation to bilateral upper extremities is symmetrical.  Radial pulses are 2+ bilaterally.  He is ambulatory with steady gait.    Differential includes but isnot limited to:  musculoskeletal, torticollis, dystonic reaction, spondylosis/stenosis, malignancy, abscess, osteomyelitis, fracture, disc herniation, muscle spasms, cervical radiculopathy    Exam and history most concerning for cervical radiculopathy.  Per chart review, he has been diagnosed with this in the past.  He was neurovascularly intact and there is no swelling or skin changes to the right upper extremity.  He was already on Eliquis, I doubt DVT.    He will be treated with Tylenol and lidocaine patches and referred back to his neurosurgeon who he has seen for back issues in the past.  He may otherwise return to the ED for new or worsening symptoms, all questions answered at this time.    Risk  OTC drugs.  Prescription drug management.                                      Clinical Impression:  Final diagnoses:  [M54.12] Cervical radiculopathy (Primary)          ED Disposition Condition    Discharge Stable          ED Prescriptions       Medication Sig Dispense Start Date End Date Auth. Provider    acetaminophen (TYLENOL) 500 MG tablet Take 1 tablet (500 mg total) by mouth every 6 (six) hours as needed for Pain. 60 tablet 7/18/2024 -- Julieth Hull PA-C    LIDOcaine (LIDODERM) 5 % Place 1 patch onto the skin once daily. Remove & Discard patch within 12 hours or as directed by MD Estrada patch 7/18/2024 -- Julieth Hull PA-C          Follow-up Information       Follow up With Specialties Details Why Contact Info    Your Neurosurgeon  Schedule an appointment as soon as possible for a visit                Julieth Hull PA-C  07/19/24 8310

## 2024-08-29 ENCOUNTER — HOSPITAL ENCOUNTER (OUTPATIENT)
Dept: RADIOLOGY | Facility: HOSPITAL | Age: 74
Discharge: HOME | End: 2024-08-29
Attending: INTERNAL MEDICINE
Payer: MEDICARE

## 2024-08-29 DIAGNOSIS — R91.1 LUNG NODULE: ICD-10-CM

## 2024-08-29 PROCEDURE — 71250 CT THORAX DX C-: CPT | Mod: TC,PN

## 2024-08-29 PROCEDURE — 71250 CT THORAX DX C-: CPT | Mod: 26,,, | Performed by: RADIOLOGY

## 2024-10-19 ENCOUNTER — HOSPITAL ENCOUNTER (EMERGENCY)
Facility: HOSPITAL | Age: 74
Discharge: HOME | End: 2024-10-19
Attending: FAMILY MEDICINE
Payer: MEDICARE

## 2024-10-19 VITALS
TEMPERATURE: 98 F | RESPIRATION RATE: 17 BRPM | HEART RATE: 64 BPM | OXYGEN SATURATION: 100 % | BODY MASS INDEX: 30.35 KG/M2 | SYSTOLIC BLOOD PRESSURE: 146 MMHG | DIASTOLIC BLOOD PRESSURE: 83 MMHG | HEIGHT: 70 IN | WEIGHT: 212 LBS

## 2024-10-19 DIAGNOSIS — M79.89 LEFT ARM SWELLING: Primary | ICD-10-CM

## 2024-10-19 PROCEDURE — 99284 EMERGENCY DEPT VISIT MOD MDM: CPT | Mod: 25,ER

## 2024-10-20 NOTE — ED NOTES
Assumed care of pt who came in reporting swelling to the L-upper arm to the are of his midline cath that his wife noticed today. He denies any pain, fevers, and is able to move the arm with no issues. Upon assessment the site is soft, non-tender, no induration, warmth, and the line flushes easily. He also denies any SOB or chest pain. Will cont to monitor.

## 2024-10-20 NOTE — ED PROVIDER NOTES
Encounter Date: 10/19/2024       History     Chief Complaint   Patient presents with    Arm Swelling     Pt reports swelling to right arm with picc line in place. Reports picc line has been there x3 weeks. Reports noticed swelling earlier today and has increased since. Pt access site twice a day for antibiotics and hep flush      24-year-old male has PICC line and has left arm per recurrent UTI.  Patient had this PICC line for last 3 weeks.  Another 4 days of antibiotics left over.  Complains of mild swelling with just noticed today.  He did not have any resistance with antibiotics are flushing with heparin.  No redness or fever.  No chills or rigors.  No dysuria.  No chest pain or shortness of breath or cough or congestion.    The history is provided by the patient.     Review of patient's allergies indicates:   Allergen Reactions    Ciprofloxacin Palpitations    Penicillins Palpitations and Anxiety     Past Medical History:   Diagnosis Date    Back pain, chronic     BPH (benign prostatic hyperplasia)     DVT (deep venous thrombosis)     Hypertension     Mitral valve prolapse syndrome     Prostate enlargement      Past Surgical History:   Procedure Laterality Date    NECK SURGERY       Family History   Problem Relation Name Age of Onset    Hypertension Mother      Stroke Father       Social History     Tobacco Use    Smoking status: Former    Smokeless tobacco: Never    Tobacco comments:     >40 years ago   Substance Use Topics    Alcohol use: No    Drug use: No     Review of Systems   All other systems reviewed and are negative.      Physical Exam     Initial Vitals [10/19/24 2144]   BP Pulse Resp Temp SpO2   (!) 146/83 64 17 97.8 °F (36.6 °C) 100 %      MAP       --         Physical Exam    Nursing note and vitals reviewed.  Constitutional: Vital signs are normal. He appears well-developed and well-nourished. He is active. No distress.   HENT:   Head: Normocephalic.   Nose: Nose normal. Mouth/Throat: Oropharynx  is clear and moist and mucous membranes are normal.   Eyes: Conjunctivae, EOM and lids are normal.   Neck: Neck supple.   Normal range of motion.  Cardiovascular:  Normal rate, regular rhythm, S1 normal, S2 normal and normal heart sounds.           Pulmonary/Chest: Breath sounds normal. No respiratory distress. He has no wheezes. He has no rhonchi. He has no rales.   Abdominal: Abdomen is soft. Bowel sounds are normal. He exhibits no distension. There is no abdominal tenderness. There is no rebound.   Musculoskeletal:         General: Normal range of motion.      Right upper arm: Normal.      Left upper arm: Normal.      Cervical back: Normal range of motion and neck supple.      Right lower leg: Normal.      Left lower leg: Normal.      Comments: PICC line in left arm.  Clean no drainage, no blood leak.  No tenderness.  No erythema.  Questionable mild swelling.     Neurological: He is alert and oriented to person, place, and time. He has normal strength. GCS score is 15. GCS eye subscore is 4. GCS verbal subscore is 5. GCS motor subscore is 6.   Skin: Skin is warm. Capillary refill takes less than 2 seconds.   Psychiatric: He has a normal mood and affect. His speech is normal and behavior is normal. Thought content normal. Cognition and memory are normal.         ED Course   Procedures  Labs Reviewed - No data to display       Imaging Results              US Upper Extremity Veins Left (Final result)  Result time 10/19/24 23:30:29      Final result by David Duran MD (10/19/24 23:30:29)                   Impression:      No thrombus in central veins of the left upper extremity.      Electronically signed by: David Duran MD  Date:    10/19/2024  Time:    23:30               Narrative:    EXAMINATION:  US UPPER EXTREMITY VEINS LEFT    CLINICAL HISTORY:  arm swelling with PICC;    TECHNIQUE:  Duplex and color flow Doppler evaluation and dynamic compression was performed of the left upper extremity  veins.    COMPARISON:  None    FINDINGS:  Central veins: The internal jugular, subclavian, and axillary veins are patent and free of thrombus.    Arm veins: The brachial and basilic veins are patent and compressible.  Cephalic vein not visualized.    Contralateral subclavian/internal jugular veins: Not evaluated.    Other findings: Left antecubital PICC line.                                       Medications - No data to display  Medical Decision Making  24-year-old male with concern for swelling in his left arm around PICC line.  No tenderness no erythema.  Patient was already on Eliquis.  Previous history of DVT.    Differential diagnosis including not limited to-infiltration of IV access, DVT, soft tissue swelling, normal swelling.    Normal flushing of IV access.  No pain no tenderness with flushed.  No obstruction or assistance with flushed.  Ultrasound of left upper arm negative for DVT.  Patient was advised to continue his antibiotics and follow up with his physician if swelling is persistent or increasing follow up ED immediately.                                      Clinical Impression:  Final diagnoses:  [M79.89] Left arm swelling (Primary)          ED Disposition Condition    Discharge Stable          ED Prescriptions    None       Follow-up Information    None          Carlo Rehman MD  10/19/24 2600

## 2024-10-21 PROBLEM — N39.0 RECURRENT UTI: Status: RESOLVED | Noted: 2022-09-06 | Resolved: 2024-10-21

## 2024-12-18 ENCOUNTER — TELEPHONE (OUTPATIENT)
Facility: CLINIC | Age: 74
End: 2024-12-18
Payer: MEDICARE

## 2024-12-18 NOTE — TELEPHONE ENCOUNTER
----- Message from Shaun sent at 12/18/2024  1:12 PM CST -----  Contact: Pt   .Type:  Test Results    Who Called: pt  Name of Test (Lab/Mammo/Etc):  CT  Date of Test: 08/29/2024  Ordering Provider:   Where the test was performed:  [facility]  Would the patient rather a call back or a response via [facility]?  Call back  Best Call Back Number: [phone]  Additional Information:   Pt. is requesting a call back to discuss his results or set up an appt. Regarding his results.

## 2024-12-23 ENCOUNTER — TELEPHONE (OUTPATIENT)
Facility: CLINIC | Age: 74
End: 2024-12-23
Payer: MEDICARE

## 2024-12-23 NOTE — TELEPHONE ENCOUNTER
----- Message from Emir sent at 12/23/2024 10:39 AM CST -----  Regarding: pt  Type:Patient Returning Call:Pt        Who Called: MA         Who Left Message for Patient:         Does the patient know what this is regarding? Missed call         Best Call Back Number: Telephone Information:  Satarii          [phone]            Additional Information:

## 2024-12-24 ENCOUNTER — TELEPHONE (OUTPATIENT)
Facility: CLINIC | Age: 74
End: 2024-12-24
Payer: MEDICARE

## 2024-12-24 NOTE — TELEPHONE ENCOUNTER
----- Message from Tammy sent at 12/24/2024 11:20 AM CST -----  Type:  Sooner Apoointment Request    Caller is requesting a sooner appointment.  Caller declined first available appointment listed below.  Caller will not accept being placed on the waitlist and is requesting a message be sent to doctor.  Name of Caller: Pt   When is the first available appointment? N/A  Symptoms: results from CT   Would the patient rather a call back or a response via [facility]? Call   Best Call Back Number:[phone]   Additional Information: pt is very upset no one has reached out to him about his CT scan results from months ago and that  is no longer at [facility].

## 2025-01-16 ENCOUNTER — OFFICE VISIT (OUTPATIENT)
Dept: UROLOGY | Facility: CLINIC | Age: 75
End: 2025-01-16
Payer: MEDICARE

## 2025-01-16 VITALS
DIASTOLIC BLOOD PRESSURE: 82 MMHG | SYSTOLIC BLOOD PRESSURE: 135 MMHG | WEIGHT: 211.63 LBS | HEART RATE: 60 BPM | HEIGHT: 70 IN | BODY MASS INDEX: 30.3 KG/M2

## 2025-01-16 DIAGNOSIS — R10.84 GENERALIZED ABDOMINAL PAIN: ICD-10-CM

## 2025-01-16 DIAGNOSIS — N48.6 PEYRONIE'S DISEASE: ICD-10-CM

## 2025-01-16 DIAGNOSIS — N52.9 ERECTILE DYSFUNCTION, UNSPECIFIED ERECTILE DYSFUNCTION TYPE: ICD-10-CM

## 2025-01-16 DIAGNOSIS — R39.12 BENIGN PROSTATIC HYPERPLASIA WITH WEAK URINARY STREAM: ICD-10-CM

## 2025-01-16 DIAGNOSIS — N40.1 BENIGN PROSTATIC HYPERPLASIA WITH WEAK URINARY STREAM: ICD-10-CM

## 2025-01-16 DIAGNOSIS — N39.0 RECURRENT UTI: Primary | ICD-10-CM

## 2025-01-16 PROCEDURE — 99214 OFFICE O/P EST MOD 30 MIN: CPT | Mod: S$GLB,,, | Performed by: UROLOGY

## 2025-01-16 PROCEDURE — 99999 PR PBB SHADOW E&M-EST. PATIENT-LVL IV: CPT | Mod: PBBFAC,,, | Performed by: UROLOGY

## 2025-01-16 PROCEDURE — 1101F PT FALLS ASSESS-DOCD LE1/YR: CPT | Mod: CPTII,S$GLB,, | Performed by: UROLOGY

## 2025-01-16 PROCEDURE — 1160F RVW MEDS BY RX/DR IN RCRD: CPT | Mod: CPTII,S$GLB,, | Performed by: UROLOGY

## 2025-01-16 PROCEDURE — 3079F DIAST BP 80-89 MM HG: CPT | Mod: CPTII,S$GLB,, | Performed by: UROLOGY

## 2025-01-16 PROCEDURE — 3075F SYST BP GE 130 - 139MM HG: CPT | Mod: CPTII,S$GLB,, | Performed by: UROLOGY

## 2025-01-16 PROCEDURE — 3288F FALL RISK ASSESSMENT DOCD: CPT | Mod: CPTII,S$GLB,, | Performed by: UROLOGY

## 2025-01-16 PROCEDURE — 1159F MED LIST DOCD IN RCRD: CPT | Mod: CPTII,S$GLB,, | Performed by: UROLOGY

## 2025-01-16 NOTE — PROGRESS NOTES
[facility]rology   Clinic Note    SUBJECTIVE:     Chief Complaint   Patient presents with    Follow-up    Urinary Tract Infection       Referral from: No ref. provider found.    History of Present Illness:  Jerardo Callejas is a 74 y.o. male who presents to clinic for BPH.    H/o BPH on tamsulosin for years followed at OSH.  He had UTI in 11/2021 and two infections in 8/2022 with Klebsiella and E coli.  Was septic with recent infection and admitted to hospital 8/2022.  Reports years of LUTs.  Does have worsening frequency/urgency with UTI and sometimes dysuria.  Denies gross hematuria recently but had this years ago.  AUA SS 18/4, PVR ok.  H/o negative prostate biopsy in past.    01/16/2025  Doing well today  Here to establish care  No recent UTIs    PSA:  Lab Results   Component Value Date    PSATOTAL 3.9 09/21/2022    PSAFREE 0.72 09/21/2022         Patient endorses no additional complaints at this time.    Past Medical History:   Diagnosis Date    Back pain, chronic     BPH (benign prostatic hyperplasia)     DVT (deep venous thrombosis)     Hypertension     Mitral valve prolapse syndrome     Prostate enlargement        Past Surgical History:   Procedure Laterality Date    NECK SURGERY         Family History   Problem Relation Name Age of Onset    Hypertension Mother      Stroke Father         Social History     Tobacco Use    Smoking status: Former    Smokeless tobacco: Never    Tobacco comments:     >40 years ago   Substance Use Topics    Alcohol use: No    Drug use: No       Current Outpatient Medications on File Prior to Visit   Medication Sig Dispense Refill    acetaminophen (TYLENOL) 500 MG tablet Take 1 tablet (500 mg total) by mouth every 6 (six) hours as needed for Pain. 60 tablet 0    ALPRAZolam (XANAX) 0.25 MG tablet Take 0.25 mg by mouth every 12 (twelve) hours as needed.      clonazePAM (KLONOPIN) 0.5 MG tablet Take 0.5 mg by mouth nightly as needed.      cloNIDine (CATAPRES) 0.1 MG tablet Take 0.1  mg by mouth 2 (two) times daily.      divalproex (DEPAKOTE) 250 MG EC tablet Take 750 mg by mouth 2 (two) times a day.      divalproex (DEPAKOTE) 500 MG TbEC Take 500 mg by mouth 2 (two) times daily.      DULoxetine (CYMBALTA) 30 MG capsule Take 30 mg by mouth.      ELIQUIS 5 mg Tab Take 5 mg by mouth 2 (two) times daily.      finasteride (PROSCAR) 5 mg tablet Take 1 tablet (5 mg total) by mouth once daily. 30 tablet 11    furosemide (LASIX) 20 MG tablet Take 20 mg by mouth every morning.      gabapentin (NEURONTIN) 600 MG tablet Take 600 mg by mouth 3 (three) times daily.      LIDOcaine (LIDODERM) 5 % Place 1 patch onto the skin once daily. Remove & Discard patch within 12 hours or as directed by MD 8 patch 0    losartan-hydrochlorothiazide 50-12.5 mg (HYZAAR) 50-12.5 mg per tablet Take 1 tablet by mouth every morning.      meloxicam (MOBIC) 15 MG tablet Take 15 mg by mouth.      omeprazole (PRILOSEC) 40 MG capsule Take 40 mg by mouth once daily.      tamsulosin (FLOMAX) 0.4 mg Cap Take 1 capsule (0.4 mg total) by mouth once daily. 90 capsule 3    testosterone cypionate (DEPOTESTOTERONE CYPIONATE) 200 mg/mL injection Inject into the muscle.      vitamin D (VITAMIN D3) 1000 units Tab Take 1,000 Units by mouth once daily.      albuterol (PROVENTIL/VENTOLIN HFA) 90 mcg/actuation inhaler Inhale 2 puffs into the lungs every 4 (four) hours as needed for Wheezing or Shortness of Breath. Rescue 6.7 g 0    atorvastatin (LIPITOR) 40 MG tablet Take 1 tablet (40 mg total) by mouth once daily. 90 tablet 3    sildenafil (REVATIO) 20 mg Tab Take 1 tablet (20 mg total) by mouth daily as needed (Take up to 5 tablets 1 hour prior to sexual activity on an empty stomach.). 30 tablet 5    [DISCONTINUED] amLODIPine (NORVASC) 5 MG tablet Take 1 tablet (5 mg total) by mouth once daily. 30 tablet 3    [DISCONTINUED] hydroCHLOROthiazide (HYDRODIURIL) 12.5 MG Tab Take 12.5 mg by mouth once daily.      [DISCONTINUED] lisinopril  "(PRINIVIL,ZESTRIL) 20 MG tablet Take 20 mg by mouth once daily.      [DISCONTINUED] metoprolol tartrate (LOPRESSOR) 25 MG tablet Take 12.5 mg by mouth 2 (two) times daily.       No current facility-administered medications on file prior to visit.       Review of patient's allergies indicates:   Allergen Reactions    Ciprofloxacin Palpitations    Penicillins Palpitations and Anxiety       Review of Systems:  A review of 10+ systems was conducted with pertinent positive and negative findings documented in HPI with all other systems reviewed and negative.    OBJECTIVE:     Estimated body mass index is 30.37 kg/m² as calculated from the following:    Height as of this encounter: 5' 10" (1.778 m).    Weight as of this encounter: 96 kg (211 lb 10.3 oz).    Vital Signs (Most Recent)  Vitals:    01/16/25 0813   BP: 135/82   Pulse: 60       Physical Exam:  GENERAL: patient sitting comfortably  HEENT: normocephalic  NECK: supple, no JVD  PULM: normal chest rise, no increased WOB  HEART: non-diaphoretic  ABDO: soft, nondistended, nontender  BACK: no CVA tenderness bilaterally  SKIN: warm, dry, well perfused  EXT: no bruising or edema  NEURO: grossly normal with no focal deficits  PSYCH: appropriate mood and affect    Genitourinary Exam:  deferred    Lab Results   Component Value Date    BUN 19 10/22/2024    CREATININE 0.87 10/22/2024    WBC 4.80 10/22/2024    HGB 14.2 10/22/2024    HCT 43.5 10/22/2024     10/22/2024    AST 37 10/22/2024    ALT 24 10/22/2024    ALKPHOS 80 10/22/2024    ALBUMIN 4.0 10/22/2024    HGBA1C 5.5 07/13/2023    INR 1.0 07/20/2021        Imaging:  I have personally reviewed all relevant imaging studies.    No results found for this or any previous visit (from the past 2160 hours).  No results found for this or any previous visit (from the past 2160 hours).  US Upper Extremity Veins Left  Narrative: EXAMINATION:  US UPPER EXTREMITY VEINS LEFT    CLINICAL HISTORY:  arm swelling with " PICC;    TECHNIQUE:  Duplex and color flow Doppler evaluation and dynamic compression was performed of the left upper extremity veins.    COMPARISON:  None    FINDINGS:  Central veins: The internal jugular, subclavian, and axillary veins are patent and free of thrombus.    Arm veins: The brachial and basilic veins are patent and compressible.  Cephalic vein not visualized.    Contralateral subclavian/internal jugular veins: Not evaluated.    Other findings: Left antecubital PICC line.  Impression: No thrombus in central veins of the left upper extremity.    Electronically signed by: David Duran MD  Date:    10/19/2024  Time:    23:30       PSA:  Lab Results   Component Value Date    PSATOTAL 3.9 09/21/2022    PSAFREE 0.72 09/21/2022       Testosterone:  Lab Results   Component Value Date    TOTALTESTOST 336 09/21/2022    TESTOSTERONE 5.0 09/21/2022        ASSESSMENT     1. Recurrent UTI    2. Benign prostatic hyperplasia with weak urinary stream    3. Erectile dysfunction, unspecified erectile dysfunction type    4. Peyronie's disease        PLAN:     Discussed possibility that recurrent infections caused by enlarged prostate and urinary obstruction demonstrated by a uroflow and symptoms.  Recommended consider outlet procedure, Rezum versus holmium laser enucleation of prostate.  Strongly considering ReZum.    Cont Sildenafil for erectile dysfunction.    Follow up in 1 mo with CT to eval prostate size    Riley Vasquez MD  Urology  [facility] [place] [facility] [place]    Disclaimer: This note has been generated using voice-recognition software. There may be typographical errors that have been missed during proof-reading.

## 2025-01-29 ENCOUNTER — TELEPHONE (OUTPATIENT)
Dept: PULMONOLOGY | Facility: CLINIC | Age: 75
End: 2025-01-29
Payer: MEDICARE

## 2025-01-29 NOTE — TELEPHONE ENCOUNTER
Patient was scheduled for 01/30/2025 , patient has been moved   To 03/13/2025 at 11:40 due to the provider being out/letter/lvm

## 2025-02-17 ENCOUNTER — HOSPITAL ENCOUNTER (OUTPATIENT)
Dept: RADIOLOGY | Facility: HOSPITAL | Age: 75
Discharge: HOME | End: 2025-02-17
Attending: UROLOGY
Payer: MEDICARE

## 2025-02-17 DIAGNOSIS — R10.84 GENERALIZED ABDOMINAL PAIN: ICD-10-CM

## 2025-02-17 PROCEDURE — 74176 CT ABD & PELVIS W/O CONTRAST: CPT | Mod: TC,PN

## 2025-02-20 ENCOUNTER — TELEPHONE (OUTPATIENT)
Dept: PREADMISSION TESTING | Facility: HOSPITAL | Age: 75
End: 2025-02-20
Payer: MEDICARE

## 2025-02-20 ENCOUNTER — TELEPHONE (OUTPATIENT)
Dept: UROLOGY | Facility: CLINIC | Age: 75
End: 2025-02-20

## 2025-02-20 ENCOUNTER — OFFICE VISIT (OUTPATIENT)
Dept: UROLOGY | Facility: CLINIC | Age: 75
End: 2025-02-20
Payer: MEDICARE

## 2025-02-20 VITALS
BODY MASS INDEX: 30.9 KG/M2 | HEIGHT: 70 IN | WEIGHT: 215.81 LBS | DIASTOLIC BLOOD PRESSURE: 76 MMHG | HEART RATE: 64 BPM | SYSTOLIC BLOOD PRESSURE: 125 MMHG

## 2025-02-20 DIAGNOSIS — N52.9 ERECTILE DYSFUNCTION, UNSPECIFIED ERECTILE DYSFUNCTION TYPE: ICD-10-CM

## 2025-02-20 DIAGNOSIS — N40.1 BENIGN PROSTATIC HYPERPLASIA WITH WEAK URINARY STREAM: Primary | ICD-10-CM

## 2025-02-20 DIAGNOSIS — I10 PRIMARY HYPERTENSION: ICD-10-CM

## 2025-02-20 DIAGNOSIS — N48.6 PEYRONIE'S DISEASE: ICD-10-CM

## 2025-02-20 DIAGNOSIS — Z01.818 PRE-OP EVALUATION: Primary | ICD-10-CM

## 2025-02-20 DIAGNOSIS — R39.12 BENIGN PROSTATIC HYPERPLASIA WITH WEAK URINARY STREAM: Primary | ICD-10-CM

## 2025-02-20 PROCEDURE — 1160F RVW MEDS BY RX/DR IN RCRD: CPT | Mod: CPTII,S$GLB,, | Performed by: UROLOGY

## 2025-02-20 PROCEDURE — 1101F PT FALLS ASSESS-DOCD LE1/YR: CPT | Mod: CPTII,S$GLB,, | Performed by: UROLOGY

## 2025-02-20 PROCEDURE — 99999 PR PBB SHADOW E&M-EST. PATIENT-LVL V: CPT | Mod: PBBFAC,,, | Performed by: UROLOGY

## 2025-02-20 PROCEDURE — 87088 URINE BACTERIA CULTURE: CPT | Performed by: STUDENT IN AN ORGANIZED HEALTH CARE EDUCATION/TRAINING PROGRAM

## 2025-02-20 PROCEDURE — 99214 OFFICE O/P EST MOD 30 MIN: CPT | Mod: S$GLB,,, | Performed by: UROLOGY

## 2025-02-20 PROCEDURE — 3288F FALL RISK ASSESSMENT DOCD: CPT | Mod: CPTII,S$GLB,, | Performed by: UROLOGY

## 2025-02-20 PROCEDURE — 87147 CULTURE TYPE IMMUNOLOGIC: CPT | Performed by: STUDENT IN AN ORGANIZED HEALTH CARE EDUCATION/TRAINING PROGRAM

## 2025-02-20 PROCEDURE — 1159F MED LIST DOCD IN RCRD: CPT | Mod: CPTII,S$GLB,, | Performed by: UROLOGY

## 2025-02-20 PROCEDURE — 1125F AMNT PAIN NOTED PAIN PRSNT: CPT | Mod: CPTII,S$GLB,, | Performed by: UROLOGY

## 2025-02-20 PROCEDURE — 3078F DIAST BP <80 MM HG: CPT | Mod: CPTII,S$GLB,, | Performed by: UROLOGY

## 2025-02-20 PROCEDURE — 87086 URINE CULTURE/COLONY COUNT: CPT | Performed by: STUDENT IN AN ORGANIZED HEALTH CARE EDUCATION/TRAINING PROGRAM

## 2025-02-20 PROCEDURE — 3074F SYST BP LT 130 MM HG: CPT | Mod: CPTII,S$GLB,, | Performed by: UROLOGY

## 2025-02-20 RX ORDER — ROSUVASTATIN CALCIUM 5 MG/1
5 TABLET, COATED ORAL DAILY
COMMUNITY

## 2025-02-20 RX ORDER — CEFAZOLIN SODIUM 2 G/50ML
2 SOLUTION INTRAVENOUS
OUTPATIENT
Start: 2025-02-20

## 2025-02-20 NOTE — TELEPHONE ENCOUNTER
Mr. Gonzalez is scheduled for cystogram on 3/18/25 with Christina. Medication list indicates he is currently taking Eliquis. He will need recommendations on when to stop prior to surgery. Please advise. Thanks

## 2025-02-20 NOTE — PROGRESS NOTES
[facility]   Clinic Note    SUBJECTIVE:     Chief Complaint   Patient presents with    Follow-up    Urinary Tract Infection       History of Present Illness:  Jerardo Callejas is a 74 y.o. male who presents to clinic for BPH.    H/o BPH on tamsulosin for years followed at OSH.  He had UTI in 11/2021 and two infections in 8/2022 with Klebsiella and E coli.  Was septic with recent infection and admitted to hospital 8/2022.  Reports years of LUTs.  Does have worsening frequency/urgency with UTI and sometimes dysuria.  Denies gross hematuria recently but had this years ago.  AUA SS 18/4, PVR ok.  H/o negative prostate biopsy in past.    01/16/2025  Doing well today  Here to establish care  No recent UTIs    02/20/2025  Returns after CT scan showing prostate 60-65 cc.   Still reports ED and Pyeronie's. Reports strange feeling when taking either cialis or viagra but not chest pain.    PSA:  Lab Results   Component Value Date    PSATOTAL 3.9 09/21/2022    PSAFREE 0.72 09/21/2022         Patient endorses no additional complaints at this time.    Past Medical History:   Diagnosis Date    Back pain, chronic     BPH (benign prostatic hyperplasia)     DVT (deep venous thrombosis)     Hypertension     Mitral valve prolapse syndrome     Prostate enlargement        Past Surgical History:   Procedure Laterality Date    NECK SURGERY         Family History   Problem Relation Name Age of Onset    Hypertension Mother      Stroke Father         Social History     Tobacco Use    Smoking status: Former    Smokeless tobacco: Never    Tobacco comments:     >40 years ago   Substance Use Topics    Alcohol use: No    Drug use: No       Current Outpatient Medications on File Prior to Visit   Medication Sig Dispense Refill    acetaminophen (TYLENOL) 500 MG tablet Take 1 tablet (500 mg total) by mouth every 6 (six) hours as needed for Pain. 60 tablet 0    ALPRAZolam (XANAX) 0.25 MG tablet Take 0.25 mg by mouth every 12 (twelve) hours as  needed.      clonazePAM (KLONOPIN) 0.5 MG tablet Take 0.5 mg by mouth nightly as needed.      cloNIDine (CATAPRES) 0.1 MG tablet Take 0.1 mg by mouth 2 (two) times daily.      divalproex (DEPAKOTE) 250 MG EC tablet Take 750 mg by mouth 2 (two) times a day.      divalproex (DEPAKOTE) 500 MG TbEC Take 500 mg by mouth 2 (two) times daily.      DULoxetine (CYMBALTA) 30 MG capsule Take 30 mg by mouth.      ELIQUIS 5 mg Tab Take 5 mg by mouth 2 (two) times daily.      finasteride (PROSCAR) 5 mg tablet Take 1 tablet (5 mg total) by mouth once daily. 30 tablet 11    furosemide (LASIX) 20 MG tablet Take 20 mg by mouth every morning.      gabapentin (NEURONTIN) 600 MG tablet Take 600 mg by mouth 3 (three) times daily.      LIDOcaine (LIDODERM) 5 % Place 1 patch onto the skin once daily. Remove & Discard patch within 12 hours or as directed by MD 8 patch 0    losartan-hydrochlorothiazide 50-12.5 mg (HYZAAR) 50-12.5 mg per tablet Take 1 tablet by mouth every morning.      meloxicam (MOBIC) 15 MG tablet Take 15 mg by mouth.      omeprazole (PRILOSEC) 40 MG capsule Take 40 mg by mouth once daily.      tamsulosin (FLOMAX) 0.4 mg Cap Take 1 capsule (0.4 mg total) by mouth once daily. 90 capsule 3    testosterone cypionate (DEPOTESTOTERONE CYPIONATE) 200 mg/mL injection Inject into the muscle.      vitamin D (VITAMIN D3) 1000 units Tab Take 1,000 Units by mouth once daily.      albuterol (PROVENTIL/VENTOLIN HFA) 90 mcg/actuation inhaler Inhale 2 puffs into the lungs every 4 (four) hours as needed for Wheezing or Shortness of Breath. Rescue 6.7 g 0    atorvastatin (LIPITOR) 40 MG tablet Take 1 tablet (40 mg total) by mouth once daily. 90 tablet 3    rosuvastatin (CRESTOR) 5 MG tablet Take 5 mg by mouth once daily.      sildenafil (REVATIO) 20 mg Tab Take 1 tablet (20 mg total) by mouth daily as needed (Take up to 5 tablets 1 hour prior to sexual activity on an empty stomach.). 30 tablet 5    [DISCONTINUED] amLODIPine (NORVASC) 5 MG  "tablet Take 1 tablet (5 mg total) by mouth once daily. 30 tablet 3    [DISCONTINUED] hydroCHLOROthiazide (HYDRODIURIL) 12.5 MG Tab Take 12.5 mg by mouth once daily.      [DISCONTINUED] lisinopril (PRINIVIL,ZESTRIL) 20 MG tablet Take 20 mg by mouth once daily.      [DISCONTINUED] metoprolol tartrate (LOPRESSOR) 25 MG tablet Take 12.5 mg by mouth 2 (two) times daily.       No current facility-administered medications on file prior to visit.       Review of patient's allergies indicates:   Allergen Reactions    Ciprofloxacin Palpitations    Penicillins Palpitations and Anxiety       Review of Systems:  A review of 10+ systems was conducted with pertinent positive and negative findings documented in HPI with all other systems reviewed and negative.    OBJECTIVE:     Estimated body mass index is 30.97 kg/m² as calculated from the following:    Height as of this encounter: 5' 10" (1.778 m).    Weight as of this encounter: 97.9 kg (215 lb 13.3 oz).    Vital Signs (Most Recent)  Vitals:    02/20/25 1023   BP: 125/76   Pulse: 64       Physical Exam:  GENERAL: patient sitting comfortably  HEENT: normocephalic  NECK: supple, no JVD  PULM: normal chest rise, no increased WOB  HEART: non-diaphoretic  ABDO: soft, nondistended, nontender  BACK: no CVA tenderness bilaterally  SKIN: warm, dry, well perfused  EXT: no bruising or edema  NEURO: grossly normal with no focal deficits  PSYCH: appropriate mood and affect    Genitourinary Exam:  deferred    Lab Results   Component Value Date    BUN 19 10/22/2024    CREATININE 0.87 10/22/2024    WBC 4.80 10/22/2024    HGB 14.2 10/22/2024    HCT 43.5 10/22/2024     10/22/2024    AST 37 10/22/2024    ALT 24 10/22/2024    ALKPHOS 80 10/22/2024    ALBUMIN 4.0 10/22/2024    HGBA1C 5.5 07/13/2023    INR 1.0 07/20/2021        Imaging:  I have personally reviewed all relevant imaging studies.    Results for orders placed or performed during the hospital encounter of 02/17/25 (from the past " 2160 hours)   CT Renal Stone Study ABD Pelvis WO    Narrative    EXAMINATION:  CT RENAL STONE STUDY ABD PELVIS WO    CLINICAL HISTORY:  Flank pain, kidney stone suspected; Generalized abdominal pain    TECHNIQUE:  Low dose axial images, sagittal and coronal reformations were obtained from the lung bases to the pubic symphysis.  Oral contrast was not administered.    COMPARISON:  None    FINDINGS:  Heart: Normal size. No effusion.    Lung Bases: Clear.  Calcified pleural plaques are noted which can be seen with asbestos related pleural disease.    Liver: Normal size and attenuation.  Multiple hypodensities are seen throughout the liver bilaterally consistent with multiple cysts measuring up to 6.5 cm within the left hepatic lobe and 4.5 cm right hepatic lobe.    Gallbladder: No calcified gallstones.    Bile Ducts: No dilatation.    Pancreas: No obvious mass. No peripancreatic fat stranding.    Spleen: Normal.    Adrenals: Normal.    Kidneys/Ureters: No mass, hydroureteronephrosis, or nephroureterolithiasis.    Bladder: Mild nonspecific wall thickening.    Reproductive organs: Prostate is enlarged measuring 5.9 x 4.1 cm.  Recommend correlation PSA.    GI Tract/Mesentery: No evidence of bowel obstruction or inflammation.  Mild constipation.  Normal appendix.    Peritoneal Space: No ascites or free air.    Retroperitoneum: No significant adenopathy.    Abdominal wall: Small bilateral fat containing inguinal hernias.    Vasculature: No aneurysm. Aorta demonstrates atherosclerotic disease.    Bones: No acute fracture.  Advanced degenerative changes throughout the lumbar spine.  Benign well corticated sclerotic lesion within the right iliac bone measuring 2.6 x 1.3 cm.      Impression    Nonspecific bladder wall thickening.  Recommend correlation with UA.    No evidence of obstructive uropathy.    Evaluation of solid organ and vascular pathology is limited due to lack of IV contrast.    All CT scans at this facility use  dose modulation, iterative reconstruction, and/or weight based dosing when appropriate to reduce radiation dose to as low as reasonable achievable.      Electronically signed by: Jorge Winston MD  Date:    02/17/2025  Time:    12:01     No results found for this or any previous visit (from the past 2160 hours).  CT Renal Stone Study ABD Pelvis WO  Narrative: EXAMINATION:  CT RENAL STONE STUDY ABD PELVIS WO    CLINICAL HISTORY:  Flank pain, kidney stone suspected; Generalized abdominal pain    TECHNIQUE:  Low dose axial images, sagittal and coronal reformations were obtained from the lung bases to the pubic symphysis.  Oral contrast was not administered.    COMPARISON:  None    FINDINGS:  Heart: Normal size. No effusion.    Lung Bases: Clear.  Calcified pleural plaques are noted which can be seen with asbestos related pleural disease.    Liver: Normal size and attenuation.  Multiple hypodensities are seen throughout the liver bilaterally consistent with multiple cysts measuring up to 6.5 cm within the left hepatic lobe and 4.5 cm right hepatic lobe.    Gallbladder: No calcified gallstones.    Bile Ducts: No dilatation.    Pancreas: No obvious mass. No peripancreatic fat stranding.    Spleen: Normal.    Adrenals: Normal.    Kidneys/Ureters: No mass, hydroureteronephrosis, or nephroureterolithiasis.    Bladder: Mild nonspecific wall thickening.    Reproductive organs: Prostate is enlarged measuring 5.9 x 4.1 cm.  Recommend correlation PSA.    GI Tract/Mesentery: No evidence of bowel obstruction or inflammation.  Mild constipation.  Normal appendix.    Peritoneal Space: No ascites or free air.    Retroperitoneum: No significant adenopathy.    Abdominal wall: Small bilateral fat containing inguinal hernias.    Vasculature: No aneurysm. Aorta demonstrates atherosclerotic disease.    Bones: No acute fracture.  Advanced degenerative changes throughout the lumbar spine.  Benign well corticated sclerotic lesion within the  right iliac bone measuring 2.6 x 1.3 cm.  Impression: Nonspecific bladder wall thickening.  Recommend correlation with UA.    No evidence of obstructive uropathy.    Evaluation of solid organ and vascular pathology is limited due to lack of IV contrast.    All CT scans at this facility use dose modulation, iterative reconstruction, and/or weight based dosing when appropriate to reduce radiation dose to as low as reasonable achievable.    Electronically signed by: Jorge Winston MD  Date:    02/17/2025  Time:    12:01       PSA:  Lab Results   Component Value Date    PSATOTAL 3.9 09/21/2022    PSAFREE 0.72 09/21/2022       Testosterone:  Lab Results   Component Value Date    TOTALTESTOST 336 09/21/2022    TESTOSTERONE 5.0 09/21/2022        ASSESSMENT     1. Benign prostatic hyperplasia with weak urinary stream    2. Erectile dysfunction, unspecified erectile dysfunction type    3. Peyronie's disease          PLAN:     CT scan showing prostate approx 60-65 cc. Long discussion regarding options including observation, medications, Rezum, HoLEP and TURP.   Patient would like to monitor his ED for now. Will consider IPP in the future. Discussed ICI as he is not happy with PDEi5s.  After shared decision making, patient would like to proceed with ReZum. Risks, complications and expected outcomes discussed.  Urine culture today    Will schedule Rezum 3/18/25    Matthew Wahl MD  [facility] - PGY4      Disclaimer: This note has been generated using voice-recognition software. There may be typographical errors that have been missed during proof-reading.

## 2025-02-22 LAB — BACTERIA UR CULT: ABNORMAL

## 2025-02-24 ENCOUNTER — RESULTS FOLLOW-UP (OUTPATIENT)
Dept: UROLOGY | Facility: HOSPITAL | Age: 75
End: 2025-02-24
Payer: MEDICARE

## 2025-02-24 ENCOUNTER — TELEPHONE (OUTPATIENT)
Dept: UROLOGY | Facility: CLINIC | Age: 75
End: 2025-02-24
Payer: MEDICARE

## 2025-02-24 RX ORDER — CEPHALEXIN 500 MG/1
500 CAPSULE ORAL EVERY 8 HOURS
Qty: 21 CAPSULE | Refills: 0 | Status: SHIPPED | OUTPATIENT
Start: 2025-02-24 | End: 2025-03-03

## 2025-02-24 NOTE — TELEPHONE ENCOUNTER
----- Message from Riley Vasquez MD sent at 2/24/2025  9:18 AM CST -----  Let pt know that I have sent abx to his pharmacy  ----- Message -----  From: Hayden, Soft Lab Interface  Sent: 2/22/2025   4:58 AM CST  To: Riley Vasquez MD

## 2025-02-24 NOTE — TELEPHONE ENCOUNTER
----- Message from Vandana sent at 2/24/2025  9:57 AM CST -----  Type:  CallWho Called:ptDoes the patient know what this is regarding?:MedWould the patient rather a call back or a response via [facility]? [place] Call Back Number: [phone] Information: Pt stated he was taking Clindamycin  MG from PCP, should he continue taking that or the meds you gave him.

## 2025-02-24 NOTE — TELEPHONE ENCOUNTER
Spoke with pt and advised urine cx was positive. Abx sent in to pharmacy. Pt voiced his understanding

## 2025-02-24 NOTE — TELEPHONE ENCOUNTER
Spoke with pt and advised to him after speaking with provider he can continue taking the medication prescribed my PCP and does not need the abx he sent in. Pt confirmed.

## 2025-02-24 NOTE — TELEPHONE ENCOUNTER
Pt is asking  if the medication he is currently taking from PCP Clindamycin does he need to continue taking with the new abx he was prescribed from provider. I advised to pt that I would sent concerns over to provider and advise. Pt voiced his understanding

## 2025-03-06 ENCOUNTER — TELEPHONE (OUTPATIENT)
Dept: UROLOGY | Facility: CLINIC | Age: 75
End: 2025-03-06
Payer: MEDICARE

## 2025-03-06 ENCOUNTER — HOSPITAL ENCOUNTER (OUTPATIENT)
Dept: CARDIOLOGY | Facility: HOSPITAL | Age: 75
Discharge: HOME | End: 2025-03-06
Attending: NURSE PRACTITIONER
Payer: MEDICARE

## 2025-03-06 DIAGNOSIS — Z01.818 PRE-OP EVALUATION: ICD-10-CM

## 2025-03-06 DIAGNOSIS — I10 PRIMARY HYPERTENSION: ICD-10-CM

## 2025-03-06 LAB
OHS QRS DURATION: 88 MS
OHS QTC CALCULATION: 377 MS

## 2025-03-06 PROCEDURE — 93005 ELECTROCARDIOGRAM TRACING: CPT | Mod: PN

## 2025-03-06 NOTE — TELEPHONE ENCOUNTER
----- Message from Alayna sent at 3/6/2025  8:25 AM CST -----  Type:  Needs Medical AdviceWho Called: ptWould the patient rather a call back or a response via [facility]? [facility] Call Back Number: [phone] Information: pt would like to keep procedure after discussing it with primary care doctor  and cardiologist.

## 2025-03-06 NOTE — TELEPHONE ENCOUNTER
Spoke with pt and advised to him that surgery was still scheduled for 3/18. Pre admit would contact day prior with arrival time.Pt voiced his understanding

## 2025-03-11 ENCOUNTER — RESULTS FOLLOW-UP (OUTPATIENT)
Dept: ANESTHESIOLOGY | Facility: HOSPITAL | Age: 75
End: 2025-03-11

## 2025-03-12 ENCOUNTER — TELEPHONE (OUTPATIENT)
Dept: PULMONOLOGY | Facility: CLINIC | Age: 75
End: 2025-03-12
Payer: MEDICARE

## 2025-03-12 NOTE — TELEPHONE ENCOUNTER
Patient was scheduled for 03/13/2025 , patient has been moved   to 06/26/2025 at 11:20 due to the provider being out/lvm/letter

## 2025-03-13 ENCOUNTER — HOSPITAL ENCOUNTER (OUTPATIENT)
Dept: PREADMISSION TESTING | Facility: HOSPITAL | Age: 75
Discharge: HOME | End: 2025-03-13
Attending: NURSE PRACTITIONER
Payer: MEDICARE

## 2025-03-13 ENCOUNTER — ANESTHESIA EVENT (OUTPATIENT)
Dept: SURGERY | Facility: HOSPITAL | Age: 75
End: 2025-03-13
Payer: MEDICARE

## 2025-03-13 NOTE — TELEPHONE ENCOUNTER
----- Message from Tamika sent at 3/13/2025  9:43 AM CDT -----  Type:  Sooner Appointment RequestCaller is requesting a sooner appointment.  Caller declined first available appointment listed below.  Caller will not accept being placed on the waitlist and is requesting a message be sent to doctor.Name of Caller:pt When is the first available appointment?06/26Symptoms: follow upWould the patient rather a call back or a response via [facility]? [facility] Call Back Number:[phone] Information: Pt was upset and stated that it is unfair that he has to wait until June to have a f/u apptOffered an appt with Rakel ot refused he hung up in my face

## 2025-03-13 NOTE — PRE-PROCEDURE INSTRUCTIONS
Wife.    Allergies, medical, surgical, family and psychosocial histories reviewed with patient. Periop plan of care reviewed. Preop instructions given, including medications to take and to hold. Hibiclens soap and instructions on use given. Time allotted for questions to be addressed.      Arrival time 1000.      Please take Losartan and Depakote the morning of surgery. Please take Eliquis on 3/14/25.     Surgery instructions reviewed and surgery booklet sent or emailed to the patient unable to send not on the portal nor an email.

## 2025-03-13 NOTE — ANESTHESIA PREPROCEDURE EVALUATION
03/13/2025  Jerardo Callejas is a 74 y.o., male scheduled for CYSTOSCOPY WITH TRANSURETHRAL DESTRUCTION OF PROSTATE,RFA 3/18/25.    Past Medical History:   Diagnosis Date    Back pain, chronic     BPH (benign prostatic hyperplasia)     DVT (deep venous thrombosis)     Hypertension     Mitral valve prolapse syndrome     Prostate enlargement      Past Surgical History:   Procedure Laterality Date    NECK SURGERY       Review of patient's allergies indicates:   Allergen Reactions    Ciprofloxacin Palpitations    Penicillins Palpitations and Anxiety     Current Outpatient Medications   Medication Instructions    acetaminophen (TYLENOL) 500 mg, Oral, Every 6 hours PRN    albuterol (PROVENTIL/VENTOLIN HFA) 90 mcg/actuation inhaler 2 puffs, Inhalation, Every 4 hours PRN, Rescue    ALPRAZolam (XANAX) 0.25 mg, Every 12 hours PRN    atorvastatin (LIPITOR) 40 mg, Oral, Daily    clonazePAM (KLONOPIN) 0.5 mg, Nightly PRN    cloNIDine (CATAPRES) 0.1 mg, 2 times daily    divalproex (DEPAKOTE) 750 mg, 2 times daily    divalproex (DEPAKOTE) 500 mg, 2 times daily    DULoxetine (CYMBALTA) 30 mg    ELIQUIS 5 mg, 2 times daily    finasteride (PROSCAR) 5 mg, Oral, Daily    furosemide (LASIX) 20 mg, Every morning    gabapentin (NEURONTIN) 600 mg, 3 times daily    LIDOcaine (LIDODERM) 5 % 1 patch, Transdermal, Daily, Remove & Discard patch within 12 hours or as directed by MD    losartan-hydrochlorothiazide 50-12.5 mg (HYZAAR) 50-12.5 mg per tablet 1 tablet, Every morning    meloxicam (MOBIC) 15 mg    omeprazole (PRILOSEC) 40 mg, Daily    rosuvastatin (CRESTOR) 5 mg, Oral, Daily    sildenafil (REVATIO) 20 mg, Oral, Daily PRN    tamsulosin (FLOMAX) 0.4 mg, Oral, Daily    testosterone cypionate (DEPOTESTOTERONE CYPIONATE) 200 mg/mL injection Inject into the muscle.    vitamin D (VITAMIN D3) 1,000 Units, Daily       Pre-op  Assessment    I have reviewed the Patient Summary Reports.     I have reviewed the Nursing Notes. I have reviewed the NPO Status.   I have reviewed the Medications.     Review of Systems  Anesthesia Hx:  No problems with previous Anesthesia   History of prior surgery of interest to airway management or planning: cervical fusion.           Denies Personal Hx of Anesthesia complications.                    Social:  Former Smoker, No Alcohol Use       Cardiovascular:  Exercise tolerance: good   Denies Pacemaker. Hypertension Valvular problems/Murmurs, MVP  CAD       Denies Angina.     hyperlipidemia    DVT following cervical fusion and while having a PICC line; has held eliquis since last Thursday           Deep Venous Thrombosis (DVT), Hx of DVT, right upper extremity                  Pulmonary:  Pulmonary Normal      Denies Shortness of breath.                  Renal/:  Chronic Renal Disease, CKD  BPH              Hepatic/GI:     GERD, well controlled    Not Taking GLP-1 Agonists            Musculoskeletal:         Spine Disorders: lumbar and cervical Chronic Pain           Neurological:  Denies TIA.  Denies CVA. Neuromuscular Disease,   Denies Seizures.                                Endocrine:  Denies Diabetes.         Obesity / BMI > 30      Physical Exam  General: Cooperative, Oriented, Alert and Well nourished    Airway:  Mallampati: II   Mouth Opening: Normal  TM Distance: Normal  Tongue: Normal  Neck ROM: Left Lateral Motion Decreased, Right Lateral Motion Decreased    Dental:  Intact    Chest/Lungs:  Clear to auscultation, Normal Respiratory Rate    Heart:  Rate: Normal  Rhythm: Regular Rhythm      Lab Results   Component Value Date    WBC 4.80 10/22/2024    HGB 14.2 10/22/2024    HCT 43.5 10/22/2024     10/22/2024    CHOL 115 (L) 07/13/2023    TRIG 56 07/13/2023    HDL 32 (L) 07/13/2023    ALT 24 10/22/2024    AST 37 10/22/2024     03/06/2025    K 4.5 03/06/2025     03/06/2025     CREATININE 1.04 03/06/2025    BUN 19 03/06/2025    CO2 35 (H) 03/06/2025    TSH 1.090 05/19/2024    INR 1.0 07/20/2021    HGBA1C 5.5 07/13/2023     Results for orders placed or performed during the hospital encounter of 03/06/25   EKG 12-lead    Collection Time: 03/06/25  9:14 AM   Result Value Ref Range    QRS Duration 88 ms    [facility]HS QTC Calculation 377 ms    Narrative    Test Reason : Z01.818,I10,    Vent. Rate :  45 BPM     Atrial Rate :  45 BPM     P-R Int : 176 ms          QRS Dur :  88 ms      QT Int : 436 ms       P-R-T Axes :  39 -46  26 degrees    QTcB Int : 377 ms    Sinus bradycardia  Left anterior fascicular block  Abnormal ECG  No previous ECGs available  Confirmed by Tani Carbajal ([id number]) on 3/9/2025 9:04:40 PM    Referred By: MEHREEN ROSALES           Confirmed By: Tani Gonzalez         Anesthesia Plan  Type of Anesthesia, risks & benefits discussed:    Anesthesia Type: Gen Supraglottic Airway  Intra-op Monitoring Plan: Standard ASA Monitors  Post Op Pain Control Plan: multimodal analgesia  Induction:  IV  Informed Consent: Informed consent signed with the Patient and all parties understand the risks and agree with anesthesia plan.  All questions answered.   ASA Score: 3  Anesthesia Plan Notes: Anesthesia consent to be signed prior to surgery 3/18/25      Ready For Surgery From Anesthesia Perspective.     .

## 2025-03-13 NOTE — TELEPHONE ENCOUNTER
Patient was scheduled to see Rakel Kraus 03/17/2025 at 8:00/Rescheduled from Dr. Kearns 03/13/2025

## 2025-03-13 NOTE — DISCHARGE INSTRUCTIONS
Your surgery is scheduled for 3/18/25.    Please report to Hospital Front Lobby on the 1st Floor at 1000 a.m.    THIS TIME IS SUBJECT TO CHANGE.  YOU WILL RECEIVE A PHONE CALL THE DAY BEFORE SURGERY BY 3:30 PM TO CONFIRM YOUR TIME OF ARRIVAL.  IF YOU HAVE NOT RECEIVED A PHONE CALL BY 3:30 PM THE DAY BEFORE YOUR SURGERY PLEASE CALL [phone]     INSTRUCTIONS IMPORTANT!!!  ¨Stop full meals 8 hours prior to arrival, but you can consume clear liquids up to 2 hours prior to arrival time. Clear liquids include Gatorade, water, soda, black coffee or tea (no milk or creamer), and clear juices only.     Please take Losartan and Depakote the morning of surgery. Please take Eliquis on 3/14/25.         ____  Do not wear makeup, including mascara.  ____  No powder, lotions or creams to surgical area.  ____  Please remove all jewelry, including piercings and leave at home.  ____  No money or valuables needed. Please leave at home.  ____  Please bring any documents given by your doctor.  ____  If going home the same day, arrange for a ride home. You will not be able to             drive if Anesthesia was used.  ____  Children under 18 years require a parent / guardian present the entire time             they are in surgery / recovery.  ____  Wear loose fitting clothing. Allow for dressings, bandages.  ____  Stop Aspirin and blood thinners as directed by prescribing provider.  ____  Do not take any herbal, vitamins, and or supplements 2 weeks prior to surgery.  ____  Stop NSAIDS (Naproxen, Aleve, Voltaren, Celebrex, Melxoicam), Goody's, and BC powder 7 days prior to surgery.  ____  Wash the surgical area with Hibiclens or Dial Antibacterial bar soap the night before surgery, and again the             morning of surgery.  Be sure to rinse hibiclens or Dial Antibacterial bar soap off completely (if instructed by   nurse).  ____  If you take diabetic medication including Metformin, Glimepiride, Glipizide, Glyburide, Byetta,  Januvia, Actos, do not take am of surgery unless            instructed by Doctor.  ____  Call MD for temperature above 101 degrees or any other signs of infection such as Urinary (bladder) infection, Upper respiratory infection, skin boils,             etc.  ____ Do Not wear your contact lenses the day of your procedure.  You may wear your glasses.      ____ Do not shave surgical site for 3 days prior to surgery.  ____ Practice Good hand washing before, during, and after procedure.  ____ Hold the following medication for 3 days prior to surgery:    Invokana (Canagliflozin)     Synjardy XR (jardiance + metformin)  Farxiga (Dapagliflozin)     Segluroment (steglarto + metformin)  Jardiance (Empagliflozin)     Qtern (farxiga + saxagliptin)   Steglatro (Ertugliflozin)     Glyxambi (jardiance + linagliptin)  Benzavvy (Bexagliflozin)     Steglujan (steglarto + sitagliptin)  Inpefa (Sotagliflozin)      Xigduo (farxiga + metformin)   Invokamet/Invokamet XR (invokana + Metformin)       I have read or had read and explained to me, and understand the above information.  Additional comments or instructions:  For additional questions call [phone]      ANESTHESIA SIDE EFFECTS  -For the first 24 hours after surgery:  Do not drive, use heavy equipment, make important decisions, or drink alcohol  -It is normal to feel sleepy for several hours.  Rest until you are more awake.  -Have someone stay with you, if needed.  They can watch for problems and help keep you safe.  -Some possible post anesthesia side effects include: nausea and vomiting, sore throat and hoarseness, sleepiness, and dizziness.        Pre-Op Bathing Instructions    Before surgery, you can play an important role in your own health.    Because skin is not sterile, we need to be sure that your skin is as free of germs as possible. By following the instructions below, you can reduce the number of germs on your skin before surgery.    IMPORTANT: You will need to shower  with a special soap called Hibiclens*, available at any pharmacy.  If you are allergic to Chlorhexidine (the antiseptic in Hibiclens), use an antibacterial soap such as Dial Soap for your preoperative shower.  You will shower with Hibiclens both the night before your surgery and the morning of your surgery.  Do not use Hibiclens on the head, face or genitals to avoid injury to those areas.    STEP #1: THE NIGHT BEFORE YOUR SURGERY     Do not shave the area of your body where your surgery will be performed.  Shower and wash your hair and body as usual with your normal soap and shampoo.  Rinse your hair and body thoroughly after you shower to remove all soap residue.  With your hand, apply one packet of Hibiclens soap to the surgical site.   Wash the site gently for five (5) minutes. Do not scrub your skin too hard.   Do not wash with your regular soap after Hibiclens is used.  Rinse your body thoroughly.  Pat yourself dry with a clean, soft towel.  Do not use lotion, cream, or powder.  Wear clean clothes.    STEP #2: THE MORNING OF YOUR SURGERY     Repeat Step #1.    * Not to be used by people allergic to Chlorhexidine.

## 2025-03-13 NOTE — TELEPHONE ENCOUNTER
----- Message from Filippo sent at 3/13/2025 12:53 PM CDT -----  Contact: pt  Type:  Patient Returning CallWho Called:PT Who Left Message for Patient:office Does the patient know what this is regarding?:says he was called to schedule with another doctorWould the patient rather a call back or a response via [facility]?  Call Best Call Back Number: [phone] Additional Information:

## 2025-03-16 NOTE — PROGRESS NOTES
[facility]  Pulmonary Clinic Note      History of Present Illness:  Jerardo Callejas is a 74 y.o. male with PMHx HTN, BPH, CAD, DVT, GERD, pulmonary nodules.     Last seen by Dr. Kearns in clinic 2024. During visit he was noted to have multiple stable ground glass pulmonary nodules. Repeat CT chest ordered at that time which again appeared stable compared to prior.     Today reports he has no issues with breathing. Denies fevers, chills, weight loss, night sweats, cough, shortness of breath, sputum production. Using walker for ambulation. Does not feel limited by breathing.     Pertinent History:  Pulm medications: none  Tobacco/Vape hx: quit , 2 packs per week x 15 years   EtOH/illicit drug use: denies  Occupational hx: worked in [employer] for 40+ years  GERD: periodically, not bothersome  Allergies/Rhinitis: occasional sinus issues   Vaccinations: pneumonia UTD, flu UTD, COVID not UTD    Pulmonary/Cardiology Testin/29/24 CT Chest Without Contrast  Unchanged calcified pleural plaques.  Unchanged 8 mm ground-glass nodule left lower lobe series 5, image 91   Unchanged 6 mm ground-glass nodule anterior right upper lobe image 42   Unchanged 5 mm nodule left lower lobe image 83   No new nodules.    Past Medical History:   Diagnosis Date    Back pain, chronic     BPH (benign prostatic hyperplasia)     DVT (deep venous thrombosis)     Hypertension     Mitral valve prolapse syndrome     Prostate enlargement      Past Surgical History:   Procedure Laterality Date    NECK SURGERY       Family History   Problem Relation Name Age of Onset    Hypertension Mother      Stroke Father       Social History[1]  Review of patient's allergies indicates:   Allergen Reactions    Ciprofloxacin Palpitations    Penicillins Palpitations and Anxiety       Review of Systems:  Review of Systems   Constitutional:  Negative for chills, fever and weight loss.   HENT:  Negative for congestion and sore throat.   "  Respiratory:  Negative for cough, shortness of breath and wheezing.    Cardiovascular:  Negative for chest pain and palpitations.   Musculoskeletal:  Negative for joint pain and myalgias.   Neurological:  Negative for loss of consciousness and headaches.   All other systems reviewed and are negative.         OBJECTIVE:     Vital Signs  Vitals:    03/17/25 0824   BP: (!) 146/83   BP Location: Right arm   Patient Position: Sitting   Pulse: 63   SpO2: 96%   Height: 5' 10" (1.778 m)     Body mass index is 30.97 kg/m².    Physical Exam:  Physical Exam  Vitals reviewed.   Constitutional:       General: He is not in acute distress.     Appearance: He is not toxic-appearing.   HENT:      Head: Normocephalic and atraumatic.      Mouth/Throat:      Mouth: Mucous membranes are moist.      Pharynx: Oropharynx is clear.   Eyes:      Extraocular Movements: Extraocular movements intact.      Conjunctiva/sclera: Conjunctivae normal.   Cardiovascular:      Rate and Rhythm: Normal rate.      Pulses: Normal pulses.   Pulmonary:      Effort: Pulmonary effort is normal. No respiratory distress.      Breath sounds: Normal breath sounds. No wheezing or rhonchi.   Abdominal:      Palpations: Abdomen is soft.   Skin:     General: Skin is warm and dry.      Capillary Refill: Capillary refill takes less than 2 seconds.   Neurological:      Mental Status: He is alert and oriented to person, place, and time.          Laboratory:  CBC  Lab Results   Component Value Date    WBC 4.80 10/22/2024    HGB 14.2 10/22/2024    HCT 43.5 10/22/2024     10/22/2024    MCV 89 10/22/2024    RDW 16.4 (H) 10/22/2024     BMP  Lab Results   Component Value Date     03/06/2025    K 4.5 03/06/2025     03/06/2025    CO2 35 (H) 03/06/2025    BUN 19 03/06/2025    CREATININE 1.04 03/06/2025     03/06/2025    CALCIUM 9.9 03/06/2025    MG 1.9 08/29/2023    PHOS 3.0 07/13/2023     LFTs  Lab Results   Component Value Date    PROT 7.6 10/22/2024 "    ALBUMIN 4.0 10/22/2024    BILITOT 0.3 10/22/2024    AST 37 10/22/2024    ALKPHOS 80 10/22/2024    ALT 24 10/22/2024         ASSESSMENT/PLAN:     Problem List Items Addressed This Visit       Lung nodule - Primary    Current Assessment & Plan   - LLL 8 mm GGN, LLL 5 mm GGN, 6 mm RUL GGN.   - Multiple ground glass nodules on CT imaging all of which are stable on 8/29/2024 when compared to scan 8/28/2023 with no new nodules.  - Repeat scan at one year in 9/2025. If remains stable at that point will space to scan every 2 years until 5 years.          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

## 2025-03-17 ENCOUNTER — OFFICE VISIT (OUTPATIENT)
Facility: CLINIC | Age: 75
End: 2025-03-17
Payer: MEDICARE

## 2025-03-17 VITALS
HEART RATE: 63 BPM | SYSTOLIC BLOOD PRESSURE: 146 MMHG | BODY MASS INDEX: 30.97 KG/M2 | HEIGHT: 70 IN | OXYGEN SATURATION: 96 % | DIASTOLIC BLOOD PRESSURE: 83 MMHG

## 2025-03-17 DIAGNOSIS — J92.0 PLEURAL PLAQUE DUE TO ASBESTOS EXPOSURE: ICD-10-CM

## 2025-03-17 DIAGNOSIS — R91.8 PULMONARY NODULES: Primary | ICD-10-CM

## 2025-03-17 PROCEDURE — 1101F PT FALLS ASSESS-DOCD LE1/YR: CPT | Mod: CPTII,S$GLB,, | Performed by: STUDENT IN AN ORGANIZED HEALTH CARE EDUCATION/TRAINING PROGRAM

## 2025-03-17 PROCEDURE — 1159F MED LIST DOCD IN RCRD: CPT | Mod: CPTII,S$GLB,, | Performed by: STUDENT IN AN ORGANIZED HEALTH CARE EDUCATION/TRAINING PROGRAM

## 2025-03-17 PROCEDURE — 3079F DIAST BP 80-89 MM HG: CPT | Mod: CPTII,S$GLB,, | Performed by: STUDENT IN AN ORGANIZED HEALTH CARE EDUCATION/TRAINING PROGRAM

## 2025-03-17 PROCEDURE — 1126F AMNT PAIN NOTED NONE PRSNT: CPT | Mod: CPTII,S$GLB,, | Performed by: STUDENT IN AN ORGANIZED HEALTH CARE EDUCATION/TRAINING PROGRAM

## 2025-03-17 PROCEDURE — 3077F SYST BP >= 140 MM HG: CPT | Mod: CPTII,S$GLB,, | Performed by: STUDENT IN AN ORGANIZED HEALTH CARE EDUCATION/TRAINING PROGRAM

## 2025-03-17 PROCEDURE — 3288F FALL RISK ASSESSMENT DOCD: CPT | Mod: CPTII,S$GLB,, | Performed by: STUDENT IN AN ORGANIZED HEALTH CARE EDUCATION/TRAINING PROGRAM

## 2025-03-17 PROCEDURE — 99213 OFFICE O/P EST LOW 20 MIN: CPT | Mod: S$GLB,,, | Performed by: STUDENT IN AN ORGANIZED HEALTH CARE EDUCATION/TRAINING PROGRAM

## 2025-03-17 PROCEDURE — 99999 PR PBB SHADOW E&M-EST. PATIENT-LVL III: CPT | Mod: PBBFAC,,, | Performed by: STUDENT IN AN ORGANIZED HEALTH CARE EDUCATION/TRAINING PROGRAM

## 2025-03-17 NOTE — ASSESSMENT & PLAN NOTE
- LLL 8 mm GGN, LLL 5 mm GGN, 6 mm RUL GGN.   - Multiple ground glass nodules on CT imaging all of which are stable on 8/29/2024 when compared to scan 8/28/2023 with no new nodules.  - Repeat scan at one year in 9/2025. If remains stable at that point will space to scan every 2 years until 5 years.

## 2025-03-18 ENCOUNTER — HOSPITAL ENCOUNTER (OUTPATIENT)
Facility: HOSPITAL | Age: 75
Discharge: HOME | End: 2025-03-18
Attending: UROLOGY | Admitting: UROLOGY
Payer: MEDICARE

## 2025-03-18 ENCOUNTER — ANESTHESIA (OUTPATIENT)
Dept: SURGERY | Facility: HOSPITAL | Age: 75
End: 2025-03-18
Payer: MEDICARE

## 2025-03-18 VITALS
DIASTOLIC BLOOD PRESSURE: 63 MMHG | TEMPERATURE: 98 F | BODY MASS INDEX: 30.78 KG/M2 | SYSTOLIC BLOOD PRESSURE: 132 MMHG | OXYGEN SATURATION: 96 % | RESPIRATION RATE: 17 BRPM | HEART RATE: 53 BPM | WEIGHT: 215 LBS | HEIGHT: 70 IN

## 2025-03-18 DIAGNOSIS — N40.1 BENIGN PROSTATIC HYPERPLASIA WITH WEAK URINARY STREAM: ICD-10-CM

## 2025-03-18 DIAGNOSIS — R39.12 BENIGN PROSTATIC HYPERPLASIA WITH WEAK URINARY STREAM: ICD-10-CM

## 2025-03-18 PROCEDURE — 53854 TRURL DSTRJ PRST8 TISS RF WV: CPT | Mod: ,,, | Performed by: UROLOGY

## 2025-03-18 PROCEDURE — 36000707: Performed by: UROLOGY

## 2025-03-18 PROCEDURE — 37000009 HC ANESTHESIA EA ADD 15 MINS: Performed by: UROLOGY

## 2025-03-18 PROCEDURE — C1758 CATHETER, URETERAL: HCPCS | Performed by: UROLOGY

## 2025-03-18 PROCEDURE — 36000706: Performed by: UROLOGY

## 2025-03-18 PROCEDURE — 71000033 HC RECOVERY, INTIAL HOUR: Performed by: UROLOGY

## 2025-03-18 PROCEDURE — 63600175 PHARM REV CODE 636 W HCPCS

## 2025-03-18 PROCEDURE — 37000008 HC ANESTHESIA 1ST 15 MINUTES: Performed by: UROLOGY

## 2025-03-18 PROCEDURE — 25000003 PHARM REV CODE 250: Performed by: UROLOGY

## 2025-03-18 PROCEDURE — 25000003 PHARM REV CODE 250

## 2025-03-18 PROCEDURE — C1726 CATH, BAL DIL, NON-VASCULAR: HCPCS | Performed by: UROLOGY

## 2025-03-18 PROCEDURE — 27201423 OPTIME MED/SURG SUP & DEVICES STERILE SUPPLY: Performed by: UROLOGY

## 2025-03-18 PROCEDURE — 71000016 HC POSTOP RECOV ADDL HR: Performed by: UROLOGY

## 2025-03-18 PROCEDURE — C1769 GUIDE WIRE: HCPCS | Performed by: UROLOGY

## 2025-03-18 PROCEDURE — 71000015 HC POSTOP RECOV 1ST HR: Performed by: UROLOGY

## 2025-03-18 RX ORDER — FENTANYL CITRATE 50 UG/ML
25 INJECTION, SOLUTION INTRAMUSCULAR; INTRAVENOUS EVERY 5 MIN PRN
Status: DISCONTINUED | OUTPATIENT
Start: 2025-03-18 | End: 2025-03-18 | Stop reason: HOSPADM

## 2025-03-18 RX ORDER — HALOPERIDOL LACTATE 5 MG/ML
0.5 INJECTION, SOLUTION INTRAMUSCULAR EVERY 10 MIN PRN
Status: DISCONTINUED | OUTPATIENT
Start: 2025-03-18 | End: 2025-03-18 | Stop reason: HOSPADM

## 2025-03-18 RX ORDER — PHENAZOPYRIDINE HYDROCHLORIDE 100 MG/1
200 TABLET, FILM COATED ORAL ONCE
Status: COMPLETED | OUTPATIENT
Start: 2025-03-18 | End: 2025-03-18

## 2025-03-18 RX ORDER — OXYCODONE HYDROCHLORIDE 5 MG/1
5 TABLET ORAL
Status: DISCONTINUED | OUTPATIENT
Start: 2025-03-18 | End: 2025-03-18 | Stop reason: HOSPADM

## 2025-03-18 RX ORDER — ONDANSETRON HYDROCHLORIDE 2 MG/ML
INJECTION, SOLUTION INTRAVENOUS
Status: DISCONTINUED | OUTPATIENT
Start: 2025-03-18 | End: 2025-03-18

## 2025-03-18 RX ORDER — CEFAZOLIN 2 G/1
2 INJECTION, POWDER, FOR SOLUTION INTRAMUSCULAR; INTRAVENOUS
Status: DISCONTINUED | OUTPATIENT
Start: 2025-03-18 | End: 2025-03-18 | Stop reason: HOSPADM

## 2025-03-18 RX ORDER — DEXAMETHASONE SODIUM PHOSPHATE 4 MG/ML
INJECTION, SOLUTION INTRA-ARTICULAR; INTRALESIONAL; INTRAMUSCULAR; INTRAVENOUS; SOFT TISSUE
Status: DISCONTINUED | OUTPATIENT
Start: 2025-03-18 | End: 2025-03-18

## 2025-03-18 RX ORDER — SODIUM CHLORIDE 0.9 % (FLUSH) 0.9 %
10 SYRINGE (ML) INJECTION
Status: DISCONTINUED | OUTPATIENT
Start: 2025-03-18 | End: 2025-03-18 | Stop reason: HOSPADM

## 2025-03-18 RX ORDER — CEFAZOLIN SODIUM 1 G/3ML
INJECTION, POWDER, FOR SOLUTION INTRAMUSCULAR; INTRAVENOUS
Status: DISCONTINUED | OUTPATIENT
Start: 2025-03-18 | End: 2025-03-18

## 2025-03-18 RX ORDER — PHENAZOPYRIDINE HYDROCHLORIDE 200 MG/1
200 TABLET, FILM COATED ORAL 3 TIMES DAILY PRN
Qty: 21 TABLET | Refills: 0 | Status: SHIPPED | OUTPATIENT
Start: 2025-03-18 | End: 2025-03-25

## 2025-03-18 RX ORDER — GLUCAGON 1 MG
1 KIT INJECTION
Status: DISCONTINUED | OUTPATIENT
Start: 2025-03-18 | End: 2025-03-18 | Stop reason: HOSPADM

## 2025-03-18 RX ORDER — FENTANYL CITRATE 50 UG/ML
INJECTION, SOLUTION INTRAMUSCULAR; INTRAVENOUS
Status: DISCONTINUED | OUTPATIENT
Start: 2025-03-18 | End: 2025-03-18

## 2025-03-18 RX ORDER — LIDOCAINE HYDROCHLORIDE 20 MG/ML
INJECTION, SOLUTION EPIDURAL; INFILTRATION; INTRACAUDAL; PERINEURAL
Status: DISCONTINUED | OUTPATIENT
Start: 2025-03-18 | End: 2025-03-18

## 2025-03-18 RX ORDER — PROPOFOL 10 MG/ML
VIAL (ML) INTRAVENOUS
Status: DISCONTINUED | OUTPATIENT
Start: 2025-03-18 | End: 2025-03-18

## 2025-03-18 RX ORDER — HYDROMORPHONE HYDROCHLORIDE 2 MG/ML
0.2 INJECTION, SOLUTION INTRAMUSCULAR; INTRAVENOUS; SUBCUTANEOUS EVERY 5 MIN PRN
Status: DISCONTINUED | OUTPATIENT
Start: 2025-03-18 | End: 2025-03-18 | Stop reason: HOSPADM

## 2025-03-18 RX ORDER — LIDOCAINE HYDROCHLORIDE 10 MG/ML
1 INJECTION, SOLUTION EPIDURAL; INFILTRATION; INTRACAUDAL; PERINEURAL ONCE
Status: DISCONTINUED | OUTPATIENT
Start: 2025-03-18 | End: 2025-03-18 | Stop reason: HOSPADM

## 2025-03-18 RX ORDER — KETOROLAC TROMETHAMINE 10 MG/1
10 TABLET, FILM COATED ORAL EVERY 6 HOURS PRN
Qty: 20 TABLET | Refills: 0 | Status: SHIPPED | OUTPATIENT
Start: 2025-03-18 | End: 2025-03-23

## 2025-03-18 RX ORDER — OXYBUTYNIN CHLORIDE 10 MG/1
10 TABLET, EXTENDED RELEASE ORAL DAILY PRN
Qty: 30 TABLET | Refills: 2 | Status: SHIPPED | OUTPATIENT
Start: 2025-03-18 | End: 2025-06-16

## 2025-03-18 RX ORDER — EPHEDRINE SULFATE 50 MG/ML
INJECTION, SOLUTION INTRAVENOUS
Status: DISCONTINUED | OUTPATIENT
Start: 2025-03-18 | End: 2025-03-18

## 2025-03-18 RX ADMIN — FENTANYL CITRATE 25 MCG: 50 INJECTION INTRAMUSCULAR; INTRAVENOUS at 10:03

## 2025-03-18 RX ADMIN — ONDANSETRON 4 MG: 2 INJECTION, SOLUTION INTRAMUSCULAR; INTRAVENOUS at 10:03

## 2025-03-18 RX ADMIN — SODIUM CHLORIDE: 0.9 INJECTION, SOLUTION INTRAVENOUS at 09:03

## 2025-03-18 RX ADMIN — LIDOCAINE HYDROCHLORIDE 100 MG: 20 INJECTION, SOLUTION EPIDURAL; INFILTRATION; INTRACAUDAL; PERINEURAL at 10:03

## 2025-03-18 RX ADMIN — CEFAZOLIN 2 G: 330 INJECTION, POWDER, FOR SOLUTION INTRAMUSCULAR; INTRAVENOUS at 10:03

## 2025-03-18 RX ADMIN — PHENAZOPYRIDINE HYDROCHLORIDE 200 MG: 100 TABLET ORAL at 11:03

## 2025-03-18 RX ADMIN — EPHEDRINE SULFATE 5 MG: 50 INJECTION, SOLUTION INTRAMUSCULAR; INTRAVENOUS; SUBCUTANEOUS at 10:03

## 2025-03-18 RX ADMIN — DEXAMETHASONE SODIUM PHOSPHATE 4 MG: 4 INJECTION, SOLUTION INTRA-ARTICULAR; INTRALESIONAL; INTRAMUSCULAR; INTRAVENOUS; SOFT TISSUE at 10:03

## 2025-03-18 RX ADMIN — PROPOFOL 180 MG: 10 INJECTION, EMULSION INTRAVENOUS at 10:03

## 2025-03-18 NOTE — DISCHARGE INSTRUCTIONS
SEE PRESCRIPTION INSERTS FOR INFORMATION ON YOUR MEDICATIONS     Rezum Prostate Water Vapor Therapy   Postoperative Instructions    Buenrostro Catheter: The catheter typically stays in for 3 to 7 days depending on the size of your prostate gland. Once the catheter is removed after Rezum procedure, most men can void. There are a subset of men who may not be able to void if the bladder is not ready to go yet and may need the catheter replaced. When the catheter first comes out it is very common to feel like you have a urinary tract infection with burning with urination, increased urinary frequency and urinary urgency. These symptoms typically improve over a 2 to 3 week period of time. If these symptoms persist then you should come in for a urine check to make sure you do not have an infection.      Fluids: You should drink plenty of fluids during the day to promote increased production of urine.  Avoid bladder irritants including caffeine, alcohol, smoking, spicy foods, acidic foods, tomato-based products, citrus, artificial sweeteners, chocolate, coffee or tea.    Activity: You should avoid strenuous activity during the first two weeks, which includes heavy lifting, bike riding, treadmill activity, or riding a lawnmower. Avoid sexual activity for two weeks. You may resume your normal daily activities. You may resume driving a car.    Urinary symptoms:  During the first several months, the primary improvement to note is increased urinary flow. With the Rezum procedure, the procedure destroys the prostate tissue with steam and can take some time for the body to reabsorb this prostate tissue. The bladder should now be able to empty with better flow, which you will notice as a more powerful urinary stream. It is important to recognize that it may take a much longer timeframe for the frequency of urination, particularly frequency of urination at night, to reduce.    Symptoms will get worse before they get better. Generally, at  two weeks symptoms begin to stabilize. At four weeks, patients are back where they were before the procedure, and at six weeks patients start to get off medication.    The bladder has been working against obstruction for years. As the bladder has worked against obstruction, it has become thicker and less elastic so that its storage capacity is diminished. After the Rezum procedure, the bladder is no longer working as hard against obstruction. Some of the thickening of the bladder muscle should decreases and the bladder should regain its elasticity. When that occurs, there will be less urgency of urination, but it may take time to notice this improvement. It is important to recognize that it took a long time for the bladder to get to where we are now and so it may take time for the bladder to reverse those changes.    Blood in the urine: You may note some blood in the urine and semen after this procedure. This is normal, and may be intermittent and last for several weeks.  Drink plenty of water.     Medications: Take medications  as prescribed.      Follow up appointment:  The office will call to arrange your follow up for blackburn catheter removal.     Call the office ([phone]) or go to nearest Emergency Room if:   -Fever of 101 degrees F or above, chills.    -Inability to urinate.  -Severe Pain that is not controlled with medications.     Riley Vasquez MD    ANESTHESIA  -For the first 24 hours after surgery:  Do not drive, use heavy equipment, make important decisions, or drink alcohol  -It is normal to feel sleepy for several hours.  Rest until you are more awake.  -Have someone stay with you, if needed.  They can watch for problems and help keep you safe.  -Some possible post anesthesia side effects include: nausea and vomiting, sore throat and hoarseness, sleepiness, and dizziness.    PAIN  -If you have pain after surgery, pain medicine will help you feel better.  Take it as directed, before pain becomes  severe.  Most pain relievers taken by mouth need at least 20-30 minutes to start working.  -Do not drive or drink alcohol while taking pain medicine.  -Pain medication can upset your stomach.  Taking them with a little food may help.  -Other ways to help control pain: elevation, ice, and relaxation  -Call your surgeon if still having unmanageable pain an hour after taking pain medicine.  -Pain medicine can cause constipation.  Taking an over-the counter stool softener while on prescription pain medicine and drinking plenty of fluids can prevent this side effect.  -Call your surgeon if you have severe side effects like: breathing problems, trouble waking up, dizziness, confusion, or severe constipation.    NAUSEA  -Some people have nausea after surgery.  This is often because of anesthesia, pain, pain medicine, or the stress of surgery.  -Do not push yourself to eat.  Start off with clear liquids and soup.  Slowly move to solid foods.  Don't eat fatty, rich, spicy foods at first.  Eat smaller amounts.  -If you develop persistent nausea and vomiting please notify your surgeon immediately.    BLEEDING  -Different types of surgery require different types of care and dressing changes.  It is important to follow all instructions and advice from your surgeon.  Change dressing as directed.  Call your surgeon for any concerns regarding postop bleeding.    SIGNS OF INFECTION  -Signs of infection include: fever, swelling, drainage, and redness  -Notify your surgeon if you have a fever of 100.4 F (38.0 C) or higher.  -Notify your surgeon if you notice redness, swelling, increased pain, pus, or a foul smell at the incision site.

## 2025-03-18 NOTE — ANESTHESIA POSTPROCEDURE EVALUATION
Anesthesia Post Evaluation    Patient: Jerardo Callejas    Procedure(s) Performed: Procedure(s) (LRB):  CYSTOSCOPY WITH TRANSURETHRAL DESTRUCTION OF PROSTATE,RFA (N/A)    Final Anesthesia Type: general      Patient location during evaluation: PACU  Patient participation: Yes- Able to Participate  Level of consciousness: awake and alert, oriented and awake  Post-procedure vital signs: reviewed and stable  Pain management: adequate  Airway patency: patent    PONV status at discharge: No PONV  Anesthetic complications: no      Cardiovascular status: stable  Respiratory status: unassisted and room air  Hydration status: euvolemic  Follow-up not needed.              Vitals Value Taken Time   /67 03/18/25 11:30   Temp 36.5 °C (97.7 °F) 03/18/25 11:25   Pulse 52 03/18/25 11:31   Resp 16 03/18/25 11:31   SpO2 96 % 03/18/25 11:31   Vitals shown include unfiled device data.      Event Time   Out of Recovery 11:29:47         Pain/King Score: King Score: 10 (3/18/2025 11:25 AM)

## 2025-03-18 NOTE — TRANSFER OF CARE
"Anesthesia Transfer of Care Note    Patient: Jearrdo Callejas    Procedure(s) Performed: Procedure(s) (LRB):  CYSTOSCOPY WITH TRANSURETHRAL DESTRUCTION OF PROSTATE,RFA (N/A)    Patient location: PACU    Anesthesia Type: general    Transport from OR: Transported from OR on room air with adequate spontaneous ventilation    Post pain: adequate analgesia    Post assessment: no apparent anesthetic complications    Post vital signs: stable    Level of consciousness: awake    Nausea/Vomiting: no nausea/vomiting    Complications: none    Transfer of care protocol was followed      Last vitals: Visit Vitals  BP (!) 154/74 (BP Location: Left arm, Patient Position: Lying)   Pulse (!) 55   Temp 36.7 °C (98.1 °F) (Skin)   Resp 17   Ht 5' 10" (1.778 m)   Wt 97.5 kg (215 lb)   SpO2 98%   BMI 30.85 kg/m²     "

## 2025-03-18 NOTE — OP NOTE
[facility] Surgery ([facility])   Operative Note     Date: 03/18/2025     Pre-Op Diagnosis: BPH with obstructive urinary symptoms    Post-Op Diagnosis: BPH with obstructive urinary symptoms     Procedure(s) Performed:   Transurethral destruction of prostate; radiofrequency thermotherapy (ReZuM)  Cystourethroscopy  Physician placement of blackburn catheter      Specimen(s): none     Staff Surgeon: Riley Vasquez MD     Assistant Surgeon: None     Anesthesia:  General LMA anesthesia     Findings:    1. Bilobar hyperplasia, no median lobe     Estimated Blood Loss: Minimal     Drains:   1.  18 Fr blackburn catheter    Indications:   Jerardo Callejas is a 74 y.o. male with BPH presenting for surgical intervention with ReZum. Risks, benefits, and alternative treatment options were discussed. All questions were answered. No guarantees as to the outcome of the treatment were made. Consents were obtained.    Procedure in detail:   The patient was brought to the operating suite and placed in the lithotomy position. General anesthesia was administered. SCDs were applied and working prior to induction of anesthesia. That patient was then prepped and draped in the usual sterile fashion. Time out was performed and preoperative antibiotics were confirmed.       We began the case by inserting the ReZum scope into the bladder. No urethral strictures were seen and scope entered easily. Next, we examined the prostate and found bilobar hyperplasia with no median lobe. Radiofrequency was then used to create thermal energy to selectively ablate prostate tissue 1 cm from the bladder neck to the proximal end of the veru spaced 1 cm apart.      After this, 6 total treatments were given. Specifically 3 treatments were given in at the 3 and 9 o'clock positions bilaterally.      At the completion of the procedure the device was removed. There was a careful check that there were no clots in the bladder or injury to the bladder, prostate or urethra. A 18  fr blackburn catheter was placed with 10 mL of sterile water in the balloon with return of clear yellow urine.     The patient tolerated the procedure well and was transferred to the PACU in stable condition.       Disposition:    The patient will be discharged home with 1 week of pyridium, 1 week of ditropan, and 2 weeks of NSAIDS. He will follow up with in 3 days to have his blackburn catheter removed.    Suggested Codes:  Transurethral destruction of prostate tissue: 07600    Riley Vasquez MD  Urology  [facility]     no

## 2025-03-18 NOTE — DISCHARGE SUMMARY
[facility]  Discharge Note  Short Stay    Admit Date: 3/18/2025    Discharge Date and Time: 03/18/2025 10:52 AM      Attending Physician: Riley Vasquez MD     Discharge Provider: Riley Vasquez MD    Diagnoses:  There are no hospital problems to display for this patient.      Discharged Condition: stable    Procedure(s) (LRB):  CYSTOSCOPY WITH TRANSURETHRAL DESTRUCTION OF PROSTATE,RFA (N/A)     Hospital Course: Patient was admitted for the above procedure and tolerated the procedure well with no complications. He was discharged home in good condition on the same day.       Final Diagnoses: Same as principal problem.    Disposition: Home or Self Care    Follow up/Patient Instructions:    Medications:  Reconciled Home Medications:   Current Discharge Medication List        START taking these medications    Details   ketorolac (TORADOL) 10 mg tablet Take 1 tablet (10 mg total) by mouth every 6 (six) hours as needed for Pain (Renal pain, flank pain, stent pain).  Qty: 20 tablet, Refills: 0      oxybutynin (DITROPAN-XL) 10 MG 24 hr tablet Take 1 tablet (10 mg total) by mouth daily as needed (bladder spasms).  Qty: 30 tablet, Refills: 2      phenazopyridine (PYRIDIUM) 200 MG tablet Take 1 tablet (200 mg total) by mouth 3 (three) times daily as needed for Pain.  Qty: 21 tablet, Refills: 0           CONTINUE these medications which have NOT CHANGED    Details   !! divalproex (DEPAKOTE) 500 MG TbEC Take 500 mg by mouth 2 (two) times daily.      losartan-hydrochlorothiazide 50-12.5 mg (HYZAAR) 50-12.5 mg per tablet Take 1 tablet by mouth every morning.      acetaminophen (TYLENOL) 500 MG tablet Take 1 tablet (500 mg total) by mouth every 6 (six) hours as needed for Pain.  Qty: 60 tablet, Refills: 0      albuterol (PROVENTIL/VENTOLIN HFA) 90 mcg/actuation inhaler Inhale 2 puffs into the lungs every 4 (four) hours as needed for Wheezing or Shortness of Breath. Rescue  Qty: 6.7 g, Refills: 0      ALPRAZolam (XANAX)  0.25 MG tablet Take 0.25 mg by mouth every 12 (twelve) hours as needed.      atorvastatin (LIPITOR) 40 MG tablet Take 1 tablet (40 mg total) by mouth once daily.  Qty: 90 tablet, Refills: 3    Comments: Meds to bed      clonazePAM (KLONOPIN) 0.5 MG tablet Take 0.5 mg by mouth nightly as needed.      cloNIDine (CATAPRES) 0.1 MG tablet Take 0.1 mg by mouth 2 (two) times daily.      !! divalproex (DEPAKOTE) 250 MG EC tablet Take 750 mg by mouth 2 (two) times a day.      ELIQUIS 5 mg Tab Take 5 mg by mouth 2 (two) times daily.      furosemide (LASIX) 20 MG tablet Take 20 mg by mouth every morning.      gabapentin (NEURONTIN) 600 MG tablet Take 600 mg by mouth 3 (three) times daily.      LIDOcaine (LIDODERM) 5 % Place 1 patch onto the skin once daily. Remove & Discard patch within 12 hours or as directed by MD  Qty: 8 patch, Refills: 0      rosuvastatin (CRESTOR) 5 MG tablet Take 5 mg by mouth once daily.      sildenafil (REVATIO) 20 mg Tab Take 1 tablet (20 mg total) by mouth daily as needed (Take up to 5 tablets 1 hour prior to sexual activity on an empty stomach.).  Qty: 30 tablet, Refills: 5    Associated Diagnoses: Erectile dysfunction, unspecified erectile dysfunction type      tamsulosin (FLOMAX) 0.4 mg Cap Take 1 capsule (0.4 mg total) by mouth once daily.  Qty: 90 capsule, Refills: 3    Associated Diagnoses: Benign prostatic hyperplasia with weak urinary stream      vitamin D (VITAMIN D3) 1000 units Tab Take 1,000 Units by mouth once daily.       !! - Potential duplicate medications found. Please discuss with provider.        Discharge Procedure Orders   Diet Adult Regular     No driving until:   Order Comments: No longer taking narcotic pain medications     No dressing needed     Notify your health care provider if you experience any of the following:  temperature >100.4     Notify your health care provider if you experience any of the following:  persistent nausea and vomiting or diarrhea     Notify your  health care provider if you experience any of the following:  severe uncontrolled pain     Activity as tolerated      Follow-up Information       Riley Vasquez MD Follow up.    Specialty: Urology  Contact information:  [address] [facility]  [place] [address] [phone]                             Time spent on Discharge: 15 minutes

## 2025-03-21 ENCOUNTER — OFFICE VISIT (OUTPATIENT)
Dept: UROLOGY | Facility: CLINIC | Age: 75
End: 2025-03-21
Payer: MEDICARE

## 2025-03-21 ENCOUNTER — TELEPHONE (OUTPATIENT)
Dept: UROLOGY | Facility: CLINIC | Age: 75
End: 2025-03-21

## 2025-03-21 VITALS
HEIGHT: 70 IN | SYSTOLIC BLOOD PRESSURE: 141 MMHG | DIASTOLIC BLOOD PRESSURE: 82 MMHG | BODY MASS INDEX: 31.45 KG/M2 | HEART RATE: 63 BPM | WEIGHT: 219.69 LBS

## 2025-03-21 DIAGNOSIS — Z98.890 S/P UROLOGICAL SURGERY: Primary | ICD-10-CM

## 2025-03-21 PROCEDURE — 99999 PR PBB SHADOW E&M-EST. PATIENT-LVL V: CPT | Mod: PBBFAC,,,

## 2025-03-21 NOTE — TELEPHONE ENCOUNTER
Spoke with pt and he advised that since having blackburn removed on this morning he has been urinating fine. I advised to pt that I would inform provider. Pt voiced his understanding

## 2025-03-21 NOTE — TELEPHONE ENCOUNTER
----- Message from Filippo sent at 3/21/2025 12:29 PM CDT -----  Contact: pt  Type: Requesting to speak with nurseIsaelo Called: PTRmachoing: would like to speak to office regarding his urinating. Did not provide any other details. Would the patient rather a call back or a response via [facility]? Call [facility] Call Back Number: [phone] Additional Information:

## 2025-04-01 ENCOUNTER — HOSPITAL ENCOUNTER (EMERGENCY)
Facility: HOSPITAL | Age: 75
Discharge: HOME | End: 2025-04-01
Attending: EMERGENCY MEDICINE
Payer: MEDICARE

## 2025-04-01 VITALS
OXYGEN SATURATION: 97 % | WEIGHT: 214 LBS | TEMPERATURE: 100 F | SYSTOLIC BLOOD PRESSURE: 164 MMHG | BODY MASS INDEX: 30.64 KG/M2 | DIASTOLIC BLOOD PRESSURE: 81 MMHG | HEART RATE: 81 BPM | RESPIRATION RATE: 16 BRPM | HEIGHT: 70 IN

## 2025-04-01 DIAGNOSIS — N39.0 ACUTE UTI: Primary | ICD-10-CM

## 2025-04-01 LAB
BACTERIA #/AREA URNS HPF: ABNORMAL /HPF
BILIRUB UR QL STRIP.AUTO: NEGATIVE
CLARITY UR: ABNORMAL
COLOR UR AUTO: YELLOW
GLUCOSE UR QL STRIP: NEGATIVE
HGB UR QL STRIP: ABNORMAL
KETONES UR QL STRIP: NEGATIVE
LEUKOCYTE ESTERASE UR QL STRIP: ABNORMAL
MICROSCOPIC COMMENT: ABNORMAL
NITRITE UR QL STRIP: NEGATIVE
PH UR STRIP: 6 [PH]
PROT UR QL STRIP: NEGATIVE
RBC #/AREA URNS HPF: 5 /HPF (ref 0–4)
SP GR UR STRIP: 1.01
SQUAMOUS #/AREA URNS HPF: 5 /HPF
UROBILINOGEN UR STRIP-ACNC: 1 EU/DL
WBC #/AREA URNS HPF: >100 /HPF (ref 0–5)

## 2025-04-01 PROCEDURE — 25000003 PHARM REV CODE 250: Mod: ER

## 2025-04-01 PROCEDURE — 87086 URINE CULTURE/COLONY COUNT: CPT | Mod: ER

## 2025-04-01 PROCEDURE — 81003 URINALYSIS AUTO W/O SCOPE: CPT | Mod: ER

## 2025-04-01 PROCEDURE — 99283 EMERGENCY DEPT VISIT LOW MDM: CPT | Mod: ER

## 2025-04-01 PROCEDURE — 87491 CHLMYD TRACH DNA AMP PROBE: CPT | Mod: ER

## 2025-04-01 RX ORDER — SULFAMETHOXAZOLE AND TRIMETHOPRIM 800; 160 MG/1; MG/1
1 TABLET ORAL 2 TIMES DAILY
Qty: 13 TABLET | Refills: 0 | Status: SHIPPED | OUTPATIENT
Start: 2025-04-01 | End: 2025-04-05 | Stop reason: ALTCHOICE

## 2025-04-01 RX ORDER — SULFAMETHOXAZOLE AND TRIMETHOPRIM 800; 160 MG/1; MG/1
1 TABLET ORAL
Status: COMPLETED | OUTPATIENT
Start: 2025-04-01 | End: 2025-04-01

## 2025-04-01 RX ADMIN — SULFAMETHOXAZOLE AND TRIMETHOPRIM 1 TABLET: 800; 160 TABLET ORAL at 07:04

## 2025-04-01 NOTE — ED PROVIDER NOTES
Encounter Date: 4/1/2025       History     Chief Complaint   Patient presents with    Dysuria     Pt states he is having some burning while urinating. Started a couple days ago. Pt endorses frequency with urination.      74-year-old male with past medical history of DVT, hypertension, prostate enlargement presents to the ED for further evaluation of dysuria since yesterday. Pt also endorses urinary frequency without hematuria. Also endorses suprapubic pressure. Had a cystoscopy procedure on 03/07  Dr. Vasquez.  He denies penile discharge.  No fever, chills, flank pain, chest pain, shortness breath, abdominal pain.   No other acute complaints today.    The history is provided by the patient.     Review of patient's allergies indicates:   Allergen Reactions    Ciprofloxacin Palpitations    Penicillins Palpitations and Anxiety     Past Medical History:   Diagnosis Date    Allergy     Back pain, chronic     BPH (benign prostatic hyperplasia)     DVT (deep venous thrombosis)     Hypertension     Mitral valve prolapse syndrome     Prostate enlargement      Past Surgical History:   Procedure Laterality Date    CYSTOSCOPY WITH TRANSURETHRAL DESTRUCTION OF PROSTATE,RFA N/A 3/18/2025    Procedure: CYSTOSCOPY WITH TRANSURETHRAL DESTRUCTION OF PROSTATE,RFA;  Surgeon: Riley Vasquez MD;  Location: [facility];  Service: Urology;  Laterality: N/A;  rezum generator and at least 2 handpieces  Contact [facility] to schedule    NECK SURGERY       Family History   Problem Relation Name Age of Onset    Hypertension Mother      Stroke Father       Social History[1]  Review of Systems   Constitutional:  Negative for chills and fever.   Respiratory:  Negative for shortness of breath.    Cardiovascular:  Negative for chest pain.   Gastrointestinal:  Negative for abdominal distention, abdominal pain, nausea and vomiting.   Genitourinary:  Positive for dysuria and frequency. Negative for flank pain, hematuria and penile discharge.    Musculoskeletal:  Negative for neck pain and neck stiffness.       Physical Exam     Initial Vitals [04/01/25 1744]   BP Pulse Resp Temp SpO2   (!) 164/81 81 16 99.7 °F (37.6 °C) 97 %      MAP       --         Physical Exam    Vitals reviewed.  Constitutional: He appears well-developed and well-nourished. He is not diaphoretic. No distress.   HENT:   Head: Atraumatic.   Eyes: EOM are normal.   Neck: Neck supple.   Cardiovascular:  Normal rate and normal heart sounds.           Pulmonary/Chest: Breath sounds normal. No respiratory distress. He has no wheezes.   Abdominal: Abdomen is soft. He exhibits no distension. There is abdominal tenderness.   Mild suprapubic discomfort with palpation over the area. There is no rebound and no guarding.   Musculoskeletal:         General: Normal range of motion.      Cervical back: Neck supple.      Comments: No CVA tenderness.     Neurological: He is alert and oriented to person, place, and time.   Skin: Skin is warm.         ED Course   Procedures  Labs Reviewed   URINALYSIS, REFLEX TO URINE CULTURE - Abnormal       Result Value    Color, UA Yellow      Appearance, UA Cloudy (*)     pH, UA 6.0      Spec Grav UA 1.015      Protein, UA Negative      Glucose, UA Negative      Ketones, UA Negative      Bilirubin, UA Negative      Blood, UA 2+ (*)     Nitrites, UA Negative      Urobilinogen, UA 1.0      Leukocyte Esterase, UA 2+ (*)    URINALYSIS MICROSCOPIC - Abnormal    RBC, UA 5 (*)     WBC, UA >100 (*)     Bacteria, UA Moderate (*)     Squamous Epithelial Cells, UA 5      Microscopic Comment       CULTURE, URINE   C. TRACHOMATIS/N. GONORRHOEAE BY AMP DNA          Imaging Results    None          Medications   sulfamethoxazole-trimethoprim 800-160mg per tablet 1 tablet (1 tablet Oral Given 4/1/25 1923)     Medical Decision Making  Differential Diagnosis includes, but is not limited to:  STI, urethritis, testicular/appendix torsion, epididymitis, orchitis, UTI, kidney stone,  urinary retention, appendicitis, prostatitis, testicular mass/neoplasm, incarcerated/strangulated hernia.    ED management     74-year-old male with past medical history of DVT, hypertension, prostate enlargement presents to the ED for further evaluation of dysuria since yesterday Patient is not toxic appearing, hemodynamically stable and resting comfortably on bed. Patient is well-appearing.  Awake and alert. Hypertensive. Afebrile with vitals WNL. No distress on exam. Pt presents to the ED today with UTI symptoms, UA shows UTI. The pt has received their first dose of ABX here in the ED. No systemic symptoms. Not septic. Well appearing. Low suspicion for acute pyelonephritis given lack of fever, CVAT, or systemic features. Low suspicion for kidney stone or infected stone. No N/V. No hx of kidney stones. Pt is able to tolerate po, appropriate for outpt treatment. Will discharge home on ABX.  Follow up with PCP.  After taking into careful account the historical factors and physical exam findings of the patient's presentation today, in conjunction with the empirical and objective data obtained on ED workup, no acute emergent medical condition requiring admission has been identified. The patient appears to be low risk for an emergent medical condition and I feel it is safe and appropriate at this time for the patient to be discharged to follow-up as detailed in their discharge instructions for reevaluation and possible continued outpatient workup and management.     I have discussed the specifics of the workup with the patient and the patient has verbalized understanding of the details of the workup, the diagnosis, the treatment plan, and the need for outpatient follow-up with PCP. ED precautions given. Discussed with pt about returning to the ED, if symptoms fail to improve or worsen.   RESULTS:  Documented in ED course.   Labs/ekg interpreted by myself       Voice recognition software utilized in this note.  Typographical and content errors may occur with this process. While efforts are made to detect and correct such errors, in some cases errors will persist. For this reason, wording in this document should be considered in the proper context and not strictly verbatim.       Amount and/or Complexity of Data Reviewed  Labs: ordered. Decision-making details documented in ED Course.    Risk  Prescription drug management.               ED Course as of 04/01/25 1925   Tue Apr 01, 2025 1808 BP(!): 164/81 [NW]   1808 Temp: 99.7 °F (37.6 °C) [NW]   1808 Temp Source: Oral [NW]   1808 Pulse: 81 [NW]   1809 Resp: 16 [NW]   1809 SpO2: 97 % [NW]   1902 WBC, UA(!): >100  Significant for UTI. Will order Abx  [NW]      ED Course User Index  [NW] Jewell Troy PA-C                           Clinical Impression:  Final diagnoses:  [N39.0] Acute UTI (Primary)          ED Disposition Condition    Discharge Stable          ED Prescriptions       Medication Sig Dispense Start Date End Date Auth. Provider    sulfamethoxazole-trimethoprim 800-160mg (BACTRIM DS) 800-160 mg Tab Take 1 tablet by mouth 2 (two) times daily. for 7 days 13 tablet 4/1/2025 4/8/2025 Jewell Troy PA-C          Follow-up Information       Follow up With Specialties Details Why Contact Info    Vanessa Landin MD Internal Medicine Schedule an appointment as soon as possible for a visit in 3 days As needed, If symptoms worsen [id number]  [place] [address]  [phone]                   [1]   Social History  Tobacco Use    Smoking status: Former     Passive exposure: Past    Smokeless tobacco: Never    Tobacco comments:     >40 years ago   Substance Use Topics    Alcohol use: No    Drug use: No        Jewell Troy PA-C  04/01/25 1925

## 2025-04-02 NOTE — DISCHARGE INSTRUCTIONS
Mr. Callejas,     Thank you for letting me care for you today! It was nice meeting you, and I hope you feel better soon.   If you would like access to your chart and what was done today please utilize the [facility].   Please don't hesitate to return if your symptoms worsen or you develop any other worrisome symptoms.    Our goal in the emergency department is to always give you outstanding care and exceptional service. You may receive a survey by mail or e-mail in the next week regarding your experience in our ED. We would greatly appreciate you completing and returning the survey. Your feedback provides us with a way to recognize our staff who give very good care and it helps us learn how to improve when your experience was below our aspiration of excellence.     Sincerely,    Jewell Troy PA-C  Emergency Department Physician Assistant  [facility]

## 2025-04-03 ENCOUNTER — TELEPHONE (OUTPATIENT)
Dept: UROLOGY | Facility: CLINIC | Age: 75
End: 2025-04-03
Payer: MEDICARE

## 2025-04-03 ENCOUNTER — HOSPITAL ENCOUNTER (EMERGENCY)
Facility: HOSPITAL | Age: 75
Discharge: HOME | End: 2025-04-03
Attending: STUDENT IN AN ORGANIZED HEALTH CARE EDUCATION/TRAINING PROGRAM
Payer: MEDICARE

## 2025-04-03 ENCOUNTER — NURSE TRIAGE (OUTPATIENT)
Dept: ADMINISTRATIVE | Facility: CLINIC | Age: 75
End: 2025-04-03
Payer: MEDICARE

## 2025-04-03 ENCOUNTER — OCHSNER VIRTUAL EMERGENCY DEPARTMENT (OUTPATIENT)
Facility: CLINIC | Age: 75
End: 2025-04-03
Payer: MEDICARE

## 2025-04-03 ENCOUNTER — PATIENT OUTREACH (OUTPATIENT)
Facility: OTHER | Age: 75
End: 2025-04-03
Payer: MEDICARE

## 2025-04-03 VITALS
HEIGHT: 70 IN | SYSTOLIC BLOOD PRESSURE: 138 MMHG | RESPIRATION RATE: 18 BRPM | OXYGEN SATURATION: 100 % | BODY MASS INDEX: 30.64 KG/M2 | WEIGHT: 214 LBS | HEART RATE: 60 BPM | DIASTOLIC BLOOD PRESSURE: 78 MMHG | TEMPERATURE: 98 F

## 2025-04-03 DIAGNOSIS — N30.00 ACUTE CYSTITIS WITHOUT HEMATURIA: Primary | ICD-10-CM

## 2025-04-03 LAB — POCT GLUCOSE: 80 MG/DL (ref 70–110)

## 2025-04-03 PROCEDURE — 82962 GLUCOSE BLOOD TEST: CPT

## 2025-04-03 PROCEDURE — 99282 EMERGENCY DEPT VISIT SF MDM: CPT

## 2025-04-03 NOTE — PROGRESS NOTES
"Patient spoke to [facility] RN on call nurse to report the following, "Pt transferred via  for urinary symptoms. Pt seen in ED with UTI on 4/1/2025. Pt placed on antibiotics and now c/o oral temp 99.5 and gray discharge from penis. Pt currently taking Bactrim DS as ordered. Denies pain with urinating. Pt also c/o lower back pain 5-6/10."    [facility] RN on call nurse consulted with [facility] on call, Dr. Amadou Addison, and MD offered VV and E-Visit and patient declined both. Pt stated he will go to the ED. Follow up scheduled on 04/04/2025 to outreach the patient to assess for any additional needs/concerns following ED visit.     "

## 2025-04-03 NOTE — TELEPHONE ENCOUNTER
"Pt transferred via  for urinary symptoms. Pt seen in ED with UTI on 4/1/2025. Pt placed on antibiotics and now c/o oral temp 99.5 and gray discharge from penis. Pt currently taking Bactrim DS as ordered. Denies pain with urinating. Pt also c/o lower back pain 5-6/10. Advise to be seen today per protocol. Unable to schedule specialty appt. Secure chat sent to [facility] Dr. Amadou Addison per protocol. Provider offered E-Visit or VV to change antibiotics. Pt declined- requested in person appt. Next urology appt not until May  19th per [facility] staff. Pt states that PCP is closed on Fridays and then states that PCP is open and will change appts with fiance to be seen tomorrow at 1pm by non [facility] PCP. Dr. Addison advised "his options are to go back to ER or I can send a new Abx." Pt made aware. VU. Pt states that he will go back to ED. Encounter routed to provider.   Reason for Disposition   Side (flank) or lower back pain and new-onset since starting antibiotics    Additional Information   Negative: Shock suspected (e.g., cold/pale/clammy skin, too weak to stand, low BP, rapid pulse)   Negative: Sounds like a life-threatening emergency to the triager   Negative: Unable to urinate (or only a few drops) > 4 hours and bladder feels very full (e.g., palpable bladder or strong urge to urinate)   Negative: Passing pure blood or large blood clots (i.e., size > a dime)  (Exceptions: Flecks, small strands, or pinkish-red color.)   Negative: Fever > 103 F (39.4 C)   Negative: Patient sounds very sick or weak to the triager   Negative: SEVERE pain (e.g., excruciating) and no improvement 2 hours after pain medications   Negative: Fever > 100 F (37.8 C) and new-onset since starting antibiotics    Protocols used: Urinary Tract Infection on Antibiotic Follow-up Call - Female-[facility]    "

## 2025-04-03 NOTE — PLAN OF CARE-OVED
[facility] Plan of Care Note  Referral Source: Nurse On-Call                               Chief Complaint   Patient presents with    Urinary Tract Infection       Recommendation: Emergency Department                            74-year-old male with history of HTN, BPH, recurrent UTIs contacted nurse on-call for recommendations about persistent urinary symptoms.  He was seen in the ER 2 days ago for dysuria and frequency, and recently had TURP procedure done on 03/18 with Urology.  UA was consistent with UTI, and he was started on Bactrim.  However since then he has had low back pain with temp 99.5°, penis discharge.  GC/chlamydia culture pending, but previous urine cultures showed E coli that was typically resistant to Bactrim, so he could have untreated or partially treated UTI or prostatitis as well.  Patient also with Cipro allergy, unsure if he has ever taken Levaquin.  I offered virtual visit or to send a new antibiotic Rx to his pharmacy until urine culture from 2 days ago results with sensitivities, but patient prefers in-person visit, [facility]

## 2025-04-03 NOTE — TELEPHONE ENCOUNTER
----- Message from Justice Plascencia sent at 4/3/2025  3:20 PM CDT -----  Contact: pt    ----- Message -----  From: Filippo Manriquez  Sent: 4/3/2025   3:09 PM CDT  To: Riley Vasquez Staff    Type:  Patient Returning CallWho Called:ptWho Left Message for Patient:Dana adames RNDoes the patient know what this is regarding?: Would the patient rather a call back or a response via [facility]? callBest Call Back Number:Additional Information: [phone]

## 2025-04-03 NOTE — TELEPHONE ENCOUNTER
Patient reports penile discharge and being recently diagnosed with UTI at the ER and is taking Bactrim.  He states his fever is 99.5 He asks if he should go to the ER. His urine culture and STD screen has not resulted yet. I told him as long as fever stays below 100.4 and he keeps taking Bactrim I did not think emergent evaluation was needed. I made him an appointment with WILLIAM Newman for next week and told him to report to the ED for fever greater than 100.4

## 2025-04-03 NOTE — ED PROVIDER NOTES
Encounter Date: 4/3/2025       History     Chief Complaint   Patient presents with    Urinary Tract Infection     Patient reports continued dysuria, urinary frequency, flank pain, and possible discharge from his penis. Currently on bactrim for UTI.      74 year old male past medical history of DVT, BPH, and BP presents to the ED for further evaluation of penile discharge x 1 day.  Of note, patient was seen by me needs ago and diagnosed with UTI.  He was placed on Bactrim.  Urine cultures and G/C results pending.  Notes dysuria and frequency has improved a bit since then. He has concerns for possible grey, white discharge. Denies penile rash, concerns for STI. States he is not circumcised. No testicular pain or tenderness. No nausea, vomiting, chills, flank pain, abdominal pain.  No other acute complaints today.    The history is provided by the patient.     Review of patient's allergies indicates:   Allergen Reactions    Ciprofloxacin Palpitations    Penicillins Palpitations and Anxiety     Past Medical History:   Diagnosis Date    Allergy     Back pain, chronic     BPH (benign prostatic hyperplasia)     DVT (deep venous thrombosis)     Hypertension     Mitral valve prolapse syndrome     Prostate enlargement      Past Surgical History:   Procedure Laterality Date    CYSTOSCOPY WITH TRANSURETHRAL DESTRUCTION OF PROSTATE,RFA N/A 3/18/2025    Procedure: CYSTOSCOPY WITH TRANSURETHRAL DESTRUCTION OF PROSTATE,RFA;  Surgeon: Riley Vasquez MD;  Location: [facility] OR;  Service: Urology;  Laterality: N/A;  rezum generator and at least 2 handpieces  Contact [facility] to schedule    NECK SURGERY       Family History   Problem Relation Name Age of Onset    Hypertension Mother      Stroke Father       Social History[1]  Review of Systems   Constitutional:  Negative for chills and fever.   Cardiovascular:  Negative for chest pain.   Gastrointestinal:  Negative for abdominal distention, abdominal pain, nausea and vomiting.    Genitourinary:  Positive for dysuria, frequency and penile discharge. Negative for penile swelling, scrotal swelling and testicular pain.   Musculoskeletal:  Negative for neck pain and neck stiffness.       Physical Exam     Initial Vitals [04/03/25 1721]   BP Pulse Resp Temp SpO2   (!) 167/79 68 18 98.3 °F (36.8 °C) 98 %      MAP       --         Physical Exam    Vitals reviewed.  Constitutional: He appears well-developed and well-nourished. He is not diaphoretic. No distress.   Cardiovascular:  Normal rate and normal heart sounds.           Pulmonary/Chest: Breath sounds normal. No respiratory distress. He has no wheezes.   Abdominal: Abdomen is soft. He exhibits no distension. There is no abdominal tenderness.   Genitourinary:    Genitourinary Comments:  exam chaperoned by ED tech E. At bedside    Uncircumcised. No rashes, ulcers noted in the genitalia.  No penile discharge on exam. No evidence of balanitis        Neurological: He is alert and oriented to person, place, and time.         ED Course   Procedures  Labs Reviewed   POCT GLUCOSE       Result Value    POCT Glucose 80            Imaging Results    None          Medications - No data to display  Medical Decision Making  Differential Diagnosis includes, but is not limited to:  STI, urethritis, testicular/appendix torsion, epididymitis, orchitis, UTI, kidney stone, urinary retention, appendicitis, prostatitis, testicular mass/neoplasm, incarcerated/strangulated hernia.    ED management     74 year old male past medical history of DVT, BPH, and BP presents to the ED for further evaluation of penile discharge x 1 day.  Patient is not toxic appearing, hemodynamically stable and resting comfortably on bed. Patient is well-appearing.  Awake and alert.  Afebrile with vitals WNL. No distress on exam.  UA results from 2 days ago with a evidence of UTI.  G/C test results and urine culture pending.  Patient was placed on Bactrim.  He is still compliant with his  antibiotic use.  No systemic symptoms. He denies new onset of fever, nausea, vomiting, flank pain. Patient who presents 2/2 urinary tract infection. No benefits of repeating another UA test. No overt signs of infected stone or prostatitis. No CVA tenderness or signs of obstruction. No rectal complaints to think deep space infection, rectal abscess, or prostatitis.  Appears well appearing and reliable. Advised to wait for urine culture results for possible abx which if needed. Return precautions given, patient understands and agrees with plan. All questions answered.  Instructed to follow up with PCP.     I have discussed the specifics of the workup with the patient and the patient has verbalized understanding of the details of the workup, the diagnosis, the treatment plan, and the need for outpatient follow-up with PCP. ED precautions given. Discussed with pt about returning to the ED, if symptoms fail to improve or worsen.     RESULTS:  Documented in ED course.   Labs/ekg interpreted by myself       Voice recognition software utilized in this note. Typographical and content errors may occur with this process. While efforts are made to detect and correct such errors, in some cases errors will persist. For this reason, wording in this document should be considered in the proper context and not strictly verbatim.         Amount and/or Complexity of Data Reviewed  Labs:  Decision-making details documented in ED Course.               ED Course as of 04/03/25 2029 Thu Apr 03, 2025   1743 POCT Glucose: 80 [NW]      ED Course User Index  [NW] Jewell Troy PA-C                           Clinical Impression:  Final diagnoses:  [N30.00] Acute cystitis without hematuria (Primary)          ED Disposition Condition    Discharge Stable          ED Prescriptions    None       Follow-up Information    None              [1]   Social History  Tobacco Use    Smoking status: Former     Passive exposure: Past    Smokeless tobacco:  Never    Tobacco comments:     >40 years ago   Substance Use Topics    Alcohol use: No    Drug use: No        Jewell Troy PA-C  04/03/25 2029

## 2025-04-03 NOTE — TELEPHONE ENCOUNTER
----- Message from Starr sent at 4/3/2025 11:31 AM CDT -----  Type:  Needs Medical AdviceWho Called: Pt Symptoms (please be specific): needs to speak to dr regarding recent ED visit on 04/01 and also antibiotics he is taken which was prescribed to him from visit Pharmacy name and phone #:  [id number] - [place], [place] - [address] [place] [id number]: [phone] Fax: [phone] the patient rather a call back or a response via [facility]? Call back Best Call Back Number: [phone] Information: Please be advised, pt states that he had a message sent yesterday regarding seeking medical advice, but didn't hear anything back, pt states that he would like to get a call back before clinic closes today

## 2025-04-04 ENCOUNTER — RESULTS FOLLOW-UP (OUTPATIENT)
Dept: EMERGENCY MEDICINE | Facility: HOSPITAL | Age: 75
End: 2025-04-04

## 2025-04-04 DIAGNOSIS — N30.00 ACUTE CYSTITIS WITHOUT HEMATURIA: Primary | ICD-10-CM

## 2025-04-04 LAB — BACTERIA UR CULT: ABNORMAL

## 2025-04-05 RX ORDER — NITROFURANTOIN 25; 75 MG/1; MG/1
100 CAPSULE ORAL 2 TIMES DAILY
Qty: 14 CAPSULE | Refills: 0 | Status: SHIPPED | OUTPATIENT
Start: 2025-04-05 | End: 2025-04-12

## 2025-04-06 LAB
C TRACH DNA SPEC QL NAA+PROBE: NOT DETECTED
CTGC SOURCE (OHS) ORD-325: NORMAL
N GONORRHOEA DNA UR QL NAA+PROBE: NOT DETECTED

## 2025-04-07 ENCOUNTER — PATIENT OUTREACH (OUTPATIENT)
Facility: OTHER | Age: 75
End: 2025-04-07
Payer: MEDICARE

## 2025-04-07 NOTE — PROGRESS NOTES
Patient was seen in the ED on 4/3/25, but the After Visit Summary does not provide follow-up instructions. A follow-up call was made to the patient, but there was no answer. A voicemail was left requesting a return call. The patient is scheduled to see urology on 4/8/25 at 3:00 PM with Marika Brooks FNP, Urology at [address] [place] [address], and a follow-up call has been planned for 4/9/25.

## 2025-04-08 ENCOUNTER — OFFICE VISIT (OUTPATIENT)
Dept: UROLOGY | Facility: CLINIC | Age: 75
End: 2025-04-08
Payer: MEDICARE

## 2025-04-08 VITALS
WEIGHT: 213.94 LBS | BODY MASS INDEX: 30.63 KG/M2 | HEIGHT: 70 IN | DIASTOLIC BLOOD PRESSURE: 80 MMHG | SYSTOLIC BLOOD PRESSURE: 150 MMHG | HEART RATE: 68 BPM

## 2025-04-08 DIAGNOSIS — N39.41 URGE INCONTINENCE OF URINE: Primary | ICD-10-CM

## 2025-04-08 DIAGNOSIS — N30.00 ACUTE CYSTITIS WITHOUT HEMATURIA: ICD-10-CM

## 2025-04-08 PROCEDURE — 99999 PR PBB SHADOW E&M-EST. PATIENT-LVL IV: CPT | Mod: PBBFAC,,,

## 2025-04-08 PROCEDURE — 3077F SYST BP >= 140 MM HG: CPT | Mod: CPTII,S$GLB,,

## 2025-04-08 PROCEDURE — 1160F RVW MEDS BY RX/DR IN RCRD: CPT | Mod: CPTII,S$GLB,,

## 2025-04-08 PROCEDURE — 1125F AMNT PAIN NOTED PAIN PRSNT: CPT | Mod: CPTII,S$GLB,,

## 2025-04-08 PROCEDURE — 1159F MED LIST DOCD IN RCRD: CPT | Mod: CPTII,S$GLB,,

## 2025-04-08 PROCEDURE — 3079F DIAST BP 80-89 MM HG: CPT | Mod: CPTII,S$GLB,,

## 2025-04-08 PROCEDURE — 99214 OFFICE O/P EST MOD 30 MIN: CPT | Mod: 24,S$GLB,,

## 2025-04-08 NOTE — PROGRESS NOTES
Subjective:       Patient ID: Jerardo Callejas is a 74 y.o. male.    Chief Complaint: f/u acute cystitis     This is a 74 y.o.  male patient that is new to me but not new to the system.  The patient was referred to me by Dr. Vasquez for VT. History of LUTS with frequent UTIs. S/p TURP 3/18/25.   3/21/25-- VT passed.  4/1/25-- Went to the ED with c/o fever, chills, dysuria, milky/white penile discharge, bilateral mid back pain. Diagnosed with UTI in the ED, started on bactrim.  Urine culture positive for E. Coli, resistant to bactrim, stopped bactrim and started on macrobid.   Today patient reports still having some dysuria. Incontinence from recent procedure, changing pads 2-3 times per day.  Daily fluids consist of 64-80oz water daily.      LAST PSA  Lab Results   Component Value Date    PSATOTAL 3.9 09/21/2022    PSAFREE 0.72 09/21/2022       Lab Results   Component Value Date    CREATININE 1.04 03/06/2025       ---  PMH/PSH/Medications/Allergies/Social history reviewed and as in chart.    Review of Systems   Constitutional:  Negative for activity change, chills and fever.   Respiratory:  Negative for shortness of breath.    Cardiovascular:  Negative for chest pain and palpitations.   Gastrointestinal:  Negative for abdominal pain and constipation.   Genitourinary:  Positive for difficulty urinating. Negative for dysuria, flank pain, frequency, hematuria and urgency.   Neurological:  Negative for dizziness and light-headedness.       Objective:      Physical Exam  HENT:      Head: Normocephalic.   Pulmonary:      Effort: Pulmonary effort is normal.   Genitourinary:     Comments: Bladder filled with 150cc of sterile water, balloon deflated and blackburn catheter removed without difficulty.  Musculoskeletal:         General: Normal range of motion.      Cervical back: Normal range of motion.   Skin:     General: Skin is warm and dry.   Neurological:      Mental Status: He is alert and oriented to person, place, and  time.         Assessment:     Problem Noted   Urge Incontinence of Urine 4/8/2025   S/P Urological Surgery 3/21/2025   Acute Cystitis 8/31/2022 4/1/25-- positive for e. Coli, course of macrobid         Plan:     Finish course of macrobid, stay hydrated.  Continue flomax and oxybutynin for now  PFPT referral placed  Follow-up 1 week for repeat urine and PVR.      WILLIAM Newman        I spent a total of 30 minutes on the day of the visit.  This includes face to face time and non-face to face time preparing to see the patient (eg, review of tests), obtaining and/or reviewing separately obtained history, documenting clinical information in the electronic or other health record, independently interpreting results and communicating results to the patient/family/caregiver, or care coordinator.

## 2025-04-09 ENCOUNTER — PATIENT OUTREACH (OUTPATIENT)
Facility: OTHER | Age: 75
End: 2025-04-09
Payer: MEDICARE

## 2025-04-09 NOTE — PROGRESS NOTES
Chart review confirms that the patient completed a urology appointment on 4/8/25. A follow-up call was made to assess for additional needs; however, there was no answer. A voicemail was left requesting the patient to return the call. Assistance will be provided as needed if the patient returns the call

## 2025-04-15 ENCOUNTER — OFFICE VISIT (OUTPATIENT)
Dept: UROLOGY | Facility: CLINIC | Age: 75
End: 2025-04-15
Payer: MEDICARE

## 2025-04-15 VITALS
BODY MASS INDEX: 30.83 KG/M2 | DIASTOLIC BLOOD PRESSURE: 75 MMHG | SYSTOLIC BLOOD PRESSURE: 145 MMHG | HEART RATE: 63 BPM | HEIGHT: 70 IN | WEIGHT: 215.38 LBS

## 2025-04-15 DIAGNOSIS — N39.0 RECURRENT UTI: Primary | ICD-10-CM

## 2025-04-15 LAB
BILIRUB SERPL-MCNC: ABNORMAL MG/DL
BLOOD URINE, POC: ABNORMAL
COLOR, POC UA: YELLOW
GLUCOSE UR QL STRIP: ABNORMAL
KETONES UR QL STRIP: ABNORMAL
LEUKOCYTE ESTERASE URINE, POC: ABNORMAL
NITRITE, POC UA: ABNORMAL
PH, POC UA: 7
POC RESIDUAL URINE VOLUME: 0 ML (ref 0–100)
PROTEIN, POC: ABNORMAL
SPECIFIC GRAVITY, POC UA: 1.01
UROBILINOGEN, POC UA: 1

## 2025-04-15 PROCEDURE — 3078F DIAST BP <80 MM HG: CPT | Mod: CPTII,S$GLB,,

## 2025-04-15 PROCEDURE — 1160F RVW MEDS BY RX/DR IN RCRD: CPT | Mod: CPTII,S$GLB,,

## 2025-04-15 PROCEDURE — 99999 PR PBB SHADOW E&M-EST. PATIENT-LVL IV: CPT | Mod: PBBFAC,,,

## 2025-04-15 PROCEDURE — 1159F MED LIST DOCD IN RCRD: CPT | Mod: CPTII,S$GLB,,

## 2025-04-15 PROCEDURE — 99213 OFFICE O/P EST LOW 20 MIN: CPT | Mod: 24,S$GLB,,

## 2025-04-15 PROCEDURE — 1125F AMNT PAIN NOTED PAIN PRSNT: CPT | Mod: CPTII,S$GLB,,

## 2025-04-15 PROCEDURE — 81002 URINALYSIS NONAUTO W/O SCOPE: CPT | Mod: S$GLB,,,

## 2025-04-15 PROCEDURE — 51798 US URINE CAPACITY MEASURE: CPT | Mod: S$GLB,,,

## 2025-04-15 PROCEDURE — 87186 SC STD MICRODIL/AGAR DIL: CPT

## 2025-04-15 PROCEDURE — 3077F SYST BP >= 140 MM HG: CPT | Mod: CPTII,S$GLB,,

## 2025-04-15 RX ORDER — CEPHALEXIN 500 MG/1
500 CAPSULE ORAL 2 TIMES DAILY
Qty: 42 CAPSULE | Refills: 0 | Status: SHIPPED | OUTPATIENT
Start: 2025-04-15 | End: 2025-05-06

## 2025-04-15 NOTE — PROGRESS NOTES
Subjective:       Patient ID: Jerardo Callejas is a 74 y.o. male.    Chief Complaint: f/u acute cystitis     This is a 74 y.o.  male patient that is new to me but not new to the system.  The patient was referred to me by Dr. Vasquez for VT. History of LUTS with frequent UTIs. S/p TURP 3/18/25.   3/21/25-- VT passed.  4/1/25-- Went to the ED with c/o fever, chills, dysuria, milky/white penile discharge, bilateral mid back pain. Diagnosed with UTI in the ED, started on bactrim.  Urine culture positive for E. Coli, resistant to bactrim, stopped bactrim and started on macrobid.   4/8/25-- Today patient reports still having some dysuria. Incontinence from recent procedure, changing pads 2-3 times per day.  Daily fluids consist of 64-80oz water daily.    4/15/25-- Here today to repeat urine collection.  No longer taking ditropan.   Taking tamsulosin.  Finished macrobid course. Reports that he is still having some dysuria. Denies fever, penile discharge, perineal pain.  PVR 0ml immediately after urinating in clinic  Urine dipstick- positive for Leukocytes, RBC, and Nitrites     LAST PSA  Lab Results   Component Value Date    PSATOTAL 3.9 09/21/2022    PSAFREE 0.72 09/21/2022       Lab Results   Component Value Date    CREATININE 1.04 03/06/2025       ---  PMH/PSH/Medications/Allergies/Social history reviewed and as in chart.    Review of Systems   Constitutional:  Negative for activity change, chills and fever.   Respiratory:  Negative for shortness of breath.    Cardiovascular:  Negative for chest pain and palpitations.   Gastrointestinal:  Negative for abdominal pain and constipation.   Genitourinary:  Positive for difficulty urinating. Negative for dysuria, flank pain, frequency, hematuria and urgency.   Neurological:  Negative for dizziness and light-headedness.       Objective:      Physical Exam  HENT:      Head: Normocephalic.   Pulmonary:      Effort: Pulmonary effort is normal.   Genitourinary:     Comments:  Bladder filled with 150cc of sterile water, balloon deflated and blackburn catheter removed without difficulty.  Musculoskeletal:         General: Normal range of motion.      Cervical back: Normal range of motion.   Skin:     General: Skin is warm and dry.   Neurological:      Mental Status: He is alert and oriented to person, place, and time.         Assessment:     Problem Noted   Urge Incontinence of Urine 4/8/2025   S/P Urological Surgery 3/21/2025   Recurrent Uti 9/6/2022    Recurrent UTI  Hospitalized 8/2022 for UTI  Klebsiella and E coli 8/2022  E coli 10/2022    BPH on tamsulosin for years    Persistent microhematuria on Uas  UTI 7/2024 E coli      Acute Cystitis 8/31/2022 4/1/25-- positive for e. Coli, course of macrobid         Plan:     Rx sent for keflex BID x21 days  Urine sent for urine culture  Continue flomax daily  PFPT referral placed  Follow-up pending urine culture results.      WILLIAM Newman        I spent a total of 20 minutes on the day of the visit.  This includes face to face time and non-face to face time preparing to see the patient (eg, review of tests), obtaining and/or reviewing separately obtained history, documenting clinical information in the electronic or other health record, independently interpreting results and communicating results to the patient/family/caregiver, or care coordinator.

## 2025-04-17 LAB — BACTERIA UR CULT: ABNORMAL

## 2025-04-21 ENCOUNTER — RESULTS FOLLOW-UP (OUTPATIENT)
Dept: UROLOGY | Facility: CLINIC | Age: 75
End: 2025-04-21

## 2025-05-01 PROBLEM — N81.89 PELVIC FLOOR WEAKNESS: Status: ACTIVE | Noted: 2025-05-01

## 2025-05-09 ENCOUNTER — OFFICE VISIT (OUTPATIENT)
Dept: UROLOGY | Facility: CLINIC | Age: 75
End: 2025-05-09
Payer: MEDICARE

## 2025-05-09 ENCOUNTER — LAB VISIT (OUTPATIENT)
Dept: LAB | Facility: HOSPITAL | Age: 75
End: 2025-05-09
Payer: MEDICARE

## 2025-05-09 VITALS
WEIGHT: 215.25 LBS | HEIGHT: 70 IN | SYSTOLIC BLOOD PRESSURE: 154 MMHG | DIASTOLIC BLOOD PRESSURE: 80 MMHG | HEART RATE: 61 BPM | BODY MASS INDEX: 30.82 KG/M2

## 2025-05-09 DIAGNOSIS — N39.0 RECURRENT UTI: ICD-10-CM

## 2025-05-09 DIAGNOSIS — N40.1 BENIGN PROSTATIC HYPERPLASIA WITH WEAK URINARY STREAM: ICD-10-CM

## 2025-05-09 DIAGNOSIS — N40.1 BENIGN PROSTATIC HYPERPLASIA WITH WEAK URINARY STREAM: Primary | ICD-10-CM

## 2025-05-09 DIAGNOSIS — R39.12 BENIGN PROSTATIC HYPERPLASIA WITH WEAK URINARY STREAM: ICD-10-CM

## 2025-05-09 DIAGNOSIS — R39.12 BENIGN PROSTATIC HYPERPLASIA WITH WEAK URINARY STREAM: Primary | ICD-10-CM

## 2025-05-09 LAB
PSA FREE MFR SERPL: 18.96 %
PSA FREE SERPL-MCNC: 0.4 NG/ML
PSA SERPL-MCNC: 2.11 NG/ML

## 2025-05-09 PROCEDURE — 99999 PR PBB SHADOW E&M-EST. PATIENT-LVL IV: CPT | Mod: PBBFAC,,,

## 2025-05-09 PROCEDURE — 84153 ASSAY OF PSA TOTAL: CPT

## 2025-05-09 PROCEDURE — 36415 COLL VENOUS BLD VENIPUNCTURE: CPT

## 2025-05-09 PROCEDURE — 87086 URINE CULTURE/COLONY COUNT: CPT

## 2025-05-09 RX ORDER — TAMSULOSIN HYDROCHLORIDE 0.4 MG/1
0.4 CAPSULE ORAL DAILY
Qty: 90 CAPSULE | Refills: 3 | Status: SHIPPED | OUTPATIENT
Start: 2025-05-09 | End: 2026-05-09

## 2025-05-09 NOTE — PROGRESS NOTES
Subjective:       Patient ID: Jerardo Callejas is a 75 y.o. male.    Chief Complaint: f/u acute cystitis     This is a 75 y.o.  male patient that is new to me but not new to the system.  The patient was referred to me by Dr. Vasquez for VT. History of LUTS with frequent UTIs. S/p TURP 3/18/25.   3/21/25-- VT passed.  4/1/25-- Went to the ED with c/o fever, chills, dysuria, milky/white penile discharge, bilateral mid back pain. Diagnosed with UTI in the ED, started on bactrim.  Urine culture positive for E. Coli, resistant to bactrim, stopped bactrim and started on macrobid.   4/8/25-- Today patient reports still having some dysuria. Incontinence from recent procedure, changing pads 2-3 times per day.  Daily fluids consist of 64-80oz water daily.    4/15/25-- Here today to repeat urine collection.  No longer taking ditropan.   Taking tamsulosin.  Finished macrobid course. Reports that he is still having some dysuria. Denies fever, penile discharge, perineal pain.  PVR 0ml immediately after urinating in clinic  Urine culture positive for E. Coli, treated with 21 days of keflex    5/9/25-- Here today for repeat UA, finished course of keflex.  Reports dysuria has resolved. Still having some incontinence but has gotten better, going to PFPT.     LAST PSA  Lab Results   Component Value Date    PSATOTAL 3.9 09/21/2022    PSAFREE 0.72 09/21/2022       Lab Results   Component Value Date    CREATININE 1.04 03/06/2025       ---  PMH/PSH/Medications/Allergies/Social history reviewed and as in chart.    Review of Systems   Constitutional:  Negative for activity change, chills and fever.   Respiratory:  Negative for shortness of breath.    Cardiovascular:  Negative for chest pain and palpitations.   Gastrointestinal:  Negative for abdominal pain and constipation.   Genitourinary:  Negative for difficulty urinating, dysuria, flank pain, frequency, hematuria and urgency.   Neurological:  Negative for dizziness and  light-headedness.       Objective:      Physical Exam  HENT:      Head: Normocephalic.   Pulmonary:      Effort: Pulmonary effort is normal.   Musculoskeletal:         General: Normal range of motion.      Cervical back: Normal range of motion.   Skin:     General: Skin is warm and dry.   Neurological:      Mental Status: He is alert and oriented to person, place, and time.       Assessment:     Problem Noted   Urge Incontinence of Urine 4/8/2025   S/P Urological Surgery 3/21/2025   Recurrent Uti 9/6/2022    Recurrent UTI  Hospitalized 8/2022 for UTI  Klebsiella and E coli 8/2022  E coli 10/2022    BPH on tamsulosin for years    Persistent microhematuria on Uas  UTI 7/2024 E coli      Acute Cystitis 8/31/2022 4/1/25-- positive for e. Coli, course of macrobid         Plan:     Continue flomax daily  Continue PFPT   PSA lab today  Urine culture in lab today  Follow-up 3 months      WILLIAM Newman        I spent a total of 20 minutes on the day of the visit.  This includes face to face time and non-face to face time preparing to see the patient (eg, review of tests), obtaining and/or reviewing separately obtained history, documenting clinical information in the electronic or other health record, independently interpreting results and communicating results to the patient/family/caregiver, or care coordinator.

## 2025-05-11 LAB — BACTERIA UR CULT: NO GROWTH

## 2025-05-12 ENCOUNTER — TELEPHONE (OUTPATIENT)
Dept: UROLOGY | Facility: CLINIC | Age: 75
End: 2025-05-12
Payer: MEDICARE

## 2025-05-12 NOTE — TELEPHONE ENCOUNTER
Called patient, gave him urine culture and PSA results. He can keep 3 month f/u. Patient voiced understanding.  -Marika Brooks NP

## 2025-06-26 ENCOUNTER — OFFICE VISIT (OUTPATIENT)
Facility: CLINIC | Age: 75
End: 2025-06-26
Payer: MEDICARE

## 2025-06-26 VITALS
OXYGEN SATURATION: 97 % | HEART RATE: 54 BPM | BODY MASS INDEX: 30.55 KG/M2 | WEIGHT: 213.38 LBS | SYSTOLIC BLOOD PRESSURE: 133 MMHG | DIASTOLIC BLOOD PRESSURE: 84 MMHG | HEIGHT: 70 IN

## 2025-06-26 DIAGNOSIS — R91.1 LUNG NODULE: Primary | ICD-10-CM

## 2025-06-26 PROCEDURE — 3079F DIAST BP 80-89 MM HG: CPT | Mod: CPTII,S$GLB,, | Performed by: INTERNAL MEDICINE

## 2025-06-26 PROCEDURE — 3075F SYST BP GE 130 - 139MM HG: CPT | Mod: CPTII,S$GLB,, | Performed by: INTERNAL MEDICINE

## 2025-06-26 PROCEDURE — 99212 OFFICE O/P EST SF 10 MIN: CPT | Mod: S$GLB,,, | Performed by: INTERNAL MEDICINE

## 2025-06-26 PROCEDURE — 99999 PR PBB SHADOW E&M-EST. PATIENT-LVL III: CPT | Mod: PBBFAC,,, | Performed by: INTERNAL MEDICINE

## 2025-06-26 PROCEDURE — 3288F FALL RISK ASSESSMENT DOCD: CPT | Mod: CPTII,S$GLB,, | Performed by: INTERNAL MEDICINE

## 2025-06-26 PROCEDURE — 1125F AMNT PAIN NOTED PAIN PRSNT: CPT | Mod: CPTII,S$GLB,, | Performed by: INTERNAL MEDICINE

## 2025-06-26 PROCEDURE — 1159F MED LIST DOCD IN RCRD: CPT | Mod: CPTII,S$GLB,, | Performed by: INTERNAL MEDICINE

## 2025-06-26 PROCEDURE — 1101F PT FALLS ASSESS-DOCD LE1/YR: CPT | Mod: CPTII,S$GLB,, | Performed by: INTERNAL MEDICINE

## 2025-06-26 NOTE — PROGRESS NOTES
Subjective:      Patient ID: Jerardo Callejas is a 75 y.o. male.    Chief Complaint: No chief complaint on file.    Jerardo Callejas is a 74 y.o. male with PMHx HTN, BPH, CAD, DVT, GERD, pulmonary nodules.      Last seen by Dr. Kearns in clinic 2024. During visit he was noted to have multiple stable ground glass pulmonary nodules. Repeat CT chest ordered at that time which again appeared stable compared to prior.      Today reports he has no issues with breathing. Denies fevers, chills, weight loss, night sweats, cough, shortness of breath, sputum production. Using walker for ambulation. Does not feel limited by breathing.      Pertinent History:  Pulm medications: none  Tobacco/Vape hx: quit , 2 packs per week x 15 years   EtOH/illicit drug use: denies  Occupational hx: worked in [employer] for 40+ years  GERD: periodically, not bothersome  Allergies/Rhinitis: occasional sinus issues   Vaccinations: pneumonia UTD, flu UTD, COVID not UTD     Pulmonary/Cardiology Testin/29/24 CT Chest Without Contrast  Unchanged calcified pleural plaques.  Unchanged 8 mm ground-glass nodule left lower lobe series 5, image 91   Unchanged 6 mm ground-glass nodule anterior right upper lobe image 42   Unchanged 5 mm nodule left lower lobe image 83     Interval hx 2025: Patient doing well.       Review of Systems   Respiratory:  Positive for shortness of breath and dyspnea on extertion.      Objective:     Physical Exam   Constitutional: He is oriented to person, place, and time. He appears well-developed and well-nourished.   Cardiovascular: Normal rate.   Pulmonary/Chest: Normal expansion. He has no rhonchi. He has no rales.   Neurological: He is alert and oriented to person, place, and time.   Skin: Skin is warm and dry.   Nursing note and vitals reviewed.    Personal Diagnostic Review  none pertinent      2025    10:03 AM 2025    10:34 AM 4/15/2025     1:16 PM 2025     3:04 PM 4/3/2025     8:18 PM  "4/3/2025     7:39 PM 4/3/2025     5:21 PM   Pulmonary Function Tests   SpO2 97 %    100 % 95 % 98 %   Height 5' 10" (1.778 m) 5' 10" (1.778 m) 5' 10" (1.778 m) 5' 10" (1.778 m)   5' 10" (1.778 m)   Weight 96.8 kg (213 lb 6.4 oz) 97.7 kg (215 lb 4.5 oz) 97.7 kg (215 lb 6.2 oz) 97 kg (213 lb 15.3 oz)   97.1 kg (214 lb)   BMI (Calculated) 30.6 30.9 30.9 30.7   30.7        Assessment:     No diagnosis found.     Encounter Medications[1]  No orders of the defined types were placed in this encounter.      Plan:   1. Lung nodule  Assessment & Plan:  Repeat scan in September 2025                  [1]   Outpatient Encounter Medications as of 6/26/2025   Medication Sig Dispense Refill    acetaminophen (TYLENOL) 500 MG tablet Take 1 tablet (500 mg total) by mouth every 6 (six) hours as needed for Pain. 60 tablet 0    ALPRAZolam (XANAX) 0.25 MG tablet Take 0.25 mg by mouth every 12 (twelve) hours as needed.      atorvastatin (LIPITOR) 40 MG tablet Take 1 tablet (40 mg total) by mouth once daily. 90 tablet 3    clonazePAM (KLONOPIN) 0.5 MG tablet Take 0.5 mg by mouth nightly as needed.      cloNIDine (CATAPRES) 0.1 MG tablet Take 0.1 mg by mouth 2 (two) times daily.      divalproex (DEPAKOTE) 250 MG EC tablet Take 750 mg by mouth 2 (two) times a day.      divalproex (DEPAKOTE) 500 MG TbEC Take 500 mg by mouth 2 (two) times daily.      ELIQUIS 5 mg Tab Take 5 mg by mouth 2 (two) times daily.      furosemide (LASIX) 20 MG tablet Take 20 mg by mouth every morning.      gabapentin (NEURONTIN) 600 MG tablet Take 600 mg by mouth 3 (three) times daily.      LIDOcaine (LIDODERM) 5 % Place 1 patch onto the skin once daily. Remove & Discard patch within 12 hours or as directed by MD Estrada patch 0    losartan-hydrochlorothiazide 50-12.5 mg (HYZAAR) 50-12.5 mg per tablet Take 1 tablet by mouth every morning.      rosuvastatin (CRESTOR) 5 MG tablet Take 5 mg by mouth once daily.      tamsulosin (FLOMAX) 0.4 mg Cap Take 1 capsule (0.4 mg total) " by mouth once daily. 90 capsule 3    vitamin D (VITAMIN D3) 1000 units Tab Take 1,000 Units by mouth once daily.      albuterol (PROVENTIL/VENTOLIN HFA) 90 mcg/actuation inhaler Inhale 2 puffs into the lungs every 4 (four) hours as needed for Wheezing or Shortness of Breath. Rescue 6.7 g 0    oxybutynin (DITROPAN-XL) 10 MG 24 hr tablet Take 1 tablet (10 mg total) by mouth daily as needed (bladder spasms). 30 tablet 2    sildenafil (REVATIO) 20 mg Tab Take 1 tablet (20 mg total) by mouth daily as needed (Take up to 5 tablets 1 hour prior to sexual activity on an empty stomach.). 30 tablet 5    [DISCONTINUED] amLODIPine (NORVASC) 5 MG tablet Take 1 tablet (5 mg total) by mouth once daily. 30 tablet 3    [DISCONTINUED] hydroCHLOROthiazide (HYDRODIURIL) 12.5 MG Tab Take 12.5 mg by mouth once daily.      [DISCONTINUED] lisinopril (PRINIVIL,ZESTRIL) 20 MG tablet Take 20 mg by mouth once daily.      [DISCONTINUED] metoprolol tartrate (LOPRESSOR) 25 MG tablet Take 12.5 mg by mouth 2 (two) times daily.       No facility-administered encounter medications on file as of 6/26/2025.

## 2025-07-24 ENCOUNTER — OFFICE VISIT (OUTPATIENT)
Dept: UROLOGY | Facility: CLINIC | Age: 75
End: 2025-07-24
Payer: MEDICARE

## 2025-07-24 VITALS
BODY MASS INDEX: 29.9 KG/M2 | SYSTOLIC BLOOD PRESSURE: 114 MMHG | HEART RATE: 61 BPM | DIASTOLIC BLOOD PRESSURE: 71 MMHG | HEIGHT: 70 IN | WEIGHT: 208.88 LBS

## 2025-07-24 DIAGNOSIS — R39.12 BENIGN PROSTATIC HYPERPLASIA WITH WEAK URINARY STREAM: Primary | ICD-10-CM

## 2025-07-24 DIAGNOSIS — N40.1 BENIGN PROSTATIC HYPERPLASIA WITH WEAK URINARY STREAM: Primary | ICD-10-CM

## 2025-07-24 DIAGNOSIS — R39.9 LOWER URINARY TRACT SYMPTOMS (LUTS): ICD-10-CM

## 2025-07-24 PROCEDURE — 99214 OFFICE O/P EST MOD 30 MIN: CPT | Mod: S$GLB,,, | Performed by: UROLOGY

## 2025-07-24 PROCEDURE — 1159F MED LIST DOCD IN RCRD: CPT | Mod: CPTII,S$GLB,, | Performed by: UROLOGY

## 2025-07-24 PROCEDURE — 99999 PR PBB SHADOW E&M-EST. PATIENT-LVL V: CPT | Mod: PBBFAC,,, | Performed by: UROLOGY

## 2025-07-24 PROCEDURE — G2211 COMPLEX E/M VISIT ADD ON: HCPCS | Mod: S$GLB,,, | Performed by: UROLOGY

## 2025-07-24 PROCEDURE — 3288F FALL RISK ASSESSMENT DOCD: CPT | Mod: CPTII,S$GLB,, | Performed by: UROLOGY

## 2025-07-24 PROCEDURE — 1101F PT FALLS ASSESS-DOCD LE1/YR: CPT | Mod: CPTII,S$GLB,, | Performed by: UROLOGY

## 2025-07-24 PROCEDURE — 3074F SYST BP LT 130 MM HG: CPT | Mod: CPTII,S$GLB,, | Performed by: UROLOGY

## 2025-07-24 PROCEDURE — 3078F DIAST BP <80 MM HG: CPT | Mod: CPTII,S$GLB,, | Performed by: UROLOGY

## 2025-07-24 PROCEDURE — 1125F AMNT PAIN NOTED PAIN PRSNT: CPT | Mod: CPTII,S$GLB,, | Performed by: UROLOGY

## 2025-07-24 PROCEDURE — 1160F RVW MEDS BY RX/DR IN RCRD: CPT | Mod: CPTII,S$GLB,, | Performed by: UROLOGY

## 2025-07-24 NOTE — PROGRESS NOTES
[facility]   Clinic Note    SUBJECTIVE:     Chief Complaint   Patient presents with    Follow-up    Benign Prostatic Hypertrophy       History of Present Illness:  Jerardo Callejas is a 75 y.o. male who presents to clinic for BPH.    H/o BPH on tamsulosin for years followed at OSH.  He had UTI in 11/2021 and two infections in 8/2022 with Klebsiella and E coli.  Was septic with recent infection and admitted to hospital 8/2022.  Reports years of LUTs.  Does have worsening frequency/urgency with UTI and sometimes dysuria.  Denies gross hematuria recently but had this years ago.  AUA SS 18/4, PVR ok.  H/o negative prostate biopsy in past.    01/16/2025  Doing well today  Here to establish care  No recent UTIs    02/20/2025  Returns after CT scan showing prostate 60-65 cc.   Still reports ED and P[facility]onie's. Reports strange feeling when taking either cialis or viagra but not chest pain.    07/24/2025  Still with dysuria and some lower urinary tract symptoms.  Recently had a negative urine culture.  Reports bothersome urgency frequency and occasionally has a weak stream and strains to void.  Feels like things are slightly better since he had his resume but not much.    PSA:  Lab Results   Component Value Date    PSA 2.11 05/09/2025    PSATOTAL 3.9 09/21/2022    PSAFREE 0.40 05/09/2025    PSAFREE 0.72 09/21/2022         Patient endorses no additional complaints at this time.    Past Medical History:   Diagnosis Date    Allergy     Back pain, chronic     BPH (benign prostatic hyperplasia)     DVT (deep venous thrombosis)     Hypertension     Mitral valve prolapse syndrome     Prostate enlargement     Urinary tract infection        Past Surgical History:   Procedure Laterality Date    CYSTOSCOPY WITH TRANSURETHRAL DESTRUCTION OF PROSTATE,RFA N/A 3/18/2025    Procedure: CYSTOSCOPY WITH TRANSURETHRAL DESTRUCTION OF PROSTATE,RFA;  Surgeon: Riley Vasquez MD;  Location: [facility];  Service: Urology;  Laterality: N/A;  keagan  generator and at least 2 handpieces  Contact [facility] to schedule    NECK SURGERY         Family History   Problem Relation Name Age of Onset    Hypertension Mother      Stroke Father         Social History     Tobacco Use    Smoking status: Former     Passive exposure: Past    Smokeless tobacco: Never    Tobacco comments:     >40 years ago   Substance Use Topics    Alcohol use: No    Drug use: No       Current Outpatient Medications on File Prior to Visit   Medication Sig Dispense Refill    acetaminophen (TYLENOL) 500 MG tablet Take 1 tablet (500 mg total) by mouth every 6 (six) hours as needed for Pain. 60 tablet 0    ALPRAZolam (XANAX) 0.25 MG tablet Take 0.25 mg by mouth every 12 (twelve) hours as needed.      clonazePAM (KLONOPIN) 0.5 MG tablet Take 0.5 mg by mouth nightly as needed.      cloNIDine (CATAPRES) 0.1 MG tablet Take 0.1 mg by mouth 2 (two) times daily.      divalproex (DEPAKOTE) 250 MG EC tablet Take 750 mg by mouth 2 (two) times a day.      divalproex (DEPAKOTE) 500 MG TbEC Take 500 mg by mouth 2 (two) times daily.      ELIQUIS 5 mg Tab Take 5 mg by mouth 2 (two) times daily.      furosemide (LASIX) 20 MG tablet Take 20 mg by mouth every morning.      gabapentin (NEURONTIN) 600 MG tablet Take 600 mg by mouth 3 (three) times daily.      LIDOcaine (LIDODERM) 5 % Place 1 patch onto the skin once daily. Remove & Discard patch within 12 hours or as directed by MD Estrada patch 0    losartan-hydrochlorothiazide 50-12.5 mg (HYZAAR) 50-12.5 mg per tablet Take 1 tablet by mouth every morning.      rosuvastatin (CRESTOR) 5 MG tablet Take 5 mg by mouth once daily.      tamsulosin (FLOMAX) 0.4 mg Cap Take 1 capsule (0.4 mg total) by mouth once daily. 90 capsule 3    vitamin D (VITAMIN D3) 1000 units Tab Take 1,000 Units by mouth once daily.      albuterol (PROVENTIL/VENTOLIN HFA) 90 mcg/actuation inhaler Inhale 2 puffs into the lungs every 4 (four) hours as needed for Wheezing or Shortness of Breath. Rescue 6.7  "g 0    atorvastatin (LIPITOR) 40 MG tablet Take 1 tablet (40 mg total) by mouth once daily. 90 tablet 3    oxybutynin (DITROPAN-XL) 10 MG 24 hr tablet Take 1 tablet (10 mg total) by mouth daily as needed (bladder spasms). 30 tablet 2    sildenafil (REVATIO) 20 mg Tab Take 1 tablet (20 mg total) by mouth daily as needed (Take up to 5 tablets 1 hour prior to sexual activity on an empty stomach.). 30 tablet 5    [DISCONTINUED] amLODIPine (NORVASC) 5 MG tablet Take 1 tablet (5 mg total) by mouth once daily. 30 tablet 3    [DISCONTINUED] hydroCHLOROthiazide (HYDRODIURIL) 12.5 MG Tab Take 12.5 mg by mouth once daily.      [DISCONTINUED] lisinopril (PRINIVIL,ZESTRIL) 20 MG tablet Take 20 mg by mouth once daily.      [DISCONTINUED] metoprolol tartrate (LOPRESSOR) 25 MG tablet Take 12.5 mg by mouth 2 (two) times daily.       No current facility-administered medications on file prior to visit.       Review of patient's allergies indicates:   Allergen Reactions    Ciprofloxacin Palpitations    Penicillins Palpitations and Anxiety       Review of Systems:  A review of 10+ systems was conducted with pertinent positive and negative findings documented in HPI with all other systems reviewed and negative.    OBJECTIVE:     Estimated body mass index is 29.97 kg/m² as calculated from the following:    Height as of this encounter: 5' 10" (1.778 m).    Weight as of this encounter: 94.7 kg (208 lb 14.2 oz).    Vital Signs (Most Recent)  Vitals:    07/24/25 1317   BP: 114/71   Pulse: 61       Physical Exam:  GENERAL: patient sitting comfortably  HEENT: normocephalic  NECK: supple, no JVD  PULM: normal chest rise, no increased WOB  HEART: non-diaphoretic  ABDO: soft, nondistended, nontender  BACK: no CVA tenderness bilaterally  SKIN: warm, dry, well perfused  EXT: no bruising or edema  NEURO: grossly normal with no focal deficits  PSYCH: appropriate mood and affect    Genitourinary Exam:  deferred    Lab Results   Component Value Date    " BUN 19 03/06/2025    CREATININE 1.04 03/06/2025    WBC 4.80 10/22/2024    HGB 14.2 10/22/2024    HCT 43.5 10/22/2024     10/22/2024    AST 37 10/22/2024    ALT 24 10/22/2024    ALKPHOS 80 10/22/2024    ALBUMIN 4.0 10/22/2024    HGBA1C 5.5 07/13/2023    INR 1.0 07/20/2021        Imaging:  I have personally reviewed all relevant imaging studies.    No results found for this or any previous visit (from the past 2160 hours).    No results found for this or any previous visit (from the past 2160 hours).  CT Renal Stone Study ABD Pelvis WO  Narrative: EXAMINATION:  CT RENAL STONE STUDY ABD PELVIS WO    CLINICAL HISTORY:  Flank pain, kidney stone suspected; Generalized abdominal pain    TECHNIQUE:  Low dose axial images, sagittal and coronal reformations were obtained from the lung bases to the pubic symphysis.  Oral contrast was not administered.    COMPARISON:  None    FINDINGS:  Heart: Normal size. No effusion.    Lung Bases: Clear.  Calcified pleural plaques are noted which can be seen with asbestos related pleural disease.    Liver: Normal size and attenuation.  Multiple hypodensities are seen throughout the liver bilaterally consistent with multiple cysts measuring up to 6.5 cm within the left hepatic lobe and 4.5 cm right hepatic lobe.    Gallbladder: No calcified gallstones.    Bile Ducts: No dilatation.    Pancreas: No obvious mass. No peripancreatic fat stranding.    Spleen: Normal.    Adrenals: Normal.    Kidneys/Ureters: No mass, hydroureteronephrosis, or nephroureterolithiasis.    Bladder: Mild nonspecific wall thickening.    Reproductive organs: Prostate is enlarged measuring 5.9 x 4.1 cm.  Recommend correlation PSA.    GI Tract/Mesentery: No evidence of bowel obstruction or inflammation.  Mild constipation.  Normal appendix.    Peritoneal Space: No ascites or free air.    Retroperitoneum: No significant adenopathy.    Abdominal wall: Small bilateral fat containing inguinal hernias.    Vasculature: No  aneurysm. Aorta demonstrates atherosclerotic disease.    Bones: No acute fracture.  Advanced degenerative changes throughout the lumbar spine.  Benign well corticated sclerotic lesion within the right iliac bone measuring 2.6 x 1.3 cm.  Impression: Nonspecific bladder wall thickening.  Recommend correlation with UA.    No evidence of obstructive uropathy.    Evaluation of solid organ and vascular pathology is limited due to lack of IV contrast.    All CT scans at this facility use dose modulation, iterative reconstruction, and/or weight based dosing when appropriate to reduce radiation dose to as low as reasonable achievable.    Electronically signed by: Jorge Winston MD  Date:    02/17/2025  Time:    12:01       PSA:  Lab Results   Component Value Date    PSA 2.11 05/09/2025    PSATOTAL 3.9 09/21/2022    PSAFREE 0.40 05/09/2025    PSAFREE 0.72 09/21/2022       Testosterone:  Lab Results   Component Value Date    TOTALTESTOST 336 09/21/2022    TESTOSTERONE 5.0 09/21/2022        ASSESSMENT     1. Benign prostatic hyperplasia with weak urinary stream    2. Lower urinary tract symptoms (LUTS)          PLAN:     - Still with bothersome dysuria and lower urinary tract symptoms status post Rezum.  Recently had a negative urine culture    - we will plan for prostate MRI and a cystoscopy in order to evaluate his outlet.  Patient may need urodynamics after that.    Riley Vasquez MD  Urology  [facility]  [place]        Disclaimer: This note has been generated using voice-recognition software. There may be typographical errors that have been missed during proof-reading.

## 2025-08-27 ENCOUNTER — HOSPITAL ENCOUNTER (OUTPATIENT)
Dept: RADIOLOGY | Facility: HOSPITAL | Age: 75
Discharge: HOME | End: 2025-08-27
Attending: UROLOGY
Payer: MEDICARE

## 2025-08-27 DIAGNOSIS — N40.1 BENIGN PROSTATIC HYPERPLASIA WITH WEAK URINARY STREAM: ICD-10-CM

## 2025-08-27 DIAGNOSIS — R39.9 LOWER URINARY TRACT SYMPTOMS (LUTS): ICD-10-CM

## 2025-08-27 DIAGNOSIS — R39.12 BENIGN PROSTATIC HYPERPLASIA WITH WEAK URINARY STREAM: ICD-10-CM

## 2025-08-27 PROCEDURE — A9585 GADOBUTROL INJECTION: HCPCS | Performed by: UROLOGY

## 2025-08-27 PROCEDURE — 72197 MRI PELVIS W/O & W/DYE: CPT | Mod: TC

## 2025-08-27 PROCEDURE — 72197 MRI PELVIS W/O & W/DYE: CPT | Mod: 26,,, | Performed by: RADIOLOGY

## 2025-08-27 PROCEDURE — 25500020 PHARM REV CODE 255: Performed by: UROLOGY

## 2025-08-27 RX ORDER — GADOBUTROL 604.72 MG/ML
10 INJECTION INTRAVENOUS
Status: COMPLETED | OUTPATIENT
Start: 2025-08-27 | End: 2025-08-27

## 2025-08-27 RX ADMIN — GADOBUTROL 10 ML: 604.72 INJECTION INTRAVENOUS at 08:08

## 2025-09-04 ENCOUNTER — PROCEDURE VISIT (OUTPATIENT)
Dept: UROLOGY | Facility: CLINIC | Age: 75
End: 2025-09-04
Payer: MEDICARE

## 2025-09-04 VITALS
BODY MASS INDEX: 30.55 KG/M2 | WEIGHT: 213.38 LBS | HEART RATE: 60 BPM | HEIGHT: 70 IN | SYSTOLIC BLOOD PRESSURE: 128 MMHG | DIASTOLIC BLOOD PRESSURE: 78 MMHG

## 2025-09-04 DIAGNOSIS — N32.81 OVERACTIVE BLADDER: ICD-10-CM

## 2025-09-04 DIAGNOSIS — R35.1 NOCTURIA: ICD-10-CM

## 2025-09-04 DIAGNOSIS — R39.12 BENIGN PROSTATIC HYPERPLASIA WITH WEAK URINARY STREAM: ICD-10-CM

## 2025-09-04 DIAGNOSIS — N40.1 BENIGN PROSTATIC HYPERPLASIA WITH WEAK URINARY STREAM: ICD-10-CM

## 2025-09-04 DIAGNOSIS — R39.15 URINARY URGENCY: Primary | ICD-10-CM

## 2025-09-04 DIAGNOSIS — R39.9 LOWER URINARY TRACT SYMPTOMS (LUTS): ICD-10-CM

## 2025-09-04 LAB — POC RESIDUAL URINE VOLUME: 16 ML (ref 0–100)

## 2025-09-04 PROCEDURE — 99499 UNLISTED E&M SERVICE: CPT | Mod: S$GLB,,, | Performed by: UROLOGY

## 2025-09-04 PROCEDURE — 52000 CYSTOURETHROSCOPY: CPT | Mod: S$GLB,,, | Performed by: UROLOGY

## 2025-09-04 PROCEDURE — 51798 US URINE CAPACITY MEASURE: CPT | Mod: S$GLB,,, | Performed by: UROLOGY

## 2025-09-04 RX ORDER — VIBEGRON 75 MG/1
1 TABLET, FILM COATED ORAL DAILY
Qty: 30 TABLET | Refills: 11 | Status: SHIPPED | OUTPATIENT
Start: 2025-09-04 | End: 2026-09-04

## (undated) DEVICE — JELLY LUBRICANT STERILE 4 OZ

## (undated) DEVICE — DRAPE T CYSTOSCOPY STERILE

## (undated) DEVICE — SYR MEDALLION 10 ML

## (undated) DEVICE — CONTAINER MULTIPURPOSE/SPECIME

## (undated) DEVICE — SET BLLN ULTRAXX NEPHROSTOMY

## (undated) DEVICE — TOWEL OR XRAY BLUE 17X26IN

## (undated) DEVICE — GOWN SURGICAL XX LARGE X LONG

## (undated) DEVICE — BAG LINGEMAN DRAIN UROLOGY

## (undated) DEVICE — GLOVE BIOGEL SKINSENSE PI 7.0

## (undated) DEVICE — DEVICE SNAPSECURE FOL CATH

## (undated) DEVICE — SYR ONLY LUER LOCK 20CC

## (undated) DEVICE — SCRUB DYNA-HEX LIQ 4% CHG 4OZ

## (undated) DEVICE — SOL NACL IRR 3000ML

## (undated) DEVICE — CATH URTRL OPEN END STR TIP 5F

## (undated) DEVICE — COVER TABLE HVY DTY 60X90IN

## (undated) DEVICE — GUIDE WIRE MOTION .035 X 150CM

## (undated) DEVICE — BAG PRESSURE INFUSER 3000CC

## (undated) DEVICE — CATH ELASTMR STR TIP 18FR 10ML

## (undated) DEVICE — SPONGE COTTON TRAY 4X4IN

## (undated) DEVICE — SET IRR URLGY 2LINE UNIV SPIKE

## (undated) DEVICE — EXTRACTOR TIPLESS 2.4FRX1115CM